# Patient Record
Sex: FEMALE | Race: WHITE | NOT HISPANIC OR LATINO | Employment: OTHER | URBAN - NONMETROPOLITAN AREA
[De-identification: names, ages, dates, MRNs, and addresses within clinical notes are randomized per-mention and may not be internally consistent; named-entity substitution may affect disease eponyms.]

---

## 2017-07-02 ENCOUNTER — HOSPITAL ENCOUNTER (EMERGENCY)
Facility: HOSPITAL | Age: 72
Discharge: HOME | End: 2017-07-02
Attending: EMERGENCY MEDICINE | Admitting: EMERGENCY MEDICINE

## 2017-07-02 VITALS
DIASTOLIC BLOOD PRESSURE: 93 MMHG | HEIGHT: 60 IN | RESPIRATION RATE: 14 BRPM | WEIGHT: 157.4 LBS | HEART RATE: 79 BPM | OXYGEN SATURATION: 96 % | BODY MASS INDEX: 30.9 KG/M2 | TEMPERATURE: 98.7 F | SYSTOLIC BLOOD PRESSURE: 169 MMHG
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
## 2017-07-02 PROCEDURE — 99282 EMERGENCY DEPT VISIT SF MDM: CPT

## 2017-07-02 RX ORDER — DOXYCYCLINE HYCLATE 100 MG/1
100 TABLET, DELAYED RELEASE ORAL 2 TIMES DAILY
Qty: 28 TABLET | Refills: 0 | Status: ON HOLD | OUTPATIENT
Start: 2017-07-02 | End: 2020-05-02

## 2017-07-02 NOTE — ED PROVIDER NOTES
Subjective   HPI Comments: 71-year-old female presents for our facility via private vehicle with family members at bedside with a complaint of a tick bite that is partially removed from her back.    She has noticed this tick bite for the past several days and itching type sensation.  She did not know what it was up until yesterday when her son noticed it was a tick and he attempted to remove it partially removed and now still has part of tick in bedded in the mid back area    There is no fever no chills no nausea no vomiting.      History provided by:  Patient   used: No        Review of Systems   Constitutional: Negative.    HENT: Negative.    Eyes: Negative.    Respiratory: Negative.    Cardiovascular: Negative.    Gastrointestinal: Negative.    Endocrine: Negative.    Musculoskeletal: Negative.    Neurological: Negative.    Hematological: Negative.    Psychiatric/Behavioral: Negative.        Past Medical History:   Diagnosis Date   • Depression    • Hypercholesteremia    • Hypertension    • TIA (transient ischemic attack)        No Known Allergies    Past Surgical History:   Procedure Laterality Date   • HYSTERECTOMY     • SKIN CANCER EXCISION         History reviewed. No pertinent family history.    Social History     Social History   • Marital status:      Spouse name: N/A   • Number of children: N/A   • Years of education: N/A     Social History Main Topics   • Smoking status: Never Smoker   • Smokeless tobacco: None   • Alcohol use No   • Drug use: No   • Sexual activity: Defer     Other Topics Concern   • None     Social History Narrative   • None           Objective   Physical Exam   Constitutional: She is oriented to person, place, and time. She appears well-developed and well-nourished.   HENT:   Head: Normocephalic.   Eyes: EOM are normal. Pupils are equal, round, and reactive to light.   Neck: Normal range of motion. Neck supple.   Cardiovascular: Normal rate.     Pulmonary/Chest: Effort normal and breath sounds normal.   Abdominal: Soft. Bowel sounds are normal.   Musculoskeletal: Normal range of motion.   Neurological: She is alert and oriented to person, place, and time. She has normal reflexes.   Skin:   Mid back area in the central area of the spine there is a central lesion that measures several millimeters there appears to be consistent with a tick its not lives moving his embedded into the skin    Surrounding area there appears to be no discoloration and no fluctuation no increasing temperature.   Psychiatric: She has a normal mood and affect. Her behavior is normal. Judgment and thought content normal.   Vitals reviewed.      Procedures         ED Course  ED Course   Comment By Time   Seizure note.  The area was cleaned with Betadine using 11 blade was able to scrape the pharmacy which of the ears to be consistent with a tick minimum bleeding she tolerated this well Chintan PETE MD 07/02 1240                  MDM  Number of Diagnoses or Management Options  Tick bite, sequela: new and does not require workup  Risk of Complications, Morbidity, and/or Mortality  Presenting problems: high  Diagnostic procedures: high  Management options: high    Patient Progress  Patient progress: stable      Final diagnoses:   Tick bite, sequela            Chintan PETE MD  07/02/17 7309

## 2017-07-02 NOTE — DISCHARGE INSTRUCTIONS
Keep area clean with antibiotic ointment.  Take antibiotics twice a day  If symptoms increase or shortness change return back to the ER.

## 2020-05-01 ENCOUNTER — HOSPITAL ENCOUNTER (INPATIENT)
Facility: HOSPITAL | Age: 75
LOS: 5 days | Discharge: NURSING, REHAB OR LONG-TERM CARE | End: 2020-05-06
Attending: EMERGENCY MEDICINE | Admitting: FAMILY MEDICINE

## 2020-05-01 ENCOUNTER — APPOINTMENT (OUTPATIENT)
Dept: CT IMAGING | Facility: HOSPITAL | Age: 75
End: 2020-05-01

## 2020-05-01 ENCOUNTER — APPOINTMENT (OUTPATIENT)
Dept: GENERAL RADIOLOGY | Facility: HOSPITAL | Age: 75
End: 2020-05-01

## 2020-05-01 DIAGNOSIS — R00.1 BRADYCARDIA, UNSPECIFIED: ICD-10-CM

## 2020-05-01 DIAGNOSIS — Z78.9 DECREASED ACTIVITIES OF DAILY LIVING (ADL): ICD-10-CM

## 2020-05-01 DIAGNOSIS — Z74.09 IMPAIRED MOBILITY: ICD-10-CM

## 2020-05-01 DIAGNOSIS — R29.898 LEFT ARM WEAKNESS: Primary | ICD-10-CM

## 2020-05-01 DIAGNOSIS — R13.10 DYSPHAGIA, UNSPECIFIED TYPE: ICD-10-CM

## 2020-05-01 DIAGNOSIS — R47.1 DYSARTHRIA: ICD-10-CM

## 2020-05-01 DIAGNOSIS — I63.9 CEREBROVASCULAR ACCIDENT (CVA), UNSPECIFIED MECHANISM (HCC): ICD-10-CM

## 2020-05-01 LAB
ABO GROUP BLD: NORMAL
ALBUMIN SERPL-MCNC: 4.5 G/DL (ref 3.5–5.2)
ALBUMIN/GLOB SERPL: 1.6 G/DL
ALP SERPL-CCNC: 94 U/L (ref 39–117)
ALT SERPL W P-5'-P-CCNC: 17 U/L (ref 1–33)
ANION GAP SERPL CALCULATED.3IONS-SCNC: 16 MMOL/L (ref 5–15)
AST SERPL-CCNC: 16 U/L (ref 1–32)
BASOPHILS # BLD AUTO: 0.06 10*3/MM3 (ref 0–0.2)
BASOPHILS NFR BLD AUTO: 0.6 % (ref 0–1.5)
BILIRUB SERPL-MCNC: 0.4 MG/DL (ref 0.2–1.2)
BLD GP AB SCN SERPL QL: NEGATIVE
BUN BLD-MCNC: 18 MG/DL (ref 8–23)
BUN/CREAT SERPL: 19.4 (ref 7–25)
CALCIUM SPEC-SCNC: 10.1 MG/DL (ref 8.6–10.5)
CHLORIDE SERPL-SCNC: 100 MMOL/L (ref 98–107)
CO2 SERPL-SCNC: 26 MMOL/L (ref 22–29)
CREAT BLD-MCNC: 0.93 MG/DL (ref 0.57–1)
DEPRECATED RDW RBC AUTO: 42.5 FL (ref 37–54)
EOSINOPHIL # BLD AUTO: 0.12 10*3/MM3 (ref 0–0.4)
EOSINOPHIL NFR BLD AUTO: 1.2 % (ref 0.3–6.2)
ERYTHROCYTE [DISTWIDTH] IN BLOOD BY AUTOMATED COUNT: 13 % (ref 12.3–15.4)
GFR SERPL CREATININE-BSD FRML MDRD: 59 ML/MIN/1.73
GLOBULIN UR ELPH-MCNC: 2.9 GM/DL
GLUCOSE BLD-MCNC: 214 MG/DL (ref 65–99)
GLUCOSE BLDC GLUCOMTR-MCNC: 207 MG/DL (ref 70–130)
HCT VFR BLD AUTO: 41.3 % (ref 34–46.6)
HGB BLD-MCNC: 13.7 G/DL (ref 12–15.9)
HOLD SPECIMEN: NORMAL
HOLD SPECIMEN: NORMAL
IMM GRANULOCYTES # BLD AUTO: 0.04 10*3/MM3 (ref 0–0.05)
IMM GRANULOCYTES NFR BLD AUTO: 0.4 % (ref 0–0.5)
INR PPP: 1.03 (ref 0.91–1.09)
LYMPHOCYTES # BLD AUTO: 2.8 10*3/MM3 (ref 0.7–3.1)
LYMPHOCYTES NFR BLD AUTO: 29.1 % (ref 19.6–45.3)
MCH RBC QN AUTO: 29.5 PG (ref 26.6–33)
MCHC RBC AUTO-ENTMCNC: 33.2 G/DL (ref 31.5–35.7)
MCV RBC AUTO: 89 FL (ref 79–97)
MONOCYTES # BLD AUTO: 0.77 10*3/MM3 (ref 0.1–0.9)
MONOCYTES NFR BLD AUTO: 8 % (ref 5–12)
NEUTROPHILS # BLD AUTO: 5.84 10*3/MM3 (ref 1.7–7)
NEUTROPHILS NFR BLD AUTO: 60.7 % (ref 42.7–76)
NRBC BLD AUTO-RTO: 0 /100 WBC (ref 0–0.2)
NT-PROBNP SERPL-MCNC: 86.3 PG/ML (ref 5–1800)
PLATELET # BLD AUTO: 268 10*3/MM3 (ref 140–450)
PMV BLD AUTO: 10.7 FL (ref 6–12)
POTASSIUM BLD-SCNC: 4.6 MMOL/L (ref 3.5–5.2)
PROT SERPL-MCNC: 7.4 G/DL (ref 6–8.5)
PROTHROMBIN TIME: 13.1 SECONDS (ref 11.9–14.6)
RBC # BLD AUTO: 4.64 10*6/MM3 (ref 3.77–5.28)
RH BLD: POSITIVE
SODIUM BLD-SCNC: 142 MMOL/L (ref 136–145)
T&S EXPIRATION DATE: NORMAL
TROPONIN T SERPL-MCNC: <0.01 NG/ML (ref 0–0.03)
WBC NRBC COR # BLD: 9.63 10*3/MM3 (ref 3.4–10.8)
WHOLE BLOOD HOLD SPECIMEN: NORMAL
WHOLE BLOOD HOLD SPECIMEN: NORMAL

## 2020-05-01 PROCEDURE — 99284 EMERGENCY DEPT VISIT MOD MDM: CPT

## 2020-05-01 PROCEDURE — 70496 CT ANGIOGRAPHY HEAD: CPT

## 2020-05-01 PROCEDURE — 85610 PROTHROMBIN TIME: CPT | Performed by: EMERGENCY MEDICINE

## 2020-05-01 PROCEDURE — 86850 RBC ANTIBODY SCREEN: CPT | Performed by: EMERGENCY MEDICINE

## 2020-05-01 PROCEDURE — 83880 ASSAY OF NATRIURETIC PEPTIDE: CPT | Performed by: EMERGENCY MEDICINE

## 2020-05-01 PROCEDURE — 80053 COMPREHEN METABOLIC PANEL: CPT | Performed by: EMERGENCY MEDICINE

## 2020-05-01 PROCEDURE — 85025 COMPLETE CBC W/AUTO DIFF WBC: CPT | Performed by: EMERGENCY MEDICINE

## 2020-05-01 PROCEDURE — 71045 X-RAY EXAM CHEST 1 VIEW: CPT

## 2020-05-01 PROCEDURE — 70450 CT HEAD/BRAIN W/O DYE: CPT

## 2020-05-01 PROCEDURE — 93010 ELECTROCARDIOGRAM REPORT: CPT | Performed by: INTERNAL MEDICINE

## 2020-05-01 PROCEDURE — 84484 ASSAY OF TROPONIN QUANT: CPT | Performed by: EMERGENCY MEDICINE

## 2020-05-01 PROCEDURE — 86901 BLOOD TYPING SEROLOGIC RH(D): CPT | Performed by: EMERGENCY MEDICINE

## 2020-05-01 PROCEDURE — 36415 COLL VENOUS BLD VENIPUNCTURE: CPT

## 2020-05-01 PROCEDURE — 70498 CT ANGIOGRAPHY NECK: CPT

## 2020-05-01 PROCEDURE — 86900 BLOOD TYPING SEROLOGIC ABO: CPT | Performed by: EMERGENCY MEDICINE

## 2020-05-01 PROCEDURE — 0 IOPAMIDOL PER 1 ML: Performed by: EMERGENCY MEDICINE

## 2020-05-01 PROCEDURE — 82962 GLUCOSE BLOOD TEST: CPT

## 2020-05-01 PROCEDURE — 93005 ELECTROCARDIOGRAM TRACING: CPT | Performed by: EMERGENCY MEDICINE

## 2020-05-01 RX ORDER — CITALOPRAM 40 MG/1
40 TABLET ORAL DAILY
COMMUNITY

## 2020-05-01 RX ORDER — SODIUM CHLORIDE 0.9 % (FLUSH) 0.9 %
10 SYRINGE (ML) INJECTION AS NEEDED
Status: DISCONTINUED | OUTPATIENT
Start: 2020-05-01 | End: 2020-05-06 | Stop reason: HOSPADM

## 2020-05-01 RX ORDER — ATORVASTATIN CALCIUM 10 MG/1
10 TABLET, FILM COATED ORAL NIGHTLY
Status: DISCONTINUED | OUTPATIENT
Start: 2020-05-01 | End: 2020-05-02

## 2020-05-01 RX ORDER — SODIUM CHLORIDE 0.9 % (FLUSH) 0.9 %
10 SYRINGE (ML) INJECTION EVERY 12 HOURS SCHEDULED
Status: DISCONTINUED | OUTPATIENT
Start: 2020-05-01 | End: 2020-05-06 | Stop reason: HOSPADM

## 2020-05-01 RX ORDER — AMLODIPINE BESYLATE AND BENAZEPRIL HYDROCHLORIDE 5; 20 MG/1; MG/1
1 CAPSULE ORAL DAILY
COMMUNITY

## 2020-05-01 RX ORDER — CLOPIDOGREL BISULFATE 75 MG/1
75 TABLET ORAL DAILY
Status: DISCONTINUED | OUTPATIENT
Start: 2020-05-02 | End: 2020-05-05

## 2020-05-01 RX ORDER — SODIUM CHLORIDE 9 MG/ML
100 INJECTION, SOLUTION INTRAVENOUS CONTINUOUS
Status: DISCONTINUED | OUTPATIENT
Start: 2020-05-01 | End: 2020-05-05

## 2020-05-01 RX ORDER — CITALOPRAM 20 MG/1
40 TABLET ORAL DAILY
Status: DISCONTINUED | OUTPATIENT
Start: 2020-05-02 | End: 2020-05-06 | Stop reason: HOSPADM

## 2020-05-01 RX ORDER — SIMVASTATIN 40 MG
40 TABLET ORAL NIGHTLY
COMMUNITY
End: 2020-05-06 | Stop reason: HOSPADM

## 2020-05-01 RX ORDER — CLOPIDOGREL BISULFATE 75 MG/1
75 TABLET ORAL DAILY
COMMUNITY

## 2020-05-01 RX ADMIN — ATORVASTATIN CALCIUM 10 MG: 10 TABLET, FILM COATED ORAL at 23:46

## 2020-05-01 RX ADMIN — IOPAMIDOL 82 ML: 755 INJECTION, SOLUTION INTRAVENOUS at 18:23

## 2020-05-01 RX ADMIN — SODIUM CHLORIDE 100 ML/HR: 9 INJECTION, SOLUTION INTRAVENOUS at 23:47

## 2020-05-01 RX ADMIN — SODIUM CHLORIDE, PRESERVATIVE FREE 10 ML: 5 INJECTION INTRAVENOUS at 23:47

## 2020-05-02 ENCOUNTER — APPOINTMENT (OUTPATIENT)
Dept: CARDIOLOGY | Facility: HOSPITAL | Age: 75
End: 2020-05-02

## 2020-05-02 ENCOUNTER — APPOINTMENT (OUTPATIENT)
Dept: MRI IMAGING | Facility: HOSPITAL | Age: 75
End: 2020-05-02

## 2020-05-02 PROBLEM — I16.0 HYPERTENSIVE URGENCY: Status: ACTIVE | Noted: 2020-05-02

## 2020-05-02 PROBLEM — E78.00 HYPERCHOLESTEROLEMIA: Status: ACTIVE | Noted: 2020-05-02

## 2020-05-02 PROBLEM — I63.9 STROKE (HCC): Status: ACTIVE | Noted: 2020-05-02

## 2020-05-02 PROBLEM — I10 ESSENTIAL HYPERTENSION: Status: ACTIVE | Noted: 2020-05-02

## 2020-05-02 LAB
ANION GAP SERPL CALCULATED.3IONS-SCNC: 15 MMOL/L (ref 5–15)
BASOPHILS # BLD AUTO: 0.07 10*3/MM3 (ref 0–0.2)
BASOPHILS NFR BLD AUTO: 0.7 % (ref 0–1.5)
BH CV ECHO MEAS - AO MAX PG (FULL): 4.1 MMHG
BH CV ECHO MEAS - AO MAX PG: 7 MMHG
BH CV ECHO MEAS - AO MEAN PG (FULL): 2 MMHG
BH CV ECHO MEAS - AO MEAN PG: 3 MMHG
BH CV ECHO MEAS - AO ROOT AREA (BSA CORRECTED): 1.8
BH CV ECHO MEAS - AO ROOT AREA: 7.3 CM^2
BH CV ECHO MEAS - AO ROOT DIAM: 3.1 CM
BH CV ECHO MEAS - AO V2 MAX: 132 CM/SEC
BH CV ECHO MEAS - AO V2 MEAN: 71.4 CM/SEC
BH CV ECHO MEAS - AO V2 VTI: 23.9 CM
BH CV ECHO MEAS - AVA(I,A): 2.2 CM^2
BH CV ECHO MEAS - AVA(I,D): 2.2 CM^2
BH CV ECHO MEAS - AVA(V,A): 2 CM^2
BH CV ECHO MEAS - AVA(V,D): 2 CM^2
BH CV ECHO MEAS - BSA(HAYCOCK): 1.7 M^2
BH CV ECHO MEAS - BSA: 1.7 M^2
BH CV ECHO MEAS - BZI_BMI: 29.9 KILOGRAMS/M^2
BH CV ECHO MEAS - BZI_METRIC_HEIGHT: 152.4 CM
BH CV ECHO MEAS - BZI_METRIC_WEIGHT: 69.4 KG
BH CV ECHO MEAS - EDV(CUBED): 71.5 ML
BH CV ECHO MEAS - EDV(MOD-SP4): 52.3 ML
BH CV ECHO MEAS - EDV(TEICH): 76.4 ML
BH CV ECHO MEAS - EF(CUBED): 75.4 %
BH CV ECHO MEAS - EF(MOD-SP4): 66.5 %
BH CV ECHO MEAS - EF(TEICH): 67.8 %
BH CV ECHO MEAS - ESV(CUBED): 17.6 ML
BH CV ECHO MEAS - ESV(MOD-SP4): 17.5 ML
BH CV ECHO MEAS - ESV(TEICH): 24.6 ML
BH CV ECHO MEAS - FS: 37.3 %
BH CV ECHO MEAS - IVS/LVPW: 1.4
BH CV ECHO MEAS - IVSD: 1.9 CM
BH CV ECHO MEAS - LA DIMENSION: 3.4 CM
BH CV ECHO MEAS - LA/AO: 1.1
BH CV ECHO MEAS - LAT PEAK E' VEL: 5.6 CM/SEC
BH CV ECHO MEAS - LV DIASTOLIC VOL/BSA (35-75): 31.4 ML/M^2
BH CV ECHO MEAS - LV MASS(C)D: 278.3 GRAMS
BH CV ECHO MEAS - LV MASS(C)DI: 167.1 GRAMS/M^2
BH CV ECHO MEAS - LV MAX PG: 2.8 MMHG
BH CV ECHO MEAS - LV MEAN PG: 1 MMHG
BH CV ECHO MEAS - LV SYSTOLIC VOL/BSA (12-30): 10.5 ML/M^2
BH CV ECHO MEAS - LV V1 MAX: 84.1 CM/SEC
BH CV ECHO MEAS - LV V1 MEAN: 41.7 CM/SEC
BH CV ECHO MEAS - LV V1 VTI: 16.8 CM
BH CV ECHO MEAS - LVIDD: 4.2 CM
BH CV ECHO MEAS - LVIDS: 2.6 CM
BH CV ECHO MEAS - LVLD AP4: 7.6 CM
BH CV ECHO MEAS - LVLS AP4: 5.6 CM
BH CV ECHO MEAS - LVOT AREA (M): 3.1 CM^2
BH CV ECHO MEAS - LVOT AREA: 3.1 CM^2
BH CV ECHO MEAS - LVOT DIAM: 2 CM
BH CV ECHO MEAS - LVPWD: 1.3 CM
BH CV ECHO MEAS - MED PEAK E' VEL: 4.35 CM/SEC
BH CV ECHO MEAS - MV A MAX VEL: 140 CM/SEC
BH CV ECHO MEAS - MV DEC TIME: 0.21 SEC
BH CV ECHO MEAS - MV E MAX VEL: 73.3 CM/SEC
BH CV ECHO MEAS - MV E/A: 0.52
BH CV ECHO MEAS - RVDD: 3.4 CM
BH CV ECHO MEAS - SI(AO): 104.8 ML/M^2
BH CV ECHO MEAS - SI(CUBED): 32.4 ML/M^2
BH CV ECHO MEAS - SI(LVOT): 31.7 ML/M^2
BH CV ECHO MEAS - SI(MOD-SP4): 20.9 ML/M^2
BH CV ECHO MEAS - SI(TEICH): 31.1 ML/M^2
BH CV ECHO MEAS - SV(AO): 174.6 ML
BH CV ECHO MEAS - SV(CUBED): 53.9 ML
BH CV ECHO MEAS - SV(LVOT): 52.8 ML
BH CV ECHO MEAS - SV(MOD-SP4): 34.8 ML
BH CV ECHO MEAS - SV(TEICH): 51.8 ML
BH CV ECHO MEASUREMENTS AVERAGE E/E' RATIO: 14.73
BUN BLD-MCNC: 20 MG/DL (ref 8–23)
BUN/CREAT SERPL: 19.2 (ref 7–25)
CALCIUM SPEC-SCNC: 10 MG/DL (ref 8.6–10.5)
CHLORIDE SERPL-SCNC: 102 MMOL/L (ref 98–107)
CHOLEST SERPL-MCNC: 184 MG/DL (ref 0–200)
CO2 SERPL-SCNC: 23 MMOL/L (ref 22–29)
CREAT BLD-MCNC: 1.04 MG/DL (ref 0.57–1)
DEPRECATED RDW RBC AUTO: 42.5 FL (ref 37–54)
EOSINOPHIL # BLD AUTO: 0.16 10*3/MM3 (ref 0–0.4)
EOSINOPHIL NFR BLD AUTO: 1.5 % (ref 0.3–6.2)
ERYTHROCYTE [DISTWIDTH] IN BLOOD BY AUTOMATED COUNT: 13.2 % (ref 12.3–15.4)
GFR SERPL CREATININE-BSD FRML MDRD: 52 ML/MIN/1.73
GLUCOSE BLD-MCNC: 265 MG/DL (ref 65–99)
GLUCOSE BLDC GLUCOMTR-MCNC: 212 MG/DL (ref 70–130)
GLUCOSE BLDC GLUCOMTR-MCNC: 262 MG/DL (ref 70–130)
HBA1C MFR BLD: 8.9 % (ref 4.8–5.6)
HCT VFR BLD AUTO: 39.1 % (ref 34–46.6)
HDLC SERPL-MCNC: 33 MG/DL (ref 40–60)
HGB BLD-MCNC: 13 G/DL (ref 12–15.9)
IMM GRANULOCYTES # BLD AUTO: 0.01 10*3/MM3 (ref 0–0.05)
IMM GRANULOCYTES NFR BLD AUTO: 0.1 % (ref 0–0.5)
LDLC SERPL CALC-MCNC: 85 MG/DL (ref 0–100)
LDLC/HDLC SERPL: 2.57 {RATIO}
LEFT ATRIUM VOLUME INDEX: 11.9 ML/M2
LEFT ATRIUM VOLUME: 19.8 CM3
LYMPHOCYTES # BLD AUTO: 4.07 10*3/MM3 (ref 0.7–3.1)
LYMPHOCYTES NFR BLD AUTO: 38.4 % (ref 19.6–45.3)
MAXIMAL PREDICTED HEART RATE: 145 BPM
MCH RBC QN AUTO: 29.4 PG (ref 26.6–33)
MCHC RBC AUTO-ENTMCNC: 33.2 G/DL (ref 31.5–35.7)
MCV RBC AUTO: 88.5 FL (ref 79–97)
MONOCYTES # BLD AUTO: 0.91 10*3/MM3 (ref 0.1–0.9)
MONOCYTES NFR BLD AUTO: 8.6 % (ref 5–12)
NEUTROPHILS # BLD AUTO: 5.37 10*3/MM3 (ref 1.7–7)
NEUTROPHILS NFR BLD AUTO: 50.7 % (ref 42.7–76)
NRBC BLD AUTO-RTO: 0 /100 WBC (ref 0–0.2)
PLATELET # BLD AUTO: 278 10*3/MM3 (ref 140–450)
PMV BLD AUTO: 10.7 FL (ref 6–12)
POTASSIUM BLD-SCNC: 3.8 MMOL/L (ref 3.5–5.2)
RBC # BLD AUTO: 4.42 10*6/MM3 (ref 3.77–5.28)
SODIUM BLD-SCNC: 140 MMOL/L (ref 136–145)
STRESS TARGET HR: 123 BPM
TRIGL SERPL-MCNC: 331 MG/DL (ref 0–150)
VIT B12 BLD-MCNC: 337 PG/ML (ref 211–946)
VLDLC SERPL-MCNC: 66.2 MG/DL
WBC NRBC COR # BLD: 10.59 10*3/MM3 (ref 3.4–10.8)

## 2020-05-02 PROCEDURE — 82962 GLUCOSE BLOOD TEST: CPT

## 2020-05-02 PROCEDURE — 97166 OT EVAL MOD COMPLEX 45 MIN: CPT

## 2020-05-02 PROCEDURE — 80061 LIPID PANEL: CPT | Performed by: NURSE PRACTITIONER

## 2020-05-02 PROCEDURE — 97110 THERAPEUTIC EXERCISES: CPT

## 2020-05-02 PROCEDURE — 63710000001 INSULIN LISPRO (HUMAN) PER 5 UNITS: Performed by: FAMILY MEDICINE

## 2020-05-02 PROCEDURE — 83036 HEMOGLOBIN GLYCOSYLATED A1C: CPT | Performed by: NURSE PRACTITIONER

## 2020-05-02 PROCEDURE — 85025 COMPLETE CBC W/AUTO DIFF WBC: CPT | Performed by: FAMILY MEDICINE

## 2020-05-02 PROCEDURE — 93306 TTE W/DOPPLER COMPLETE: CPT | Performed by: INTERNAL MEDICINE

## 2020-05-02 PROCEDURE — 70551 MRI BRAIN STEM W/O DYE: CPT

## 2020-05-02 PROCEDURE — 82607 VITAMIN B-12: CPT | Performed by: NURSE PRACTITIONER

## 2020-05-02 PROCEDURE — 80048 BASIC METABOLIC PNL TOTAL CA: CPT | Performed by: FAMILY MEDICINE

## 2020-05-02 PROCEDURE — 99223 1ST HOSP IP/OBS HIGH 75: CPT | Performed by: PSYCHIATRY & NEUROLOGY

## 2020-05-02 PROCEDURE — 93306 TTE W/DOPPLER COMPLETE: CPT

## 2020-05-02 RX ORDER — LEVOCETIRIZINE DIHYDROCHLORIDE 5 MG/1
5 TABLET, FILM COATED ORAL EVERY EVENING
COMMUNITY
End: 2020-05-06 | Stop reason: HOSPADM

## 2020-05-02 RX ORDER — LORAZEPAM 1 MG/1
1 TABLET ORAL EVERY 6 HOURS PRN
Status: DISCONTINUED | OUTPATIENT
Start: 2020-05-02 | End: 2020-05-06 | Stop reason: HOSPADM

## 2020-05-02 RX ORDER — ATORVASTATIN CALCIUM 40 MG/1
40 TABLET, FILM COATED ORAL NIGHTLY
Status: DISCONTINUED | OUTPATIENT
Start: 2020-05-02 | End: 2020-05-02

## 2020-05-02 RX ORDER — NICOTINE POLACRILEX 4 MG
15 LOZENGE BUCCAL
Status: DISCONTINUED | OUTPATIENT
Start: 2020-05-02 | End: 2020-05-06 | Stop reason: HOSPADM

## 2020-05-02 RX ORDER — ATORVASTATIN CALCIUM 40 MG/1
80 TABLET, FILM COATED ORAL NIGHTLY
Status: DISCONTINUED | OUTPATIENT
Start: 2020-05-02 | End: 2020-05-06 | Stop reason: HOSPADM

## 2020-05-02 RX ORDER — DEXTROSE MONOHYDRATE 25 G/50ML
25 INJECTION, SOLUTION INTRAVENOUS
Status: DISCONTINUED | OUTPATIENT
Start: 2020-05-02 | End: 2020-05-06 | Stop reason: HOSPADM

## 2020-05-02 RX ADMIN — CLOPIDOGREL 75 MG: 75 TABLET, FILM COATED ORAL at 08:08

## 2020-05-02 RX ADMIN — AMLODIPINE BESYLATE: 5 TABLET ORAL at 08:08

## 2020-05-02 RX ADMIN — SODIUM CHLORIDE, PRESERVATIVE FREE 10 ML: 5 INJECTION INTRAVENOUS at 08:08

## 2020-05-02 RX ADMIN — INSULIN LISPRO 4 UNITS: 100 INJECTION, SOLUTION INTRAVENOUS; SUBCUTANEOUS at 17:37

## 2020-05-02 RX ADMIN — ATORVASTATIN CALCIUM 80 MG: 40 TABLET, FILM COATED ORAL at 20:16

## 2020-05-02 RX ADMIN — SODIUM CHLORIDE, PRESERVATIVE FREE 10 ML: 5 INJECTION INTRAVENOUS at 20:16

## 2020-05-02 RX ADMIN — CITALOPRAM 40 MG: 20 TABLET, FILM COATED ORAL at 08:08

## 2020-05-02 RX ADMIN — INSULIN LISPRO 6 UNITS: 100 INJECTION, SOLUTION INTRAVENOUS; SUBCUTANEOUS at 12:14

## 2020-05-02 NOTE — THERAPY EVALUATION
Acute Care - Occupational Therapy Initial Evaluation  [facility]     Patient Name: Gaviota Erazo  : 1945  MRN: [id number]  Today's Date: 2020  Onset of Illness/Injury or Date of Surgery: 20  Date of Referral to OT: 20  Referring Physician: MARGARITA Sy    Admit Date: 2020       ICD-10-CM ICD-9-CM   1. Left arm weakness R29.898 729.89   2. Decreased activities of daily living (ADL) Z78.9 V49.89     Patient Active Problem List   Diagnosis   • Left arm weakness   • Essential hypertension   • Hypertensive urgency   • Hypercholesterolemia   • Stroke (CMS/HCC)     Past Medical History:   Diagnosis Date   • Depression    • Hypercholesteremia    • Hypertension    • TIA (transient ischemic attack)      Past Surgical History:   Procedure Laterality Date   • HYSTERECTOMY     • SKIN CANCER EXCISION            OT ASSESSMENT FLOWSHEET (last 12 hours)      Occupational Therapy Evaluation     Row Name 20 0830                   OT Evaluation Time/Intention    Subjective Information  complains of;weakness;numbness BLE weakness, LUE weakness, L hand numbness  -CS        Document Type  evaluation  -CS        Mode of Treatment  occupational therapy  -CS           General Information    Patient Profile Reviewed?  yes  -CS        Onset of Illness/Injury or Date of Surgery  20  -CS        Referring Physician  MARGARITA Sy  -CS        Patient Observations  alert;cooperative;agree to therapy  -CS        Patient/Family Observations  no family present  -CS        General Observations of Patient  Pt sitting in bedside chair  -CS        Prior Level of Function  independent:;all household mobility;community mobility;ADL's;cooking;cleaning;driving;dressing;bathing  -CS        Equipment Currently Used at Home  grab bar in shower  -CS        Pertinent History of Current Functional Problem  Pt presents with a 3 days history of LUE weakness and undetermined amount of time with progressive LE weakness.  DX:  R temporal lobe, basal ganglia, and centrum semiovale CVA (all age indeterminate)  -CS        Existing Precautions/Restrictions  fall  -CS        Risks Reviewed  patient:;LOB;nausea/vomiting;dizziness;increased discomfort  -CS        Benefits Reviewed  patient:;improve function;increase independence;increase strength;increase balance;decrease pain  -CS        Barriers to Rehab  medically complex  -CS           Relationship/Environment    Lives With  grandchild(brent)  -CS        Name(s) of Who Lives With Patient  Pt's grandson works full time; 7:00-3:30  -CS           Resource/Environmental Concerns    Current Living Arrangements  home/apartment/condo  -CS           Home Main Entrance    Number of Stairs, Main Entrance  two  -CS        Stair Railings, Main Entrance  railing on left side (ascending)  -CS           Cognitive Assessment/Interventions    Additional Documentation  Cognitive Assessment/Intervention (Group)  -CS           Cognitive Assessment/Intervention- PT/OT    Affect/Mental Status (Cognitive)  WNL  -CS        Orientation Status (Cognition)  oriented x 4  -CS        Follows Commands (Cognition)  WNL  -CS        Cognitive Function (Cognitive)  WNL  -CS        Personal Safety Interventions  fall prevention program maintained;gait belt;nonskid shoes/slippers when out of bed;supervised activity  -CS           Safety Issues, Functional Mobility    Impairments Affecting Function (Mobility)  balance;strength  -CS           Bed Mobility Assessment/Treatment    Comment (Bed Mobility)  deferred due to pt sitting in chair  -CS           Functional Mobility    Functional Mobility- Ind. Level  contact guard assist;supervision required;verbal cues required  -CS        Functional Mobility- Comment  Pt walked from bedside chair to room door to transport bed  -CS           Transfer Assessment/Treatment    Transfer Assessment/Treatment  sit-stand transfer;stand-sit transfer  -CS           Sit-Stand Transfer    Sit-Stand  Price (Transfers)  supervision;verbal cues  -CS           Stand-Sit Transfer    Stand-Sit Price (Transfers)  supervision;verbal cues  -CS           ADL Assessment/Intervention    BADL Assessment/Intervention  lower body dressing  -CS           Lower Body Dressing Assessment/Training    Lower Body Dressing Price Level  don;doff;socks;maximum assist (25% patient effort)  -CS        Lower Body Dressing Position  edge of bed sitting  -CS        Comment (Lower Body Dressing)  difficulty using 1 hand technique due to flexibility  -CS           BADL Safety/Performance    Impairments, BADL Safety/Performance  motor control;coordination;grasp/prehension;strength  -CS           General ROM    GENERAL ROM COMMENTS  RUE AROM WFL, LUE AROM WFL with increased time and attention, minimal impairment with L hand gross digit extension  -CS           MMT (Manual Muscle Testing)    General MMT Comments  RUE: 4+/5, LUE shoulder: 3+/5, L elbow: 4-/5, L hand grasp: 4-/5, L hand gross extension: 3-/5  -CS           Motor Assessment/Interventions    Muscle Tone Assessment  LUE  -CS        LUE Muscle Tone Assessment  mildly increased tone  -CS        Additional Documentation  Balance (Group);Fine Motor Testing & Training (Group);Gross Motor Coordination (Group);Muscle Tone Assessment (Row)  -CS           Gross Motor Coordination    Gross Motor Impairments  finger to nose;rapid alternating LUE severely impaired  -CS           Balance    Balance  static sitting balance;dynamic sitting balance;static standing balance;dynamic standing balance  -CS           Static Sitting Balance    Level of [place] (Unsupported Sitting, Static Balance)  independent  -CS        Sitting Position (Unsupported Sitting, Static Balance)  sitting in chair  -CS           Dynamic Sitting Balance    Level of [place], Reaches Outside Midline (Sitting, Dynamic Balance)  supervision  -CS        Sitting Position, Reaches Outside Midline  (Sitting, Dynamic Balance)  sitting in chair  -CS           Static Standing Balance    Level of [place] (Supported Standing, Static Balance)  supervision  -CS           Dynamic Standing Balance    Level of [place], Reaches Outside Midline (Standing, Dynamic Balance)  contact guard assist  -CS           Fine Motor Testing & Training    Comment, Fine Motor Coordination  L hand FMC severly impaired  -CS           Sensory Assessment/Intervention    Sensory General Assessment  no sensation deficits identified despite reports initially of diminished sensation in L hand  -CS           Positioning and Restraints    Pre-Treatment Position  sitting in chair/recliner  -CS           Pain Assessment    Additional Documentation  Pain Scale: Numbers Pre/Post-Treatment (Group)  -CS           Pain Scale: Numbers Pre/Post-Treatment    Pain Scale: Numbers, Pretreatment  0/10 - no pain  -CS        Pain Scale: Numbers, Post-Treatment  0/10 - no pain  -CS           Plan of Care Review    Plan of Care Reviewed With  patient  -CS        Progress  no change  -CS        Outcome Summary  OT evaluation completed.  Pt demo need for continued OT services.  Pt required S/CGA for functional mobility in the room, and max A for LB dressing due to impaired LUE strength and coordination deficits.  Pt is significantly limited with her LUE strength and coordination impairment, however also demonstrates decreased safety awareness and decreased balance.  OT will cont to follow.  It is recommended for pt to discharge to acute rehab vs home with assist and OP OT.  -CS           Clinical Impression (OT)    Date of Referral to OT  05/01/20  -CS        OT Diagnosis  Impaired ADLs and functional mobility  -CS        Patient/Family Goals Statement (OT Eval)  to go home  -CS        Criteria for Skilled Therapeutic Interventions Met (OT Eval)  yes;treatment indicated  -CS        Rehab Potential (OT Eval)  good, to achieve stated therapy goals  -CS         Therapy Frequency (OT Eval)  5 times/wk  -CS        Predicted Duration of Therapy Intervention (Therapy Eval)  until hospital discharge  -CS        Care Plan Review (OT)  evaluation/treatment results reviewed;care plan/treatment goals reviewed;risks/benefits reviewed;current/potential barriers reviewed  -CS        Anticipated Discharge Disposition (OT)  home with assist;home with OP services;inpatient rehabilitation facility  -CS           Planned OT Interventions    Planned Therapy Interventions (OT Eval)  activity tolerance training;BADL retraining;functional balance retraining;IADL retraining;transfer/mobility retraining;strengthening exercise;ROM/therapeutic exercise;patient/caregiver education/training;occupation/activity based interventions;neuromuscular control/coordination retraining  -CS           OT Goals    Bathing Goal Selection (OT)  bathing, OT goal 1  -CS        Dressing Goal Selection (OT)  dressing, OT goal 1  -CS        Coordination Goal Selection (OT)  coordination, OT goal 1  -CS        Additional Documentation  Coordination Goal Selection (OT) (Row)  -CS           Bathing Goal 1 (OT)    Activity/Assistive Device (Bathing Goal 1, OT)  bathing skills, all  -CS        [place] Level/Cues Needed (Bathing Goal 1, OT)  conditional independence  -CS        Time Frame (Bathing Goal 1, OT)  10 days  -CS        Progress/Outcomes (Bathing Goal 1, OT)  goal ongoing  -CS           Dressing Goal 1 (OT)    Activity/Assistive Device (Dressing Goal 1, OT)  dressing skills, all  -CS        [place]/Cues Needed (Dressing Goal 1, OT)  conditional independence  -CS        Time Frame (Dressing Goal 1, OT)  10 days  -CS        Progress/Outcome (Dressing Goal 1, OT)  goal ongoing  -CS           Coordination Goal 1 (OT)    Activity/Assistive Device (Coordination Goal 1, OT)  FM task;GM task  -CS        [place] Level/Cues Needed (Coordination Goal 1, OT)  independent  -CS        Time Frame (Coordination Goal  1, OT)  10 days  -CS        Progress/Outcomes (Coordination Goal 1, OT)  goal ongoing  -CS           Living Environment    Home Accessibility  stairs to enter home;tub/shower is not walk in  -CS          User Key  (r) = Recorded By, (t) = Taken By, (c) = Cosigned By    Initials Name Effective Dates    CS Enma Bennett, OTR/L, CNT 04/02/19 -                OT Recommendation and Plan  Outcome Summary/Treatment Plan (OT)  Anticipated Discharge Disposition (OT): home with assist, home with OP services, inpatient rehabilitation facility  Planned Therapy Interventions (OT Eval): activity tolerance training, BADL retraining, functional balance retraining, IADL retraining, transfer/mobility retraining, strengthening exercise, ROM/therapeutic exercise, patient/caregiver education/training, occupation/activity based interventions, neuromuscular control/coordination retraining  Therapy Frequency (OT Eval): 5 times/wk  Plan of Care Review  Plan of Care Reviewed With: patient  Plan of Care Reviewed With: patient  Outcome Summary: OT evaluation completed.  Pt demo need for continued OT services.  Pt required S/CGA for functional mobility in the room, and max A for LB dressing due to impaired LUE strength and coordination deficits.  Pt is significantly limited with her LUE strength and coordination impairment, however also demonstrates decreased safety awareness and decreased balance.  OT will cont to follow.  It is recommended for pt to discharge to acute rehab vs home with assist and OP OT.    Outcome Measures     Row Name 05/02/20 0830             How much help from another is currently needed...    Putting on and taking off regular lower body clothing?  2  -CS      Bathing (including washing, rinsing, and drying)  3  -CS      Toileting (which includes using toilet bed pan or urinal)  3  -CS      Putting on and taking off regular upper body clothing  2  -CS      Taking care of personal grooming (such as brushing teeth)  3   -CS      Eating meals  3  -CS      AM-PAC 6 Clicks Score (OT)  16  -CS         Modified [place] Scale    Pre-Stroke Modified Miller Scale  0 - No Symptoms at all.  -CS      Modified Miller Scale  4 - Moderately severe disability.  Unable to walk without assistance, and unable to attend to own bodily needs without assistance.  -CS         Functional Assessment    Outcome Measure Options  AM-PAC 6 Clicks Daily Activity (OT);Modified Miller  -CS        User Key  (r) = Recorded By, (t) = Taken By, (c) = Cosigned By    Initials Name Provider Type    Enma Sarkar OTR/L, ELVA Occupational Therapist          Time Calculation:   Time Calculation- OT     Row Name 05/02/20 0916             Time Calculation- OT    OT Start Time  0830  -CS      OT Stop Time  0911  -CS      OT Time Calculation (min)  41 min  -CS      OT Received On  05/02/20  -      OT Goal Re-Cert Due Date  05/12/20  -        User Key  (r) = Recorded By, (t) = Taken By, (c) = Cosigned By    Initials Name Provider Type    Enma Sarkar OTR/L, ELVA Occupational Therapist        Therapy Charges for Today     Code Description Service Date Service Provider Modifiers Qty    46287403277 HC OT EVAL MOD COMPLEXITY 3 5/2/2020 Enma Bennett OTR/L, CNT GO 1               GLADYS Xavier/L, CNT  5/2/2020

## 2020-05-02 NOTE — PLAN OF CARE
Problem: Patient Care Overview  Goal: Plan of Care Review  Outcome: Ongoing (interventions implemented as appropriate)  Flowsheets (Taken 5/2/2020 0830)  Progress: no change  Plan of Care Reviewed With: patient  Outcome Summary: OT evaluation completed.  Pt demo need for continued OT services.  Pt required S/CGA for functional mobility in the room, and max A for LB dressing due to impaired LUE strength and coordination deficits.  Pt is significantly limited with her LUE strength and coordination impairment, however also demonstrates decreased safety awareness and decreased balance.  OT will cont to follow.  It is recommended for pt to discharge to acute rehab vs home with assist and OP OT.

## 2020-05-02 NOTE — DISCHARGE PLACEMENT REQUEST
"Gaviota Hale (74 y.o. Female)     Date of Birth Social Security Number Address Home Phone MRN    1945  [address] [place] [id number] [phone] [id number]    [employer] Marital Status          [employer]        Admission Date Admission Type Admitting Provider Attending Provider Department, Room/Bed    5/1/20 Emergency Nhan Muir MD [place], James Noah, MD [facility] 3A, 353/1    Discharge Date Discharge Disposition Discharge Destination                       Attending Provider:  Nhan Muir MD    Allergies:  No Known Allergies    Isolation:  None   Infection:  None   Code Status:  CPR    Ht:  152.4 cm (60\")   Wt:  69.6 kg (153 lb 6 oz)    Admission Cmt:  None   Principal Problem:  Stroke (CMS/HCC) [I63.9]                 Active Insurance as of 5/1/2020     Primary Coverage     Payor Plan Insurance Group Employer/Plan Group    ANTHEM MEDICARE REPLACEMENT ANTHEM MEDICARE ADVANTAGE [id number]     Payor Plan Address Payor Plan Phone Number Payor Plan Fax Number Effective Dates    [address] [phone]  8/1/2019 - None Entered    [facility] [id number]       Subscriber Name Subscriber Birth Date Member ID       GAVIOTA HALE 1945 [id number]                 Emergency Contacts      (Rel.) Home Phone Work Phone Mobile Phone    Mandeep Hale (Grandchild) [phone] -- [phone]    Willam Hale (Grandchild) [phone] -- [phone]              "

## 2020-05-02 NOTE — PLAN OF CARE
Problem: Patient Care Overview  Goal: Plan of Care Review  Outcome: Ongoing (interventions implemented as appropriate)  Flowsheets  Taken 5/2/2020 1831 by Sophia Guzman RN  Progress: improving  Taken 5/2/2020 1451 by Jose Miguel Castaneda COTA/L  Plan of Care Reviewed With: patient  Note:   A&O X4. NIH=2,3. Q4 NIH complete, now Q shift. No c/o pain. Up X1. Voiding. Up in chair most of the shift. C/o nausea, crackers and Sprite given with good relief. MRI and echo today. Glucose monitoring. Safety maintained. Tele. NPO midnight tomorrow for ZAYDA on 5/4.

## 2020-05-02 NOTE — THERAPY TREATMENT NOTE
Acute Care - Occupational Therapy Treatment Note  [facility]     Patient Name: Gaviota Erazo  : 1945  MRN: [id number]  Today's Date: 2020  Onset of Illness/Injury or Date of Surgery: 20  Date of Referral to OT: 20  Referring Physician: MARGARITA Sy    Admit Date: 2020       ICD-10-CM ICD-9-CM   1. Left arm weakness R29.898 729.89   2. Decreased activities of daily living (ADL) Z78.9 V49.89     Patient Active Problem List   Diagnosis   • Left arm weakness   • Essential hypertension   • Hypertensive urgency   • Hypercholesterolemia   • Stroke (CMS/HCC)     Past Medical History:   Diagnosis Date   • Depression    • Hypercholesteremia    • Hypertension    • TIA (transient ischemic attack)      Past Surgical History:   Procedure Laterality Date   • HYSTERECTOMY     • SKIN CANCER EXCISION         Therapy Treatment    Rehabilitation Treatment Summary     Row Name 20 1305             Treatment Time/Intention    Discipline  occupational therapy assistant  -CJ      Document Type  therapy note (daily note)  -CJ      Subjective Information  complains of;weakness;fatigue  -CJ      Mode of Treatment  occupational therapy  -CJ      Patient Effort  good  -CJ      Existing Precautions/Restrictions  fall L. sided weakness!  -CJ      Recorded by [CJ] Jose Miguel Castaneda COTA/L 20 1450      Row Name 20 1305             Bed Mobility Assessment/Treatment    Comment (Bed Mobility)  up in chair!  -CJ      Recorded by [CJ] Jose Miguel Castaneda COTA/L 20 1450      Row Name 20 1305             General ROM    LT Upper Ext  Lt Shoulder ABduction;Lt Shoulder Extension;Lt Shoulder Flexion;Lt Shoulder Horizontal Abduction;Lt Shoulder External Rotation;Lt Shoulder Internal Rotation;Lt Elbow Extension/Flexion;Lt Elbow Supination;Lt Elbow Pronation;Lt Wrist Flexion;Lt Wrist Extension Prom/AAROM!  -CJ      Right Hand  Fingers MCP Flexion;Fingers MCP Extension;Fingers MCP ABDuction;Fingers MCP  ADDuction;Fingers PIP Flexion;Fingers DIP Flexion;Thumb CM Flexion;Thumb CM Extension L. Phalanges!  -CJ      Recorded by [CJ] Jose Miguel Castaneda COTA/L 05/02/20 1450      Row Name 05/02/20 1305             Motor Skills Assessment/Interventions    Muscle Tone Assessment  RUE  -CJ      Recorded by [CJ] Jose Miguel Castaneda COTA/L 05/02/20 1450      Row Name 05/02/20 1305             Positioning and Restraints    Pre-Treatment Position  sitting in chair/recliner  -CJ      Post Treatment Position  chair  -CJ      In Chair  sitting;call light within reach;encouraged to call for assist  -CJ      Recorded by [CJ] Jose Miguel Castaneda COTA/L 05/02/20 1450      Row Name 05/02/20 1305             Pain Assessment    Additional Documentation  Pain Scale 2: Word Pre/Post-Treatment (Group)  -CJ      Recorded by [CJ] Jose Miguel Castaneda COTA/L 05/02/20 1450      Row Name 05/02/20 1305             Pain Scale: Numbers Pre/Post-Treatment    Pain Scale: Numbers, Pretreatment  0/10 - no pain  -CJ      Pain Scale: Numbers, Post-Treatment  0/10 - no pain  -CJ      Recorded by [CJ] Jose Miguel Castaneda COTA/L 05/02/20 1450      Row Name 05/02/20 1305             Outcome Summary/Treatment Plan (OT)    Daily Summary of Progress (OT)  progress toward functional goals is good  -CJ      Barriers to Overall Progress (OT)  Fall/L. sided weakness!  -CJ      Plan for Continued Treatment (OT)  continue ot poc!  -CJ      Recorded by [CJ] Jose Miguel Castaneda COTA/LARISA 05/02/20 2283        User Key  (r) = Recorded By, (t) = Taken By, (c) = Cosigned By    Initials Name Effective Dates Discipline    CJ Jose Miguel Castaneda COTA/L 08/02/16 -  OT           Rehab Goal Summary     Row Name 05/02/20 0830             Occupational Therapy Goals    Bathing Goal Selection (OT)  bathing, OT goal 1  -CS      Dressing Goal Selection (OT)  dressing, OT goal 1  -CS      Coordination Goal Selection (OT)  coordination, OT goal 1  -CS         Bathing Goal 1 (OT)     Activity/Assistive Device (Bathing Goal 1, OT)  bathing skills, all  -CS      [place] Level/Cues Needed (Bathing Goal 1, OT)  conditional independence  -CS      Time Frame (Bathing Goal 1, OT)  10 days  -CS      Progress/Outcomes (Bathing Goal 1, OT)  goal ongoing  -CS         Dressing Goal 1 (OT)    Activity/Assistive Device (Dressing Goal 1, OT)  dressing skills, all  -CS      [place]/Cues Needed (Dressing Goal 1, OT)  conditional independence  -CS      Time Frame (Dressing Goal 1, OT)  10 days  -CS      Progress/Outcome (Dressing Goal 1, OT)  goal ongoing  -CS         Coordination Goal 1 (OT)    Activity/Assistive Device (Coordination Goal 1, OT)  FM task;GM task  -CS      [place] Level/Cues Needed (Coordination Goal 1, OT)  independent  -CS      Time Frame (Coordination Goal 1, OT)  10 days  -CS      Progress/Outcomes (Coordination Goal 1, OT)  goal ongoing  -CS        User Key  (r) = Recorded By, (t) = Taken By, (c) = Cosigned By    Initials Name Provider Type Discipline    CS Enma Bennett, OTR/L, CNT Occupational Therapist OT        Occupational Therapy Education                 Title: PT OT SLP Therapies (In Progress)     Topic: Occupational Therapy (In Progress)     Point: Home exercise program (Done)     Description:   Instruct learner(s) on appropriate technique for monitoring, assisting and/or progressing therapeutic exercises/activities.              Learning Progress Summary           Patient Acceptance, E,TB, VU,DU,NR by  at 5/2/2020 1450    Comment:  Pt. had prom/aarom with Lue, ue exs with Rue!                   Point: Precautions (Done)     Description:   Instruct learner(s) on prescribed precautions during self-care and functional transfers.              Learning Progress Summary           Patient Acceptance, E,TB, VU,DU,NR by  at 5/2/2020 1450    Comment:  Pt. had prom/aarom with Lue, ue exs with Rue!                   Point: Body mechanics (Done)     Description:   Instruct  learner(s) on proper positioning and spine alignment during self-care, functional mobility activities and/or exercises.              Learning Progress Summary           Patient Acceptance, E,TB, VU,DU,NR by  at 5/2/2020 1450    Comment:  Pt. had prom/aarom with Ariadnee, ue exs with Alia!                               User Key     Initials Effective Dates Name Provider Type Discipline     08/02/16 -  Jose Miguel Castaneda, JENARO/L Occupational Therapy Assistant OT                OT Recommendation and Plan  Outcome Summary/Treatment Plan (OT)  Daily Summary of Progress (OT): progress toward functional goals is good  Barriers to Overall Progress (OT): Fall/L. sided weakness!  Plan for Continued Treatment (OT): continue ot poc!  Daily Summary of Progress (OT): progress toward functional goals is good  Plan of Care Review  Plan of Care Reviewed With: patient  Plan of Care Reviewed With: patient  Outcome Measures     Row Name 05/02/20 1305 05/02/20 0830          How much help from another is currently needed...    Putting on and taking off regular lower body clothing?  2  -  2  -CS     Bathing (including washing, rinsing, and drying)  3  -  3  -CS     Toileting (which includes using toilet bed pan or urinal)  3  -  3  -CS     Putting on and taking off regular upper body clothing  2  -  2  -CS     Taking care of personal grooming (such as brushing teeth)  3  -  3  -CS     Eating meals  3  -  3  -CS     AM-PAC 6 Clicks Score (OT)  16  -  16  -CS        Modified [place] Scale    Pre-Stroke Modified [place] Scale  --  0 - No Symptoms at all.  -CS     Modified [place] Scale  --  4 - Moderately severe disability.  Unable to walk without assistance, and unable to attend to own bodily needs without assistance.  -CS        Functional Assessment    Outcome Measure Options  AM-PAC 6 Clicks Daily Activity (OT)  -  AM-PAC 6 Clicks Daily Activity (OT);Modified [place]  -CS       User Key  (r) = Recorded By, (t) = Taken By, (c) =  Cosigned By    Initials Name Provider Type     Jose Miguel Castaneda EASON/L Occupational Therapy Assistant    Enma Sarkar, OTR/L, ELVA Occupational Therapist           Time Calculation:   Time Calculation- OT     Row Name 05/02/20 1305 05/02/20 0916          Time Calculation- OT    OT Start Time  1305  -  0830  -     OT Stop Time  1336  -  0911  -     OT Time Calculation (min)  31 min  -  41 min  -     Total Timed Code Minutes- OT  31 minute(s)  -  --     TCU Minutes- OT  31 min  -  --     OT Received On  05/02/20  -  05/02/20  -     OT Goal Re-Cert Due Date  05/12/20  -  05/12/20  -       User Key  (r) = Recorded By, (t) = Taken By, (c) = Cosigned By    Initials Name Provider Type     Jose Miguel Castaneda EASON/L Occupational Therapy Assistant    Enma Sarkar, OTR/L, ELVA Occupational Therapist        Therapy Charges for Today     Code Description Service Date Service Provider Modifiers Qty    [id number] HC OT THER PROC EA 15 MIN 5/2/2020 Jose Miguel Castaneda COTA/L GO 2               JENARO Krishnamurthy/LARISA  5/2/2020

## 2020-05-02 NOTE — PLAN OF CARE
Problem: Patient Care Overview  Goal: Plan of Care Review  Outcome: Ongoing (interventions implemented as appropriate)  Flowsheets (Taken 5/2/2020 8429)  Progress: improving  Plan of Care Reviewed With: patient  Note:   Pt. Sitting in chair, Prom/AAROM exs performed with pt's Shubham, Pt. Has active mov't, but trace to weak L. Phalanges/wrist  release/flex/ext! Rue 2lb. Dumb bell for ue exs to vc's! Pt. Had vc's from this meza/l for instruction! Pt. Instructed with using leg or Rue to open fingers when exercising Lhand/phalanges!

## 2020-05-02 NOTE — PROGRESS NOTES
Discharge Planning Assessment  [facility]     Patient Name: Gaviota Erazo  MRN: [id number]  Today's Date: 5/2/2020    Admit Date: 5/1/2020    Discharge Needs Assessment     Row Name 05/02/20 1303       Living Environment    Lives With  grandchild(brent)    Current Living Arrangements  home/apartment/condo    Primary Care Provided by  self    Provides Primary Care For  no one    Family Caregiver if Needed  grandchild(brent), adult    Quality of Family Relationships  helpful;involved;supportive    Able to Return to Prior Arrangements  yes       Resource/Environmental Concerns    Resource/Environmental Concerns  none    Transportation Concerns  car, none       Transition Planning    Patient/Family Anticipates Transition to  home with family    Patient/Family Anticipated Services at Transition  none    Transportation Anticipated  family or friend will provide       Discharge Needs Assessment    Readmission Within the Last 30 Days  no previous admission in last 30 days    Concerns to be Addressed  no discharge needs identified;denies needs/concerns at this time    Equipment Currently Used at Home  none    Anticipated Changes Related to Illness  none    Equipment Needed After Discharge  none    Outpatient/Agency/Support Group Needs  inpatient rehabilitation facility    Patient's Choice of Community Agency(s)  [facility]    Discharge Coordination/Progress  Pt has RX coverage and a PCP. Pt lived with her grand son prior to admission. Pt is requesting a referral to [facility]. SW has faxed referral and will await on Monday for possible bed offer,         Discharge Plan    No documentation.       Destination      Service Provider Request Status Selected Services Address Phone Number Fax Number    [facility] Pending - No Request Sent N/A [address], [facility] [address] [phone]      Durable Medical Equipment      Coordination has not been started for this encounter.      Dialysis/Infusion       Coordination has not been started for this encounter.      Home Medical Care      Coordination has not been started for this encounter.      Therapy      Coordination has not been started for this encounter.      Community Resources      Coordination has not been started for this encounter.          Demographic Summary    No documentation.       Functional Status    No documentation.       Psychosocial    No documentation.       Abuse/Neglect    No documentation.       Legal    No documentation.       Substance Abuse    No documentation.       Patient Forms    No documentation.           Donna Foster

## 2020-05-02 NOTE — CONSULTS
Neurology Consult Note    Patient:  Gaviota Erazo   YOB: 1945  MRN:  [id number]  Date of Admission:  5/1/2020  3:44 PM    Date: 5/2/2020    Referring Provider: Thomas Rosales MD  Reason for Consultation: L hand ataxia/weakness, to see in AM      History of present illness:     This is a 74 y.o. left handed female.with H/O hypertension, hyperlipidemia, DM, previous TIA evaluated for possible stroke due to new onset left hand ataxia/weakness for 3  days prior to admission.    Patient noted left arm weakness/ataxia of about 4 days ago when she noted she had poor coordination and decreased ability to feed herself or use bathroom  She states there may have been a couple of times of jerking of the left arm but only lasted a second or so.   It is not occurring now. She denies a seizure and states it was positional and when she would move her arm another way the jerking would immediately stop. She  was not willing initially to come to the hospital due to Covid 19 risk. She also noticed some heaviness with walking and some decrease in balance but states the balance issues were of 3 to 4 months duration but the heaviness and greater difficulty walking just started.  She also reports of episodes of dysarthria that will come and go and episodes of choking on liquids at times. .     She is not able to dress herself or wipe herself    She lives with her grandson who is 27.    Past Medical History:   Diagnosis Date   • Depression    • Hypercholesteremia    • Hypertension    • TIA (transient ischemic attack)        Past Surgical History:   Procedure Laterality Date   • HYSTERECTOMY     • SKIN CANCER EXCISION         Prior to Admission medications    Medication Sig Start Date End Date Taking? Authorizing Provider   amLODIPine-benazepril (LOTREL 5-20) 5-20 MG per capsule Take 1 capsule by mouth Daily.   Yes Provider, MD Estrada   citalopram (CeleXA) 40 MG tablet Take 40 mg by mouth Daily.   Yes  Provider, MD Estrada   clopidogrel (PLAVIX) 75 MG tablet Take 75 mg by mouth Daily.   Yes Provider, MD Estrada   metFORMIN (GLUCOPHAGE) 500 MG tablet Take 500 mg by mouth 2 (Two) Times a Day With Meals.   Yes Provider, MD Estrada   simvastatin (ZOCOR) 40 MG tablet Take 40 mg by mouth Every Night.   Yes Provider, MD Estrada   doxycycline (DORYX) 100 MG enteric coated tablet Take 1 tablet by mouth 2 (Two) Times a Day. 7/2/17   Chintan Balbuena MD       [facility] scheduled medications:     amLODIPine-lisinopril 5-20 mg combo dose  Oral Q24H   atorvastatin 10 mg Oral Nightly   citalopram 40 mg Oral Daily   clopidogrel 75 mg Oral Daily   insulin lispro 0-9 Units Subcutaneous TID AC   [START ON 5/3/2020] metFORMIN 500 mg Oral BID With Meals   sodium chloride 10 mL Intravenous Q12H     Hospital PRN medications:  dextrose  •  dextrose  •  glucagon (human recombinant)  •  LORazepam  •  sodium chloride  •  sodium chloride    No Known Allergies    Social History     Socioeconomic History   • Marital status:      Spouse name: Not on file   • Number of children: Not on file   • Years of education: Not on file   • Highest education level: Not on file   Tobacco Use   • Smoking status: Never Smoker   Substance and Sexual Activity   • Alcohol use: No   • Drug use: No   • Sexual activity: Defer     History reviewed. No pertinent family history.    Review of Systems  A 14 point review of systems was reviewed and was negative except for weakness of left arm and episodic dysarthria    Vital Signs   Temp:  [97.6 °F (36.4 °C)-98.4 °F (36.9 °C)] 97.6 °F (36.4 °C)  Heart Rate:  [] 87  Resp:  [16-18] 16  BP: (134-173)/() 145/93    General Exam:  Head:  Normal cephalic, atraumatic  HEENT:  Neck supple  Fundoscopic Exam:  No signs of disc edema  CVS:  Regular rate and rhythm.  No murmurs  Carotid Examination:  No bruits  Lungs:  Clear to auscultation  Abdomen:  Non-tender, Non-distended  Extremities:  No signs  of peripheral edema  Skin:  No rashes    Neurologic Exam:    Mental Status:    -Awake, Alert, Oriented X 3  -No word finding difficulties  -No aphasia  -No dysarthria  -Follows simple and complex commands    CN II:  Visual fields full.  Pupils equally reactive to light  CN III, IV, VI:  Extraocular Muscles full with no signs of nystagmus  CN V:  Facial sensory is symmetric   CN VII:  Facial motor symmetric  CN VIII:  Gross hearing intact bilaterally  CN IX/X:  Palate elevates symmetrically  CN XI:  Shoulder shrug symmetric  CN XII:  Tongue is midline on protrusion    Motor: (strength out of 5:  1= minimal movement, 2 = movement in plane of gravity, 3 = movement against gravity, 4 = movement against some resistance, 5 = full strength)    -Right Upper Ext: Proximal: 5 Distal: 5  -Left Upper Ext: Left arm drift Proximal: 4 Distal: 3    -Right Lower Ext: Proximal: 5 Distal: 5  -Left Lower Ext: Proximal: 5 Distal: 5    DTR:  -Right   Bicep: 2+ Triceps: 2+ Brachioradialis: 2+   Patella: 2+ Ankle: 2+ Neg Babinski  -Left   Bicep: 2+ Triceps: 2+ Brachioradialis: 2+   Patella: 2+ Ankle: 2+ Neg Babinski    Sensory:  -Intact to light touch, pinprick    Coordination:  -Finger to nose intact on right and c/w weakness on left  -Heel to shin intact  -No ataxia    Gait  -Not tested for safety reasons as patient states she is off balanced and needs gait belt      Results Review:  Lab Results (last 7 days)     Procedure Component Value Units Date/Time    POC Glucose Once [[id number]]  (Abnormal) Collected:  05/02/20 1106    Specimen:  Blood Updated:  05/02/20 1121     Glucose 262 mg/dL      Comment: : [id number] Deny Vasquez ID: [id number]       Vitamin B12 [[id number]] Collected:  05/02/20 0754    Specimen:  Blood Updated:  05/02/20 0905    Lipid Panel [[id number]]  (Abnormal) Collected:  05/02/20 0438    Specimen:  Blood Updated:  05/02/20 0751     Total Cholesterol 184 mg/dL      Triglycerides 331 mg/dL      HDL Cholesterol 33  mg/dL      LDL Cholesterol  85 mg/dL      VLDL Cholesterol 66.2 mg/dL      LDL/HDL Ratio 2.57    Narrative:       Cholesterol Reference Ranges  (U.S. Department of Health and Human Services ATP III Classifications)    Desirable          <200 mg/dL  Borderline High    200-239 mg/dL  High Risk          >240 mg/dL      Triglyceride Reference Ranges  (U.S. Department of Health and Human Services ATP III Classifications)    Normal           <150 mg/dL  Borderline High  150-199 mg/dL  High             200-499 mg/dL  Very High        >500 mg/dL    HDL Reference Ranges  (U.S. Department of Health and Human Services ATP III Classifications)    Low     <40 mg/dl (major risk factor for CHD)  High    >60 mg/dl ('negative' risk factor for CHD)        LDL Reference Ranges  (U.S. Department of Health and Human Services ATP III Classifications)    Optimal          <100 mg/dL  Near Optimal     100-129 mg/dL  Borderline High  130-159 mg/dL  High             160-189 mg/dL  Very High        >189 mg/dL    Hemoglobin A1c [[id number]]  (Abnormal) Collected:  05/02/20 0438    Specimen:  Blood Updated:  05/02/20 0751     Hemoglobin A1C 8.90 %     Narrative:       Hemoglobin A1C Ranges:    Increased Risk for Diabetes  5.7% to 6.4%  Diabetes                     >= 6.5%  Diabetic Goal                < 7.0%    Basic Metabolic Panel [[id number]]  (Abnormal) Collected:  05/02/20 0438    Specimen:  Blood Updated:  05/02/20 0539     Glucose 265 mg/dL      BUN 20 mg/dL      Creatinine 1.04 mg/dL      Sodium 140 mmol/L      Potassium 3.8 mmol/L      Chloride 102 mmol/L      CO2 23.0 mmol/L      Calcium 10.0 mg/dL      eGFR Non African Amer 52 mL/min/1.73      BUN/Creatinine Ratio 19.2     Anion Gap 15.0 mmol/L     Narrative:       GFR Normal >60  Chronic Kidney Disease <60  Kidney Failure <15      CBC Auto Differential [[id number]]  (Abnormal) Collected:  05/02/20 0438    Specimen:  Blood Updated:  05/02/20 0521     WBC 10.59 10*3/mm3      RBC 4.42  10*6/mm3      Hemoglobin 13.0 g/dL      Hematocrit 39.1 %      MCV 88.5 fL      MCH 29.4 pg      MCHC 33.2 g/dL      RDW 13.2 %      RDW-SD 42.5 fl      MPV 10.7 fL      Platelets 278 10*3/mm3      Neutrophil % 50.7 %      Lymphocyte % 38.4 %      Monocyte % 8.6 %      Eosinophil % 1.5 %      Basophil % 0.7 %      Immature Grans % 0.1 %      Neutrophils, Absolute 5.37 10*3/mm3      Lymphocytes, Absolute 4.07 10*3/mm3      Monocytes, Absolute 0.91 10*3/mm3      Eosinophils, Absolute 0.16 10*3/mm3      Basophils, Absolute 0.07 10*3/mm3      Immature Grans, Absolute 0.01 10*3/mm3      nRBC 0.0 /100 WBC     [place] Draw [[id number]] Collected:  05/01/20 1622    Specimen:  Blood Updated:  05/01/20 1745    Narrative:       The following orders were created for panel order [place] Draw.  Procedure                               Abnormality         Status                     ---------                               -----------         ------                     Light Blue Top[[id number]]                                   Final result               Green Top (Gel)[[id number]]                                  Final result               Lavender Top[[id number]]                                     Final result               Red Top[[id number]]                                          Final result                 Please view results for these tests on the individual orders.    Light Blue Top [[id number]] Collected:  05/01/20 1632    Specimen:  Blood Updated:  05/01/20 1745     Extra Tube hold for add-on     Comment: Auto resulted       Green Top (Gel) [[id number]] Collected:  05/01/20 1622    Specimen:  Blood Updated:  05/01/20 1730     Extra Tube Hold for add-ons.     Comment: Auto resulted.       Lavender Top [[id number]] Collected:  05/01/20 1622    Specimen:  Blood Updated:  05/01/20 1730     Extra Tube hold for add-on     Comment: Auto resulted       Red Top [[id number]] Collected:  05/01/20 1622    Specimen:  Blood Updated:  05/01/20  1730     Extra Tube Hold for add-ons.     Comment: Auto resulted.       Comprehensive Metabolic Panel [[id number]]  (Abnormal) Collected:  05/01/20 1622    Specimen:  Blood Updated:  05/01/20 1704     Glucose 214 mg/dL      BUN 18 mg/dL      Creatinine 0.93 mg/dL      Sodium 142 mmol/L      Potassium 4.6 mmol/L      Chloride 100 mmol/L      CO2 26.0 mmol/L      Calcium 10.1 mg/dL      Total Protein 7.4 g/dL      Albumin 4.50 g/dL      ALT (SGPT) 17 U/L      AST (SGOT) 16 U/L      Alkaline Phosphatase 94 U/L      Total Bilirubin 0.4 mg/dL      eGFR Non African Amer 59 mL/min/1.73      Globulin 2.9 gm/dL      A/G Ratio 1.6 g/dL      BUN/Creatinine Ratio 19.4     Anion Gap 16.0 mmol/L     Narrative:       GFR Normal >60  Chronic Kidney Disease <60  Kidney Failure <15      Troponin [[id number]]  (Normal) Collected:  05/01/20 1622    Specimen:  Blood Updated:  05/01/20 1702     Troponin T <0.010 ng/mL     Narrative:       Troponin T Reference Range:  <= 0.03 ng/mL-   Negative for AMI  >0.03 ng/mL-     Abnormal for myocardial necrosis.  Clinicians would have to utilize clinical acumen, EKG, Troponin and serial changes to determine if it is an Acute Myocardial Infarction or myocardial injury due to an underlying chronic condition.       Results may be falsely decreased if patient taking Biotin.      BNP [[id number]]  (Normal) Collected:  05/01/20 1622    Specimen:  Blood Updated:  05/01/20 1701     proBNP 86.3 pg/mL     Narrative:       Among patients with dyspnea, NT-proBNP is highly sensitive for the detection of acute congestive heart failure. In addition NT-proBNP of <300 pg/ml effectively rules out acute congestive heart failure with 99% negative predictive value.    Results may be falsely decreased if patient taking Biotin.      Protime-INR [[id number]]  (Normal) Collected:  05/01/20 1632    Specimen:  Blood Updated:  05/01/20 1650     Protime 13.1 Seconds      INR 1.03    CBC & Differential [[id number]] Collected:   05/01/20 1622    Specimen:  Blood Updated:  05/01/20 1642    Narrative:       The following orders were created for panel order CBC & Differential.  Procedure                               Abnormality         Status                     ---------                               -----------         ------                     CBC Auto Differential[[id number]]        Normal              Final result                 Please view results for these tests on the individual orders.    CBC Auto Differential [[id number]]  (Normal) Collected:  05/01/20 1622    Specimen:  Blood Updated:  05/01/20 1642     WBC 9.63 10*3/mm3      RBC 4.64 10*6/mm3      Hemoglobin 13.7 g/dL      Hematocrit 41.3 %      MCV 89.0 fL      MCH 29.5 pg      MCHC 33.2 g/dL      RDW 13.0 %      RDW-SD 42.5 fl      MPV 10.7 fL      Platelets 268 10*3/mm3      Neutrophil % 60.7 %      Lymphocyte % 29.1 %      Monocyte % 8.0 %      Eosinophil % 1.2 %      Basophil % 0.6 %      Immature Grans % 0.4 %      Neutrophils, Absolute 5.84 10*3/mm3      Lymphocytes, Absolute 2.80 10*3/mm3      Monocytes, Absolute 0.77 10*3/mm3      Eosinophils, Absolute 0.12 10*3/mm3      Basophils, Absolute 0.06 10*3/mm3      Immature Grans, Absolute 0.04 10*3/mm3      nRBC 0.0 /100 WBC     POC Glucose Once [[id number]]  (Abnormal) Collected:  05/01/20 1616    Specimen:  Blood Updated:  05/01/20 1627     Glucose 207 mg/dL      Comment: : [id number] Champion Martins FerryMeter ID: [id number]             .  Imaging Results (Last 24 Hours)     Procedure Component Value Units Date/Time    MRI Brain Without Contrast [[id number]] Collected:  05/02/20 1149     Updated:  05/02/20 1159    Narrative:       HISTORY: Left arm weakness     MRI BRAIN: Multiplanar imaging the brain performed without contrast.     COMPARISON: CT head 05/01/2020     FINDINGS: Scattered foci of diffusion restriction seen throughout the  right cerebral hemisphere within the right MCA distribution involving  the frontal parietal  and temporal lobes. No diffusion restriction of the  cerebellum or the left cerebral hemisphere. No intracranial hemorrhage.  Mild cerebral and cerebellar volume loss. Corpus callosum intact. No  sellar or intracranial mass. Old lacunar infarct suspected of the right  basal ganglia. Prominent perivascular spaces along the basal ganglia  bilaterally. Hyperintense FLAIR signal changes of the periventricular  white matter regions favoring underlying chronic small vessel ischemia.     Hyperostosis frontalis internus. Chronic mucosal thickening of the  paranasal sinuses. Normal flow-voids in the distal internal carotid and  basilar arteries. Normal flow void in the sagittal sinus.       Impression:       1. Scattered foci of diffusion restriction within the right MCA  distribution suggestive for acute nonhemorrhagic multifocal ischemia of  the right MCA distribution.  2. Chronic small vessel ischemic changes suspected with mild cerebral  volume loss.  This report was finalized on 05/02/2020 11:56 by Dr. Lina Perrin MD.    CT Angiogram Head [[id number]] Collected:  05/01/20 1852     Updated:  05/01/20 1903    Narrative:       EXAM:   CT NECK ANGIOGRAM  CT HEAD ANGIOGRAM 05/01/2020     COMPARISON:   None.      HISTORY:  74 years-old Female. Stroke      TECHNIQUE:      Multiple CT images were obtained of the of the head and neck after the  administration of IV contrast. 3D MIP reformats were generated in the  axial, sagittal and coronal planes were sent to PACS for interpretation.        Grading of carotid artery stenosis is performed by NASCET criteria.     FINDINGS:      CT Neck Angiogram:     Common origin of the innominate and right common carotid artery. The  visualized bilateral common carotid arteries demonstrate mild  atherosclerotic disease, more atherosclerotic disease at the  bifurcation. The bilateral subclavian arteries are patent.     Mild calcification along the lateral aspect of the right carotid  bulb  with less than 30% stenosis. The remaining cervical right ICA is without  flow-limiting stenosis or occlusion.     Calcification of the left carotid bifurcation with less than 50%  stenosis.     The bilateral proximal vertebral arteries are well opacified. The right  vertebral artery is diminutive and terminates into a PICA the left C3/C4  demonstrate more atherosclerotic disease with mild stenosis.     CT Head Angiogram:     The intracranial right ICA demonstrates atherosclerotic disease in the  carotid siphon, with mild to moderate stenosis. The right carotid  terminus is visualized. The right A1 is diminutive. The right A2 is also  diminutive. The MCA bifurcation is visualized with irregular appearance  of the A1 and M1, reflecting atherosclerotic disease. There is prompt  opacification of the proximal M2 segments. There is no aneurysm or  vascular malformation.     The left intracranial ICA demonstrates atherosclerotic disease with mild  to moderate stenosis in the left carotid siphon. The left carotid  terminus is visualized. The left A1, A2, M1, visualized proximal M2  segments demonstrate irregularity, reflecting atherosclerotic disease,  but without flow-limiting stenosis or occlusion. No aneurysm or other  vascular malformation identified.        The visualized posterior fossa circulation demonstrates no focal  flow-limiting stenosis or occlusion in the basilar artery. The left PCA  is fetal in origin. The bilateral ACAs are well opacified.       Impression:       CT Angiography Of The Neck With Intravenous Contrast   1. No evidence of high-grade arterial stenosis or underlying dissection.     CT Angiography Of The Head With Intravenous Contrast  1.   Atherosclerotic disease, without focal high-grade stenosis or  occlusion.  This report was finalized on 05/01/2020 19:00 by Dr. Zenaida Gonzalez MD.    CT Angiogram Neck [[id number]] Collected:  05/01/20 1852     Updated:  05/01/20 1903    Narrative:        EXAM:   CT NECK ANGIOGRAM  CT HEAD ANGIOGRAM 05/01/2020     COMPARISON:   None.      HISTORY:  74 years-old Female. Stroke      TECHNIQUE:      Multiple CT images were obtained of the of the head and neck after the  administration of IV contrast. 3D MIP reformats were generated in the  axial, sagittal and coronal planes were sent to PACS for interpretation.        Grading of carotid artery stenosis is performed by NASCET criteria.     FINDINGS:      CT Neck Angiogram:     Common origin of the innominate and right common carotid artery. The  visualized bilateral common carotid arteries demonstrate mild  atherosclerotic disease, more atherosclerotic disease at the  bifurcation. The bilateral subclavian arteries are patent.     Mild calcification along the lateral aspect of the right carotid bulb  with less than 30% stenosis. The remaining cervical right ICA is without  flow-limiting stenosis or occlusion.     Calcification of the left carotid bifurcation with less than 50%  stenosis.     The bilateral proximal vertebral arteries are well opacified. The right  vertebral artery is diminutive and terminates into a PICA the left C3/C4  demonstrate more atherosclerotic disease with mild stenosis.     CT Head Angiogram:     The intracranial right ICA demonstrates atherosclerotic disease in the  carotid siphon, with mild to moderate stenosis. The right carotid  terminus is visualized. The right A1 is diminutive. The right A2 is also  diminutive. The MCA bifurcation is visualized with irregular appearance  of the A1 and M1, reflecting atherosclerotic disease. There is prompt  opacification of the proximal M2 segments. There is no aneurysm or  vascular malformation.     The left intracranial ICA demonstrates atherosclerotic disease with mild  to moderate stenosis in the left carotid siphon. The left carotid  terminus is visualized. The left A1, A2, M1, visualized proximal M2  segments demonstrate irregularity, reflecting  atherosclerotic disease,  but without flow-limiting stenosis or occlusion. No aneurysm or other  vascular malformation identified.        The visualized posterior fossa circulation demonstrates no focal  flow-limiting stenosis or occlusion in the basilar artery. The left PCA  is fetal in origin. The bilateral ACAs are well opacified.       Impression:       CT Angiography Of The Neck With Intravenous Contrast   1. No evidence of high-grade arterial stenosis or underlying dissection.     CT Angiography Of The Head With Intravenous Contrast  1.   Atherosclerotic disease, without focal high-grade stenosis or  occlusion.  This report was finalized on 05/01/2020 19:00 by Dr. Zenaida Gonzalez MD.    CT Head Without Contrast Stroke Protocol [[id number]] Collected:  05/01/20 1847     Updated:  05/01/20 1854    Narrative:       EXAM: CT OF THE HEAD WITHOUT IV CONTRAST 05/01/2020     COMPARISON: None      INDICATION: Female, 74 years-old. Left arm weakness      PROCEDURE: Non contrast enhanced head CT was performed.      Radiation dose equals .2 mGy-cm.  Automated exposure control dose  reduction technique was implemented.     FINDINGS:     7 mm hypodensity within the right frontal centrum semiovale, consistent  with age-indeterminate infarct (image 19, series 4). A right temporal  hypodensity measuring 7 mm (image 16, series 4) is age-indeterminate.  Age-indeterminate right basal ganglia infarct. A more chronic appearing  lacunar infarct in the right frontal lobe anteriorly (image 13, series  4).     There is no acute intracranial hemorrhage.     Confluent periventricular hypodensity, favored to reflect mild to  moderate chronic microvascular changes.     The basal cisterns are preserved. There is no midline shift. No acute  hydrocephalus.     Mastoid air cells are clear.          Impression:       1.  Age-indeterminate infarct in the right basal ganglia, right centrum  semiovale and the posterior right temporal  lobe.  2.  More chronic appearing right anterior frontal lobe lacunar infarct.  This report was finalized on 05/01/2020 18:51 by Dr. Zenaida Gonzalez MD.    XR Chest 1 View [[id number]] Collected:  05/01/20 1714     Updated:  05/01/20 1718    Narrative:       Exam:   XR CHEST 1 VW-       Date:  05/01/2020      History:  Female, age  74 years;Stroke Protocol (Onset > 12 hrs);  R29.898-Other symptoms and signs involving the musculoskeletal system     COMPARISON:  None.     Findings :  Low lung volumes. Chronic interstitial lung changes.  The heart and mediastinum are normal in size. Lungs are without focal  infiltrate, mass or effusions.  The bones show no acute pathology.         Impression:       Impression:     1.  Low lung volumes.  2.  Otherwise, no acute cardiopulmonary process.     This report was finalized on 05/01/2020 17:15 by Dr. Zenaida Gonzalez MD.          Results for orders placed during the hospital encounter of 05/01/20   Adult Transthoracic Echo Complete W/ Cont if Necessary Per Protocol    Narrative · Left ventricular systolic function is normal. Estimated EF appears to be   in the range of 61 - 65%.  · Left ventricular diastolic dysfunction (grade I) consistent with   impaired relaxation.  · Left ventricular wall thickness is consistent with mild concentric   hypertrophy.  · No evidence of a patent foramen ovale.  · No hemodynamically significant valvular abnormalities identified on the   study.          Personal review of MRI:with multiple acute lesions suggestive of emboli but in the same vascular distribution of the right MCA which  may mean artery to artery emboli. These are of differing ages as some show on FLAIR but all are dark on ADC map   DWI      Telemetry: Sinus/sinus tach 94 to 104    Impression:  1. Acute and subacute right MCA stroke--multiple acute/subacute foci of ischemia in the right MCA territory s/o artery to artery emboli  2. DM with elevated hemoglobin A1c at 8.9 and goal of <  7.0 to reduce future risk of stroke  3. Mixed  Hyperlipidemia with LDL of 85 and goal of < 70 to reduce future risk of stroke. Her triglycerides are elevated at 331  4. No TPA as she was well outside the TPA window    Plan  · Carb consistent and cardiac diet  · PT/OT/Speech  · Telemetry  · Would benefit from ZAYDA  · SCDs  · Dietician to educate patient on diet.  · Increase Lipitor to 80 mg for acute stroke  This can be reduced later  · Plavix--already on   · Would likely benefit from inpatient rehab but will need to observe gait when PT is working with her  I discussed the patients findings and my recommendations with patient    Suri Gaspar MD  05/02/20  12:37

## 2020-05-02 NOTE — PLAN OF CARE
Problem: Patient Care Overview  Goal: Plan of Care Review  Outcome: Ongoing (interventions implemented as appropriate)  Flowsheets (Taken 5/2/2020 3440)  Progress: no change  Plan of Care Reviewed With: patient  Note:   Pt admitted from the ER tonight with L hand weakness. Alert and oriented x4. No c/o pain. NIH 1. Will be going down for an MRI this morning. Upx1 stand by assist. VSS. Safety maintained. Will continue to monitor.

## 2020-05-02 NOTE — H&P
History and Physical      CHIEF COMPLAINT: Left arm weakness    Reason for Admission: TIA versus CVA    History Obtained From: Patient    HISTORY OF PRESENT ILLNESS:      The patient is a 74 y.o. female with significant past medical history of hyperlipidemia, hypertension, previous TIA and depression who presents with left arm weakness for 3 days.  The patient states that she has been having some difficulties with her legs going weak for an undetermined period of time.  She states approximately 3 days ago she started having left arm weakness and coordination issues with it.  She states she is left hand dominant and had difficulties feeding herself.  She states that she did not want to come to the hospital right away due to the coronavirus and possible exposure.  The patient states that she currently has been taking all of her appropriate medications.  She denies any headache, loss of vision, speech difficulties or decreased sensation in her lower extremities.  She was evaluated in the emergency department and was found to have a severely elevated blood pressure at 173/101.  She was taken for CT scan of the head that shows an age-indeterminate infarct in the right basal ganglia, right centrum semiovale and the posterior right temporal lobe.  CTA head and neck did not reveal any high-grade stenosis or occlusion.  She was admitted for further work-up and evaluation.    Past Medical History:    Past Medical History:   Diagnosis Date   • Depression    • Hypercholesteremia    • Hypertension    • TIA (transient ischemic attack)      Past Surgical History:    Past Surgical History:   Procedure Laterality Date   • HYSTERECTOMY     • SKIN CANCER EXCISION       Medications Prior to Admission:    Medications Prior to Admission   Medication Sig Dispense Refill Last Dose   • amLODIPine-benazepril (LOTREL 5-20) 5-20 MG per capsule Take 1 capsule by mouth Daily.      • citalopram (CeleXA) 40 MG tablet Take 40 mg by mouth Daily.       • clopidogrel (PLAVIX) 75 MG tablet Take 75 mg by mouth Daily.      • metFORMIN (GLUCOPHAGE) 500 MG tablet Take 500 mg by mouth 2 (Two) Times a Day With Meals.      • simvastatin (ZOCOR) 40 MG tablet Take 40 mg by mouth Every Night.      • doxycycline (DORYX) 100 MG enteric coated tablet Take 1 tablet by mouth 2 (Two) Times a Day. 28 tablet 0        Allergies:  Patient has no known allergies.    Social History:   Social History     Socioeconomic History   • Marital status:      Spouse name: Not on file   • Number of children: Not on file   • Years of education: Not on file   • Highest education level: Not on file   Tobacco Use   • Smoking status: Never Smoker   Substance and Sexual Activity   • Alcohol use: No   • Drug use: No   • Sexual activity: Defer       Family History:   History reviewed. No pertinent family history.    REVIEW OF SYSTEMS:    CONSTITUTIONAL:  Negative for anorexia, chills, fevers, night sweats and weight loss  EYES:  negative for eye dryness, icterus and redness  HEENT:   negative for dental problems, epistaxis, facial trauma and thrush  RESPIRATORY:  negative for chest tightness, cough, dyspnea on exertion, pneumonia and sputum  CARDIOVASCULAR: negative for chest pain, dyspnea, exertional chest pressure/discomfort, irregular heart beat, palpitations, paroxysmal nocturnal dyspnea and syncope  GASTROINTESTINAL:  negative for abdominal pain, hematemesis, jaundice, melena and rectal bleeding  MUSCULOSKELETAL:  negative for muscle weakness, myalgias and neck pain  NEUROLOGICAL:   negative for dizziness, headaches, seizures, speech problems, tremors and vertigo  INTEGUMENT: negative for pruritus, rash, skin color change and skin lesion(s)       Vital Signs   Temp:  [98.1 °F (36.7 °C)-98.4 °F (36.9 °C)] 98.2 °F (36.8 °C)  Heart Rate:  [] 99  Resp:  [16-18] 16  BP: (134-173)/() 147/89          Physical Exam:  Constitutional: The patient is oriented to person, place, and time.  They appear well-developed.   HEENT:   Head: Normocephalic and atraumatic.   Eyes: Pupils are equal, round, and reactive to light.   Neck: Neck supple without masses or carotid bruit.  Cardiovascular: Regular rhythm and normal heart sounds.    Pulmonary/Chest: Effort normal and breath sounds normal. CTAB  Abdominal: Soft. Bowel sounds are normal. There is  no distension or  Tenderness appreciated. There is no rebound and no guarding.   Musculoskeletal: Normal range of motion. There is  no edema or tenderness.   Neurological: Pt is alert and oriented to person, place, and time. They have normal reflexes. Left arm weakness and discoordination.  Skin: Skin is warm and dry without significant rashes     Results Review:   I reviewed the patient's new imaging results and agree with the interpretation.    DATA:  Lab Results (last 24 hours)     Procedure Component Value Units Date/Time    Basic Metabolic Panel [[id number]]  (Abnormal) Collected:  05/02/20 0438    Specimen:  Blood Updated:  05/02/20 0539     Glucose 265 mg/dL      BUN 20 mg/dL      Creatinine 1.04 mg/dL      Sodium 140 mmol/L      Potassium 3.8 mmol/L      Chloride 102 mmol/L      CO2 23.0 mmol/L      Calcium 10.0 mg/dL      eGFR Non African Amer 52 mL/min/1.73      BUN/Creatinine Ratio 19.2     Anion Gap 15.0 mmol/L     Narrative:       GFR Normal >60  Chronic Kidney Disease <60  Kidney Failure <15      CBC Auto Differential [[id number]]  (Abnormal) Collected:  05/02/20 0438    Specimen:  Blood Updated:  05/02/20 0521     WBC 10.59 10*3/mm3      RBC 4.42 10*6/mm3      Hemoglobin 13.0 g/dL      Hematocrit 39.1 %      MCV 88.5 fL      MCH 29.4 pg      MCHC 33.2 g/dL      RDW 13.2 %      RDW-SD 42.5 fl      MPV 10.7 fL      Platelets 278 10*3/mm3      Neutrophil % 50.7 %      Lymphocyte % 38.4 %      Monocyte % 8.6 %      Eosinophil % 1.5 %      Basophil % 0.7 %      Immature Grans % 0.1 %      Neutrophils, Absolute 5.37 10*3/mm3      Lymphocytes, Absolute 4.07  10*3/mm3      Monocytes, Absolute 0.91 10*3/mm3      Eosinophils, Absolute 0.16 10*3/mm3      Basophils, Absolute 0.07 10*3/mm3      Immature Grans, Absolute 0.01 10*3/mm3      nRBC 0.0 /100 WBC     [facility] Draw [[id number]] Collected:  05/01/20 1622    Specimen:  Blood Updated:  05/01/20 1745    Narrative:       The following orders were created for panel order [facility].  Procedure                               Abnormality         Status                     ---------                               -----------         ------                     Light Blue Top[[id number]]                                   Final result               Green Top (Gel)[[id number]]                                  Final result               Lavender Top[[id number]]                                     Final result               Red Top[[id number]]                                          Final result                 Please view results for these tests on the individual orders.    Light Blue Top [[id number]] Collected:  05/01/20 1632    Specimen:  Blood Updated:  05/01/20 1745     Extra Tube hold for add-on     Comment: Auto resulted       Green Top (Gel) [[id number]] Collected:  05/01/20 1622    Specimen:  Blood Updated:  05/01/20 1730     Extra Tube Hold for add-ons.     Comment: Auto resulted.       Lavender Top [[id number]] Collected:  05/01/20 1622    Specimen:  Blood Updated:  05/01/20 1730     Extra Tube hold for add-on     Comment: Auto resulted       Red Top [[id number]] Collected:  05/01/20 1622    Specimen:  Blood Updated:  05/01/20 1730     Extra Tube Hold for add-ons.     Comment: Auto resulted.       Comprehensive Metabolic Panel [[id number]]  (Abnormal) Collected:  05/01/20 1622    Specimen:  Blood Updated:  05/01/20 1704     Glucose 214 mg/dL      BUN 18 mg/dL      Creatinine 0.93 mg/dL      Sodium 142 mmol/L      Potassium 4.6 mmol/L      Chloride 100 mmol/L      CO2 26.0 mmol/L      Calcium 10.1 mg/dL      Total Protein 7.4 g/dL       Albumin 4.50 g/dL      ALT (SGPT) 17 U/L      AST (SGOT) 16 U/L      Alkaline Phosphatase 94 U/L      Total Bilirubin 0.4 mg/dL      eGFR Non African Amer 59 mL/min/1.73      Globulin 2.9 gm/dL      A/G Ratio 1.6 g/dL      BUN/Creatinine Ratio 19.4     Anion Gap 16.0 mmol/L     Narrative:       GFR Normal >60  Chronic Kidney Disease <60  Kidney Failure <15      Troponin [[id number]]  (Normal) Collected:  05/01/20 1622    Specimen:  Blood Updated:  05/01/20 1702     Troponin T <0.010 ng/mL     Narrative:       Troponin T Reference Range:  <= 0.03 ng/mL-   Negative for AMI  >0.03 ng/mL-     Abnormal for myocardial necrosis.  Clinicians would have to utilize clinical acumen, EKG, Troponin and serial changes to determine if it is an Acute Myocardial Infarction or myocardial injury due to an underlying chronic condition.       Results may be falsely decreased if patient taking Biotin.      BNP [[id number]]  (Normal) Collected:  05/01/20 1622    Specimen:  Blood Updated:  05/01/20 1701     proBNP 86.3 pg/mL     Narrative:       Among patients with dyspnea, NT-proBNP is highly sensitive for the detection of acute congestive heart failure. In addition NT-proBNP of <300 pg/ml effectively rules out acute congestive heart failure with 99% negative predictive value.    Results may be falsely decreased if patient taking Biotin.      Protime-INR [[id number]]  (Normal) Collected:  05/01/20 1632    Specimen:  Blood Updated:  05/01/20 1650     Protime 13.1 Seconds      INR 1.03    CBC & Differential [[id number]] Collected:  05/01/20 1622    Specimen:  Blood Updated:  05/01/20 1642    Narrative:       The following orders were created for panel order CBC & Differential.  Procedure                               Abnormality         Status                     ---------                               -----------         ------                     CBC Auto Differential[[id number]]        Normal              Final result                  Please view results for these tests on the individual orders.    CBC Auto Differential [[id number]]  (Normal) Collected:  05/01/20 1622    Specimen:  Blood Updated:  05/01/20 1642     WBC 9.63 10*3/mm3      RBC 4.64 10*6/mm3      Hemoglobin 13.7 g/dL      Hematocrit 41.3 %      MCV 89.0 fL      MCH 29.5 pg      MCHC 33.2 g/dL      RDW 13.0 %      RDW-SD 42.5 fl      MPV 10.7 fL      Platelets 268 10*3/mm3      Neutrophil % 60.7 %      Lymphocyte % 29.1 %      Monocyte % 8.0 %      Eosinophil % 1.2 %      Basophil % 0.6 %      Immature Grans % 0.4 %      Neutrophils, Absolute 5.84 10*3/mm3      Lymphocytes, Absolute 2.80 10*3/mm3      Monocytes, Absolute 0.77 10*3/mm3      Eosinophils, Absolute 0.12 10*3/mm3      Basophils, Absolute 0.06 10*3/mm3      Immature Grans, Absolute 0.04 10*3/mm3      nRBC 0.0 /100 WBC     POC Glucose Once [[id number]]  (Abnormal) Collected:  05/01/20 1616    Specimen:  Blood Updated:  05/01/20 1627     Glucose 207 mg/dL      Comment: : [id number] Akira TaylorMeter ID: [id number]           Imaging Results (Last 24 Hours)     Procedure Component Value Units Date/Time    CT Angiogram Head [[id number]] Collected:  05/01/20 1852     Updated:  05/01/20 1903    Narrative:       EXAM:   CT NECK ANGIOGRAM  CT HEAD ANGIOGRAM 05/01/2020     COMPARISON:   None.      HISTORY:  74 years-old Female. Stroke      TECHNIQUE:      Multiple CT images were obtained of the of the head and neck after the  administration of IV contrast. 3D MIP reformats were generated in the  axial, sagittal and coronal planes were sent to PACS for interpretation.        Grading of carotid artery stenosis is performed by NASCET criteria.     FINDINGS:      CT Neck Angiogram:     Common origin of the innominate and right common carotid artery. The  visualized bilateral common carotid arteries demonstrate mild  atherosclerotic disease, more atherosclerotic disease at the  bifurcation. The bilateral subclavian arteries are  patent.     Mild calcification along the lateral aspect of the right carotid bulb  with less than 30% stenosis. The remaining cervical right ICA is without  flow-limiting stenosis or occlusion.     Calcification of the left carotid bifurcation with less than 50%  stenosis.     The bilateral proximal vertebral arteries are well opacified. The right  vertebral artery is diminutive and terminates into a PICA the left C3/C4  demonstrate more atherosclerotic disease with mild stenosis.     CT Head Angiogram:     The intracranial right ICA demonstrates atherosclerotic disease in the  carotid siphon, with mild to moderate stenosis. The right carotid  terminus is visualized. The right A1 is diminutive. The right A2 is also  diminutive. The MCA bifurcation is visualized with irregular appearance  of the A1 and M1, reflecting atherosclerotic disease. There is prompt  opacification of the proximal M2 segments. There is no aneurysm or  vascular malformation.     The left intracranial ICA demonstrates atherosclerotic disease with mild  to moderate stenosis in the left carotid siphon. The left carotid  terminus is visualized. The left A1, A2, M1, visualized proximal M2  segments demonstrate irregularity, reflecting atherosclerotic disease,  but without flow-limiting stenosis or occlusion. No aneurysm or other  vascular malformation identified.        The visualized posterior fossa circulation demonstrates no focal  flow-limiting stenosis or occlusion in the basilar artery. The left PCA  is fetal in origin. The bilateral ACAs are well opacified.       Impression:       CT Angiography Of The Neck With Intravenous Contrast   1. No evidence of high-grade arterial stenosis or underlying dissection.     CT Angiography Of The Head With Intravenous Contrast  1.   Atherosclerotic disease, without focal high-grade stenosis or  occlusion.  This report was finalized on 05/01/2020 19:00 by Dr. Zenaida Gonzalez MD.    CT Angiogram Neck  [[id number]] Collected:  05/01/20 1852     Updated:  05/01/20 1903    Narrative:       EXAM:   CT NECK ANGIOGRAM  CT HEAD ANGIOGRAM 05/01/2020     COMPARISON:   None.      HISTORY:  74 years-old Female. Stroke      TECHNIQUE:      Multiple CT images were obtained of the of the head and neck after the  administration of IV contrast. 3D MIP reformats were generated in the  axial, sagittal and coronal planes were sent to PACS for interpretation.        Grading of carotid artery stenosis is performed by NASCET criteria.     FINDINGS:      CT Neck Angiogram:     Common origin of the innominate and right common carotid artery. The  visualized bilateral common carotid arteries demonstrate mild  atherosclerotic disease, more atherosclerotic disease at the  bifurcation. The bilateral subclavian arteries are patent.     Mild calcification along the lateral aspect of the right carotid bulb  with less than 30% stenosis. The remaining cervical right ICA is without  flow-limiting stenosis or occlusion.     Calcification of the left carotid bifurcation with less than 50%  stenosis.     The bilateral proximal vertebral arteries are well opacified. The right  vertebral artery is diminutive and terminates into a PICA the left C3/C4  demonstrate more atherosclerotic disease with mild stenosis.     CT Head Angiogram:     The intracranial right ICA demonstrates atherosclerotic disease in the  carotid siphon, with mild to moderate stenosis. The right carotid  terminus is visualized. The right A1 is diminutive. The right A2 is also  diminutive. The MCA bifurcation is visualized with irregular appearance  of the A1 and M1, reflecting atherosclerotic disease. There is prompt  opacification of the proximal M2 segments. There is no aneurysm or  vascular malformation.     The left intracranial ICA demonstrates atherosclerotic disease with mild  to moderate stenosis in the left carotid siphon. The left carotid  terminus is visualized. The left  A1, A2, M1, visualized proximal M2  segments demonstrate irregularity, reflecting atherosclerotic disease,  but without flow-limiting stenosis or occlusion. No aneurysm or other  vascular malformation identified.        The visualized posterior fossa circulation demonstrates no focal  flow-limiting stenosis or occlusion in the basilar artery. The left PCA  is fetal in origin. The bilateral ACAs are well opacified.       Impression:       CT Angiography Of The Neck With Intravenous Contrast   1. No evidence of high-grade arterial stenosis or underlying dissection.     CT Angiography Of The Head With Intravenous Contrast  1.   Atherosclerotic disease, without focal high-grade stenosis or  occlusion.  This report was finalized on 05/01/2020 19:00 by Dr. Zenaida Gonzalez MD.    CT Head Without Contrast Stroke Protocol [[id number]] Collected:  05/01/20 1847     Updated:  05/01/20 1854    Narrative:       EXAM: CT OF THE HEAD WITHOUT IV CONTRAST 05/01/2020     COMPARISON: None      INDICATION: Female, 74 years-old. Left arm weakness      PROCEDURE: Non contrast enhanced head CT was performed.      Radiation dose equals .2 mGy-cm.  Automated exposure control dose  reduction technique was implemented.     FINDINGS:     7 mm hypodensity within the right frontal centrum semiovale, consistent  with age-indeterminate infarct (image 19, series 4). A right temporal  hypodensity measuring 7 mm (image 16, series 4) is age-indeterminate.  Age-indeterminate right basal ganglia infarct. A more chronic appearing  lacunar infarct in the right frontal lobe anteriorly (image 13, series  4).     There is no acute intracranial hemorrhage.     Confluent periventricular hypodensity, favored to reflect mild to  moderate chronic microvascular changes.     The basal cisterns are preserved. There is no midline shift. No acute  hydrocephalus.     Mastoid air cells are clear.          Impression:       1.  Age-indeterminate infarct in the  right basal ganglia, right centrum  semiovale and the posterior right temporal lobe.  2.  More chronic appearing right anterior frontal lobe lacunar infarct.  This report was finalized on 05/01/2020 18:51 by Dr. Zenaida Gonzalez MD.    XR Chest 1 View [[id number]] Collected:  05/01/20 1714     Updated:  05/01/20 1718    Narrative:       Exam:   XR CHEST 1 VW-       Date:  05/01/2020      History:  Female, age  74 years;Stroke Protocol (Onset > 12 hrs);  R29.898-Other symptoms and signs involving the musculoskeletal system     COMPARISON:  None.     Findings :  Low lung volumes. Chronic interstitial lung changes.  The heart and mediastinum are normal in size. Lungs are without focal  infiltrate, mass or effusions.  The bones show no acute pathology.         Impression:       Impression:     1.  Low lung volumes.  2.  Otherwise, no acute cardiopulmonary process.     This report was finalized on 05/01/2020 17:15 by Dr. Zenaida Gonzalez MD.            ASSESSMENT AND PLAN:         Stroke (CMS/[facility])    Left arm weakness    Essential hypertension    Hypertensive urgency    Hypercholesterolemia    1.  Age interdeterminate stroke on CT head    - MRI today    - ECHO today   - Neurology consultation    - PT/OT   - Lipid profile   - HgbA1c   - Will need better blood pressure control, will continue to allow permissive hypertension until MRI is done and have more information.  Likely need to increase Lisinopril to 40 mg.   - Continue Plavix     2.   Hyperlipidemia   - lipid profile    - Continue Lipitor     3.   Hypertensive urgency   - Continue Amlodipine and Lisinopril    - Likely need to increase Lisinopril to 40 mg but await MRI results     4.   Left arm weakness    - MRI   - PT/OT     Estimate 24-48 hour stay, likely able to discharge home.  Await work up.       MARGARITA Sullivan  07:27 5/2/2020     Will add carotids  Ativan prior to MRI  Await Neuro eval  We will hold Glucophage after IV dye-add Accu-Cheks sliding scale  insulin.    Type 2 DM- review labs and home medication. Discuss with patient importance of blood sugar control in the healing process. Review diet, medical,and lifestyle modifications for optimal medical treatment. Will restart their regular diabetic regimen and make consult for diabetic education / dietician available upon request.     I have discussed the care of Gaviota Erazo, including pertinent history and exam findings with the ARNP/PA.  I have seen and examined the patient and the key elements of all parts of the encounter have been performed by me. I agree with the assessment and plan as outlined by the ARNP/PA. Please refer to my comments for complete documentation.     Electronically signed by Nhan Muir MD on 5/2/2020 at 08:15

## 2020-05-03 ENCOUNTER — APPOINTMENT (OUTPATIENT)
Dept: CT IMAGING | Facility: HOSPITAL | Age: 75
End: 2020-05-03

## 2020-05-03 LAB
ANION GAP SERPL CALCULATED.3IONS-SCNC: 14 MMOL/L (ref 5–15)
BUN BLD-MCNC: 20 MG/DL (ref 8–23)
BUN/CREAT SERPL: 24.4 (ref 7–25)
CALCIUM SPEC-SCNC: 9.5 MG/DL (ref 8.6–10.5)
CHLORIDE SERPL-SCNC: 102 MMOL/L (ref 98–107)
CO2 SERPL-SCNC: 23 MMOL/L (ref 22–29)
CREAT BLD-MCNC: 0.82 MG/DL (ref 0.57–1)
GFR SERPL CREATININE-BSD FRML MDRD: 68 ML/MIN/1.73
GLUCOSE BLD-MCNC: 192 MG/DL (ref 65–99)
GLUCOSE BLDC GLUCOMTR-MCNC: 212 MG/DL (ref 70–130)
GLUCOSE BLDC GLUCOMTR-MCNC: 267 MG/DL (ref 70–130)
POTASSIUM BLD-SCNC: 4.3 MMOL/L (ref 3.5–5.2)
SODIUM BLD-SCNC: 139 MMOL/L (ref 136–145)
WHOLE BLOOD HOLD SPECIMEN: NORMAL

## 2020-05-03 PROCEDURE — 80048 BASIC METABOLIC PNL TOTAL CA: CPT | Performed by: NURSE PRACTITIONER

## 2020-05-03 PROCEDURE — 82962 GLUCOSE BLOOD TEST: CPT

## 2020-05-03 PROCEDURE — 97535 SELF CARE MNGMENT TRAINING: CPT

## 2020-05-03 PROCEDURE — 70450 CT HEAD/BRAIN W/O DYE: CPT

## 2020-05-03 PROCEDURE — 97161 PT EVAL LOW COMPLEX 20 MIN: CPT

## 2020-05-03 PROCEDURE — 99233 SBSQ HOSP IP/OBS HIGH 50: CPT | Performed by: PSYCHIATRY & NEUROLOGY

## 2020-05-03 PROCEDURE — 97110 THERAPEUTIC EXERCISES: CPT

## 2020-05-03 PROCEDURE — 92610 EVALUATE SWALLOWING FUNCTION: CPT

## 2020-05-03 PROCEDURE — 63710000001 INSULIN LISPRO (HUMAN) PER 5 UNITS: Performed by: FAMILY MEDICINE

## 2020-05-03 RX ADMIN — AMLODIPINE BESYLATE: 5 TABLET ORAL at 09:53

## 2020-05-03 RX ADMIN — CLOPIDOGREL 75 MG: 75 TABLET, FILM COATED ORAL at 09:53

## 2020-05-03 RX ADMIN — CITALOPRAM 40 MG: 20 TABLET, FILM COATED ORAL at 09:53

## 2020-05-03 RX ADMIN — BUSPIRONE HYDROCHLORIDE 15 MG: 10 TABLET ORAL at 21:15

## 2020-05-03 RX ADMIN — INSULIN LISPRO 4 UNITS: 100 INJECTION, SOLUTION INTRAVENOUS; SUBCUTANEOUS at 17:40

## 2020-05-03 RX ADMIN — ATORVASTATIN CALCIUM 80 MG: 40 TABLET, FILM COATED ORAL at 21:15

## 2020-05-03 NOTE — PROGRESS NOTES
Gaviota Erazo is a 74 y.o. female patient.  Anxious about the diagnosis of acute CVA.  Work-up in progress.  Plan transesophageal echo tomorrow.  Reviewed neuro note.        Current Facility-Administered Medications   Medication Dose Route Frequency Provider Last Rate Last Dose   • amLODIPine (NORVASC) 5 mg, lisinopril (PRINIVIL,ZESTRIL) 20 mg   Oral Q24H Nhan Muir MD       • atorvastatin (LIPITOR) tablet 80 mg  80 mg Oral Nightly Clancy Suri Gaspar MD   80 mg at 05/02/20 2016   • citalopram (CeleXA) tablet 40 mg  40 mg Oral Daily Nhan Muir MD   40 mg at 05/03/20 0953   • clopidogrel (PLAVIX) tablet 75 mg  75 mg Oral Daily Nhan Muir MD   75 mg at 05/03/20 0953   • dextrose (D50W) 25 g/ 50mL Intravenous Solution 25 g  25 g Intravenous Q15 Min PRN Nhan Muir MD       • dextrose (GLUTOSE) oral gel 15 g  15 g Oral Q15 Min PRN Nhan Muir MD       • glucagon (human recombinant) (GLUCAGEN DIAGNOSTIC) injection 1 mg  1 mg Subcutaneous Q15 Min PRN Nhan Muir MD       • insulin lispro (humaLOG) injection 0-9 Units  0-9 Units Subcutaneous TID AC Nhan Muir MD   4 Units at 05/02/20 1737   • LORazepam (ATIVAN) tablet 1 mg  1 mg Oral Q6H PRN Nhan Muir MD       • metFORMIN (GLUCOPHAGE) tablet 500 mg  500 mg Oral BID With Meals Nhan Muir MD       • sodium chloride 0.9 % flush 10 mL  10 mL Intravenous PRN Thomas Rosales MD       • sodium chloride 0.9 % flush 10 mL  10 mL Intravenous Q12H Nhan Muir MD   10 mL at 05/02/20 2016   • sodium chloride 0.9 % flush 10 mL  10 mL Intravenous PRN Nhan Muir MD       • sodium chloride 0.9 % infusion  100 mL/hr Intravenous Continuous Nhan Muir MD   Stopped at 05/02/20 1554     ALLERGIES:  No Known Allergies    Stroke (CMS/HCC)    Left arm weakness    Essential hypertension    Hypertensive urgency    Hypercholesterolemia    Blood pressure  "157/81, pulse 89, temperature 98.6 °F (37 °C), temperature source Oral, resp. rate 18, height 152.4 cm (60\"), weight 69.6 kg (153 lb 6 oz), SpO2 95 %.      Subjective:  Symptoms:  Stable.  She reports weakness and anxiety.  No shortness of breath or chest pain.    Diet:  Adequate intake.    Activity level: Impaired due to weakness.    Pain:  She reports no pain.      Review of Systems   Respiratory: Negative for shortness of breath.    Cardiovascular: Negative for chest pain.   Neurological: Positive for weakness.     Objective:  General Appearance:  Comfortable and well-appearing.    Vital signs: (most recent): Blood pressure 157/81, pulse 89, temperature 98.6 °F (37 °C), temperature source Oral, resp. rate 18, height 152.4 cm (60\"), weight 69.6 kg (153 lb 6 oz), SpO2 95 %.  Vital signs are normal.    Output: Producing urine and minimal stool output.    HEENT: Normal HEENT exam.  (Left-sided facial droop noted)    Lungs:  Normal effort and normal respiratory rate.    Heart: Normal rate.  Regular rhythm.    Abdomen: Abdomen is soft.  Bowel sounds are normal.   There is no abdominal tenderness.     Extremities: Decreased range of motion.    Skin:  Warm and dry.              Labs:  Lab Results (last 72 hours)     Procedure Component Value Units Date/Time    POC Glucose Once [[id number]]  (Abnormal) Collected:  05/03/20 0945    Specimen:  Blood Updated:  05/03/20 0956     Glucose 267 mg/dL      Comment: : [id number] Pickens MeganMeter ID: [id number]       Extra Tubes [[id number]] Collected:  05/03/20 0341    Specimen:  Blood, Venous Line Updated:  05/03/20 0500    Narrative:       The following orders were created for panel order Extra Tubes.  Procedure                               Abnormality         Status                     ---------                               -----------         ------                     Lavender Top[[id number]]                                     Final result                 Please view " results for these tests on the individual orders.    Lavender Top [[id number]] Collected:  05/03/20 0341    Specimen:  Blood Updated:  05/03/20 0500     Extra Tube hold for add-on     Comment: Auto resulted       Basic Metabolic Panel [[id number]]  (Abnormal) Collected:  05/03/20 0341    Specimen:  Blood Updated:  05/03/20 0447     Glucose 192 mg/dL      BUN 20 mg/dL      Creatinine 0.82 mg/dL      Sodium 139 mmol/L      Potassium 4.3 mmol/L      Chloride 102 mmol/L      CO2 23.0 mmol/L      Calcium 9.5 mg/dL      eGFR Non African Amer 68 mL/min/1.73      BUN/Creatinine Ratio 24.4     Anion Gap 14.0 mmol/L     Narrative:       GFR Normal >60  Chronic Kidney Disease <60  Kidney Failure <15      Vitamin B12 [[id number]]  (Normal) Collected:  05/02/20 0754    Specimen:  Blood Updated:  05/02/20 2202     Vitamin B-12 337 pg/mL     Narrative:       Results may be falsely increased if patient taking Biotin.      POC Glucose Once [[id number]]  (Abnormal) Collected:  05/02/20 1733    Specimen:  Blood Updated:  05/02/20 1744     Glucose 212 mg/dL      Comment: : [id number] Megan Reese DestinyMeter ID: [id number]       POC Glucose Once [[id number]]  (Abnormal) Collected:  05/02/20 1106    Specimen:  Blood Updated:  05/02/20 1121     Glucose 262 mg/dL      Comment: : [id number] Deny RenndyMeter ID: [id number]       Lipid Panel [[id number]]  (Abnormal) Collected:  05/02/20 0438    Specimen:  Blood Updated:  05/02/20 0751     Total Cholesterol 184 mg/dL      Triglycerides 331 mg/dL      HDL Cholesterol 33 mg/dL      LDL Cholesterol  85 mg/dL      VLDL Cholesterol 66.2 mg/dL      LDL/HDL Ratio 2.57    Narrative:       Cholesterol Reference Ranges  (U.S. Department of Health and Human Services ATP III Classifications)    Desirable          <200 mg/dL  Borderline High    200-239 mg/dL  High Risk          >240 mg/dL      Triglyceride Reference Ranges  (U.S. Department of Health and Human Services ATP III Classifications)    Normal            <150 mg/dL  Borderline High  150-199 mg/dL  High             200-499 mg/dL  Very High        >500 mg/dL    HDL Reference Ranges  (U.S. Department of Health and Human Services ATP III Classifcations)    Low     <40 mg/dl (major risk factor for CHD)  High    >60 mg/dl ('negative' risk factor for CHD)        LDL Reference Ranges  (U.S. Department of Health and Human Services ATP III Classifcations)    Optimal          <100 mg/dL  Near Optimal     100-129 mg/dL  Borderline High  130-159 mg/dL  High             160-189 mg/dL  Very High        >189 mg/dL    Hemoglobin A1c [[id number]]  (Abnormal) Collected:  05/02/20 0438    Specimen:  Blood Updated:  05/02/20 0751     Hemoglobin A1C 8.90 %     Narrative:       Hemoglobin A1C Ranges:    Increased Risk for Diabetes  5.7% to 6.4%  Diabetes                     >= 6.5%  Diabetic Goal                < 7.0%    Basic Metabolic Panel [[id number]]  (Abnormal) Collected:  05/02/20 0438    Specimen:  Blood Updated:  05/02/20 0539     Glucose 265 mg/dL      BUN 20 mg/dL      Creatinine 1.04 mg/dL      Sodium 140 mmol/L      Potassium 3.8 mmol/L      Chloride 102 mmol/L      CO2 23.0 mmol/L      Calcium 10.0 mg/dL      eGFR Non African Amer 52 mL/min/1.73      BUN/Creatinine Ratio 19.2     Anion Gap 15.0 mmol/L     Narrative:       GFR Normal >60  Chronic Kidney Disease <60  Kidney Failure <15      CBC Auto Differential [[id number]]  (Abnormal) Collected:  05/02/20 0438    Specimen:  Blood Updated:  05/02/20 0521     WBC 10.59 10*3/mm3      RBC 4.42 10*6/mm3      Hemoglobin 13.0 g/dL      Hematocrit 39.1 %      MCV 88.5 fL      MCH 29.4 pg      MCHC 33.2 g/dL      RDW 13.2 %      RDW-SD 42.5 fl      MPV 10.7 fL      Platelets 278 10*3/mm3      Neutrophil % 50.7 %      Lymphocyte % 38.4 %      Monocyte % 8.6 %      Eosinophil % 1.5 %      Basophil % 0.7 %      Immature Grans % 0.1 %      Neutrophils, Absolute 5.37 10*3/mm3      Lymphocytes, Absolute 4.07 10*3/mm3      Monocytes,  Absolute 0.91 10*3/mm3      Eosinophils, Absolute 0.16 10*3/mm3      Basophils, Absolute 0.07 10*3/mm3      Immature Grans, Absolute 0.01 10*3/mm3      nRBC 0.0 /100 WBC     [facility] Draw [[id number]] Collected:  05/01/20 1622    Specimen:  Blood Updated:  05/01/20 1745    Narrative:       The following orders were created for panel order [facility] Draw.  Procedure                               Abnormality         Status                     ---------                               -----------         ------                     Light Blue Top[[id number]]                                   Final result               Green Top (Gel)[[id number]]                                  Final result               Lavender Top[[id number]]                                     Final result               Red Top[[id number]]                                          Final result                 Please view results for these tests on the individual orders.    Light Blue Top [[id number]] Collected:  05/01/20 1632    Specimen:  Blood Updated:  05/01/20 1745     Extra Tube hold for add-on     Comment: Auto resulted       Green Top (Gel) [[id number]] Collected:  05/01/20 1622    Specimen:  Blood Updated:  05/01/20 1730     Extra Tube Hold for add-ons.     Comment: Auto resulted.       Lavender Top [[id number]] Collected:  05/01/20 1622    Specimen:  Blood Updated:  05/01/20 1730     Extra Tube hold for add-on     Comment: Auto resulted       Red Top [[id number]] Collected:  05/01/20 1622    Specimen:  Blood Updated:  05/01/20 1730     Extra Tube Hold for add-ons.     Comment: Auto resulted.       Comprehensive Metabolic Panel [[id number]]  (Abnormal) Collected:  05/01/20 1622    Specimen:  Blood Updated:  05/01/20 1704     Glucose 214 mg/dL      BUN 18 mg/dL      Creatinine 0.93 mg/dL      Sodium 142 mmol/L      Potassium 4.6 mmol/L      Chloride 100 mmol/L      CO2 26.0 mmol/L      Calcium 10.1 mg/dL      Total Protein 7.4 g/dL      Albumin 4.50 g/dL       ALT (SGPT) 17 U/L      AST (SGOT) 16 U/L      Alkaline Phosphatase 94 U/L      Total Bilirubin 0.4 mg/dL      eGFR Non African Amer 59 mL/min/1.73      Globulin 2.9 gm/dL      A/G Ratio 1.6 g/dL      BUN/Creatinine Ratio 19.4     Anion Gap 16.0 mmol/L     Narrative:       GFR Normal >60  Chronic Kidney Disease <60  Kidney Failure <15      Troponin [[id number]]  (Normal) Collected:  05/01/20 1622    Specimen:  Blood Updated:  05/01/20 1702     Troponin T <0.010 ng/mL     Narrative:       Troponin T Reference Range:  <= 0.03 ng/mL-   Negative for AMI  >0.03 ng/mL-     Abnormal for myocardial necrosis.  Clinicians would have to utilize clinical acumen, EKG, Troponin and serial changes to determine if it is an Acute Myocardial Infarction or myocardial injury due to an underlying chronic condition.       Results may be falsely decreased if patient taking Biotin.      BNP [[id number]]  (Normal) Collected:  05/01/20 1622    Specimen:  Blood Updated:  05/01/20 1701     proBNP 86.3 pg/mL     Narrative:       Among patients with dyspnea, NT-proBNP is highly sensitive for the detection of acute congestive heart failure. In addition NT-proBNP of <300 pg/ml effectively rules out acute congestive heart failure with 99% negative predictive value.    Results may be falsely decreased if patient taking Biotin.      Protime-INR [[id number]]  (Normal) Collected:  05/01/20 1632    Specimen:  Blood Updated:  05/01/20 1650     Protime 13.1 Seconds      INR 1.03    CBC & Differential [[id number]] Collected:  05/01/20 1622    Specimen:  Blood Updated:  05/01/20 1642    Narrative:       The following orders were created for panel order CBC & Differential.  Procedure                               Abnormality         Status                     ---------                               -----------         ------                     CBC Auto Differential[[id number]]        Normal              Final result                 Please view results for these  tests on the individual orders.    CBC Auto Differential [[id number]]  (Normal) Collected:  05/01/20 1622    Specimen:  Blood Updated:  05/01/20 1642     WBC 9.63 10*3/mm3      RBC 4.64 10*6/mm3      Hemoglobin 13.7 g/dL      Hematocrit 41.3 %      MCV 89.0 fL      MCH 29.5 pg      MCHC 33.2 g/dL      RDW 13.0 %      RDW-SD 42.5 fl      MPV 10.7 fL      Platelets 268 10*3/mm3      Neutrophil % 60.7 %      Lymphocyte % 29.1 %      Monocyte % 8.0 %      Eosinophil % 1.2 %      Basophil % 0.6 %      Immature Grans % 0.4 %      Neutrophils, Absolute 5.84 10*3/mm3      Lymphocytes, Absolute 2.80 10*3/mm3      Monocytes, Absolute 0.77 10*3/mm3      Eosinophils, Absolute 0.12 10*3/mm3      Basophils, Absolute 0.06 10*3/mm3      Immature Grans, Absolute 0.04 10*3/mm3      nRBC 0.0 /100 WBC     POC Glucose Once [[id number]]  (Abnormal) Collected:  05/01/20 1616    Specimen:  Blood Updated:  05/01/20 1627     Glucose 207 mg/dL      Comment: : [id number] Champion TaylorMeter ID: [id number]             Imaging Results (Last 72 Hours)     Procedure Component Value Units Date/Time    MRI Brain Without Contrast [[id number]] Collected:  05/02/20 1149     Updated:  05/02/20 1159    Narrative:       HISTORY: Left arm weakness     MRI BRAIN: Multiplanar imaging the brain performed without contrast.     COMPARISON: CT head 05/01/2020     FINDINGS: Scattered foci of diffusion restriction seen throughout the  right cerebral hemisphere within the right MCA distribution involving  the frontal parietal and temporal lobes. No diffusion restriction of the  cerebellum or the left cerebral hemisphere. No intracranial hemorrhage.  Mild cerebral and cerebellar volume loss. Corpus callosum intact. No  sellar or intracranial mass. Old lacunar infarct suspected of the right  basal ganglia. Prominent perivascular spaces along the basal ganglia  bilaterally. Hyperintense FLAIR signal changes of the periventricular  white matter regions favoring  underlying chronic small vessel ischemia.     Hyperostosis frontalis internus. Chronic mucosal thickening of the  paranasal sinuses. Normal flow-voids in the distal internal carotid and  basilar arteries. Normal flow void in the sagittal sinus.       Impression:       1. Scattered foci of diffusion restriction within the right MCA  distribution suggestive for acute nonhemorrhagic multifocal ischemia of  the right MCA distribution.  2. Chronic small vessel ischemic changes suspected with mild cerebral  volume loss.  This report was finalized on 05/02/2020 11:56 by Dr. Lina Perrin MD.    CT Angiogram Head [[id number]] Collected:  05/01/20 1852     Updated:  05/01/20 1903    Narrative:       EXAM:   CT NECK ANGIOGRAM  CT HEAD ANGIOGRAM 05/01/2020     COMPARISON:   None.      HISTORY:  74 years-old Female. Stroke      TECHNIQUE:      Multiple CT images were obtained of the of the head and neck after the  administration of IV contrast. 3D MIP reformats were generated in the  axial, sagittal and coronal planes were sent to PACS for interpretation.        Grading of carotid artery stenosis is performed by NASCET criteria.     FINDINGS:      CT Neck Angiogram:     Common origin of the innominate and right common carotid artery. The  visualized bilateral common carotid arteries demonstrate mild  atherosclerotic disease, more atherosclerotic disease at the  bifurcation. The bilateral subclavian arteries are patent.     Mild calcification along the lateral aspect of the right carotid bulb  with less than 30% stenosis. The remaining cervical right ICA is without  flow-limiting stenosis or occlusion.     Calcification of the left carotid bifurcation with less than 50%  stenosis.     The bilateral proximal vertebral arteries are well opacified. The right  vertebral artery is diminutive and terminates into a PICA the left C3/C4  demonstrate more atherosclerotic disease with mild stenosis.     CT Head Angiogram:     The  intracranial right ICA demonstrates atherosclerotic disease in the  carotid siphon, with mild to moderate stenosis. The right carotid  terminus is visualized. The right A1 is diminutive. The right A2 is also  diminutive. The MCA bifurcation is visualized with irregular appearance  of the A1 and M1, reflecting atherosclerotic disease. There is prompt  opacification of the proximal M2 segments. There is no aneurysm or  vascular malformation.     The left intracranial ICA demonstrates atherosclerotic disease with mild  to moderate stenosis in the left carotid siphon. The left carotid  terminus is visualized. The left A1, A2, M1, visualized proximal M2  segments demonstrate irregularity, reflecting atherosclerotic disease,  but without flow-limiting stenosis or occlusion. No aneurysm or other  vascular malformation identified.        The visualized posterior fossa circulation demonstrates no focal  flow-limiting stenosis or occlusion in the basilar artery. The left PCA  is fetal in origin. The bilateral ACAs are well opacified.       Impression:       CT Angiography Of The Neck With Intravenous Contrast   1. No evidence of high-grade arterial stenosis or underlying dissection.     CT Angiography Of The Head With Intravenous Contrast  1.   Atherosclerotic disease, without focal high-grade stenosis or  occlusion.  This report was finalized on 05/01/2020 19:00 by Dr. Zenaida Gonzalez MD.    CT Angiogram Neck [[id number]] Collected:  05/01/20 1852     Updated:  05/01/20 1903    Narrative:       EXAM:   CT NECK ANGIOGRAM  CT HEAD ANGIOGRAM 05/01/2020     COMPARISON:   None.      HISTORY:  74 years-old Female. Stroke      TECHNIQUE:      Multiple CT images were obtained of the of the head and neck after the  administration of IV contrast. 3D MIP reformats were generated in the  axial, sagittal and coronal planes were sent to PACS for interpretation.        Grading of carotid artery stenosis is performed by NASCET criteria.      FINDINGS:      CT Neck Angiogram:     Common origin of the innominate and right common carotid artery. The  visualized bilateral common carotid arteries demonstrate mild  atherosclerotic disease, more atherosclerotic disease at the  bifurcation. The bilateral subclavian arteries are patent.     Mild calcification along the lateral aspect of the right carotid bulb  with less than 30% stenosis. The remaining cervical right ICA is without  flow-limiting stenosis or occlusion.     Calcification of the left carotid bifurcation with less than 50%  stenosis.     The bilateral proximal vertebral arteries are well opacified. The right  vertebral artery is diminutive and terminates into a PICA the left C3/C4  demonstrate more atherosclerotic disease with mild stenosis.     CT Head Angiogram:     The intracranial right ICA demonstrates atherosclerotic disease in the  carotid siphon, with mild to moderate stenosis. The right carotid  terminus is visualized. The right A1 is diminutive. The right A2 is also  diminutive. The MCA bifurcation is visualized with irregular appearance  of the A1 and M1, reflecting atherosclerotic disease. There is prompt  opacification of the proximal M2 segments. There is no aneurysm or  vascular malformation.     The left intracranial ICA demonstrates atherosclerotic disease with mild  to moderate stenosis in the left carotid siphon. The left carotid  terminus is visualized. The left A1, A2, M1, visualized proximal M2  segments demonstrate irregularity, reflecting atherosclerotic disease,  but without flow-limiting stenosis or occlusion. No aneurysm or other  vascular malformation identified.        The visualized posterior fossa circulation demonstrates no focal  flow-limiting stenosis or occlusion in the basilar artery. The left PCA  is fetal in origin. The bilateral ACAs are well opacified.       Impression:       CT Angiography Of The Neck With Intravenous Contrast   1. No evidence of  high-grade arterial stenosis or underlying dissection.     CT Angiography Of The Head With Intravenous Contrast  1.   Atherosclerotic disease, without focal high-grade stenosis or  occlusion.  This report was finalized on 05/01/2020 19:00 by Dr. Zenaida Gonzalez MD.    CT Head Without Contrast Stroke Protocol [[id number]] Collected:  05/01/20 1847     Updated:  05/01/20 1854    Narrative:       EXAM: CT OF THE HEAD WITHOUT IV CONTRAST 05/01/2020     COMPARISON: None      INDICATION: Female, 74 years-old. Left arm weakness      PROCEDURE: Non contrast enhanced head CT was performed.      Radiation dose equals .2 mGy-cm.  Automated exposure control dose  reduction technique was implemented.     FINDINGS:     7 mm hypodensity within the right frontal centrum semiovale, consistent  with age-indeterminate infarct (image 19, series 4). A right temporal  hypodensity measuring 7 mm (image 16, series 4) is age-indeterminate.  Age-indeterminate right basal ganglia infarct. A more chronic appearing  lacunar infarct in the right frontal lobe anteriorly (image 13, series  4).     There is no acute intracranial hemorrhage.     Confluent periventricular hypodensity, favored to reflect mild to  moderate chronic microvascular changes.     The basal cisterns are preserved. There is no midline shift. No acute  hydrocephalus.     Mastoid air cells are clear.          Impression:       1.  Age-indeterminate infarct in the right basal ganglia, right centrum  semiovale and the posterior right temporal lobe.  2.  More chronic appearing right anterior frontal lobe lacunar infarct.  This report was finalized on 05/01/2020 18:51 by Dr. Zenaida Gonzalez MD.    XR Chest 1 View [[id number]] Collected:  05/01/20 1714     Updated:  05/01/20 1718    Narrative:       Exam:   XR CHEST 1 VW-       Date:  05/01/2020      History:  Female, age  74 years;Stroke Protocol (Onset > 12 hrs);  R29.898-Other symptoms and signs involving the musculoskeletal  system     COMPARISON:  None.     Findings :  Low lung volumes. Chronic interstitial lung changes.  The heart and mediastinum are normal in size. Lungs are without focal  infiltrate, mass or effusions.  The bones show no acute pathology.         Impression:       Impression:     1.  Low lung volumes.  2.  Otherwise, no acute cardiopulmonary process.     This report was finalized on 05/01/2020 17:15 by Dr. Zenaida Gonzalez MD.                Assessment:    Condition: In stable condition.  Improving.   (Type 2 DM- review labs and home medication. Discuss with patient importance of blood sugar control in the healing process. Review diet, medical,and lifestyle modifications for optimal medical treatment. Will restart their regular diabetic regimen and make consult for diabetic education / dietician available upon request.  Hemoglobin A1c 8.9- will adjust medication today.    Anxiety/depression- currently on Celexa 40 mg.  Reluctant to add Wellbutrin with questionable seizure activity.      Check transesophageal echo in morning.    Agree will need inpatient rehab but she does not have much social support at home.).     Plan:   Encourage ambulation and per physical therapy.      Patient Active Problem List   Diagnosis   • Left arm weakness   • Essential hypertension   • Hypertensive urgency   • Hypercholesterolemia   • Stroke (CMS/HCC)     Nhan Muir MD  5/3/2020

## 2020-05-03 NOTE — THERAPY TREATMENT NOTE
Acute Care - Occupational Therapy Treatment Note  [facility]     Patient Name: Gaviota Erazo  : 1945  MRN: [id number]  Today's Date: 5/3/2020  Onset of Illness/Injury or Date of Surgery: 20  Date of Referral to OT: 20  Referring Physician: MARGARITA Sy    Admit Date: 2020       ICD-10-CM ICD-9-CM   1. Left arm weakness R29.898 729.89   2. Decreased activities of daily living (ADL) Z78.9 V49.89   3. Impaired mobility Z74.09 799.89     Patient Active Problem List   Diagnosis   • Left arm weakness   • Essential hypertension   • Hypertensive urgency   • Hypercholesterolemia   • Stroke (CMS/HCC)     Past Medical History:   Diagnosis Date   • Depression    • Hypercholesteremia    • Hypertension    • TIA (transient ischemic attack)      Past Surgical History:   Procedure Laterality Date   • HYSTERECTOMY     • SKIN CANCER EXCISION         Therapy Treatment    Rehabilitation Treatment Summary     Row Name 20 0807             Treatment Time/Intention    Discipline  occupational therapy assistant  -CJ      Document Type  therapy note (daily note)  -CJ      Subjective Information  complains of;weakness;fatigue  -CJ      Mode of Treatment  occupational therapy  -CJ      Patient Effort  adequate  -CJ      Existing Precautions/Restrictions  fall L. sided weakness!  -CJ      Recorded by [CJ] Jose Miguel Castaneda COTA/L 20 1130      Row Name 20 0807             Bed Mobility Assessment/Treatment    Comment (Bed Mobility)  up in chair!  -CJ      Recorded by [CJ] Jose Miguel Castaneda COTA/L 20 1130      Row Name 20 0807             ADL Assessment/Intervention    14564 - OT Self Care/Mgmt Minutes  20  -CJ      BADL Assessment/Intervention  bathing;upper body dressing;lower body dressing  -CJ2      Recorded by [CJ] Jose Miguel Castaneda COTA/L 20 1131  [CJ2] Jose Miguel Castaneda COTA/L 20 1130      Row Name 20 0807             Bathing Assessment/Intervention    Bathing  [place] Level  bathing skills;set up;verbal cues;minimum assist (75% patient effort);moderate assist (50% patient effort)  -      Assistive Devices (Bathing)  bath mitt;grab bars/tub rail;hand-held shower spray hose;shower chair Kelsi PCA assisted pt. with bathing!  -      Bathing Position  supported sitting;supported standing  -CJ      Recorded by [CJ] Jose Miguel Castaneda COTA/L 05/03/20 1130      Row Name 05/03/20 0807             Upper Body Dressing Assessment/Training    Upper Body Dressing [place] Level  doff;don;front opening garment;set up;verbal cues;minimum assist (75% patient effort)  -CJ      Upper Body Dressing Position  unsupported sitting  -CJ      Recorded by [CJ] Jose Miguel Castaneda COTA/L 05/03/20 1130      Row Name 05/03/20 0807             Lower Body Dressing Assessment/Training    Lower Body Dressing [place] Level  doff;don;socks;undergarment;set up;verbal cues;minimum assist (75% patient effort);moderate assist (50% patient effort)  -      Lower Body Dressing Position  unsupported sitting;supported standing  -CJ      Recorded by [CJ] Jose Miguel Castaneda COTA/L 05/03/20 1130      Row Name 05/03/20 0807             General ROM    LT Upper Ext  Lt Shoulder ABduction;Lt Shoulder Extension;Lt Shoulder Flexion;Lt Shoulder Horizontal Abduction;Lt Shoulder External Rotation;Lt Elbow Extension/Flexion;Lt Elbow Supination;Lt Elbow Pronation;Lt Wrist Flexion;Lt Wrist Extension  -CJ      Recorded by [CJ] Jose Miguel Castaneda COTA/L 05/03/20 1130      Row Name 05/03/20 0807             Positioning and Restraints    Pre-Treatment Position  sitting in chair/recliner  -CJ      Post Treatment Position  chair  -CJ      In Chair  sitting;call light within reach;encouraged to call for assist  -CJ      Recorded by [CJ] Jose Miguel Castaneda COTA/L 05/03/20 1130      Row Name 05/03/20 0807             Pain Assessment    Additional Documentation  Pain Scale 2: Word Pre/Post-Treatment (Group)  -CJ       Recorded by [CJ] Jose Miguel Castaneda EASON/L 05/03/20 1130      Row Name 05/03/20 0807             Pain Scale: Numbers Pre/Post-Treatment    Pain Scale: Numbers, Pretreatment  0/10 - no pain  -      Pain Scale: Numbers, Post-Treatment  0/10 - no pain  -      Pain Intervention(s)  Repositioned;Rest;Shower  -CJ      Recorded by [CJ] Jose Miguel Castaneda EASON/L 05/03/20 1130      Row Name 05/03/20 0807             Outcome Summary/Treatment Plan (OT)    Daily Summary of Progress (OT)  progress towards functional goals is fair  -CJ      Barriers to Overall Progress (OT)  Fall/L. sided weakness!  -      Plan for Continued Treatment (OT)  continue with ot poc!  -CJ      Recorded by [CJ] Jose Miguel Castaneda EASON/L 05/03/20 1130        User Key  (r) = Recorded By, (t) = Taken By, (c) = Cosigned By    Initials Name Effective Dates Discipline     Jose Miguel Castaneda EASON/L 08/02/16 -  OT           Rehab Goal Summary     Row Name 05/03/20 1000             Bed Mobility Goal 1 (PT)    Activity/Assistive Device (Bed Mobility Goal 1, PT)  bed mobility activities, all  -      [place] Level/Cues Needed (Bed Mobility Goal 1, PT)  independent  -      Time Frame (Bed Mobility Goal 1, PT)  by discharge  -      Progress/Outcomes (Bed Mobility Goal 1, PT)  goal ongoing  -         Transfer Goal 1 (PT)    Activity/Assistive Device (Transfer Goal 1, PT)  sit-to-stand/stand-to-sit  -LH      [place] Level/Cues Needed (Transfer Goal 1, PT)  independent  -         Gait Training Goal 1 (PT)    [place] Level (Gait Training Goal 1, PT)  standby assist  -LH      Distance (Gait Goal 1, PT)  120ft  -LH      Time Frame (Gait Training Goal 1, PT)  by discharge  -      Progress/Outcome (Gait Training Goal 1, PT)  goal ongoing  -        User Key  (r) = Recorded By, (t) = Taken By, (c) = Cosigned By    Initials Name Provider Type Discipline     Deny Dobson, PT Physical Therapist PT            OT Recommendation and  Plan  Outcome Summary/Treatment Plan (OT)  Daily Summary of Progress (OT): progress towards functional goals is fair  Barriers to Overall Progress (OT): Fall/L. sided weakness!  Plan for Continued Treatment (OT): continue with ot poc!  Daily Summary of Progress (OT): progress towards functional goals is fair  Plan of Care Review  Plan of Care Reviewed With: patient  Plan of Care Reviewed With: patient  Outcome Measures     Row Name 05/03/20 0807 05/02/20 1305 05/02/20 0830       How much help from another is currently needed...    Putting on and taking off regular lower body clothing?  2  -CJ  2  -CJ  2  -CS    Bathing (including washing, rinsing, and drying)  3  -CJ  3  -CJ  3  -CS    Toileting (which includes using toilet bed pan or urinal)  3  -CJ  3  -CJ  3  -CS    Putting on and taking off regular upper body clothing  2  -CJ  2  -CJ  2  -CS    Taking care of personal grooming (such as brushing teeth)  3  -CJ  3  -CJ  3  -CS    Eating meals  3  -CJ  3  -CJ  3  -CS    AM-PAC 6 Clicks Score (OT)  16  -CJ  16  -CJ  16  -CS       Modified Corn Scale    Pre-Stroke Modified Miller Scale  --  --  0 - No Symptoms at all.  -CS    Modified [place] Scale  --  --  4 - Moderately severe disability.  Unable to walk without assistance, and unable to attend to own bodily needs without assistance.  -CS       Functional Assessment    Outcome Measure Options  --  AM-PAC 6 Clicks Daily Activity (OT)  -  AM-PAC 6 Clicks Daily Activity (OT);Modified Corn  -CS      User Key  (r) = Recorded By, (t) = Taken By, (c) = Cosigned By    Initials Name Provider Type    CJ Jose Miguel Castaneda, EASON/L Occupational Therapy Assistant    CS Enma Bennett, OTR/L, CNT Occupational Therapist           Time Calculation:   Time Calculation- OT     Row Name 05/03/20 0807             Time Calculation- OT    OT Start Time  0807  -      OT Stop Time  0848  -      OT Time Calculation (min)  41 min  -      Total Timed Code Minutes- OT  41  minute(s)  -      TCU Minutes- OT  41 min  -      OT Received On  05/03/20  -      OT Goal Re-Cert Due Date  05/12/20  -         Timed Charges    14432 - OT Self Care/Mgmt Minutes  20  -        User Key  (r) = Recorded By, (t) = Taken By, (c) = Cosigned By    Initials Name Provider Type     Jose Miguel Castaneda, EASON/L Occupational Therapy Assistant        Therapy Charges for Today     Code Description Service Date Service Provider Modifiers Qty    40176353825 HC OT THER PROC EA 15 MIN 5/2/2020 Jose Miguel Castaneda EASON/L GO 2    68143128890 HC OT SELF CARE/MGMT/TRAIN EA 15 MIN 5/3/2020 Jose Miguel Castaneda COTA/L GO 1    24642289928 HC OT THER PROC EA 15 MIN 5/3/2020 Jose Miguel Castaneda EASON/L GO 2               JENARO Krishnamurthy/LARISA  5/3/2020

## 2020-05-03 NOTE — PROGRESS NOTES
Neurology Progress Note      Date of admission: 5/1/2020  3:44 PM  Date of visit: 5/3/2020    Chief Complaint:  F/u stroke    Subjective     Subjective:  Patient reported to nursing that she had an episode where she could not talk and she tells me it occurred when she first woke up.  Nursing thought slight dysarthria but it rapidly resolved.  During episode her VSS and blood glucose was 266. No change in NIHSS per nursing. However, she was seen by speech therapy and while she  passed her swallow evaluation,  speech therapist noted anomia and dysarthria at times. With me she is dysarthric and there is worsening left facial droop and left arm weakness.   ZAYDA scheduled for tomorrow       Medications:  Current Facility-Administered Medications   Medication Dose Route Frequency Provider Last Rate Last Dose   • amLODIPine (NORVASC) 5 mg, lisinopril (PRINIVIL,ZESTRIL) 20 mg   Oral Q24H Nhan Muir MD       • atorvastatin (LIPITOR) tablet 80 mg  80 mg Oral Nightly Suri Tyler MD   80 mg at 05/02/20 2016   • citalopram (CeleXA) tablet 40 mg  40 mg Oral Daily Nhan Muir MD   40 mg at 05/03/20 0953   • clopidogrel (PLAVIX) tablet 75 mg  75 mg Oral Daily Nhan Muir MD   75 mg at 05/03/20 0953   • dextrose (D50W) 25 g/ 50mL Intravenous Solution 25 g  25 g Intravenous Q15 Min PRN Nhan Muir MD       • dextrose (GLUTOSE) oral gel 15 g  15 g Oral Q15 Min PRN Nhan Muir MD       • glucagon (human recombinant) (GLUCAGEN DIAGNOSTIC) injection 1 mg  1 mg Subcutaneous Q15 Min PRN Nhan Muir MD       • insulin lispro (humaLOG) injection 0-9 Units  0-9 Units Subcutaneous TID AC Nhan Muir MD   4 Units at 05/02/20 1737   • LORazepam (ATIVAN) tablet 1 mg  1 mg Oral Q6H PRN Nhan Muir MD       • metFORMIN (GLUCOPHAGE) tablet 500 mg  500 mg Oral BID With Meals Nhan Muir MD       • sodium chloride 0.9 % flush 10 mL  10 mL  Intravenous PRN Thomas Rosales MD       • sodium chloride 0.9 % flush 10 mL  10 mL Intravenous Q12H Nhan Muir MD   10 mL at 05/02/20 2016   • sodium chloride 0.9 % flush 10 mL  10 mL Intravenous PRN Nhan Muir MD       • sodium chloride 0.9 % infusion  100 mL/hr Intravenous Continuous Nhan Muir MD   Stopped at 05/02/20 1554       Review of Systems:   -A 14 point review of systems is completed and is negative except for increased dysarthria and left arm weakness    Objective     Objective      Vital Signs  Temp:  [98 °F (36.7 °C)-99.5 °F (37.5 °C)] 98.6 °F (37 °C)  Heart Rate:  [82-98] 89  Resp:  [16-18] 18  BP: (135-161)/(79-94) 157/81    Physical Exam:    HEENT:  Neck supple  CVS:  Regular rate and rhythm.  No murmurs  Carotid Examination:  No bruits  Lungs:  Clear to auscultation  Abdomen:  Non-tender, Non-distended  Extremities:  No signs of peripheral edema    Neurologic Exam:    -Awake, Alert, Oriented X 3  -No word finding difficulties--able to repeat  words, read sentences and speak and comprehend   -No aphasia  -Has dysarthria  -Follows simple and complex commands    Cranial nerves II through XII   · EOMI  · No facial sensory loss  · Increased left facial droop --UMN VII-spares forehead  · Hearing intact to finger rub and voice  · Turns head side to side.  · Tongue is midline (reportedly showed deviation to speech therapist    Motor: (strength out of 5:  1= minimal movement, 2 = movement in plane of gravity, 3 = movement against gravity, 4 = movement against some resistance, 5 = full strength)    -Right Upper Ext: Proximal: 5 Distal: 5  -Left Upper Ext: Proximal: 5 Distal: 5    -Right Lower Ext: Proximal: 3 Distal: 1+ to 2  -Left Lower Ext: Proximal: 5 Distal: 5    DTR:  2+ throughout in all four extremities  No babinski's    Sensory:  -Intact to light touch, pinprick, temperature, pain, and proprioception    Coordination/Gait:  -No ataxia/dysmetria on finger  to nos of right upper extremity and heel to shin of bilateral extremities.  Left finger to nose c/w weakness     Results Review:    I reviewed the patient's new clinical results.    Lab Results (last 24 hours)     Procedure Component Value Units Date/Time    POC Glucose Once [[id number]]  (Abnormal) Collected:  05/03/20 0945    Specimen:  Blood Updated:  05/03/20 0956     Glucose 267 mg/dL      Comment: : [id number] Nelia MeganMeter ID: [id number]       Extra Tubes [[id number]] Collected:  05/03/20 0341    Specimen:  Blood, Venous Line Updated:  05/03/20 0500    Narrative:       The following orders were created for panel order Extra Tubes.  Procedure                               Abnormality         Status                     ---------                               -----------         ------                     Lavender Top[[id number]]                                     Final result                 Please view results for these tests on the individual orders.    Lavender Top [[id number]] Collected:  05/03/20 0341    Specimen:  Blood Updated:  05/03/20 0500     Extra Tube hold for add-on     Comment: Auto resulted       Basic Metabolic Panel [[id number]]  (Abnormal) Collected:  05/03/20 0341    Specimen:  Blood Updated:  05/03/20 0447     Glucose 192 mg/dL      BUN 20 mg/dL      Creatinine 0.82 mg/dL      Sodium 139 mmol/L      Potassium 4.3 mmol/L      Chloride 102 mmol/L      CO2 23.0 mmol/L      Calcium 9.5 mg/dL      eGFR Non African Amer 68 mL/min/1.73      BUN/Creatinine Ratio 24.4     Anion Gap 14.0 mmol/L     Narrative:       GFR Normal >60  Chronic Kidney Disease <60  Kidney Failure <15      Vitamin B12 [[id number]]  (Normal) Collected:  05/02/20 0754    Specimen:  Blood Updated:  05/02/20 2202     Vitamin B-12 337 pg/mL     Narrative:       Results may be falsely increased if patient taking Biotin.      POC Glucose Once [[id number]]  (Abnormal) Collected:  05/02/20 1733    Specimen:  Blood Updated:   05/02/20 1744     Glucose 212 mg/dL      Comment: : [id number] Megan Reese DestinyMeter ID: [id number]           Imaging Results (Last 24 Hours)     ** No results found for the last 24 hours. **        Results for orders placed during the hospital encounter of 05/01/20   Adult Transthoracic Echo Complete W/ Cont if Necessary Per Protocol    Narrative · Left ventricular systolic function is normal. Estimated EF appears to be   in the range of 61 - 65%.  · Left ventricular diastolic dysfunction (grade I) consistent with   impaired relaxation.  · Left ventricular wall thickness is consistent with mild concentric   hypertrophy.  · No evidence of a patent foramen ovale.  · No hemodynamically significant valvular abnormalities identified on the   study.        Telemetry: Sinus/sinus tach 76 to 103    Assessment/Plan     Hospital Problem List      Stroke (CMS/HCC)    Left arm weakness    Essential hypertension    Hypertensive urgency    Hypercholesterolemia    Impression:  1. Left MCA territory strokes suspicious for artery to artery emboli but cannot exclude cardiac CTA did not show evidence for emboli or thrombus but there was atherosclerotic disease without high grade stenosis.  There is irregularity at right M1 (and A2)  c/w atherosclerotic disease which may be the basis for stroke  There may be slight progression of symptoms in the same territory or increased edema from stroke --however she state this began several days ago so there should not be the increased edema  2. Hypertension   3. Mixed hyperlipidemia with LDL of 85 and goal of < 70 to reduce future risk of stroke. Her triglycerides are elevated at 331  4. DM  with elevated hemoglobin A1c at 8.9 and goal of < 7.0 to reduce future risk of stroke    Plan:  · Head CT to look for progression  · Due to balance issues, incoordination of left arm and speech finding it is recommended that she go through inpatient rehab.  · ZAYDA tomorrow  · NPO at midnight for ZAYDA  tomorrow  · She may benefit from antidepressant as she seems to think she will never get better and is not sure why she should go through therapy.   · Discussed the benefits of therapy with her      Suri Gaspar MD  05/03/20  14:14

## 2020-05-03 NOTE — SIGNIFICANT NOTE
Went to give patient meds and patient presented with more slurred speech. Called Charge RN and NIHSS repeated. Slurred speech resolved in assessment. Slight tongue deviation present, Rusty Gaspar notified. Speech therapy present and evaluated after NIH completed

## 2020-05-03 NOTE — PLAN OF CARE
Problem: Patient Care Overview  Goal: Plan of Care Review  Outcome: Ongoing (interventions implemented as appropriate)  Flowsheets (Taken 5/3/2020 0310)  Progress: no change  Plan of Care Reviewed With: patient  Outcome Summary: Pt A&O, answers all appropriately and follows commands, however she requires additional time to think about the answers and may need additional prompting and cues at times. Upset that he husnamd isn't allowed in the hospital, but requests for the Dr's to call him in the AM. L side continues to be weak, with limited use of LUE. Spent the night up in the recliner, as she staes the bed is uncomfortable.

## 2020-05-03 NOTE — THERAPY EVALUATION
Patient Name: Gaviota Erazo  : 1945    MRN: [id number]                              Today's Date: 5/3/2020       Admit Date: 2020    Visit Dx:     ICD-10-CM ICD-9-CM   1. Left arm weakness R29.898 729.89   2. Decreased activities of daily living (ADL) Z78.9 V49.89   3. Impaired mobility Z74.09 799.89     Patient Active Problem List   Diagnosis   • Left arm weakness   • Essential hypertension   • Hypertensive urgency   • Hypercholesterolemia   • Stroke (CMS/HCC)     Past Medical History:   Diagnosis Date   • Depression    • Hypercholesteremia    • Hypertension    • TIA (transient ischemic attack)      Past Surgical History:   Procedure Laterality Date   • HYSTERECTOMY     • SKIN CANCER EXCISION       General Information     Row Name 20 1000          PT Evaluation Time/Intention    Document Type  evaluation Pt presents with a 3 days history of LUE weakness and undetermined amount of time with progressive LE weakness.  DX: R temporal lobe, basal ganglia, and centrum semiovale CVA (all age indeterminate)  -     Mode of Treatment  physical therapy mri on 20:  Scattered foci of diffusion restriction within the right MCA distribution suggestive for acute nonhemorrhagic multifocal ischemia   -     Row Name 20 1000          General Information    Patient Profile Reviewed?  yes  -     Prior Level of Function  independent:;community mobility;ADL's;home management;cooking;cleaning;driving;shopping  -     Existing Precautions/Restrictions  fall  -     Barriers to Rehab  medically complex  -     Row Name 20 1000          Relationship/Environment    Lives With  grandchild(brent)  -     Row Name 20 1000          Resource/Environmental Concerns    Current Living Arrangements  home/apartment/condo  -     Row Name 20 1000          Home Main Entrance    Number of Stairs, Main Entrance  two  -     Stair Railings, Main Entrance  railing on left side (ascending)  -     Row Name  05/03/20 1000          Cognitive Assessment/Intervention- PT/OT    Orientation Status (Cognition)  oriented x 4  -     Personal Safety Interventions  fall prevention program maintained;gait belt;nonskid shoes/slippers when out of bed  -[facility] Name 05/03/20 1000          Safety Issues, Functional Mobility    Safety Issues Affecting Function (Mobility)  ability to follow commands  -     Impairments Affecting Function (Mobility)  balance;coordination;endurance/activity tolerance;strength  -       User Key  (r) = Recorded By, (t) = Taken By, (c) = Cosigned By    Initials Name Provider Type     Deny Dobson, PT Physical Therapist        Mobility     [facility] Name 05/03/20 1000          Bed Mobility Assessment/Treatment    Comment (Bed Mobility)  up in chair  -LH     Row Name 05/03/20 1000          Sit-Stand Transfer    Sit-Stand [place] (Transfers)  verbal cues;stand by assist  -LH     Row Name 05/03/20 1000          Gait/Stairs Assessment/Training    [place] Level (Gait)  contact guard  -     Distance in Feet (Gait)  90  -     Deviations/Abnormal Patterns (Gait)  gait speed decreased;stride length decreased  [facility]       User Key  (r) = Recorded By, (t) = Taken By, (c) = Cosigned By    Initials Name Provider Type     Deny Dobson, PT Physical Therapist        Obj/Interventions     [facility] Name 05/03/20 1000          General ROM    GENERAL ROM COMMENTS  WFL  -[facility] Name 05/03/20 1000          MMT (Manual Muscle Testing)    General MMT Comments  4/5 BLE  -LH     Row Name 05/03/20 1000          Static Sitting Balance    Level of [place] (Unsupported Sitting, Static Balance)  independent  -     Sitting Position (Unsupported Sitting, Static Balance)  sitting in chair  -LH     Row Name 05/03/20 1000          Dynamic Sitting Balance    Level of [place], Reaches Outside Midline (Sitting, Dynamic Balance)  independent  -     Sitting Position, Reaches Outside Midline (Sitting, Dynamic Balance)   sitting in chair  -[facility] Name 05/03/20 1000          Static Standing Balance    Level of [place] (Supported Standing, Static Balance)  standby assist  -[facility] Name 05/03/20 1000          Dynamic Standing Balance    Level of [place], Reaches Outside Midline (Standing, Dynamic Balance)  minimal assist, 75% patient effort  -     Comment, Reaches Outside Midline (Standing, Dynamic Balance)  tandem stance and gait min A to balance, backwards gait is guarded but cg A, LLE decrease step length backwards  -[facility] Name 05/03/20 1000          Sensory Assessment/Intervention    Sensory General Assessment  no sensation deficits identified  -       User Key  (r) = Recorded By, (t) = Taken By, (c) = Cosigned By    Initials Name Provider Type     Deny Dobson, PT Physical Therapist        Goals/Plan     [facility] Name 05/03/20 1000          Bed Mobility Goal 1 (PT)    Activity/Assistive Device (Bed Mobility Goal 1, PT)  bed mobility activities, all  -     [place] Level/Cues Needed (Bed Mobility Goal 1, PT)  independent  -     Time Frame (Bed Mobility Goal 1, PT)  by discharge  -     Progress/Outcomes (Bed Mobility Goal 1, PT)  goal ongoing  -[facility] Name 05/03/20 1000          Transfer Goal 1 (PT)    Activity/Assistive Device (Transfer Goal 1, PT)  sit-to-stand/stand-to-sit  -     [place] Level/Cues Needed (Transfer Goal 1, PT)  independent  -[facility] Name 05/03/20 1000          Gait Training Goal 1 (PT)    [place] Level (Gait Training Goal 1, PT)  standby assist  -     Distance (Gait Goal 1, PT)  120ft  -     Time Frame (Gait Training Goal 1, PT)  by discharge  -     Progress/Outcome (Gait Training Goal 1, PT)  goal ongoing  -       User Key  (r) = Recorded By, (t) = Taken By, (c) = Cosigned By    Initials Name Provider Type     Deny Dobson, PT Physical Therapist        Clinical Impression     Row Name 05/03/20 1000          Pain Assessment    Additional  Documentation  Pain Scale: Numbers Pre/Post-Treatment (Group)  -[facility] Name 05/03/20 1000          Pain Scale: Numbers Pre/Post-Treatment    Pain Scale: Numbers, Pretreatment  0/10 - no pain  -     Pain Scale: Numbers, Post-Treatment  0/10 - no pain  -     Pain Intervention(s)  Medication (See MAR)  -[facility] Name 05/03/20 1000          Plan of Care Review    Plan of Care Reviewed With  patient  -     Outcome Summary  PT IE complete.  Pt is cg A w/ forward gait no obstacles/challenges.  Min A to balance w/ sidestepping, tandem stance/gait.  Decrease coordination LLE.  PT to address functional mobility safety and balance.  Recommend acute rehab at [place].  Thank you for referral.  -[facility] Name 05/03/20 1000          Physical Therapy Clinical Impression    Patient/Family Goals Statement (PT Clinical Impression)  return home  -     Criteria for Skilled Interventions Met (PT Clinical Impression)  yes;treatment indicated  -     Rehab Potential (PT Clinical Summary)  good, to achieve stated therapy goals  -     Predicted Duration of Therapy (PT)  until dc  -[facility] Name 05/03/20 1000          Vital Signs    Posttreatment Heart Rate (beats/min)  101  -     Post SpO2 (%)  98  -     O2 Delivery Post Treatment  room air  -     Post Patient Position  Sitting  -[facility] Name 05/03/20 1000          Positioning and Restraints    Pre-Treatment Position  sitting in chair/recliner  -     Post Treatment Position  chair  -     In Chair  reclined;call light within reach;encouraged to call for assist;legs elevated  -       User Key  (r) = Recorded By, (t) = Taken By, (c) = Cosigned By    Initials Name Provider Type     Deny Dobson, PT Physical Therapist        Outcome Measures     [facility] Name 05/03/20 1043          How much help from another person do you currently need...    Turning from your back to your side while in flat bed without using bedrails?  4  -     Moving from lying on back to sitting  on the side of a flat bed without bedrails?  3  -LH     Moving to and from a bed to a chair (including a wheelchair)?  3  -LH     Standing up from a chair using your arms (e.g., wheelchair, bedside chair)?  4  -LH     Climbing 3-5 steps with a railing?  3  -LH     To walk in hospital room?  3  -     AM-PAC 6 Clicks Score (PT)  20  -     Row Name 05/03/20 1043          Modified [place] Scale    Pre-Stroke Modified [place] Scale  0 - No Symptoms at all.  -     Modified [place] Scale  4 - Moderately severe disability.  Unable to walk without assistance, and unable to attend to own bodily needs without assistance.  -     Row Name 05/03/20 1043          Functional Assessment    Outcome Measure Options  AM-PAC 6 Clicks Basic Mobility (PT);Modified Miller  -       User Key  (r) = Recorded By, (t) = Taken By, (c) = Cosigned By    Initials Name Provider Type     Deny Dobson, PT Physical Therapist          PT Recommendation and Plan  Planned Therapy Interventions (PT Eval): balance training, bed mobility training, gait training, home exercise program, motor coordination training, patient/family education, strengthening, transfer training  Outcome Summary/Treatment Plan (PT)  Anticipated Discharge Disposition (PT): inpatient rehabilitation facility  Plan of Care Reviewed With: patient  Outcome Summary: PT IE complete.  Pt is cg A w/ forward gait no obstacles/challenges.  Min A to balance w/ sidestepping, tandem stance/gait.  Decrease coordination LLE.  PT to address functional mobility safety and balance.  Recommend acute rehab at [place].  Thank you for referral.     Time Calculation:   PT Charges     Row Name 05/03/20 1053             Time Calculation    Start Time  1000  -      Stop Time  1053  -      Time Calculation (min)  53 min  -      PT Received On  05/03/20  -      PT Goal Re-Cert Due Date  05/13/20  -        User Key  (r) = Recorded By, (t) = Taken By, (c) = Cosigned By    Initials Name Provider Type      Deny Dobson, PT Physical Therapist        Therapy Charges for Today     Code Description Service Date Service Provider Modifiers Qty    14982080973 HC PT EVAL LOW COMPLEXITY 4 5/3/2020 Deny Dobson, PT GP 1          PT G-Codes  Outcome Measure Options: AM-PAC 6 Clicks Basic Mobility (PT), Modified [place]  AM-PAC 6 Clicks Score (PT): 20  AM-PAC 6 Clicks Score (OT): 16  Modified Miller Scale: 4 - Moderately severe disability.  Unable to walk without assistance, and unable to attend to own bodily needs without assistance.    Deny Dobson, PT  5/3/2020

## 2020-05-03 NOTE — PLAN OF CARE
Problem: Patient Care Overview  Goal: Plan of Care Review  Outcome: Ongoing (interventions implemented as appropriate)  Flowsheets (Taken 5/3/2020 3595)  Progress: no change (Initial eval)  Plan of Care Reviewed With: patient  Note:   Clinical bedside swallow evaluation complete. Pt alert and oriented x4. Mild left sided labial droop with decreased left sided tongue strength and slight deviation to the right with protrusion. Due to this she has increased oral preparation time of regular solids. No overt s/s of aspiration noted with all trials complete. Pt ok to continue a regular diet with thin liquids. Ok for meds to be administered whole with thin liquids as tolerated. SLP to complete a speech language evaluation as she does exhibit anomia as well as mild dysarthria at times.

## 2020-05-03 NOTE — THERAPY EVALUATION
Acute Care - Speech Language Pathology   Swallow Initial Evaluation [facility]     Patient Name: Gaviota Erazo  : 1945  MRN: [id number]  Today's Date: 5/3/2020  Onset of Illness/Injury or Date of Surgery: 20     Referring Physician: MARGARITA Sy      Admit Date: 2020  Clinical bedside swallow evaluation complete. Pt alert and oriented x4. Mild left sided labial droop with decreased left sided tongue strength and slight deviation to the right with protrusion. Due to this she has increased oral preparation time of regular solids. No overt s/s of aspiration noted with all trials complete. Pt ok to continue a regular diet with thin liquids. Ok for meds to be administered whole with thin liquids as tolerated. SLP to complete a speech language evaluation as she does exhibit anomia as well as mild dysarthria at times.  Mandeep Savage MS CCC-SLP 5/3/2020 13:39  Visit Dx:     ICD-10-CM ICD-9-CM   1. Left arm weakness R29.898 729.89   2. Decreased activities of daily living (ADL) Z78.9 V49.89   3. Impaired mobility Z74.09 799.89   4. Dysphagia, unspecified type R13.10 787.20     Patient Active Problem List   Diagnosis   • Left arm weakness   • Essential hypertension   • Hypertensive urgency   • Hypercholesterolemia   • Stroke (CMS/HCC)     Past Medical History:   Diagnosis Date   • Depression    • Hypercholesteremia    • Hypertension    • TIA (transient ischemic attack)      Past Surgical History:   Procedure Laterality Date   • HYSTERECTOMY     • SKIN CANCER EXCISION          SWALLOW EVALUATION (last 72 hours)      SLP Adult Swallow Evaluation     Row Name 20 0944                   Rehab Evaluation    Document Type  evaluation  -CS        Subjective Information  complains of;weakness  -CS        Patient Observations  alert;cooperative;agree to therapy  -CS        Patient/Family Observations  No family present  -CS        Patient Effort  good  -CS           General Information    Patient Profile  Reviewed  yes  -CS        Pertinent History Of Current Problem  Left sided weakness, HTN, DM, MRI of brain 5/02/2020- Acute nonhemorrhagic multifocal ischemia of the right MCA distribution.  -CS        Current Method of Nutrition  regular textures;thin liquids  -CS        Precautions/Limitations, Vision  WFL;for purposes of eval  -CS        Precautions/Limitations, Hearing  WFL  -CS        Prior Level of Function-Communication  WFL  -CS        Prior Level of Function-Swallowing  no diet consistency restrictions  -CS        Plans/Goals Discussed with  patient  -CS        Barriers to Rehab  none identified  -CS        Patient's Goals for Discharge  patient did not state  -CS           Pain Assessment    Additional Documentation  Pain Scale: Numbers Pre/Post-Treatment (Group)  -CS           Pain Scale: Numbers Pre/Post-Treatment    Pain Scale: Numbers, Pretreatment  0/10 - no pain  -CS        Pain Scale: Numbers, Post-Treatment  0/10 - no pain  -CS           Oral Motor and Function    Dentition Assessment  natural, present and adequate  -CS        Secretion Management  WNL/WFL  -CS        Mucosal Quality  moist, healthy  -CS           Oral Musculature and Cranial Nerve Assessment    Oral Motor General Assessment  oral labial or buccal impairment;lingual impairment  -CS        Oral Labial or Buccal Impairment, Detail, Cranial Nerve VII (Facial):  CN7: Motor Impairment;left labial droop  -CS        Lingual Impairment, Detail. Cranial Nerves IX, XII (Glossopharyngeal and Hypoglossal)  CN12: Motor Impairment;reduced strength left  -CS           General Eating/Swallowing Observations    Respiratory Support Currently in Use  room air  -CS        Eating/Swallowing Skills  fed by SLP;self-fed  -CS        Positioning During Eating  upright in chair  -CS        Utensils Used  spoon;straw  -CS        Consistencies Trialed  regular textures;honey-thick liquids;nectar/syrup-thick liquids;thin liquids;pureed  -CS           Clinical  Swallow Eval    Oral Prep Phase  impaired  -CS        Oral Transit  WFL  -CS        Oral Residue  WFL  -CS        Pharyngeal Phase  no overt signs/symptoms of pharyngeal impairment  -CS        Esophageal Phase  unremarkable  -CS        Clinical Swallow Evaluation Summary  See eval note  -CS           Oral Prep Concerns    Oral Prep Concerns  increased prep time  -CS        Increased Prep Time  regular consistencies  -CS           Clinical Impression    SLP Swallowing Diagnosis  mild;oral dysfunction  -CS        Functional Impact  risk of aspiration/pneumonia  -CS        Rehab Potential/Prognosis, Swallowing  good, to achieve stated therapy goals  -CS        Swallow Criteria for Skilled Therapeutic Interventions Met  demonstrates skilled criteria  -CS           Recommendations    Therapy Frequency (Swallow)  PRN  -CS        Predicted Duration Therapy Intervention (Days)  until discharge  -CS        SLP Diet Recommendation  regular textures;thin liquids  -CS        Recommended Precautions and Strategies  upright posture during/after eating;small bites of food and sips of liquid  -CS        SLP Rec. for Method of Medication Administration  meds whole;with thin liquids;as tolerated  -CS        Monitor for Signs of Aspiration  yes;cough;gurgly voice;throat clearing;notify SLP if any concerns  -CS        Anticipated Dischage Disposition  unknown  -CS           Swallow Goals (SLP)    Oral Nutrition/Hydration Goal Selection (SLP)  oral nutrition/hydration, SLP goal 1  -CS           Oral Nutrition/Hydration Goal 1 (SLP)    Oral Nutrition/Hydration Goal 1, SLP  Pt will tolerate LRD w/o any overt s/s of aspriation.  -CS        Time Frame (Oral Nutrition/Hydration Goal 1, SLP)  by discharge  -CS        Barriers (Oral Nutrition/Hydration Goal 1, SLP)  n/a  -CS        Progress/Outcomes (Oral Nutrition/Hydration Goal 1, SLP)  goal ongoing  -CS          User Key  (r) = Recorded By, (t) = Taken By, (c) = Cosigned By    Initials Name  Effective Dates    Mandeep Wing MS CCC-SLP 04/03/18 -           EDUCATION  The patient has been educated in the following areas:   Dysphagia (Swallowing Impairment).    SLP Recommendation and Plan  SLP Swallowing Diagnosis: mild, oral dysfunction  SLP Diet Recommendation: regular textures, thin liquids  Recommended Precautions and Strategies: upright posture during/after eating, small bites of food and sips of liquid  SLP Rec. for Method of Medication Administration: meds whole, with thin liquids, as tolerated     Monitor for Signs of Aspiration: yes, cough, gurgly voice, throat clearing, notify SLP if any concerns     Swallow Criteria for Skilled Therapeutic Interventions Met: demonstrates skilled criteria  Anticipated Dischage Disposition: unknown  Rehab Potential/Prognosis, Swallowing: good, to achieve stated therapy goals  Therapy Frequency (Swallow): PRN  Predicted Duration Therapy Intervention (Days): until discharge       Plan of Care Reviewed With: patient  Progress: no change(Initial eval)    SLP GOALS     Row Name 05/03/20 0944             Oral Nutrition/Hydration Goal 1 (SLP)    Oral Nutrition/Hydration Goal 1, SLP  Pt will tolerate LRD w/o any overt s/s of aspriation.  -CS      Time Frame (Oral Nutrition/Hydration Goal 1, SLP)  by discharge  -CS      Barriers (Oral Nutrition/Hydration Goal 1, SLP)  n/a  -CS      Progress/Outcomes (Oral Nutrition/Hydration Goal 1, SLP)  goal ongoing  -CS        User Key  (r) = Recorded By, (t) = Taken By, (c) = Cosigned By    Initials Name Provider Type    AMPARO SavageMandeep MS CCC-SLP Speech and Language Pathologist             Time Calculation:   Time Calculation- SLP     Row Name 05/03/20 1338             Time Calculation- SLP    SLP Start Time  0944  -      SLP Stop Time  1055  -      SLP Time Calculation (min)  71 min  -CS      SLP Received On  05/03/20  -      SLP Goal Re-Cert Due Date  05/13/20  -CS        User Key  (r) = Recorded By, (t) = Taken By, (c)  = Cosigned By    Initials Name Provider Type    CS Mandeep Savage, MS CCC-SLP Speech and Language Pathologist          Therapy Charges for Today     Code Description Service Date Service Provider Modifiers Qty    [id number]  ST EVAL ORAL PHARYNG SWALLOW 5 5/3/2020 Mandeep Savage, MS CCC-SLP GN 1               Mandeep Savage MS CCC-SLP  5/3/2020

## 2020-05-03 NOTE — PLAN OF CARE
Problem: Patient Care Overview  Goal: Plan of Care Review  Outcome: Ongoing (interventions implemented as appropriate)  Flowsheets (Taken 5/3/2020 1000)  Plan of Care Reviewed With: patient  Outcome Summary: PT IE complete.  Pt is cg A w/ forward gait no obstacles/challenges.  Min A to balance w/ sidestepping, tandem stance/gait.  Decrease coordination LLE.  PT to address functional mobility safety and balance.  Recommend acute rehab at [place].  Thank you for referral.

## 2020-05-03 NOTE — PLAN OF CARE
Problem: Patient Care Overview  Goal: Plan of Care Review  Outcome: Ongoing (interventions implemented as appropriate)  Flowsheets (Taken 5/3/2020 1132)  Progress: improving  Plan of Care Reviewed With: patient  Note:   Pt. Able to perform ue rom with Lue with this meza/l, AAROM with vc's, trace phalanges ext noted with this tx, adl with PCA KALEE for female areas!

## 2020-05-03 NOTE — PLAN OF CARE
Problem: Patient Care Overview  Goal: Plan of Care Review  Outcome: Ongoing (interventions implemented as appropriate)  Flowsheets  Taken 5/3/2020 1817 by Alma Barclay, RN  Progress: improving  Outcome Summary: Pt A&Ox4, VSS safety maintained. Acute neuro change this shift, see significant note and Bienvenido Gaspar evaluated, repeat CT ordered. Will continue to monitor  Taken 5/3/2020 1334 by Mandeep Savage MS CCC-SLP  Plan of Care Reviewed With: patient

## 2020-05-04 ENCOUNTER — APPOINTMENT (OUTPATIENT)
Dept: CARDIOLOGY | Facility: HOSPITAL | Age: 75
End: 2020-05-04

## 2020-05-04 ENCOUNTER — ANESTHESIA (OUTPATIENT)
Dept: CARDIOLOGY | Facility: HOSPITAL | Age: 75
End: 2020-05-04

## 2020-05-04 ENCOUNTER — ANESTHESIA EVENT (OUTPATIENT)
Dept: CARDIOLOGY | Facility: HOSPITAL | Age: 75
End: 2020-05-04

## 2020-05-04 LAB
BH CV ECHO MEAS - BSA(HAYCOCK): 1.7 M^2
BH CV ECHO MEAS - BSA: 1.7 M^2
BH CV ECHO MEAS - BZI_BMI: 29.9 KILOGRAMS/M^2
BH CV ECHO MEAS - BZI_METRIC_HEIGHT: 152.4 CM
BH CV ECHO MEAS - BZI_METRIC_WEIGHT: 69.4 KG
GLUCOSE BLDC GLUCOMTR-MCNC: 152 MG/DL (ref 70–130)
GLUCOSE BLDC GLUCOMTR-MCNC: 193 MG/DL (ref 70–130)
GLUCOSE BLDC GLUCOMTR-MCNC: 200 MG/DL (ref 70–130)

## 2020-05-04 PROCEDURE — 25010000002 PROPOFOL 10 MG/ML EMULSION: Performed by: NURSE ANESTHETIST, CERTIFIED REGISTERED

## 2020-05-04 PROCEDURE — 97110 THERAPEUTIC EXERCISES: CPT

## 2020-05-04 PROCEDURE — 93325 DOPPLER ECHO COLOR FLOW MAPG: CPT

## 2020-05-04 PROCEDURE — 93320 DOPPLER ECHO COMPLETE: CPT | Performed by: INTERNAL MEDICINE

## 2020-05-04 PROCEDURE — 99232 SBSQ HOSP IP/OBS MODERATE 35: CPT | Performed by: PSYCHIATRY & NEUROLOGY

## 2020-05-04 PROCEDURE — 25010000002 CYANOCOBALAMIN PER 1000 MCG: Performed by: CLINICAL NURSE SPECIALIST

## 2020-05-04 PROCEDURE — 93320 DOPPLER ECHO COMPLETE: CPT

## 2020-05-04 PROCEDURE — 97116 GAIT TRAINING THERAPY: CPT

## 2020-05-04 PROCEDURE — 93312 ECHO TRANSESOPHAGEAL: CPT

## 2020-05-04 PROCEDURE — 82962 GLUCOSE BLOOD TEST: CPT

## 2020-05-04 PROCEDURE — 63710000001 INSULIN LISPRO (HUMAN) PER 5 UNITS: Performed by: FAMILY MEDICINE

## 2020-05-04 PROCEDURE — 93312 ECHO TRANSESOPHAGEAL: CPT | Performed by: INTERNAL MEDICINE

## 2020-05-04 PROCEDURE — 92523 SPEECH SOUND LANG COMPREHEN: CPT | Performed by: SPEECH-LANGUAGE PATHOLOGIST

## 2020-05-04 PROCEDURE — 93325 DOPPLER ECHO COLOR FLOW MAPG: CPT | Performed by: INTERNAL MEDICINE

## 2020-05-04 RX ORDER — CYANOCOBALAMIN 1000 UG/ML
1000 INJECTION, SOLUTION INTRAMUSCULAR; SUBCUTANEOUS ONCE
Status: COMPLETED | OUTPATIENT
Start: 2020-05-04 | End: 2020-05-04

## 2020-05-04 RX ORDER — ASPIRIN 81 MG/1
81 TABLET, CHEWABLE ORAL DAILY
Status: DISCONTINUED | OUTPATIENT
Start: 2020-05-04 | End: 2020-05-05

## 2020-05-04 RX ORDER — PROPOFOL 10 MG/ML
VIAL (ML) INTRAVENOUS AS NEEDED
Status: DISCONTINUED | OUTPATIENT
Start: 2020-05-04 | End: 2020-05-04 | Stop reason: SURG

## 2020-05-04 RX ADMIN — PROPOFOL 60 MG: 10 INJECTION, EMULSION INTRAVENOUS at 10:15

## 2020-05-04 RX ADMIN — LIDOCAINE HYDROCHLORIDE 100 MG: 20 INJECTION, SOLUTION INTRAVENOUS at 10:10

## 2020-05-04 RX ADMIN — SODIUM CHLORIDE: 9 INJECTION, SOLUTION INTRAVENOUS at 10:08

## 2020-05-04 RX ADMIN — SODIUM CHLORIDE, PRESERVATIVE FREE 10 ML: 5 INJECTION INTRAVENOUS at 20:42

## 2020-05-04 RX ADMIN — CYANOCOBALAMIN 1000 MCG: 1000 INJECTION, SOLUTION INTRAMUSCULAR; SUBCUTANEOUS at 17:55

## 2020-05-04 RX ADMIN — METFORMIN HYDROCHLORIDE 500 MG: 500 TABLET ORAL at 18:46

## 2020-05-04 RX ADMIN — LIDOCAINE HYDROCHLORIDE 100 MG: 20 INJECTION, SOLUTION INTRAVENOUS at 10:17

## 2020-05-04 RX ADMIN — ASPIRIN 81 MG: 81 TABLET, CHEWABLE ORAL at 17:56

## 2020-05-04 RX ADMIN — LINAGLIPTIN 5 MG: 5 TABLET, FILM COATED ORAL at 12:50

## 2020-05-04 RX ADMIN — METFORMIN HYDROCHLORIDE 500 MG: 500 TABLET ORAL at 12:53

## 2020-05-04 RX ADMIN — CLOPIDOGREL 75 MG: 75 TABLET, FILM COATED ORAL at 12:50

## 2020-05-04 RX ADMIN — INSULIN LISPRO 2 UNITS: 100 INJECTION, SOLUTION INTRAVENOUS; SUBCUTANEOUS at 17:57

## 2020-05-04 RX ADMIN — BUSPIRONE HYDROCHLORIDE 15 MG: 10 TABLET ORAL at 12:54

## 2020-05-04 RX ADMIN — AMLODIPINE BESYLATE: 5 TABLET ORAL at 12:54

## 2020-05-04 RX ADMIN — PROPOFOL 60 MG: 10 INJECTION, EMULSION INTRAVENOUS at 10:10

## 2020-05-04 RX ADMIN — SODIUM CHLORIDE 100 ML/HR: 9 INJECTION, SOLUTION INTRAVENOUS at 18:50

## 2020-05-04 RX ADMIN — PROPOFOL 40 MG: 10 INJECTION, EMULSION INTRAVENOUS at 10:20

## 2020-05-04 RX ADMIN — INSULIN LISPRO 4 UNITS: 100 INJECTION, SOLUTION INTRAVENOUS; SUBCUTANEOUS at 12:45

## 2020-05-04 RX ADMIN — SODIUM CHLORIDE, PRESERVATIVE FREE 10 ML: 5 INJECTION INTRAVENOUS at 12:55

## 2020-05-04 RX ADMIN — ATORVASTATIN CALCIUM 80 MG: 40 TABLET, FILM COATED ORAL at 20:42

## 2020-05-04 RX ADMIN — BUSPIRONE HYDROCHLORIDE 15 MG: 10 TABLET ORAL at 20:42

## 2020-05-04 RX ADMIN — CITALOPRAM 40 MG: 20 TABLET, FILM COATED ORAL at 12:51

## 2020-05-04 NOTE — THERAPY TREATMENT NOTE
Acute Care - Physical Therapy Treatment Note  [facility]     Patient Name: Gaviota Erazo  : 1945  MRN: [id number]  Today's Date: 2020  Onset of Illness/Injury or Date of Surgery: 20     Referring Physician: MARGARITA Sy    Admit Date: 2020    Visit Dx:    ICD-10-CM ICD-9-CM   1. Left arm weakness R29.898 729.89   2. Decreased activities of daily living (ADL) Z78.9 V49.89   3. Impaired mobility Z74.09 799.89   4. Dysphagia, unspecified type R13.10 787.20   5. Cerebrovascular accident (CVA), unspecified mechanism (CMS/HCC) I63.9 434.91   6. Bradycardia, unspecified  R00.1 427.89     Patient Active Problem List   Diagnosis   • Left arm weakness   • Essential hypertension   • Hypertensive urgency   • Hypercholesterolemia   • Stroke (CMS/[facility])       Therapy Treatment    Rehabilitation Treatment Summary     Row Name 20 1435 20 1319 20 1018       Treatment Time/Intention    Discipline  physical therapy assistant  -LC  physical therapy assistant  -LC  physical therapy assistant  -LC    Document Type  therapy note (daily note)  -2  therapy note (daily note)  -  --    Subjective Information  complains of;weakness;fatigue  -2  --  --    Mode of Treatment  physical therapy  -2  physical therapy  -  --    Comment  --  eating lunch  -2  --    Reason Treatment Not Performed  --  --  unavailable for treatment  -    Existing Precautions/Restrictions  fall L. sided weakness  -2  --  --    Recorded by [LC] Donna Shaffer, PTA 20 1435  [LC2] Donna Shaffer, PTA 20 1446 [LC] Donna Shaffer, PTA 20 1319  [LC2] Donna Shaffer, PTA 20 1320 [LC] Donna Shaffer, PTA 20 1018    Row Name 20 0755             Treatment Time/Intention    Discipline  occupational therapy assistant  -CJ      Document Type  therapy note (daily note)  -      Subjective Information  complains of;weakness;fatigue  -CJ      Mode of Treatment  occupational therapy  -      Patient  Effort  good  -CJ      Existing Precautions/Restrictions  fall L. sided weakness!  -CJ      Recorded by [CJ] Jose Miguel Castaneda EASON/L 05/04/20 1123      Row Name 05/04/20 1435             Bed Mobility Assessment/Treatment    Comment (Bed Mobility)  up in chair  -LC      Recorded by [LC] Donna Shaffer, PTA 05/04/20 1522      Row Name 05/04/20 0755             Functional Mobility    Functional Mobility- Comment  up in chair!  -CJ      Recorded by [CJ] Jose Miguel Castaneda EASON/L 05/04/20 1123      Row Name 05/04/20 1435             Sit-Stand Transfer    Sit-Stand [place] (Transfers)  verbal cues;contact guard  -LC      Recorded by [LC] Donna Shaffer, PTA 05/04/20 1522      Row Name 05/04/20 1435             Stand-Sit Transfer    Stand-Sit [place] (Transfers)  verbal cues;contact guard  -LC      Recorded by [LC] Donna Shaffer, PTA 05/04/20 1522      Row Name 05/04/20 1435             Gait/Stairs Assessment/Training    [place] Level (Gait)  contact guard  -LC      Assistive Device (Gait)  -- HHA  -LC      Distance in Feet (Gait)  50' x2 seated rest break  -LC      Pattern (Gait)  step-to;step-through  -LC      Deviations/Abnormal Patterns (Gait)  base of support, narrow;gait speed decreased;stride length decreased  -LC      Bilateral Gait Deviations  forward flexed posture  -LC      Comment (Gait/Stairs)  seated rest break, requires cues for LLE advancement, corrects with cues  -LC      Recorded by [LC] Donna Shaffer, PTA 05/04/20 1522      Row Name 05/04/20 0755             General ROM    LT Upper Ext  Lt Shoulder ABduction;Lt Shoulder Extension;Lt Shoulder Flexion;Lt Shoulder Horizontal Abduction;Lt Shoulder External Rotation;Lt Shoulder Internal Rotation;Lt Elbow Extension/Flexion;Lt Elbow Supination;Lt Elbow Pronation;Lt Wrist Flexion;Lt Wrist Extension;Lt Ulnar Deviation  -CJ      Left Hand  Fingers MCP Flexion;Fingers MCP Extension;Fingers PIP Flexion;Fingers DIP Flexion;Thumb CM Flexion;Thumb CM  Extension;Thumb MCP Flexion;Thumb IP Flexion  -CJ      Recorded by [CJ] Jose Miguel Castaneda COTA/L 05/04/20 1123      Row Name 05/04/20 1435             Motor Skills Assessment/Interventions    Additional Documentation  Therapeutic Exercise (Group);Therapeutic Exercise Interventions (Group)  -      Recorded by [LC] Donna Shaffer, [facility] 05/04/20 1522      Row Name 05/04/20 1435             Therapeutic Exercise    Lower Extremity (Therapeutic Exercise)  LAQ (long arc quad), bilateral;marching while seated  -      Lower Extremity Range of Motion (Therapeutic Exercise)  ankle dorsiflexion/plantar flexion, bilateral  -      Exercise Type (Therapeutic Exercise)  AROM (active range of motion)  -      Position (Therapeutic Exercise)  seated  -      Sets/Reps (Therapeutic Exercise)  10 x2   -LC      Recorded by [LC] Donna Shaffer, [facility] 05/04/20 1522      Row Name 05/04/20 1435 05/04/20 0755          Positioning and Restraints    Pre-Treatment Position  sitting in chair/recliner  -  sitting in chair/recliner  -     Post Treatment Position  chair  -  chair  -CJ     In Chair  reclined;call light within reach;encouraged to call for assist  -  sitting;call light within reach;encouraged to call for assist  -CJ     Recorded by [LC] Donna Shaffer, PTA 05/04/20 1522 [CJ] Jose Miguel Castaneda COTA/L 05/04/20 1123     Row Name 05/04/20 0755             Pain Assessment    Additional Documentation  Pain Scale 2: Word Pre/Post-Treatment (Group)  -CJ      Recorded by [CJ] Jose Miguel Castaneda COTA/L 05/04/20 1123      Row Name 05/04/20 1435 05/04/20 0755          Pain Scale: Numbers Pre/Post-Treatment    Pain Scale: Numbers, Pretreatment  0/10 - no pain  -  0/10 - no pain  -CJ     Pain Scale: Numbers, Post-Treatment  0/10 - no pain  -  0/10 - no pain  -     Pain Intervention(s)  --  Rest  -CJ     Recorded by [LC] Donna Shaffer, [facility] 05/04/20 1522 [CJ] Jose Miguel Castaneda COTA/L 05/04/20 1123     Row Name 05/04/20 1435              Plan of Care Review    Plan of Care Reviewed With  patient  -LC      Progress  improving  -      Outcome Summary  PT tx completed. CGA for sit to stands. Amb 50' x2 trials with HHA and CGA and seated rest break. Required cues for improved LLE advancement, able to correct. Cues provided for safety, mostly with turns. Will cont to progress with PT for impoved functional mobility. Recommend acute inpatient rehab upon d/c.  -LC      Recorded by [LC] Donna Shaffer, PTA 05/04/20 1522      Row Name 05/04/20 0755             Outcome Summary/Treatment Plan (OT)    Daily Summary of Progress (OT)  progress toward functional goals is good  -      Barriers to Overall Progress (OT)  Fall/L. sided weakness!  -      Plan for Continued Treatment (OT)  continue with ot poc!  -CJ      Recorded by [CJ] Jose Miguel Castaneda COTA/LARISA 05/04/20 1123        User Key  (r) = Recorded By, (t) = Taken By, (c) = Cosigned By    Initials Name Effective Dates Discipline     Jose Miguel Castaneda EASON/L 08/02/16 -  OT     Donna Shaffer, PTA 10/18/19 -  PT                       PT Recommendation and Plan     Plan of Care Reviewed With: patient  Progress: improving  Outcome Summary: PT tx completed. CGA for sit to stands. Amb 50' x2 trials with HHA and CGA and seated rest break. Required cues for improved LLE advancement, able to correct. Cues provided for safety, mostly with turns. Will cont to progress with PT for impoved functional mobility. Recommend acute inpatient rehab upon d/c.  Outcome Measures     Row Name 05/04/20 0755 05/03/20 0807 05/02/20 1305       How much help from another is currently needed...    Putting on and taking off regular lower body clothing?  2  -CJ  2  -CJ  2  -CJ    Bathing (including washing, rinsing, and drying)  3  -CJ  3  -CJ  3  -CJ    Toileting (which includes using toilet bed pan or urinal)  3  -CJ  3  -CJ  3  -CJ    Putting on and taking off regular upper body clothing  2  -CJ  2  -CJ  2  -CJ     Taking care of personal grooming (such as brushing teeth)  3  -CJ  3  -CJ  3  -CJ    Eating meals  3  -CJ  3  -CJ  3  -CJ    AM-PAC 6 Clicks Score (OT)  16  -CJ  16  -CJ  16  -CJ       Functional Assessment    Outcome Measure Options  AM-PAC 6 Clicks Daily Activity (OT)  -  --  AM-PAC 6 Clicks Daily Activity (OT)  -    Row Name 05/02/20 0830             How much help from another is currently needed...    Putting on and taking off regular lower body clothing?  2  -CS      Bathing (including washing, rinsing, and drying)  3  -CS      Toileting (which includes using toilet bed pan or urinal)  3  -CS      Putting on and taking off regular upper body clothing  2  -CS      Taking care of personal grooming (such as brushing teeth)  3  -CS      Eating meals  3  -CS      AM-PAC 6 Clicks Score (OT)  16  -CS         Modified Miller Scale    Pre-Stroke Modified [place] Scale  0 - No Symptoms at all.  -CS      Modified [place] Scale  4 - Moderately severe disability.  Unable to walk without assistance, and unable to attend to own bodily needs without assistance.  -CS         Functional Assessment    Outcome Measure Options  AM-PAC 6 Clicks Daily Activity (OT);Modified Miller  -CS        User Key  (r) = Recorded By, (t) = Taken By, (c) = Cosigned By    Initials Name Provider Type     Jose Miguel Castaneda COTA/L Occupational Therapy Assistant    Enma Sarkar, OTR/L, CNT Occupational Therapist         Time Calculation:   PT Charges     Row Name 05/04/20 1522             Time Calculation    Start Time  1435  -      Stop Time  1515  -      Time Calculation (min)  40 min  -      PT Received On  05/04/20  -         Time Calculation- PT    Total Timed Code Minutes- PT  40 minute(s)  -        User Key  (r) = Recorded By, (t) = Taken By, (c) = Cosigned By    Initials Name Provider Type    Donna Lopez, PTA Physical Therapy Assistant        Therapy Charges for Today     Code Description Service Date Service  Provider Modifiers Qty    46990544947 HC GAIT TRAINING EA 15 MIN 5/4/2020 Donna Shaffer, HUMZA GP 2    78129424124 HC PT THER PROC EA 15 MIN 5/4/2020 Donna Shaffer PTA GP 1          PT G-Codes  Outcome Measure Options: AM-PAC 6 Clicks Daily Activity (OT)  AM-PAC 6 Clicks Score (PT): 20  AM-PAC 6 Clicks Score (OT): 16  Modified Dubois Scale: 4 - Moderately severe disability.  Unable to walk without assistance, and unable to attend to own bodily needs without assistance.    Donna Shafefr PTA  5/4/2020

## 2020-05-04 NOTE — THERAPY TREATMENT NOTE
Acute Care - Occupational Therapy Treatment Note  [facility]     Patient Name: Gaviota Erazo  : 1945  MRN: [id number]  Today's Date: 2020  Onset of Illness/Injury or Date of Surgery: 20  Date of Referral to OT: 20  Referring Physician: MARGARITA Sy    Admit Date: 2020       ICD-10-CM ICD-9-CM   1. Left arm weakness R29.898 729.89   2. Decreased activities of daily living (ADL) Z78.9 V49.89   3. Impaired mobility Z74.09 799.89   4. Dysphagia, unspecified type R13.10 787.20   5. Cerebrovascular accident (CVA), unspecified mechanism (CMS/HCC) I63.9 434.91   6. Bradycardia, unspecified  R00.1 427.89     Patient Active Problem List   Diagnosis   • Left arm weakness   • Essential hypertension   • Hypertensive urgency   • Hypercholesterolemia   • Stroke (CMS/HCC)     Past Medical History:   Diagnosis Date   • Depression    • Hypercholesteremia    • Hypertension    • TIA (transient ischemic attack)      Past Surgical History:   Procedure Laterality Date   • HYSTERECTOMY     • SKIN CANCER EXCISION         Therapy Treatment    Rehabilitation Treatment Summary     Row Name 20 1018 20 0755          Treatment Time/Intention    Discipline  physical therapy assistant  -LC  occupational therapy assistant  -     Document Type  --  therapy note (daily note)  -CJ     Subjective Information  --  complains of;weakness;fatigue  -CJ     Mode of Treatment  --  occupational therapy  -CJ     Patient Effort  --  good  -CJ     Reason Treatment Not Performed  unavailable for treatment  -  --     Existing Precautions/Restrictions  --  fall L. sided weakness!  -CJ     Recorded by [LC] Donna Shaffer, HUMZA 20 1018 [CJ] Jose Miguel Castaneda COTA/L 20 1123     Row Name 20 0755             Functional Mobility    Functional Mobility- Comment  up in chair!  -CJ      Recorded by [CJ] Jose Miguel Castaneda COTA/L 20 1123      Row Name 20 0755             General ROM    LT Upper Ext   Lt Shoulder ABduction;Lt Shoulder Extension;Lt Shoulder Flexion;Lt Shoulder Horizontal Abduction;Lt Shoulder External Rotation;Lt Shoulder Internal Rotation;Lt Elbow Extension/Flexion;Lt Elbow Supination;Lt Elbow Pronation;Lt Wrist Flexion;Lt Wrist Extension;Lt Ulnar Deviation  -CJ      Left Hand  Fingers MCP Flexion;Fingers MCP Extension;Fingers PIP Flexion;Fingers DIP Flexion;Thumb CM Flexion;Thumb CM Extension;Thumb MCP Flexion;Thumb IP Flexion  -CJ      Recorded by [CJ] Jose Miguel Castaneda COTA/L 05/04/20 1123      Row Name 05/04/20 0755             Positioning and Restraints    Pre-Treatment Position  sitting in chair/recliner  -CJ      Post Treatment Position  chair  -CJ      In Chair  sitting;call light within reach;encouraged to call for assist  -CJ      Recorded by [CJ] Jose Miguel Castaneda COTA/L 05/04/20 1123      Row Name 05/04/20 0755             Pain Assessment    Additional Documentation  Pain Scale 2: Word Pre/Post-Treatment (Group)  -CJ      Recorded by [CJ] Jose Miguel Castaneda COTA/L 05/04/20 1123      Row Name 05/04/20 0755             Pain Scale: Numbers Pre/Post-Treatment    Pain Scale: Numbers, Pretreatment  0/10 - no pain  -CJ      Pain Scale: Numbers, Post-Treatment  0/10 - no pain  -CJ      Pain Intervention(s)  Rest  -CJ      Recorded by [CJ] Jose Miguel Castaneda COTA/L 05/04/20 1123      Row Name 05/04/20 0755             Outcome Summary/Treatment Plan (OT)    Daily Summary of Progress (OT)  progress toward functional goals is good  -CJ      Barriers to Overall Progress (OT)  Fall/L. sided weakness!  -      Plan for Continued Treatment (OT)  continue with ot poc!  -CJ      Recorded by [CJ] Jose Miguel Castaneda COTA/LARISA 05/04/20 1123        User Key  (r) = Recorded By, (t) = Taken By, (c) = Cosigned By    Initials Name Effective Dates Discipline    CJ Jose Miguel Castaneda COTA/L 08/02/16 -  OT    Donna Lopez, PTA 10/18/19 -  PT                 OT Recommendation and Plan  Outcome  Summary/Treatment Plan (OT)  Daily Summary of Progress (OT): progress toward functional goals is good  Barriers to Overall Progress (OT): Fall/L. sided weakness!  Plan for Continued Treatment (OT): continue with ot poc!  Daily Summary of Progress (OT): progress toward functional goals is good  Plan of Care Review  Plan of Care Reviewed With: patient  Plan of Care Reviewed With: patient  Outcome Measures     Row Name 05/04/20 0755 05/03/20 0807 05/02/20 1305       How much help from another is currently needed...    Putting on and taking off regular lower body clothing?  2  -CJ  2  -CJ  2  -CJ    Bathing (including washing, rinsing, and drying)  3  -CJ  3  -CJ  3  -CJ    Toileting (which includes using toilet bed pan or urinal)  3  -CJ  3  -CJ  3  -CJ    Putting on and taking off regular upper body clothing  2  -CJ  2  -CJ  2  -CJ    Taking care of personal grooming (such as brushing teeth)  3  -CJ  3  -CJ  3  -CJ    Eating meals  3  -CJ  3  -CJ  3  -CJ    AM-PAC 6 Clicks Score (OT)  16  -CJ  16  -CJ  16  -CJ       Functional Assessment    Outcome Measure Options  AM-PAC 6 Clicks Daily Activity (OT)  -CJ  --  AM-PAC 6 Clicks Daily Activity (OT)  -CJ    Row Name 05/02/20 0830             How much help from another is currently needed...    Putting on and taking off regular lower body clothing?  2  -CS      Bathing (including washing, rinsing, and drying)  3  -CS      Toileting (which includes using toilet bed pan or urinal)  3  -CS      Putting on and taking off regular upper body clothing  2  -CS      Taking care of personal grooming (such as brushing teeth)  3  -CS      Eating meals  3  -CS      AM-PAC 6 Clicks Score (OT)  16  -CS         Modified [place] Scale    Pre-Stroke Modified [place] Scale  0 - No Symptoms at all.  -CS      Modified [place] Scale  4 - Moderately severe disability.  Unable to walk without assistance, and unable to attend to own bodily needs without assistance.  -CS         Functional Assessment     Outcome Measure Options  AM-PAC 6 Clicks Daily Activity (OT);Modified [place]  -AMPARO        User Key  (r) = Recorded By, (t) = Taken By, (c) = Cosigned By    Initials Name Provider Type    Jose Miguel Talamantes EASON/L Occupational Therapy Assistant    Enma Sarkar, OTR/L, CNT Occupational Therapist           Time Calculation:   Time Calculation- OT     Row Name 05/04/20 0755             Time Calculation- OT    OT Start Time  0755  -      OT Stop Time  0824  -      OT Time Calculation (min)  29 min  -      Total Timed Code Minutes- OT  29 minute(s)  -      TCU Minutes- OT  29 min  -      OT Received On  05/04/20  -      OT Goal Re-Cert Due Date  05/12/20  -        User Key  (r) = Recorded By, (t) = Taken By, (c) = Cosigned By    Initials Name Provider Type     Jose Miguel Castaneda COTA/L Occupational Therapy Assistant        Therapy Charges for Today     Code Description Service Date Service Provider Modifiers Qty    74722536910 HC OT SELF CARE/MGMT/TRAIN EA 15 MIN 5/3/2020 Jose Miguel Castaneda EASON/L GO 1    69974507590 HC OT THER PROC EA 15 MIN 5/3/2020 Jose Miguel Castaneda EASON/L GO 2    98830579331 HC OT THER PROC EA 15 MIN 5/4/2020 Jose Miguel Castaneda COTA/L GO 2               JENARO Krishnamurthy/LARISA  5/4/2020

## 2020-05-04 NOTE — ANESTHESIA PREPROCEDURE EVALUATION
Anesthesia Evaluation     Patient summary reviewed and Nursing notes reviewed   no history of anesthetic complications:  NPO Solid Status: > 8 hours  NPO Liquid Status: > 8 hours           Airway   Mallampati: II  TM distance: >3 FB  Neck ROM: full  No difficulty expected  Dental      Pulmonary    (-) not a smoker  Cardiovascular   Exercise tolerance: poor (<4 METS)    (+) hypertension, hyperlipidemia,     ROS comment: TTE· Left ventricular systolic function is normal. Estimated EF appears to be in the range of 61 - 65%.  · Left ventricular diastolic dysfunction (grade I) consistent with impaired relaxation.  · Left ventricular wall thickness is consistent with mild concentric hypertrophy.  · No evidence of a patent foramen ovale.  · No hemodynamically significant valvular abnormalities identified on the study.    Neuro/Psych  (+) TIA, CVA, psychiatric history Depression,       ROS Comment: Admitted 5/2/20 with left arm weakness, found to have acute stroke  GI/Hepatic/Renal/Endo    (+)   diabetes mellitus type 2,   (-) liver disease, no renal disease    Musculoskeletal     Abdominal    Substance History      OB/GYN          Other                        Anesthesia Plan    ASA 3     MAC     intravenous induction     Anesthetic plan, all risks, benefits, and alternatives have been provided, discussed and informed consent has been obtained with: patient.

## 2020-05-04 NOTE — PROGRESS NOTES
Continued Stay Note  [facility]     Patient Name: Gaviota Erazo  MRN: [id number]  Today's Date: 5/4/2020    Admit Date: 5/1/2020    Discharge Plan     Row Name 05/04/20 1001       Plan    Plan  [facility] Referral    Patient/Family in Agreement with Plan  yes    Plan Comments  Left a message for Hayley from [facility] [phone] to check on the status of the referral. Awaiting return call.         Discharge Codes    No documentation.             MAXI Crystal

## 2020-05-04 NOTE — PROGRESS NOTES
Gaviota Erazo is a 74 y.o. female patient.  Anxious about the diagnosis of acute CVA.  Work-up in progress.  Plan transesophageal echo today  Reviewed neuro note.        Current Facility-Administered Medications   Medication Dose Route Frequency Provider Last Rate Last Dose   • amLODIPine (NORVASC) 5 mg, lisinopril (PRINIVIL,ZESTRIL) 20 mg   Oral Q24H Nhan Muir MD       • atorvastatin (LIPITOR) tablet 80 mg  80 mg Oral Nightly Suri Tyler MD   80 mg at 05/03/20 2115   • busPIRone (BUSPAR) tablet 15 mg  15 mg Oral Q12H Nhan Muir MD   15 mg at 05/03/20 2115   • citalopram (CeleXA) tablet 40 mg  40 mg Oral Daily Nhan Muir MD   40 mg at 05/03/20 0953   • clopidogrel (PLAVIX) tablet 75 mg  75 mg Oral Daily Nhan Muir MD   75 mg at 05/03/20 0953   • dextrose (D50W) 25 g/ 50mL Intravenous Solution 25 g  25 g Intravenous Q15 Min PRN Nhan Muir MD       • dextrose (GLUTOSE) oral gel 15 g  15 g Oral Q15 Min PRN Nhan Muir MD       • glucagon (human recombinant) (GLUCAGEN DIAGNOSTIC) injection 1 mg  1 mg Subcutaneous Q15 Min PRN Nhan Muir MD       • insulin lispro (humaLOG) injection 0-9 Units  0-9 Units Subcutaneous TID AC Nhan Muir MD   4 Units at 05/03/20 1740   • LORazepam (ATIVAN) tablet 1 mg  1 mg Oral Q6H PRN Nhan Muir MD       • metFORMIN (GLUCOPHAGE) tablet 500 mg  500 mg Oral BID With Meals Nhan Muir MD       • sodium chloride 0.9 % flush 10 mL  10 mL Intravenous PRN Thomas Rosales MD       • sodium chloride 0.9 % flush 10 mL  10 mL Intravenous Q12H Nhan Muir MD   10 mL at 05/02/20 2016   • sodium chloride 0.9 % flush 10 mL  10 mL Intravenous PRN Nhan Muir MD       • sodium chloride 0.9 % infusion  100 mL/hr Intravenous Continuous Nhan Muir MD   Stopped at 05/02/20 1051     ALLERGIES:  No Known Allergies    Stroke (CMS/[facility])    Left arm  "weakness    Essential hypertension    Hypertensive urgency    Hypercholesterolemia    Blood pressure 144/74, pulse 86, temperature 98.3 °F (36.8 °C), temperature source Oral, resp. rate 14, height 152.4 cm (60\"), weight 69.6 kg (153 lb 6 oz), SpO2 95 %.      Subjective:  Symptoms:  Stable.  She reports weakness and anxiety.  No shortness of breath or chest pain.    Diet:  Adequate intake.    Activity level: Impaired due to weakness.    Pain:  She reports no pain.      Review of Systems   Respiratory: Negative for shortness of breath.    Cardiovascular: Negative for chest pain.   Neurological: Positive for weakness.     Objective:  General Appearance:  Comfortable and well-appearing.    Vital signs: (most recent): Blood pressure 144/74, pulse 86, temperature 98.3 °F (36.8 °C), temperature source Oral, resp. rate 14, height 152.4 cm (60\"), weight 69.6 kg (153 lb 6 oz), SpO2 95 %.  Vital signs are normal.    Output: Producing urine and minimal stool output.    HEENT: Normal HEENT exam.  (Left-sided facial droop noted)    Lungs:  Normal effort and normal respiratory rate.    Heart: Normal rate.  Regular rhythm.    Abdomen: Abdomen is soft.  Bowel sounds are normal.   There is no abdominal tenderness.     Extremities: Decreased range of motion.    Skin:  Warm and dry.              Labs:  Lab Results (last 72 hours)     Procedure Component Value Units Date/Time    POC Glucose Once [[id number]]  (Abnormal) Collected:  05/03/20 0945    Specimen:  Blood Updated:  05/03/20 0956     Glucose 267 mg/dL      Comment: : [id number] Nelia MeganMeter ID: [id number]       Extra Tubes [[id number]] Collected:  05/03/20 0341    Specimen:  Blood, Venous Line Updated:  05/03/20 0500    Narrative:       The following orders were created for panel order Extra Tubes.  Procedure                               Abnormality         Status                     ---------                               -----------         ------                   "   Lavender Top[[id number]]                                     Final result                 Please view results for these tests on the individual orders.    Lavender Top [[id number]] Collected:  05/03/20 0341    Specimen:  Blood Updated:  05/03/20 0500     Extra Tube hold for add-on     Comment: Auto resulted       Basic Metabolic Panel [[id number]]  (Abnormal) Collected:  05/03/20 0341    Specimen:  Blood Updated:  05/03/20 0447     Glucose 192 mg/dL      BUN 20 mg/dL      Creatinine 0.82 mg/dL      Sodium 139 mmol/L      Potassium 4.3 mmol/L      Chloride 102 mmol/L      CO2 23.0 mmol/L      Calcium 9.5 mg/dL      eGFR Non African Amer 68 mL/min/1.73      BUN/Creatinine Ratio 24.4     Anion Gap 14.0 mmol/L     Narrative:       GFR Normal >60  Chronic Kidney Disease <60  Kidney Failure <15      Vitamin B12 [[id number]]  (Normal) Collected:  05/02/20 0754    Specimen:  Blood Updated:  05/02/20 2202     Vitamin B-12 337 pg/mL     Narrative:       Results may be falsely increased if patient taking Biotin.      POC Glucose Once [[id number]]  (Abnormal) Collected:  05/02/20 1733    Specimen:  Blood Updated:  05/02/20 1744     Glucose 212 mg/dL      Comment: : [id number] Megan LópezyMeter ID: [id number]       POC Glucose Once [[id number]]  (Abnormal) Collected:  05/02/20 1106    Specimen:  Blood Updated:  05/02/20 1121     Glucose 262 mg/dL      Comment: : [id number] Deny McgowanyMeter ID: [id number]       Lipid Panel [[id number]]  (Abnormal) Collected:  05/02/20 0438    Specimen:  Blood Updated:  05/02/20 0751     Total Cholesterol 184 mg/dL      Triglycerides 331 mg/dL      HDL Cholesterol 33 mg/dL      LDL Cholesterol  85 mg/dL      VLDL Cholesterol 66.2 mg/dL      LDL/HDL Ratio 2.57    Narrative:       Cholesterol Reference Ranges  (U.S. Department of Health and Human Services ATP III Classifications)    Desirable          <200 mg/dL  Borderline High    200-239 mg/dL  High Risk          >240  mg/dL      Triglyceride Reference Ranges  (U.S. Department of Health and Human Services ATP III Classifications)    Normal           <150 mg/dL  Borderline High  150-199 mg/dL  High             200-499 mg/dL  Very High        >500 mg/dL    HDL Reference Ranges  (U.S. Department of Health and Human Services ATP III Classifcations)    Low     <40 mg/dl (major risk factor for CHD)  High    >60 mg/dl ('negative' risk factor for CHD)        LDL Reference Ranges  (U.S. Department of Health and Human Services ATP III Classifcations)    Optimal          <100 mg/dL  Near Optimal     100-129 mg/dL  Borderline High  130-159 mg/dL  High             160-189 mg/dL  Very High        >189 mg/dL    Hemoglobin A1c [[id number]]  (Abnormal) Collected:  05/02/20 0438    Specimen:  Blood Updated:  05/02/20 0751     Hemoglobin A1C 8.90 %     Narrative:       Hemoglobin A1C Ranges:    Increased Risk for Diabetes  5.7% to 6.4%  Diabetes                     >= 6.5%  Diabetic Goal                < 7.0%    Basic Metabolic Panel [[id number]]  (Abnormal) Collected:  05/02/20 0438    Specimen:  Blood Updated:  05/02/20 0539     Glucose 265 mg/dL      BUN 20 mg/dL      Creatinine 1.04 mg/dL      Sodium 140 mmol/L      Potassium 3.8 mmol/L      Chloride 102 mmol/L      CO2 23.0 mmol/L      Calcium 10.0 mg/dL      eGFR Non African Amer 52 mL/min/1.73      BUN/Creatinine Ratio 19.2     Anion Gap 15.0 mmol/L     Narrative:       GFR Normal >60  Chronic Kidney Disease <60  Kidney Failure <15      CBC Auto Differential [[id number]]  (Abnormal) Collected:  05/02/20 0438    Specimen:  Blood Updated:  05/02/20 0521     WBC 10.59 10*3/mm3      RBC 4.42 10*6/mm3      Hemoglobin 13.0 g/dL      Hematocrit 39.1 %      MCV 88.5 fL      MCH 29.4 pg      MCHC 33.2 g/dL      RDW 13.2 %      RDW-SD 42.5 fl      MPV 10.7 fL      Platelets 278 10*3/mm3      Neutrophil % 50.7 %      Lymphocyte % 38.4 %      Monocyte % 8.6 %      Eosinophil % 1.5 %      Basophil % 0.7 %       Immature Grans % 0.1 %      Neutrophils, Absolute 5.37 10*3/mm3      Lymphocytes, Absolute 4.07 10*3/mm3      Monocytes, Absolute 0.91 10*3/mm3      Eosinophils, Absolute 0.16 10*3/mm3      Basophils, Absolute 0.07 10*3/mm3      Immature Grans, Absolute 0.01 10*3/mm3      nRBC 0.0 /100 WBC     [place] Draw [[id number]] Collected:  05/01/20 1622    Specimen:  Blood Updated:  05/01/20 1745    Narrative:       The following orders were created for panel order [place] Draw.  Procedure                               Abnormality         Status                     ---------                               -----------         ------                     Light Blue Top[[id number]]                                   Final result               Green Top (Gel)[[id number]]                                  Final result               Lavender Top[[id number]]                                     Final result               Red Top[[id number]]                                          Final result                 Please view results for these tests on the individual orders.    Light Blue Top [[id number]] Collected:  05/01/20 1632    Specimen:  Blood Updated:  05/01/20 1745     Extra Tube hold for add-on     Comment: Auto resulted       Green Top (Gel) [[id number]] Collected:  05/01/20 1622    Specimen:  Blood Updated:  05/01/20 1730     Extra Tube Hold for add-ons.     Comment: Auto resulted.       Lavender Top [[id number]] Collected:  05/01/20 1622    Specimen:  Blood Updated:  05/01/20 1730     Extra Tube hold for add-on     Comment: Auto resulted       Red Top [[id number]] Collected:  05/01/20 1622    Specimen:  Blood Updated:  05/01/20 1730     Extra Tube Hold for add-ons.     Comment: Auto resulted.       Comprehensive Metabolic Panel [[id number]]  (Abnormal) Collected:  05/01/20 1622    Specimen:  Blood Updated:  05/01/20 1704     Glucose 214 mg/dL      BUN 18 mg/dL      Creatinine 0.93 mg/dL      Sodium 142 mmol/L      Potassium 4.6 mmol/L       Chloride 100 mmol/L      CO2 26.0 mmol/L      Calcium 10.1 mg/dL      Total Protein 7.4 g/dL      Albumin 4.50 g/dL      ALT (SGPT) 17 U/L      AST (SGOT) 16 U/L      Alkaline Phosphatase 94 U/L      Total Bilirubin 0.4 mg/dL      eGFR Non African Amer 59 mL/min/1.73      Globulin 2.9 gm/dL      A/G Ratio 1.6 g/dL      BUN/Creatinine Ratio 19.4     Anion Gap 16.0 mmol/L     Narrative:       GFR Normal >60  Chronic Kidney Disease <60  Kidney Failure <15      Troponin [[id number]]  (Normal) Collected:  05/01/20 1622    Specimen:  Blood Updated:  05/01/20 1702     Troponin T <0.010 ng/mL     Narrative:       Troponin T Reference Range:  <= 0.03 ng/mL-   Negative for AMI  >0.03 ng/mL-     Abnormal for myocardial necrosis.  Clinicians would have to utilize clinical acumen, EKG, Troponin and serial changes to determine if it is an Acute Myocardial Infarction or myocardial injury due to an underlying chronic condition.       Results may be falsely decreased if patient taking Biotin.      BNP [[id number]]  (Normal) Collected:  05/01/20 1622    Specimen:  Blood Updated:  05/01/20 1701     proBNP 86.3 pg/mL     Narrative:       Among patients with dyspnea, NT-proBNP is highly sensitive for the detection of acute congestive heart failure. In addition NT-proBNP of <300 pg/ml effectively rules out acute congestive heart failure with 99% negative predictive value.    Results may be falsely decreased if patient taking Biotin.      Protime-INR [[id number]]  (Normal) Collected:  05/01/20 1632    Specimen:  Blood Updated:  05/01/20 1650     Protime 13.1 Seconds      INR 1.03    CBC & Differential [[id number]] Collected:  05/01/20 1622    Specimen:  Blood Updated:  05/01/20 1642    Narrative:       The following orders were created for panel order CBC & Differential.  Procedure                               Abnormality         Status                     ---------                               -----------         ------                      CBC Auto Differential[[id number]]        Normal              Final result                 Please view results for these tests on the individual orders.    CBC Auto Differential [[id number]]  (Normal) Collected:  05/01/20 1622    Specimen:  Blood Updated:  05/01/20 1642     WBC 9.63 10*3/mm3      RBC 4.64 10*6/mm3      Hemoglobin 13.7 g/dL      Hematocrit 41.3 %      MCV 89.0 fL      MCH 29.5 pg      MCHC 33.2 g/dL      RDW 13.0 %      RDW-SD 42.5 fl      MPV 10.7 fL      Platelets 268 10*3/mm3      Neutrophil % 60.7 %      Lymphocyte % 29.1 %      Monocyte % 8.0 %      Eosinophil % 1.2 %      Basophil % 0.6 %      Immature Grans % 0.4 %      Neutrophils, Absolute 5.84 10*3/mm3      Lymphocytes, Absolute 2.80 10*3/mm3      Monocytes, Absolute 0.77 10*3/mm3      Eosinophils, Absolute 0.12 10*3/mm3      Basophils, Absolute 0.06 10*3/mm3      Immature Grans, Absolute 0.04 10*3/mm3      nRBC 0.0 /100 WBC     POC Glucose Once [[id number]]  (Abnormal) Collected:  05/01/20 1616    Specimen:  Blood Updated:  05/01/20 1627     Glucose 207 mg/dL      Comment: : [id number] Champion TaylorMeter ID: [id number]             Imaging Results (Last 72 Hours)     Procedure Component Value Units Date/Time    CT Head Without Contrast [[id number]] Collected:  05/03/20 1557     Updated:  05/03/20 1603    Narrative:       CT HEAD WO CONTRAST- 5/3/2020 3:38 PM CDT     HISTORY: Speech changes (or aphasia), new or progressive; right MCA  ischemia     COMPARISON: 05/01/2020     DOSE LENGTH PRODUCT: 639 mGy cm. Automated exposure control was also  utilized to decrease patient radiation dose.     TECHNIQUE: Axial images of brain obtained without contrast     FINDINGS:  Similar hypodensities of the right basal ganglia and right  centrum semiovale. Old ischemia suspected of the right occipital lobe.  No intracranial hemorrhage. Chronic vessel vessel ischemic changes.  Ventricles are similar in size and configuration. No midline shifting.  No  extra-axial fluid collection.     Visible paranasal sinuses and mastoid air cells are well-aerated.  Hyperostosis frontalis internus.       Impression:       1. Stable CT head exam compared to 05/01/2020. Acute/subacute ischemic  foci of the right middle cerebral artery territory with no intracranial  hemorrhage or prominent mass effect.   This report was finalized on 05/03/2020 16:00 by Dr. Lina Perrin MD.    MRI Brain Without Contrast [[id number]] Collected:  05/02/20 1149     Updated:  05/02/20 1159    Narrative:       HISTORY: Left arm weakness     MRI BRAIN: Multiplanar imaging the brain performed without contrast.     COMPARISON: CT head 05/01/2020     FINDINGS: Scattered foci of diffusion restriction seen throughout the  right cerebral hemisphere within the right MCA distribution involving  the frontal parietal and temporal lobes. No diffusion restriction of the  cerebellum or the left cerebral hemisphere. No intracranial hemorrhage.  Mild cerebral and cerebellar volume loss. Corpus callosum intact. No  sellar or intracranial mass. Old lacunar infarct suspected of the right  basal ganglia. Prominent perivascular spaces along the basal ganglia  bilaterally. Hyperintense FLAIR signal changes of the periventricular  white matter regions favoring underlying chronic small vessel ischemia.     Hyperostosis frontalis internus. Chronic mucosal thickening of the  paranasal sinuses. Normal flow-voids in the distal internal carotid and  basilar arteries. Normal flow void in the sagittal sinus.       Impression:       1. Scattered foci of diffusion restriction within the right MCA  distribution suggestive for acute nonhemorrhagic multifocal ischemia of  the right MCA distribution.  2. Chronic small vessel ischemic changes suspected with mild cerebral  volume loss.  This report was finalized on 05/02/2020 11:56 by Dr. Lina Perrin MD.    CT Angiogram Head [[id number]] Collected:  05/01/20 1852     Updated:  05/01/20  1903    Narrative:       EXAM:   CT NECK ANGIOGRAM  CT HEAD ANGIOGRAM 05/01/2020     COMPARISON:   None.      HISTORY:  74 years-old Female. Stroke      TECHNIQUE:      Multiple CT images were obtained of the of the head and neck after the  administration of IV contrast. 3D MIP reformats were generated in the  axial, sagittal and coronal planes were sent to PACS for interpretation.        Grading of carotid artery stenosis is performed by NASCET criteria.     FINDINGS:      CT Neck Angiogram:     Common origin of the innominate and right common carotid artery. The  visualized bilateral common carotid arteries demonstrate mild  atherosclerotic disease, more atherosclerotic disease at the  bifurcation. The bilateral subclavian arteries are patent.     Mild calcification along the lateral aspect of the right carotid bulb  with less than 30% stenosis. The remaining cervical right ICA is without  flow-limiting stenosis or occlusion.     Calcification of the left carotid bifurcation with less than 50%  stenosis.     The bilateral proximal vertebral arteries are well opacified. The right  vertebral artery is diminutive and terminates into a PICA the left C3/C4  demonstrate more atherosclerotic disease with mild stenosis.     CT Head Angiogram:     The intracranial right ICA demonstrates atherosclerotic disease in the  carotid siphon, with mild to moderate stenosis. The right carotid  terminus is visualized. The right A1 is diminutive. The right A2 is also  diminutive. The MCA bifurcation is visualized with irregular appearance  of the A1 and M1, reflecting atherosclerotic disease. There is prompt  opacification of the proximal M2 segments. There is no aneurysm or  vascular malformation.     The left intracranial ICA demonstrates atherosclerotic disease with mild  to moderate stenosis in the left carotid siphon. The left carotid  terminus is visualized. The left A1, A2, M1, visualized proximal M2  segments demonstrate  irregularity, reflecting atherosclerotic disease,  but without flow-limiting stenosis or occlusion. No aneurysm or other  vascular malformation identified.        The visualized posterior fossa circulation demonstrates no focal  flow-limiting stenosis or occlusion in the basilar artery. The left PCA  is fetal in origin. The bilateral ACAs are well opacified.       Impression:       CT Angiography Of The Neck With Intravenous Contrast   1. No evidence of high-grade arterial stenosis or underlying dissection.     CT Angiography Of The Head With Intravenous Contrast  1.   Atherosclerotic disease, without focal high-grade stenosis or  occlusion.  This report was finalized on 05/01/2020 19:00 by Dr. Zenaida Gonzalez MD.    CT Angiogram Neck [[id number]] Collected:  05/01/20 1852     Updated:  05/01/20 1903    Narrative:       EXAM:   CT NECK ANGIOGRAM  CT HEAD ANGIOGRAM 05/01/2020     COMPARISON:   None.      HISTORY:  74 years-old Female. Stroke      TECHNIQUE:      Multiple CT images were obtained of the of the head and neck after the  administration of IV contrast. 3D MIP reformats were generated in the  axial, sagittal and coronal planes were sent to PACS for interpretation.        Grading of carotid artery stenosis is performed by NASCET criteria.     FINDINGS:      CT Neck Angiogram:     Common origin of the innominate and right common carotid artery. The  visualized bilateral common carotid arteries demonstrate mild  atherosclerotic disease, more atherosclerotic disease at the  bifurcation. The bilateral subclavian arteries are patent.     Mild calcification along the lateral aspect of the right carotid bulb  with less than 30% stenosis. The remaining cervical right ICA is without  flow-limiting stenosis or occlusion.     Calcification of the left carotid bifurcation with less than 50%  stenosis.     The bilateral proximal vertebral arteries are well opacified. The right  vertebral artery is diminutive and  terminates into a PICA the left C3/C4  demonstrate more atherosclerotic disease with mild stenosis.     CT Head Angiogram:     The intracranial right ICA demonstrates atherosclerotic disease in the  carotid siphon, with mild to moderate stenosis. The right carotid  terminus is visualized. The right A1 is diminutive. The right A2 is also  diminutive. The MCA bifurcation is visualized with irregular appearance  of the A1 and M1, reflecting atherosclerotic disease. There is prompt  opacification of the proximal M2 segments. There is no aneurysm or  vascular malformation.     The left intracranial ICA demonstrates atherosclerotic disease with mild  to moderate stenosis in the left carotid siphon. The left carotid  terminus is visualized. The left A1, A2, M1, visualized proximal M2  segments demonstrate irregularity, reflecting atherosclerotic disease,  but without flow-limiting stenosis or occlusion. No aneurysm or other  vascular malformation identified.        The visualized posterior fossa circulation demonstrates no focal  flow-limiting stenosis or occlusion in the basilar artery. The left PCA  is fetal in origin. The bilateral ACAs are well opacified.       Impression:       CT Angiography Of The Neck With Intravenous Contrast   1. No evidence of high-grade arterial stenosis or underlying dissection.     CT Angiography Of The Head With Intravenous Contrast  1.   Atherosclerotic disease, without focal high-grade stenosis or  occlusion.  This report was finalized on 05/01/2020 19:00 by Dr. Zenaida Gonzalez MD.    CT Head Without Contrast Stroke Protocol [[id number]] Collected:  05/01/20 1847     Updated:  05/01/20 1854    Narrative:       EXAM: CT OF THE HEAD WITHOUT IV CONTRAST 05/01/2020     COMPARISON: None      INDICATION: Female, 74 years-old. Left arm weakness      PROCEDURE: Non contrast enhanced head CT was performed.      Radiation dose equals .2 mGy-cm.  Automated exposure control dose  reduction  technique was implemented.     FINDINGS:     7 mm hypodensity within the right frontal centrum semiovale, consistent  with age-indeterminate infarct (image 19, series 4). A right temporal  hypodensity measuring 7 mm (image 16, series 4) is age-indeterminate.  Age-indeterminate right basal ganglia infarct. A more chronic appearing  lacunar infarct in the right frontal lobe anteriorly (image 13, series  4).     There is no acute intracranial hemorrhage.     Confluent periventricular hypodensity, favored to reflect mild to  moderate chronic microvascular changes.     The basal cisterns are preserved. There is no midline shift. No acute  hydrocephalus.     Mastoid air cells are clear.          Impression:       1.  Age-indeterminate infarct in the right basal ganglia, right centrum  semiovale and the posterior right temporal lobe.  2.  More chronic appearing right anterior frontal lobe lacunar infarct.  This report was finalized on 05/01/2020 18:51 by Dr. Zenaida Gonzalez MD.    XR Chest 1 View [[id number]] Collected:  05/01/20 1714     Updated:  05/01/20 1718    Narrative:       Exam:   XR CHEST 1 VW-       Date:  05/01/2020      History:  Female, age  74 years;Stroke Protocol (Onset > 12 hrs);  R29.898-Other symptoms and signs involving the musculoskeletal system     COMPARISON:  None.     Findings :  Low lung volumes. Chronic interstitial lung changes.  The heart and mediastinum are normal in size. Lungs are without focal  infiltrate, mass or effusions.  The bones show no acute pathology.         Impression:       Impression:     1.  Low lung volumes.  2.  Otherwise, no acute cardiopulmonary process.     This report was finalized on 05/01/2020 17:15 by Dr. Zenaida Gonzalez MD.                Assessment:    Condition: In stable condition.  Improving.   (Type 2 DM- review labs and home medication. Discuss with patient importance of blood sugar control in the healing process. Review diet, medical,and lifestyle  modifications for optimal medical treatment. Will restart their regular diabetic regimen and make consult for diabetic education / dietician available upon request.  Hemoglobin A1c 8.9- will adjust medication today.  Tradjenta added.    Anxiety/depression- currently on Celexa 40 mg.  Reluctant to add Wellbutrin with questionable seizure activity.  Add BuSpar for anxiety today      Check transesophageal echo.    Agree will need inpatient rehab but she does not have much social support at home.  Consult  for inpatient [facility] at [facility]).     Plan:   Encourage ambulation and per physical therapy.      Patient Active Problem List   Diagnosis   • Left arm weakness   • Essential hypertension   • Hypertensive urgency   • Hypercholesterolemia   • Stroke (CMS/[facility] [facility])     Nhan Muir MD  5/4/2020

## 2020-05-04 NOTE — PLAN OF CARE
Problem: Patient Care Overview  Goal: Plan of Care Review  Outcome: Ongoing (interventions implemented as appropriate)  Flowsheets (Taken 5/4/2020 4685)  Progress: no change  Plan of Care Reviewed With: patient  Outcome Summary: Pt A&O, shruthi aphsica nad dyphagia at times. Pt did had difficulty swallowing one of her pills at night, was able to get it down wiht applesauce. Encourgae patient to use her L hand, becomes frustrated and is scared. No neuro chnages noted this shift. Rested in chair. Ambulated in hallway. NPO for ZAYDA today.

## 2020-05-04 NOTE — PLAN OF CARE
Problem: Patient Care Overview  Goal: Plan of Care Review  Flowsheets (Taken 5/4/2020 9389)  Progress: improving  Plan of Care Reviewed With: patient  Outcome Summary: PT tx completed. CGA for sit to stands. Amb 50' x2 trials with HHA and CGA and seated rest break. Required cues for improved LLE advancement, able to correct. Cues provided for safety, mostly with turns. Will cont to progress with PT for impoved functional mobility. Recommend acute inpatient rehab upon d/c.

## 2020-05-04 NOTE — PROGRESS NOTES
Neurology Progress Note      Chief Complaint:  F/u stroke    Subjective     Subjective:  Sitting up in chair. PT at bedside. ZAYDA this AM and no thrombus seen. No complaints. Referral to acute rehab pending.       Medications:  Current Facility-Administered Medications   Medication Dose Route Frequency Provider Last Rate Last Dose   • amLODIPine (NORVASC) 5 mg, lisinopril (PRINIVIL,ZESTRIL) 20 mg   Oral Q24H Nhan Muir MD       • atorvastatin (LIPITOR) tablet 80 mg  80 mg Oral Nightly Suri Tyler MD   80 mg at 05/03/20 2115   • busPIRone (BUSPAR) tablet 15 mg  15 mg Oral Q12H Nhan Muir MD   15 mg at 05/04/20 1254   • citalopram (CeleXA) tablet 40 mg  40 mg Oral Daily Nhan Muir MD   40 mg at 05/04/20 1251   • clopidogrel (PLAVIX) tablet 75 mg  75 mg Oral Daily Nhan Muir MD   75 mg at 05/04/20 1250   • dextrose (D50W) 25 g/ 50mL Intravenous Solution 25 g  25 g Intravenous Q15 Min PRN Nhan Muir MD       • dextrose (GLUTOSE) oral gel 15 g  15 g Oral Q15 Min PRN Nhan Muir MD       • glucagon (human recombinant) (GLUCAGEN DIAGNOSTIC) injection 1 mg  1 mg Subcutaneous Q15 Min PRN Nhan Muir MD       • insulin lispro (humaLOG) injection 0-9 Units  0-9 Units Subcutaneous TID AC Nhan Muir MD   4 Units at 05/04/20 1245   • linagliptin (TRADJENTA) tablet 5 mg  5 mg Oral Daily Nhan Muir MD   5 mg at 05/04/20 1250   • LORazepam (ATIVAN) tablet 1 mg  1 mg Oral Q6H PRN Nhan Muir MD       • metFORMIN (GLUCOPHAGE) tablet 500 mg  500 mg Oral BID With Meals Nhan Muir MD   500 mg at 05/04/20 1253   • sodium chloride 0.9 % flush 10 mL  10 mL Intravenous PRN Thomas Rosales MD       • sodium chloride 0.9 % flush 10 mL  10 mL Intravenous Q12H Nhan Muir MD   10 mL at 05/04/20 1255   • sodium chloride 0.9 % flush 10 mL  10 mL Intravenous PRN Nhan Muir MD       •  sodium chloride 0.9 % infusion  100 mL/hr Intravenous Continuous Nhan Muir MD 0 mL/hr at 05/02/20 1554         Review of Systems:   -A 14 point review of systems is completed and is negative except for left arm more than left leg weakness, dysarthria.      Objective      Vital Signs  Temp:  [97.4 °F (36.3 °C)-98.7 °F (37.1 °C)] 98.7 °F (37.1 °C)  Heart Rate:  [80-97] 80  Resp:  [14-23] 14  BP: (144-175)/(74-99) 155/78    Telemetry: S-St     Physical Exam:  HEENT:  Neck supple  CVS:  Regular rate and rhythm.  No murmurs  Carotid Examination:  No bruits  Lungs:  Clear to auscultation  Abdomen:  Non-tender, Non-distended  Extremities:  No signs of peripheral edema     Neurologic Exam:     -Awake, Alert, Oriented X 3  -No word finding difficulties-  -No aphasia  -mild to moderate dysarthria  -Follows simple and complex commands     Cranial nerves II through XII   · EOMI  · No facial sensory loss  · Increased left facial droop --UMN VII-spares forehead  · Hearing intact to finger rub and voice  · Turns head side to side.  · Tongue is midline     Motor: (strength out of 5:  1= minimal movement, 2 = movement in plane of gravity, 3 = movement against gravity, 4 = movement against some resistance, 5 = full strength)     -Right Upper Ext: Proximal: 5 Distal: 5  -Left Upper Ext: Proximal: 4   Distal: 54     -Right Lower Ext: Proximal: 5 Distal: 5  -Left Lower Ext: Proximal: 5-  Distal: 5_     DTR:  2+ throughout in all four extremities  No babinski's     Sensory:  -Intact to light touch, pinprick, temperature, pain, and proprioception     Coordination/Gait:  -No ataxia/dysmetria on finger to nose of right upper extremity and heel to shin of bilateral extremities but more difficult for patient to perform on left.   No  of left hand.                     Results Review:    I reviewed the patient's new clinical results.    Results from last 7 days   Lab Units 05/02/20  0438 05/01/20  1622   WBC 10*3/mm3 10.59  9.63   HEMOGLOBIN g/dL 13.0 13.7   HEMATOCRIT % 39.1 41.3   PLATELETS 10*3/mm3 278 268        Results from last 7 days   Lab Units 05/03/20  0341 05/02/20  0438 05/01/20  1622   SODIUM mmol/L 139 140 142   POTASSIUM mmol/L 4.3 3.8 4.6   CHLORIDE mmol/L 102 102 100   CO2 mmol/L 23.0 23.0 26.0   BUN mg/dL 20 20 18   CREATININE mg/dL 0.82 1.04* 0.93   CALCIUM mg/dL 9.5 10.0 10.1   BILIRUBIN mg/dL  --   --  0.4   ALK PHOS U/L  --   --  94   ALT (SGPT) U/L  --   --  17   AST (SGOT) U/L  --   --  16   GLUCOSE mg/dL 192* 265* 214*        Lab Results   Component Value Date    PROTIME 13.1 05/01/2020    INR 1.03 05/01/2020     No components found for: POCGLUC  No components found for: A1C  Lab Results   Component Value Date    HDL 33 (L) 05/02/2020    LDL 85 05/02/2020     No components found for: B12  No results found for: TSH    Imaging Results (Last 24 Hours)     Procedure Component Value Units Date/Time    CT Head Without Contrast [[id number]] Collected:  05/03/20 1557     Updated:  05/03/20 1603    Narrative:       CT HEAD WO CONTRAST- 5/3/2020 3:38 PM CDT     HISTORY: Speech changes (or aphasia), new or progressive; right MCA  ischemia     COMPARISON: 05/01/2020     DOSE LENGTH PRODUCT: 639 mGy cm. Automated exposure control was also  utilized to decrease patient radiation dose.     TECHNIQUE: Axial images of brain obtained without contrast     FINDINGS:  Similar hypodensities of the right basal ganglia and right  centrum semiovale. Old ischemia suspected of the right occipital lobe.  No intracranial hemorrhage. Chronic vessel vessel ischemic changes.  Ventricles are similar in size and configuration. No midline shifting.  No extra-axial fluid collection.     Visible paranasal sinuses and mastoid air cells are well-aerated.  Hyperostosis frontalis internus.       Impression:       1. Stable CT head exam compared to 05/01/2020. Acute/subacute ischemic  foci of the right middle cerebral artery territory with no  intracranial  hemorrhage or prominent mass effect.   This report was finalized on 05/03/2020 16:00 by Dr. Lina Perrin MD.        Results for orders placed during the hospital encounter of 05/01/20   Adult Transesophageal Echo (ZAYDA) W/ Cont if Necessary Per Protocol    Narrative · Left ventricular systolic function is normal.  · No evidence of a left atrial appendage thrombus.  · No obvious clots, shunts, masses or vegetations.  · No hemodynamically significant valvular abnormalities.          ZAYDA:  Interpretation Summary     · Left ventricular systolic function is normal.  · No evidence of a left atrial appendage thrombus.  · No obvious clots, shunts, masses or vegetations.  · No hemodynamically significant valvular abnormalities.          Assessment/Plan     Hospital Problem List      Stroke (CMS/HCC)    Left arm weakness    Essential hypertension    Hypertensive urgency    Hypercholesterolemia    Impression:  1. Left MCA territory strokes suspicious for artery to artery emboli but cannot exclude cardiac CTA did not show evidence for emboli or thrombus but there was atherosclerotic disease without high grade stenosis.  There is irregularity at right M1 (and A2)  c/w atherosclerotic disease which may be the basis for stroke  There may be slight progression of symptoms in the same territory or increased edema from stroke --however she state this began several days ago so there should not be the increased edema  2. Hypertension   3. Mixed hyperlipidemia with LDL of 85 and goal of < 70 to reduce future risk of stroke. Her triglycerides are elevated at 331  4. DM  with elevated hemoglobin A1c at 8.9 and goal of < 7.0 to reduce future risk of stroke   5. NIHSS = 6  6. B12 deficiency, low normal at 337.     Plan:  1. ASA 81 mg added to Plavix 75 mg for dual antiplatelet therapy.  2. Lipitor 80 mg for LDL goal less than 70.   3. DM management for A1C goal less than 7.  4. Will need ZIO patch at discharge.  5. Replace  B12 1000 mcg IM today and then 500 mcg daily.   6. Refer to acute rehab pending.  7. Will need follow up with neurology in 3 weeks.  8. SCD for DVT prophylaxis.   9. Continue OT/ST/PT  10. Repeat CT head in AM for surveillance.       Flory Chaney, APRN  05/04/20  15:05

## 2020-05-04 NOTE — PAYOR COMM NOTE
"Gaviota Hale (74 y.o. Female) [id number]   ADMIT 5/1   [facility] phone   Fax        Date of Birth Social Security Number Address Home Phone MRN    1945  [address] [place] [phone] [id number]    [employer] Marital Status          Cheondoism        Admission Date Admission Type Admitting Provider Attending Provider Department, Room/Bed    5/1/20 Emergency Nhan Muir MD Eickholz, James Noah, MD [facility] 3A, 353/1    Discharge Date Discharge Disposition Discharge Destination                       Attending Provider:  Nhan Muir MD    Allergies:  No Known Allergies    Isolation:  None   Infection:  None   Code Status:  CPR    Ht:  152.4 cm (60\")   Wt:  69.6 kg (153 lb 6 oz)    Admission Cmt:  None   Principal Problem:  Stroke (CMS/[facility]) [I63.9]                 Active Insurance as of 5/1/2020     Primary Coverage     Payor Plan Insurance Group Employer/Plan Group    ANTHEM MEDICARE REPLACEMENT ANTHEM MEDICARE ADVANTAGE [id number]     Payor Plan Address Payor Plan Phone Number Payor Plan Fax Number Effective Dates    [address] [phone]  8/1/2019 - None Entered    [facility] [id number]       Subscriber Name Subscriber Birth Date Member ID       GAVIOTA HALE 1945 [id number]                 Emergency Contacts      (Rel.) Home Phone Work Phone Mobile Phone    Mandeep Hale (Grandchild) [phone] -- [phone]    Willam Hale (Grandchild) [phone] -- [phone]               History & Physical      Nhna Muir MD at 05/02/20 0719          History and Physical      CHIEF COMPLAINT: Left arm weakness    Reason for Admission: TIA versus CVA    History Obtained From: Patient    HISTORY OF PRESENT ILLNESS:      The patient is a 74 y.o. female with significant past medical history of hyperlipidemia, hypertension, previous TIA and depression who presents with left " arm weakness for 3 days.  The patient states that she has been having some difficulties with her legs going weak for an undetermined period of time.  She states approximately 3 days ago she started having left arm weakness and coordination issues with it.  She states she is left hand dominant and had difficulties feeding herself.  She states that she did not want to come to the hospital right away due to the coronavirus and possible exposure.  The patient states that she currently has been taking all of her appropriate medications.  She denies any headache, loss of vision, speech difficulties or decreased sensation in her lower extremities.  She was evaluated in the emergency department and was found to have a severely elevated blood pressure at 173/101.  She was taken for CT scan of the head that shows an age-indeterminate infarct in the right basal ganglia, right centrum semiovale and the posterior right temporal lobe.  CTA head and neck did not reveal any high-grade stenosis or occlusion.  She was admitted for further work-up and evaluation.    Past Medical History:    Past Medical History:   Diagnosis Date   • Depression    • Hypercholesteremia    • Hypertension    • TIA (transient ischemic attack)      Past Surgical History:    Past Surgical History:   Procedure Laterality Date   • HYSTERECTOMY     • SKIN CANCER EXCISION       Medications Prior to Admission:    Medications Prior to Admission   Medication Sig Dispense Refill Last Dose   • amLODIPine-benazepril (LOTREL 5-20) 5-20 MG per capsule Take 1 capsule by mouth Daily.      • citalopram (CeleXA) 40 MG tablet Take 40 mg by mouth Daily.      • clopidogrel (PLAVIX) 75 MG tablet Take 75 mg by mouth Daily.      • metFORMIN (GLUCOPHAGE) 500 MG tablet Take 500 mg by mouth 2 (Two) Times a Day With Meals.      • simvastatin (ZOCOR) 40 MG tablet Take 40 mg by mouth Every Night.      • doxycycline (DORYX) 100 MG enteric coated tablet Take 1 tablet by mouth 2 (Two)  Times a Day. 28 tablet 0        Allergies:  Patient has no known allergies.    Social History:   Social History     Socioeconomic History   • Marital status:      Spouse name: Not on file   • Number of children: Not on file   • Years of education: Not on file   • Highest education level: Not on file   Tobacco Use   • Smoking status: Never Smoker   Substance and Sexual Activity   • Alcohol use: No   • Drug use: No   • Sexual activity: Defer       Family History:   History reviewed. No pertinent family history.    REVIEW OF SYSTEMS:    CONSTITUTIONAL:  Negative for anorexia, chills, fevers, night sweats and weight loss  EYES:  negative for eye dryness, icterus and redness  HEENT:   negative for dental problems, epistaxis, facial trauma and thrush  RESPIRATORY:  negative for chest tightness, cough, dyspnea on exertion, pneumonia and sputum  CARDIOVASCULAR: negative for chest pain, dyspnea, exertional chest pressure/discomfort, irregular heart beat, palpitations, paroxysmal nocturnal dyspnea and syncope  GASTROINTESTINAL:  negative for abdominal pain, hematemesis, jaundice, melena and rectal bleeding  MUSCULOSKELETAL:  negative for muscle weakness, myalgias and neck pain  NEUROLOGICAL:   negative for dizziness, headaches, seizures, speech problems, tremors and vertigo  INTEGUMENT: negative for pruritus, rash, skin color change and skin lesion(s)       Vital Signs   Temp:  [98.1 °F (36.7 °C)-98.4 °F (36.9 °C)] 98.2 °F (36.8 °C)  Heart Rate:  [] 99  Resp:  [16-18] 16  BP: (134-173)/() 147/89          Physical Exam:  Constitutional: The patient is oriented to person, place, and time. They appear well-developed.   HEENT:   Head: Normocephalic and atraumatic.   Eyes: Pupils are equal, round, and reactive to light.   Neck: Neck supple without masses or carotid bruit.  Cardiovascular: Regular rhythm and normal heart sounds.    Pulmonary/Chest: Effort normal and breath sounds normal. CTAB  Abdominal: Soft.  Bowel sounds are normal. There is  no distension or  Tenderness appreciated. There is no rebound and no guarding.   Musculoskeletal: Normal range of motion. There is  no edema or tenderness.   Neurological: Pt is alert and oriented to person, place, and time. They have normal reflexes. Left arm weakness and discoordination.  Skin: Skin is warm and dry without significant rashes     Results Review:   I reviewed the patient's new imaging results and agree with the interpretation.    DATA:  Lab Results (last 24 hours)     Procedure Component Value Units Date/Time    Basic Metabolic Panel [[id number]]  (Abnormal) Collected:  05/02/20 0438    Specimen:  Blood Updated:  05/02/20 0539     Glucose 265 mg/dL      BUN 20 mg/dL      Creatinine 1.04 mg/dL      Sodium 140 mmol/L      Potassium 3.8 mmol/L      Chloride 102 mmol/L      CO2 23.0 mmol/L      Calcium 10.0 mg/dL      eGFR Non African Amer 52 mL/min/1.73      BUN/Creatinine Ratio 19.2     Anion Gap 15.0 mmol/L     Narrative:       GFR Normal >60  Chronic Kidney Disease <60  Kidney Failure <15      CBC Auto Differential [[id number]]  (Abnormal) Collected:  05/02/20 0438    Specimen:  Blood Updated:  05/02/20 0521     WBC 10.59 10*3/mm3      RBC 4.42 10*6/mm3      Hemoglobin 13.0 g/dL      Hematocrit 39.1 %      MCV 88.5 fL      MCH 29.4 pg      MCHC 33.2 g/dL      RDW 13.2 %      RDW-SD 42.5 fl      MPV 10.7 fL      Platelets 278 10*3/mm3      Neutrophil % 50.7 %      Lymphocyte % 38.4 %      Monocyte % 8.6 %      Eosinophil % 1.5 %      Basophil % 0.7 %      Immature Grans % 0.1 %      Neutrophils, Absolute 5.37 10*3/mm3      Lymphocytes, Absolute 4.07 10*3/mm3      Monocytes, Absolute 0.91 10*3/mm3      Eosinophils, Absolute 0.16 10*3/mm3      Basophils, Absolute 0.07 10*3/mm3      Immature Grans, Absolute 0.01 10*3/mm3      nRBC 0.0 /100 WBC     Gates Draw [[id number]] Collected:  05/01/20 1622    Specimen:  Blood Updated:  05/01/20 0782    Narrative:       The  following orders were created for panel order Rosine Draw.  Procedure                               Abnormality         Status                     ---------                               -----------         ------                     Light Blue Top[[id number]]                                   Final result               Green Top (Gel)[[id number]]                                  Final result               Lavender Top[[id number]]                                     Final result               Red Top[[id number]]                                          Final result                 Please view results for these tests on the individual orders.    Light Blue Top [[id number]] Collected:  05/01/20 1632    Specimen:  Blood Updated:  05/01/20 1745     Extra Tube hold for add-on     Comment: Auto resulted       Green Top (Gel) [[id number]] Collected:  05/01/20 1622    Specimen:  Blood Updated:  05/01/20 1730     Extra Tube Hold for add-ons.     Comment: Auto resulted.       Lavender Top [[id number]] Collected:  05/01/20 1622    Specimen:  Blood Updated:  05/01/20 1730     Extra Tube hold for add-on     Comment: Auto resulted       Red Top [[id number]] Collected:  05/01/20 1622    Specimen:  Blood Updated:  05/01/20 1730     Extra Tube Hold for add-ons.     Comment: Auto resulted.       Comprehensive Metabolic Panel [[id number]]  (Abnormal) Collected:  05/01/20 1622    Specimen:  Blood Updated:  05/01/20 1704     Glucose 214 mg/dL      BUN 18 mg/dL      Creatinine 0.93 mg/dL      Sodium 142 mmol/L      Potassium 4.6 mmol/L      Chloride 100 mmol/L      CO2 26.0 mmol/L      Calcium 10.1 mg/dL      Total Protein 7.4 g/dL      Albumin 4.50 g/dL      ALT (SGPT) 17 U/L      AST (SGOT) 16 U/L      Alkaline Phosphatase 94 U/L      Total Bilirubin 0.4 mg/dL      eGFR Non African Amer 59 mL/min/1.73      Globulin 2.9 gm/dL      A/G Ratio 1.6 g/dL      BUN/Creatinine Ratio 19.4     Anion Gap 16.0 mmol/L     Narrative:       GFR Normal  >60  Chronic Kidney Disease <60  Kidney Failure <15      Troponin [[id number]]  (Normal) Collected:  05/01/20 1622    Specimen:  Blood Updated:  05/01/20 1702     Troponin T <0.010 ng/mL     Narrative:       Troponin T Reference Range:  <= 0.03 ng/mL-   Negative for AMI  >0.03 ng/mL-     Abnormal for myocardial necrosis.  Clinicians would have to utilize clinical acumen, EKG, Troponin and serial changes to determine if it is an Acute Myocardial Infarction or myocardial injury due to an underlying chronic condition.       Results may be falsely decreased if patient taking Biotin.      BNP [[id number]]  (Normal) Collected:  05/01/20 1622    Specimen:  Blood Updated:  05/01/20 1701     proBNP 86.3 pg/mL     Narrative:       Among patients with dyspnea, NT-proBNP is highly sensitive for the detection of acute congestive heart failure. In addition NT-proBNP of <300 pg/ml effectively rules out acute congestive heart failure with 99% negative predictive value.    Results may be falsely decreased if patient taking Biotin.      Protime-INR [[id number]]  (Normal) Collected:  05/01/20 1632    Specimen:  Blood Updated:  05/01/20 1650     Protime 13.1 Seconds      INR 1.03    CBC & Differential [[id number]] Collected:  05/01/20 1622    Specimen:  Blood Updated:  05/01/20 1642    Narrative:       The following orders were created for panel order CBC & Differential.  Procedure                               Abnormality         Status                     ---------                               -----------         ------                     CBC Auto Differential[[id number]]        Normal              Final result                 Please view results for these tests on the individual orders.    CBC Auto Differential [[id number]]  (Normal) Collected:  05/01/20 1622    Specimen:  Blood Updated:  05/01/20 1642     WBC 9.63 10*3/mm3      RBC 4.64 10*6/mm3      Hemoglobin 13.7 g/dL      Hematocrit 41.3 %      MCV 89.0 fL      MCH 29.5 pg       MCHC 33.2 g/dL      RDW 13.0 %      RDW-SD 42.5 fl      MPV 10.7 fL      Platelets 268 10*3/mm3      Neutrophil % 60.7 %      Lymphocyte % 29.1 %      Monocyte % 8.0 %      Eosinophil % 1.2 %      Basophil % 0.6 %      Immature Grans % 0.4 %      Neutrophils, Absolute 5.84 10*3/mm3      Lymphocytes, Absolute 2.80 10*3/mm3      Monocytes, Absolute 0.77 10*3/mm3      Eosinophils, Absolute 0.12 10*3/mm3      Basophils, Absolute 0.06 10*3/mm3      Immature Grans, Absolute 0.04 10*3/mm3      nRBC 0.0 /100 WBC     POC Glucose Once [[id number]]  (Abnormal) Collected:  05/01/20 1616    Specimen:  Blood Updated:  05/01/20 1627     Glucose 207 mg/dL      Comment: : [id number] Akira TaylorMeter ID: [id number]           Imaging Results (Last 24 Hours)     Procedure Component Value Units Date/Time    CT Angiogram Head [[id number]] Collected:  05/01/20 1852     Updated:  05/01/20 1903    Narrative:       EXAM:   CT NECK ANGIOGRAM  CT HEAD ANGIOGRAM 05/01/2020     COMPARISON:   None.      HISTORY:  74 years-old Female. Stroke      TECHNIQUE:      Multiple CT images were obtained of the of the head and neck after the  administration of IV contrast. 3D MIP reformats were generated in the  axial, sagittal and coronal planes were sent to PACS for interpretation.        Grading of carotid artery stenosis is performed by NASCET criteria.     FINDINGS:      CT Neck Angiogram:     Common origin of the innominate and right common carotid artery. The  visualized bilateral common carotid arteries demonstrate mild  atherosclerotic disease, more atherosclerotic disease at the  bifurcation. The bilateral subclavian arteries are patent.     Mild calcification along the lateral aspect of the right carotid bulb  with less than 30% stenosis. The remaining cervical right ICA is without  flow-limiting stenosis or occlusion.     Calcification of the left carotid bifurcation with less than 50%  stenosis.     The bilateral proximal vertebral arteries  are well opacified. The right  vertebral artery is diminutive and terminates into a PICA the left C3/C4  demonstrate more atherosclerotic disease with mild stenosis.     CT Head Angiogram:     The intracranial right ICA demonstrates atherosclerotic disease in the  carotid siphon, with mild to moderate stenosis. The right carotid  terminus is visualized. The right A1 is diminutive. The right A2 is also  diminutive. The MCA bifurcation is visualized with irregular appearance  of the A1 and M1, reflecting atherosclerotic disease. There is prompt  opacification of the proximal M2 segments. There is no aneurysm or  vascular malformation.     The left intracranial ICA demonstrates atherosclerotic disease with mild  to moderate stenosis in the left carotid siphon. The left carotid  terminus is visualized. The left A1, A2, M1, visualized proximal M2  segments demonstrate irregularity, reflecting atherosclerotic disease,  but without flow-limiting stenosis or occlusion. No aneurysm or other  vascular malformation identified.        The visualized posterior fossa circulation demonstrates no focal  flow-limiting stenosis or occlusion in the basilar artery. The left PCA  is fetal in origin. The bilateral ACAs are well opacified.       Impression:       CT Angiography Of The Neck With Intravenous Contrast   1. No evidence of high-grade arterial stenosis or underlying dissection.     CT Angiography Of The Head With Intravenous Contrast  1.   Atherosclerotic disease, without focal high-grade stenosis or  occlusion.  This report was finalized on 05/01/2020 19:00 by Dr. Zenaida Gonzalez MD.    CT Angiogram Neck [[id number]] Collected:  05/01/20 1852     Updated:  05/01/20 1903    Narrative:       EXAM:   CT NECK ANGIOGRAM  CT HEAD ANGIOGRAM 05/01/2020     COMPARISON:   None.      HISTORY:  74 years-old Female. Stroke      TECHNIQUE:      Multiple CT images were obtained of the of the head and neck after the  administration of IV  contrast. 3D MIP reformats were generated in the  axial, sagittal and coronal planes were sent to PACS for interpretation.        Grading of carotid artery stenosis is performed by NASCET criteria.     FINDINGS:      CT Neck Angiogram:     Common origin of the innominate and right common carotid artery. The  visualized bilateral common carotid arteries demonstrate mild  atherosclerotic disease, more atherosclerotic disease at the  bifurcation. The bilateral subclavian arteries are patent.     Mild calcification along the lateral aspect of the right carotid bulb  with less than 30% stenosis. The remaining cervical right ICA is without  flow-limiting stenosis or occlusion.     Calcification of the left carotid bifurcation with less than 50%  stenosis.     The bilateral proximal vertebral arteries are well opacified. The right  vertebral artery is diminutive and terminates into a PICA the left C3/C4  demonstrate more atherosclerotic disease with mild stenosis.     CT Head Angiogram:     The intracranial right ICA demonstrates atherosclerotic disease in the  carotid siphon, with mild to moderate stenosis. The right carotid  terminus is visualized. The right A1 is diminutive. The right A2 is also  diminutive. The MCA bifurcation is visualized with irregular appearance  of the A1 and M1, reflecting atherosclerotic disease. There is prompt  opacification of the proximal M2 segments. There is no aneurysm or  vascular malformation.     The left intracranial ICA demonstrates atherosclerotic disease with mild  to moderate stenosis in the left carotid siphon. The left carotid  terminus is visualized. The left A1, A2, M1, visualized proximal M2  segments demonstrate irregularity, reflecting atherosclerotic disease,  but without flow-limiting stenosis or occlusion. No aneurysm or other  vascular malformation identified.        The visualized posterior fossa circulation demonstrates no focal  flow-limiting stenosis or occlusion in  the basilar artery. The left PCA  is fetal in origin. The bilateral ACAs are well opacified.       Impression:       CT Angiography Of The Neck With Intravenous Contrast   1. No evidence of high-grade arterial stenosis or underlying dissection.     CT Angiography Of The Head With Intravenous Contrast  1.   Atherosclerotic disease, without focal high-grade stenosis or  occlusion.  This report was finalized on 05/01/2020 19:00 by Dr. Zenaida Gonzalez MD.    CT Head Without Contrast Stroke Protocol [[id number]] Collected:  05/01/20 1847     Updated:  05/01/20 1854    Narrative:       EXAM: CT OF THE HEAD WITHOUT IV CONTRAST 05/01/2020     COMPARISON: None      INDICATION: Female, 74 years-old. Left arm weakness      PROCEDURE: Non contrast enhanced head CT was performed.      Radiation dose equals .2 mGy-cm.  Automated exposure control dose  reduction technique was implemented.     FINDINGS:     7 mm hypodensity within the right frontal centrum semiovale, consistent  with age-indeterminate infarct (image 19, series 4). A right temporal  hypodensity measuring 7 mm (image 16, series 4) is age-indeterminate.  Age-indeterminate right basal ganglia infarct. A more chronic appearing  lacunar infarct in the right frontal lobe anteriorly (image 13, series  4).     There is no acute intracranial hemorrhage.     Confluent periventricular hypodensity, favored to reflect mild to  moderate chronic microvascular changes.     The basal cisterns are preserved. There is no midline shift. No acute  hydrocephalus.     Mastoid air cells are clear.          Impression:       1.  Age-indeterminate infarct in the right basal ganglia, right centrum  semiovale and the posterior right temporal lobe.  2.  More chronic appearing right anterior frontal lobe lacunar infarct.  This report was finalized on 05/01/2020 18:51 by Dr. Zenaida Gonzalez MD.    XR Chest 1 View [[id number]] Collected:  05/01/20 1714     Updated:  05/01/20 1718     Narrative:       Exam:   XR CHEST 1 VW-       Date:  05/01/2020      History:  Female, age  74 years;Stroke Protocol (Onset > 12 hrs);  R29.898-Other symptoms and signs involving the musculoskeletal system     COMPARISON:  None.     Findings :  Low lung volumes. Chronic interstitial lung changes.  The heart and mediastinum are normal in size. Lungs are without focal  infiltrate, mass or effusions.  The bones show no acute pathology.         Impression:       Impression:     1.  Low lung volumes.  2.  Otherwise, no acute cardiopulmonary process.     This report was finalized on 05/01/2020 17:15 by Dr. Zenaida Gonzalez MD.            ASSESSMENT AND PLAN:         Stroke (CMS/[facility])    Left arm weakness    Essential hypertension    Hypertensive urgency    Hypercholesterolemia    1.  Age interdeterminate stroke on CT head    - MRI today    - ECHO today   - Neurology consultation    - PT/OT   - Lipid profile   - HgbA1c   - Will need better blood pressure control, will continue to allow permissive hypertension until MRI is done and have more information.  Likely need to increase Lisinopril to 40 mg.   - Continue Plavix     2.   Hyperlipidemia   - lipid profile    - Continue Lipitor     3.   Hypertensive urgency   - Continue Amlodipine and Lisinopril    - Likely need to increase Lisinopril to 40 mg but await MRI results     4.   Left arm weakness    - MRI   - PT/OT     Estimate 24-48 hour stay, likely able to discharge home.  Await work up.       Himanshu Diaz, APRN  07:27 5/2/2020     Will add carotids  Ativan prior to MRI  Await Neuro eval  We will hold Glucophage after IV dye-add Accu-Cheks sliding scale insulin.    Type 2 DM- review labs and home medication. Discuss with patient importance of blood sugar control in the healing process. Review diet, medical,and lifestyle modifications for optimal medical treatment. Will restart their regular diabetic regimen and make consult for diabetic education / dietician available  upon request.     I have discussed the care of Gaviota Erazo, including pertinent history and exam findings with the ARNP/PA.  I have seen and examined the patient and the key elements of all parts of the encounter have been performed by me. I agree with the assessment and plan as outlined by the ARNP/PA. Please refer to my comments for complete documentation.     Electronically signed by Nhan Muir MD on 5/2/2020 at 08:15        Electronically signed by Nhan Muir MD at 05/02/20 0818          Emergency Department Notes      Thomas Rosales MD at 05/01/20 1603          Subjective   74-year-old lady with a past medical history of hypertension, hyperlipidemia, TIA who presents the emergency department with left arm ataxia.  She states this was sudden onset somewhere between 3 and 5 days ago.  It is intermittent.  No exacerbating or relieving factors.  Associated with a feeling of heaviness in her bilateral lower extremities.  She denies any headache, vision changes, neck pain, shoulder pain, arm pain, chest pain, shortness of breath, abdominal pain, nausea, vomiting, fevers, chills, cough, dysuria, or hematuria.  She states she called her primary care provider who was concerned she had a stroke so she was sent here for further evaluation.  She denies any numbness or weakness.    Past medical history: Hypertension, hyperlipidemia, TIA  Medications: Plavix      History provided by:  Patient      Review of Systems   All other systems reviewed and are negative.      Past Medical History:   Diagnosis Date   • Depression    • Hypercholesteremia    • Hypertension    • TIA (transient ischemic attack)        No Known Allergies    Past Surgical History:   Procedure Laterality Date   • HYSTERECTOMY     • SKIN CANCER EXCISION         History reviewed. No pertinent family history.    Social History     Socioeconomic History   • Marital status:      Spouse name: Not on file   • Number of  children: Not on file   • Years of education: Not on file   • Highest education level: Not on file   Tobacco Use   • Smoking status: Never Smoker   Substance and Sexual Activity   • Alcohol use: No   • Drug use: No   • Sexual activity: Defer           Objective   Physical Exam   Constitutional: She is oriented to person, place, and time. She appears well-developed and well-nourished. No distress.   HENT:   Head: Normocephalic and atraumatic.   Eyes: Conjunctivae and EOM are normal. Right eye exhibits no discharge. Left eye exhibits no discharge.   Neck: Normal range of motion. Neck supple. No JVD present. No tracheal deviation present.   Cardiovascular: Normal rate, regular rhythm, normal heart sounds and intact distal pulses. Exam reveals no gallop and no friction rub.   No murmur heard.  Pulmonary/Chest: Effort normal and breath sounds normal. No stridor. No respiratory distress. She has no wheezes. She has no rales. She exhibits no tenderness.   Abdominal: Soft. Bowel sounds are normal. She exhibits no distension and no mass. There is no tenderness. There is no rebound and no guarding. No hernia.   Musculoskeletal: Normal range of motion. She exhibits no edema or deformity.   Neurological: She is alert and oriented to person, place, and time. No cranial nerve deficit or sensory deficit. She exhibits normal muscle tone. Coordination normal.   NIH stroke scale 0.  Alert and oriented.  5 out of 5 strength in bilateral upper and bilateral lower extremities.  Normal sensation to light touch in bilateral lower extremities.  Normal finger-to-nose in the right arm.  Patient does not want to try it with her left arm.   Skin: Skin is warm and dry. Capillary refill takes less than 2 seconds. No rash noted. She is not diaphoretic. No pallor.   Psychiatric: She has a normal mood and affect. Her behavior is normal.   Nursing note and vitals reviewed.      Procedures          ED Course                                            MDM  Number of Diagnoses or Management Options  Left arm weakness: new and requires workup  Diagnosis management comments: Patient presents with left arm ataxia.  Upon arrival she is in no acute distress and vital signs are reassuring.  She is far outside the stroke alert window so stroke alert is not called.  Overall, physical exam is remarkable for no objective finding.  She has no neck pain or shoulder pain to suggest some sort of injury to her arm.  She has no back pain to suggest cauda equina syndrome.  She has no urinary retention, urinary incontinence, stool incontinence, saddle anesthesia.  She is evaluated with chest x-ray, lab work, and CT angiogram of the head and neck. Overall lab work is reassuring as is her CT scan. I considered spinal cord pathology however she has no spinal pain or tenderness. I considered brachial plexus pathology however she has no trauma and no arm pain. Labwork overall is unrevealing. Due to her complex presentation I have discussed the case with neurology who has recommended admission and MRI. Due to this I have discussed the case with the patient's PCP who has graciously agreed to admit her to his service. I have ordered a MRI brain for the morning as well as a diet, vitals, and telemtery. The patient is in agreement with this plan. She has been admitted in stable condition for further evaluation and management.        Amount and/or Complexity of Data Reviewed  Clinical lab tests: ordered and reviewed  Tests in the radiology section of CPT®:  ordered and reviewed  Discuss the patient with other providers: yes (Dr. Gaspar and Dr. Luna)    Risk of Complications, Morbidity, and/or Mortality  Presenting problems: moderate  Diagnostic procedures: low  Management options: moderate    Patient Progress  Patient progress: stable      Final diagnoses:   Left arm weakness            Thomas Rosales MD  05/02/20 0032      Electronically signed by Thomas Rosales  MD Serjio at 05/02/20 0032         Current Facility-Administered Medications   Medication Dose Route Frequency Provider Last Rate Last Dose   • amLODIPine (NORVASC) 5 mg, lisinopril (PRINIVIL,ZESTRIL) 20 mg   Oral Q24H Nhan Muir MD       • atorvastatin (LIPITOR) tablet 80 mg  80 mg Oral Nightly Suri Tyler MD   80 mg at 05/03/20 2115   • busPIRone (BUSPAR) tablet 15 mg  15 mg Oral Q12H Nhan Muir MD   15 mg at 05/04/20 1254   • citalopram (CeleXA) tablet 40 mg  40 mg Oral Daily Nhan Muir MD   40 mg at 05/04/20 1251   • clopidogrel (PLAVIX) tablet 75 mg  75 mg Oral Daily Nhan Muir MD   75 mg at 05/04/20 1250   • dextrose (D50W) 25 g/ 50mL Intravenous Solution 25 g  25 g Intravenous Q15 Min PRN Nhan Muir MD       • dextrose (GLUTOSE) oral gel 15 g  15 g Oral Q15 Min PRN Nhan Muir MD       • glucagon (human recombinant) (GLUCAGEN DIAGNOSTIC) injection 1 mg  1 mg Subcutaneous Q15 Min PRN Nhan Muir MD       • insulin lispro (humaLOG) injection 0-9 Units  0-9 Units Subcutaneous TID AC Nhan Muir MD   4 Units at 05/04/20 1245   • linagliptin (TRADJENTA) tablet 5 mg  5 mg Oral Daily Nhan Muir MD   5 mg at 05/04/20 1250   • LORazepam (ATIVAN) tablet 1 mg  1 mg Oral Q6H PRN Nhan Miur MD       • metFORMIN (GLUCOPHAGE) tablet 500 mg  500 mg Oral BID With Meals Nhan Muir MD   500 mg at 05/04/20 1253   • sodium chloride 0.9 % flush 10 mL  10 mL Intravenous PRN Thomas Rosales MD       • sodium chloride 0.9 % flush 10 mL  10 mL Intravenous Q12H Nhan Muir MD   10 mL at 05/04/20 1255   • sodium chloride 0.9 % flush 10 mL  10 mL Intravenous PRN Nhan Muir MD       • sodium chloride 0.9 % infusion  100 mL/hr Intravenous Continuous Nhan Muir MD 0 mL/hr at 05/02/20 1554          Physician Progress Notes (last 72 hours) (Notes from 05/01/20 1344  through 05/04/20 1344)      Nhan Muir MD at 05/04/20 0805          Gaviota Erazo is a 74 y.o. female patient.  Anxious about the diagnosis of acute CVA.  Work-up in progress.  Plan transesophageal echo today  Reviewed neuro note.        Current Facility-Administered Medications   Medication Dose Route Frequency Provider Last Rate Last Dose   • amLODIPine (NORVASC) 5 mg, lisinopril (PRINIVIL,ZESTRIL) 20 mg   Oral Q24H Nhan Muir MD       • atorvastatin (LIPITOR) tablet 80 mg  80 mg Oral Nightly Suri Tyler MD   80 mg at 05/03/20 2115   • busPIRone (BUSPAR) tablet 15 mg  15 mg Oral Q12H Nhan Muir MD   15 mg at 05/03/20 2115   • citalopram (CeleXA) tablet 40 mg  40 mg Oral Daily Nhan Muir MD   40 mg at 05/03/20 0953   • clopidogrel (PLAVIX) tablet 75 mg  75 mg Oral Daily Nhan Muir MD   75 mg at 05/03/20 0953   • dextrose (D50W) 25 g/ 50mL Intravenous Solution 25 g  25 g Intravenous Q15 Min PRN Nhan Muir MD       • dextrose (GLUTOSE) oral gel 15 g  15 g Oral Q15 Min PRN Nhan Muir MD       • glucagon (human recombinant) (GLUCAGEN DIAGNOSTIC) injection 1 mg  1 mg Subcutaneous Q15 Min PRN Nhan Muir MD       • insulin lispro (humaLOG) injection 0-9 Units  0-9 Units Subcutaneous TID AC hNan Muir MD   4 Units at 05/03/20 1740   • LORazepam (ATIVAN) tablet 1 mg  1 mg Oral Q6H PRN Nhan Muir MD       • metFORMIN (GLUCOPHAGE) tablet 500 mg  500 mg Oral BID With Meals Nhan Muir MD       • sodium chloride 0.9 % flush 10 mL  10 mL Intravenous PRN Thomas Rosales MD       • sodium chloride 0.9 % flush 10 mL  10 mL Intravenous Q12H Nhan Muir MD   10 mL at 05/02/20 2016   • sodium chloride 0.9 % flush 10 mL  10 mL Intravenous PRN Nhan Muir MD       • sodium chloride 0.9 % infusion  100 mL/hr Intravenous Continuous Nhan Muir MD   Stopped at 05/02/20  "1554     ALLERGIES:  No Known Allergies    Stroke (CMS/[facility])    Left arm weakness    Essential hypertension    Hypertensive urgency    Hypercholesterolemia    Blood pressure 144/74, pulse 86, temperature 98.3 °F (36.8 °C), temperature source Oral, resp. rate 14, height 152.4 cm (60\"), weight 69.6 kg (153 lb 6 oz), SpO2 95 %.      Subjective:  Symptoms:  Stable.  She reports weakness and anxiety.  No shortness of breath or chest pain.    Diet:  Adequate intake.    Activity level: Impaired due to weakness.    Pain:  She reports no pain.      Review of Systems   Respiratory: Negative for shortness of breath.    Cardiovascular: Negative for chest pain.   Neurological: Positive for weakness.     Objective:  General Appearance:  Comfortable and well-appearing.    Vital signs: (most recent): Blood pressure 144/74, pulse 86, temperature 98.3 °F (36.8 °C), temperature source Oral, resp. rate 14, height 152.4 cm (60\"), weight 69.6 kg (153 lb 6 oz), SpO2 95 %.  Vital signs are normal.    Output: Producing urine and minimal stool output.    HEENT: Normal HEENT exam.  (Left-sided facial droop noted)    Lungs:  Normal effort and normal respiratory rate.    Heart: Normal rate.  Regular rhythm.    Abdomen: Abdomen is soft.  Bowel sounds are normal.   There is no abdominal tenderness.     Extremities: Decreased range of motion.    Skin:  Warm and dry.              Labs:  Lab Results (last 72 hours)     Procedure Component Value Units Date/Time    POC Glucose Once [[id number]]  (Abnormal) Collected:  05/03/20 0945    Specimen:  Blood Updated:  05/03/20 0956     Glucose 267 mg/dL      Comment: : [id number] Nelia MeganMeter ID: [id number]       Extra Tubes [[id number]] Collected:  05/03/20 0341    Specimen:  Blood, Venous Line Updated:  05/03/20 0500    Narrative:       The following orders were created for panel order Extra Tubes.  Procedure                               Abnormality         Status                     ---------      "                          -----------         ------                     Lavender Top[[id number]]                                     Final result                 Please view results for these tests on the individual orders.    Lavender Top [[id number]] Collected:  05/03/20 0341    Specimen:  Blood Updated:  05/03/20 0500     Extra Tube hold for add-on     Comment: Auto resulted       Basic Metabolic Panel [[id number]]  (Abnormal) Collected:  05/03/20 0341    Specimen:  Blood Updated:  05/03/20 0447     Glucose 192 mg/dL      BUN 20 mg/dL      Creatinine 0.82 mg/dL      Sodium 139 mmol/L      Potassium 4.3 mmol/L      Chloride 102 mmol/L      CO2 23.0 mmol/L      Calcium 9.5 mg/dL      eGFR Non African Amer 68 mL/min/1.73      BUN/Creatinine Ratio 24.4     Anion Gap 14.0 mmol/L     Narrative:       GFR Normal >60  Chronic Kidney Disease <60  Kidney Failure <15      Vitamin B12 [[id number]]  (Normal) Collected:  05/02/20 0754    Specimen:  Blood Updated:  05/02/20 2202     Vitamin B-12 337 pg/mL     Narrative:       Results may be falsely increased if patient taking Biotin.      POC Glucose Once [[id number]]  (Abnormal) Collected:  05/02/20 1733    Specimen:  Blood Updated:  05/02/20 1744     Glucose 212 mg/dL      Comment: : [id number] Megan LópezyMeter ID: [id number]       POC Glucose Once [[id number]]  (Abnormal) Collected:  05/02/20 1106    Specimen:  Blood Updated:  05/02/20 1121     Glucose 262 mg/dL      Comment: : [id number] Deny McgowanyMeter ID: [id number]       Lipid Panel [[id number]]  (Abnormal) Collected:  05/02/20 0438    Specimen:  Blood Updated:  05/02/20 0751     Total Cholesterol 184 mg/dL      Triglycerides 331 mg/dL      HDL Cholesterol 33 mg/dL      LDL Cholesterol  85 mg/dL      VLDL Cholesterol 66.2 mg/dL      LDL/HDL Ratio 2.57    Narrative:       Cholesterol Reference Ranges  (U.S. Department of Health and Human Services ATP III Classifications)    Desirable          <200  mg/dL  Borderline High    200-239 mg/dL  High Risk          >240 mg/dL      Triglyceride Reference Ranges  (U.S. Department of Health and Human Services ATP III Classifications)    Normal           <150 mg/dL  Borderline High  150-199 mg/dL  High             200-499 mg/dL  Very High        >500 mg/dL    HDL Reference Ranges  (U.S. Department of Health and Human Services ATP III Classifcations)    Low     <40 mg/dl (major risk factor for CHD)  High    >60 mg/dl ('negative' risk factor for CHD)        LDL Reference Ranges  (U.S. Department of Health and Human Services ATP III Classifcations)    Optimal          <100 mg/dL  Near Optimal     100-129 mg/dL  Borderline High  130-159 mg/dL  High             160-189 mg/dL  Very High        >189 mg/dL    Hemoglobin A1c [[id number]]  (Abnormal) Collected:  05/02/20 0438    Specimen:  Blood Updated:  05/02/20 0751     Hemoglobin A1C 8.90 %     Narrative:       Hemoglobin A1C Ranges:    Increased Risk for Diabetes  5.7% to 6.4%  Diabetes                     >= 6.5%  Diabetic Goal                < 7.0%    Basic Metabolic Panel [[id number]]  (Abnormal) Collected:  05/02/20 0438    Specimen:  Blood Updated:  05/02/20 0539     Glucose 265 mg/dL      BUN 20 mg/dL      Creatinine 1.04 mg/dL      Sodium 140 mmol/L      Potassium 3.8 mmol/L      Chloride 102 mmol/L      CO2 23.0 mmol/L      Calcium 10.0 mg/dL      eGFR Non African Amer 52 mL/min/1.73      BUN/Creatinine Ratio 19.2     Anion Gap 15.0 mmol/L     Narrative:       GFR Normal >60  Chronic Kidney Disease <60  Kidney Failure <15      CBC Auto Differential [[id number]]  (Abnormal) Collected:  05/02/20 0438    Specimen:  Blood Updated:  05/02/20 0521     WBC 10.59 10*3/mm3      RBC 4.42 10*6/mm3      Hemoglobin 13.0 g/dL      Hematocrit 39.1 %      MCV 88.5 fL      MCH 29.4 pg      MCHC 33.2 g/dL      RDW 13.2 %      RDW-SD 42.5 fl      MPV 10.7 fL      Platelets 278 10*3/mm3      Neutrophil % 50.7 %      Lymphocyte % 38.4 %       Monocyte % 8.6 %      Eosinophil % 1.5 %      Basophil % 0.7 %      Immature Grans % 0.1 %      Neutrophils, Absolute 5.37 10*3/mm3      Lymphocytes, Absolute 4.07 10*3/mm3      Monocytes, Absolute 0.91 10*3/mm3      Eosinophils, Absolute 0.16 10*3/mm3      Basophils, Absolute 0.07 10*3/mm3      Immature Grans, Absolute 0.01 10*3/mm3      nRBC 0.0 /100 WBC     [place] Draw [[id number]] Collected:  05/01/20 1622    Specimen:  Blood Updated:  05/01/20 1745    Narrative:       The following orders were created for panel order [place] Draw.  Procedure                               Abnormality         Status                     ---------                               -----------         ------                     Light Blue Top[[id number]]                                   Final result               Green Top (Gel)[[id number]]                                  Final result               Lavender Top[[id number]]                                     Final result               Red Top[[id number]]                                          Final result                 Please view results for these tests on the individual orders.    Light Blue Top [[id number]] Collected:  05/01/20 1632    Specimen:  Blood Updated:  05/01/20 1745     Extra Tube hold for add-on     Comment: Auto resulted       Green Top (Gel) [[id number]] Collected:  05/01/20 1622    Specimen:  Blood Updated:  05/01/20 1730     Extra Tube Hold for add-ons.     Comment: Auto resulted.       Lavender Top [[id number]] Collected:  05/01/20 1622    Specimen:  Blood Updated:  05/01/20 1730     Extra Tube hold for add-on     Comment: Auto resulted       Red Top [[id number]] Collected:  05/01/20 1622    Specimen:  Blood Updated:  05/01/20 1730     Extra Tube Hold for add-ons.     Comment: Auto resulted.       Comprehensive Metabolic Panel [[id number]]  (Abnormal) Collected:  05/01/20 1622    Specimen:  Blood Updated:  05/01/20 1704     Glucose 214 mg/dL      BUN 18 mg/dL       Creatinine 0.93 mg/dL      Sodium 142 mmol/L      Potassium 4.6 mmol/L      Chloride 100 mmol/L      CO2 26.0 mmol/L      Calcium 10.1 mg/dL      Total Protein 7.4 g/dL      Albumin 4.50 g/dL      ALT (SGPT) 17 U/L      AST (SGOT) 16 U/L      Alkaline Phosphatase 94 U/L      Total Bilirubin 0.4 mg/dL      eGFR Non African Amer 59 mL/min/1.73      Globulin 2.9 gm/dL      A/G Ratio 1.6 g/dL      BUN/Creatinine Ratio 19.4     Anion Gap 16.0 mmol/L     Narrative:       GFR Normal >60  Chronic Kidney Disease <60  Kidney Failure <15      Troponin [[id number]]  (Normal) Collected:  05/01/20 1622    Specimen:  Blood Updated:  05/01/20 1702     Troponin T <0.010 ng/mL     Narrative:       Troponin T Reference Range:  <= 0.03 ng/mL-   Negative for AMI  >0.03 ng/mL-     Abnormal for myocardial necrosis.  Clinicians would have to utilize clinical acumen, EKG, Troponin and serial changes to determine if it is an Acute Myocardial Infarction or myocardial injury due to an underlying chronic condition.       Results may be falsely decreased if patient taking Biotin.      BNP [[id number]]  (Normal) Collected:  05/01/20 1622    Specimen:  Blood Updated:  05/01/20 1701     proBNP 86.3 pg/mL     Narrative:       Among patients with dyspnea, NT-proBNP is highly sensitive for the detection of acute congestive heart failure. In addition NT-proBNP of <300 pg/ml effectively rules out acute congestive heart failure with 99% negative predictive value.    Results may be falsely decreased if patient taking Biotin.      Protime-INR [[id number]]  (Normal) Collected:  05/01/20 1632    Specimen:  Blood Updated:  05/01/20 1650     Protime 13.1 Seconds      INR 1.03    CBC & Differential [[id number]] Collected:  05/01/20 1622    Specimen:  Blood Updated:  05/01/20 1642    Narrative:       The following orders were created for panel order CBC & Differential.  Procedure                               Abnormality         Status                     ---------                                -----------         ------                     CBC Auto Differential[[id number]]        Normal              Final result                 Please view results for these tests on the individual orders.    CBC Auto Differential [[id number]]  (Normal) Collected:  05/01/20 1622    Specimen:  Blood Updated:  05/01/20 1642     WBC 9.63 10*3/mm3      RBC 4.64 10*6/mm3      Hemoglobin 13.7 g/dL      Hematocrit 41.3 %      MCV 89.0 fL      MCH 29.5 pg      MCHC 33.2 g/dL      RDW 13.0 %      RDW-SD 42.5 fl      MPV 10.7 fL      Platelets 268 10*3/mm3      Neutrophil % 60.7 %      Lymphocyte % 29.1 %      Monocyte % 8.0 %      Eosinophil % 1.2 %      Basophil % 0.6 %      Immature Grans % 0.4 %      Neutrophils, Absolute 5.84 10*3/mm3      Lymphocytes, Absolute 2.80 10*3/mm3      Monocytes, Absolute 0.77 10*3/mm3      Eosinophils, Absolute 0.12 10*3/mm3      Basophils, Absolute 0.06 10*3/mm3      Immature Grans, Absolute 0.04 10*3/mm3      nRBC 0.0 /100 WBC     POC Glucose Once [[id number]]  (Abnormal) Collected:  05/01/20 1616    Specimen:  Blood Updated:  05/01/20 1627     Glucose 207 mg/dL      Comment: : [id number] Akira TaylorMeter ID: [id number]             Imaging Results (Last 72 Hours)     Procedure Component Value Units Date/Time    CT Head Without Contrast [[id number]] Collected:  05/03/20 1557     Updated:  05/03/20 1603    Narrative:       CT HEAD WO CONTRAST- 5/3/2020 3:38 PM CDT     HISTORY: Speech changes (or aphasia), new or progressive; right MCA  ischemia     COMPARISON: 05/01/2020     DOSE LENGTH PRODUCT: 639 mGy cm. Automated exposure control was also  utilized to decrease patient radiation dose.     TECHNIQUE: Axial images of brain obtained without contrast     FINDINGS:  Similar hypodensities of the right basal ganglia and right  centrum semiovale. Old ischemia suspected of the right occipital lobe.  No intracranial hemorrhage. Chronic vessel vessel ischemic  changes.  Ventricles are similar in size and configuration. No midline shifting.  No extra-axial fluid collection.     Visible paranasal sinuses and mastoid air cells are well-aerated.  Hyperostosis frontalis internus.       Impression:       1. Stable CT head exam compared to 05/01/2020. Acute/subacute ischemic  foci of the right middle cerebral artery territory with no intracranial  hemorrhage or prominent mass effect.   This report was finalized on 05/03/2020 16:00 by Dr. Lina Perrin MD.    MRI Brain Without Contrast [[id number]] Collected:  05/02/20 1149     Updated:  05/02/20 1159    Narrative:       HISTORY: Left arm weakness     MRI BRAIN: Multiplanar imaging the brain performed without contrast.     COMPARISON: CT head 05/01/2020     FINDINGS: Scattered foci of diffusion restriction seen throughout the  right cerebral hemisphere within the right MCA distribution involving  the frontal parietal and temporal lobes. No diffusion restriction of the  cerebellum or the left cerebral hemisphere. No intracranial hemorrhage.  Mild cerebral and cerebellar volume loss. Corpus callosum intact. No  sellar or intracranial mass. Old lacunar infarct suspected of the right  basal ganglia. Prominent perivascular spaces along the basal ganglia  bilaterally. Hyperintense FLAIR signal changes of the periventricular  white matter regions favoring underlying chronic small vessel ischemia.     Hyperostosis frontalis internus. Chronic mucosal thickening of the  paranasal sinuses. Normal flow-voids in the distal internal carotid and  basilar arteries. Normal flow void in the sagittal sinus.       Impression:       1. Scattered foci of diffusion restriction within the right MCA  distribution suggestive for acute nonhemorrhagic multifocal ischemia of  the right MCA distribution.  2. Chronic small vessel ischemic changes suspected with mild cerebral  volume loss.  This report was finalized on 05/02/2020 11:56 by Dr. Lina Perrin  MD.    CT Angiogram Head [[id number]] Collected:  05/01/20 1852     Updated:  05/01/20 1903    Narrative:       EXAM:   CT NECK ANGIOGRAM  CT HEAD ANGIOGRAM 05/01/2020     COMPARISON:   None.      HISTORY:  74 years-old Female. Stroke      TECHNIQUE:      Multiple CT images were obtained of the of the head and neck after the  administration of IV contrast. 3D MIP reformats were generated in the  axial, sagittal and coronal planes were sent to PACS for interpretation.        Grading of carotid artery stenosis is performed by NASCET criteria.     FINDINGS:      CT Neck Angiogram:     Common origin of the innominate and right common carotid artery. The  visualized bilateral common carotid arteries demonstrate mild  atherosclerotic disease, more atherosclerotic disease at the  bifurcation. The bilateral subclavian arteries are patent.     Mild calcification along the lateral aspect of the right carotid bulb  with less than 30% stenosis. The remaining cervical right ICA is without  flow-limiting stenosis or occlusion.     Calcification of the left carotid bifurcation with less than 50%  stenosis.     The bilateral proximal vertebral arteries are well opacified. The right  vertebral artery is diminutive and terminates into a PICA the left C3/C4  demonstrate more atherosclerotic disease with mild stenosis.     CT Head Angiogram:     The intracranial right ICA demonstrates atherosclerotic disease in the  carotid siphon, with mild to moderate stenosis. The right carotid  terminus is visualized. The right A1 is diminutive. The right A2 is also  diminutive. The MCA bifurcation is visualized with irregular appearance  of the A1 and M1, reflecting atherosclerotic disease. There is prompt  opacification of the proximal M2 segments. There is no aneurysm or  vascular malformation.     The left intracranial ICA demonstrates atherosclerotic disease with mild  to moderate stenosis in the left carotid siphon. The left carotid  terminus  is visualized. The left A1, A2, M1, visualized proximal M2  segments demonstrate irregularity, reflecting atherosclerotic disease,  but without flow-limiting stenosis or occlusion. No aneurysm or other  vascular malformation identified.        The visualized posterior fossa circulation demonstrates no focal  flow-limiting stenosis or occlusion in the basilar artery. The left PCA  is fetal in origin. The bilateral ACAs are well opacified.       Impression:       CT Angiography Of The Neck With Intravenous Contrast   1. No evidence of high-grade arterial stenosis or underlying dissection.     CT Angiography Of The Head With Intravenous Contrast  1.   Atherosclerotic disease, without focal high-grade stenosis or  occlusion.  This report was finalized on 05/01/2020 19:00 by Dr. Zenaida Gonzalez MD.    CT Angiogram Neck [[id number]] Collected:  05/01/20 1852     Updated:  05/01/20 1903    Narrative:       EXAM:   CT NECK ANGIOGRAM  CT HEAD ANGIOGRAM 05/01/2020     COMPARISON:   None.      HISTORY:  74 years-old Female. Stroke      TECHNIQUE:      Multiple CT images were obtained of the of the head and neck after the  administration of IV contrast. 3D MIP reformats were generated in the  axial, sagittal and coronal planes were sent to PACS for interpretation.        Grading of carotid artery stenosis is performed by NASCET criteria.     FINDINGS:      CT Neck Angiogram:     Common origin of the innominate and right common carotid artery. The  visualized bilateral common carotid arteries demonstrate mild  atherosclerotic disease, more atherosclerotic disease at the  bifurcation. The bilateral subclavian arteries are patent.     Mild calcification along the lateral aspect of the right carotid bulb  with less than 30% stenosis. The remaining cervical right ICA is without  flow-limiting stenosis or occlusion.     Calcification of the left carotid bifurcation with less than 50%  stenosis.     The bilateral proximal vertebral  arteries are well opacified. The right  vertebral artery is diminutive and terminates into a PICA the left C3/C4  demonstrate more atherosclerotic disease with mild stenosis.     CT Head Angiogram:     The intracranial right ICA demonstrates atherosclerotic disease in the  carotid siphon, with mild to moderate stenosis. The right carotid  terminus is visualized. The right A1 is diminutive. The right A2 is also  diminutive. The MCA bifurcation is visualized with irregular appearance  of the A1 and M1, reflecting atherosclerotic disease. There is prompt  opacification of the proximal M2 segments. There is no aneurysm or  vascular malformation.     The left intracranial ICA demonstrates atherosclerotic disease with mild  to moderate stenosis in the left carotid siphon. The left carotid  terminus is visualized. The left A1, A2, M1, visualized proximal M2  segments demonstrate irregularity, reflecting atherosclerotic disease,  but without flow-limiting stenosis or occlusion. No aneurysm or other  vascular malformation identified.        The visualized posterior fossa circulation demonstrates no focal  flow-limiting stenosis or occlusion in the basilar artery. The left PCA  is fetal in origin. The bilateral ACAs are well opacified.       Impression:       CT Angiography Of The Neck With Intravenous Contrast   1. No evidence of high-grade arterial stenosis or underlying dissection.     CT Angiography Of The Head With Intravenous Contrast  1.   Atherosclerotic disease, without focal high-grade stenosis or  occlusion.  This report was finalized on 05/01/2020 19:00 by Dr. Zenaida Gonzalez MD.    CT Head Without Contrast Stroke Protocol [[id number]] Collected:  05/01/20 1847     Updated:  05/01/20 1854    Narrative:       EXAM: CT OF THE HEAD WITHOUT IV CONTRAST 05/01/2020     COMPARISON: None      INDICATION: Female, 74 years-old. Left arm weakness      PROCEDURE: Non contrast enhanced head CT was performed.      Radiation dose  equals .2 mGy-cm.  Automated exposure control dose  reduction technique was implemented.     FINDINGS:     7 mm hypodensity within the right frontal centrum semiovale, consistent  with age-indeterminate infarct (image 19, series 4). A right temporal  hypodensity measuring 7 mm (image 16, series 4) is age-indeterminate.  Age-indeterminate right basal ganglia infarct. A more chronic appearing  lacunar infarct in the right frontal lobe anteriorly (image 13, series  4).     There is no acute intracranial hemorrhage.     Confluent periventricular hypodensity, favored to reflect mild to  moderate chronic microvascular changes.     The basal cisterns are preserved. There is no midline shift. No acute  hydrocephalus.     Mastoid air cells are clear.          Impression:       1.  Age-indeterminate infarct in the right basal ganglia, right centrum  semiovale and the posterior right temporal lobe.  2.  More chronic appearing right anterior frontal lobe lacunar infarct.  This report was finalized on 05/01/2020 18:51 by Dr. Zenaida Gonzalez MD.    XR Chest 1 View [[id number]] Collected:  05/01/20 1714     Updated:  05/01/20 1718    Narrative:       Exam:   XR CHEST 1 VW-       Date:  05/01/2020      History:  Female, age  74 years;Stroke Protocol (Onset > 12 hrs);  R29.898-Other symptoms and signs involving the musculoskeletal system     COMPARISON:  None.     Findings :  Low lung volumes. Chronic interstitial lung changes.  The heart and mediastinum are normal in size. Lungs are without focal  infiltrate, mass or effusions.  The bones show no acute pathology.         Impression:       Impression:     1.  Low lung volumes.  2.  Otherwise, no acute cardiopulmonary process.     This report was finalized on 05/01/2020 17:15 by Dr. Zenaida Gonzalez MD.                Assessment:    Condition: In stable condition.  Improving.   (Type 2 DM- review labs and home medication. Discuss with patient importance of blood sugar control in  the healing process. Review diet, medical,and lifestyle modifications for optimal medical treatment. Will restart their regular diabetic regimen and make consult for diabetic education / dietician available upon request.  Hemoglobin A1c 8.9- will adjust medication today.  Tradjenta added.    Anxiety/depression- currently on Celexa 40 mg.  Reluctant to add Wellbutrin with questionable seizure activity.  Add BuSpar for anxiety today      Check transesophageal echo.    Agree will need inpatient rehab but she does not have much social support at home.  Consult  for inpatient rehab at [facility]).     Plan:   Encourage ambulation and per physical therapy.      Patient Active Problem List   Diagnosis   • Left arm weakness   • Essential hypertension   • Hypertensive urgency   • Hypercholesterolemia   • Stroke (CMS/[facility])     Nhan Muir MD  5/4/2020                                                  Electronically signed by Nhan Muir MD at 05/04/20 0808     Nhan Muir MD at 05/03/20 1528          Gaviota Erazo is a 74 y.o. female patient.  Anxious about the diagnosis of acute CVA.  Work-up in progress.  Plan transesophageal echo tomorrow.  Reviewed neuro note.        Current Facility-Administered Medications   Medication Dose Route Frequency Provider Last Rate Last Dose   • amLODIPine (NORVASC) 5 mg, lisinopril (PRINIVIL,ZESTRIL) 20 mg   Oral Q24H Nhan Muir MD       • atorvastatin (LIPITOR) tablet 80 mg  80 mg Oral Nightly Clancy Suri Gaspar MD   80 mg at 05/02/20 2016   • citalopram (CeleXA) tablet 40 mg  40 mg Oral Daily Nhan Muir MD   40 mg at 05/03/20 0953   • clopidogrel (PLAVIX) tablet 75 mg  75 mg Oral Daily Nhan Muir MD   75 mg at 05/03/20 0953   • dextrose (D50W) 25 g/ 50mL Intravenous Solution 25 g  25 g Intravenous Q15 Min PRN Nhan Muir MD       • dextrose (GLUTOSE) oral gel 15 g  15 g Oral Q15 Min PRN Isadora  "Nhan Spicer MD       • glucagon (human recombinant) (GLUCAGEN DIAGNOSTIC) injection 1 mg  1 mg Subcutaneous Q15 Min PRN Nhan Muir MD       • insulin lispro (humaLOG) injection 0-9 Units  0-9 Units Subcutaneous TID AC Nhan Muir MD   4 Units at 05/02/20 1737   • LORazepam (ATIVAN) tablet 1 mg  1 mg Oral Q6H PRN Nhan Muir MD       • metFORMIN (GLUCOPHAGE) tablet 500 mg  500 mg Oral BID With Meals Nhan Muir MD       • sodium chloride 0.9 % flush 10 mL  10 mL Intravenous PRN Thomas Rosales MD       • sodium chloride 0.9 % flush 10 mL  10 mL Intravenous Q12H Nhan Muir MD   10 mL at 05/02/20 2016   • sodium chloride 0.9 % flush 10 mL  10 mL Intravenous PRN Nhan Muir MD       • sodium chloride 0.9 % infusion  100 mL/hr Intravenous Continuous Nhan Muir MD   Stopped at 05/02/20 1554     ALLERGIES:  No Known Allergies    Stroke (CMS/[facility])    Left arm weakness    Essential hypertension    Hypertensive urgency    Hypercholesterolemia    Blood pressure 157/81, pulse 89, temperature 98.6 °F (37 °C), temperature source Oral, resp. rate 18, height 152.4 cm (60\"), weight 69.6 kg (153 lb 6 oz), SpO2 95 %.      Subjective:  Symptoms:  Stable.  She reports weakness and anxiety.  No shortness of breath or chest pain.    Diet:  Adequate intake.    Activity level: Impaired due to weakness.    Pain:  She reports no pain.      Review of Systems   Respiratory: Negative for shortness of breath.    Cardiovascular: Negative for chest pain.   Neurological: Positive for weakness.     Objective:  General Appearance:  Comfortable and well-appearing.    Vital signs: (most recent): Blood pressure 157/81, pulse 89, temperature 98.6 °F (37 °C), temperature source Oral, resp. rate 18, height 152.4 cm (60\"), weight 69.6 kg (153 lb 6 oz), SpO2 95 %.  Vital signs are normal.    Output: Producing urine and minimal stool output.    HEENT: Normal HEENT exam.  " (Left-sided facial droop noted)    Lungs:  Normal effort and normal respiratory rate.    Heart: Normal rate.  Regular rhythm.    Abdomen: Abdomen is soft.  Bowel sounds are normal.   There is no abdominal tenderness.     Extremities: Decreased range of motion.    Skin:  Warm and dry.              Labs:  Lab Results (last 72 hours)     Procedure Component Value Units Date/Time    POC Glucose Once [[id number]]  (Abnormal) Collected:  05/03/20 0945    Specimen:  Blood Updated:  05/03/20 0956     Glucose 267 mg/dL      Comment: : [id number] Pickens MeganMeter ID: [id number]       Extra Tubes [[id number]] Collected:  05/03/20 0341    Specimen:  Blood, Venous Line Updated:  05/03/20 0500    Narrative:       The following orders were created for panel order Extra Tubes.  Procedure                               Abnormality         Status                     ---------                               -----------         ------                     Lavender Top[[id number]]                                     Final result                 Please view results for these tests on the individual orders.    Lavender Top [[id number]] Collected:  05/03/20 0341    Specimen:  Blood Updated:  05/03/20 0500     Extra Tube hold for add-on     Comment: Auto resulted       Basic Metabolic Panel [[id number]]  (Abnormal) Collected:  05/03/20 0341    Specimen:  Blood Updated:  05/03/20 0447     Glucose 192 mg/dL      BUN 20 mg/dL      Creatinine 0.82 mg/dL      Sodium 139 mmol/L      Potassium 4.3 mmol/L      Chloride 102 mmol/L      CO2 23.0 mmol/L      Calcium 9.5 mg/dL      eGFR Non African Amer 68 mL/min/1.73      BUN/Creatinine Ratio 24.4     Anion Gap 14.0 mmol/L     Narrative:       GFR Normal >60  Chronic Kidney Disease <60  Kidney Failure <15      Vitamin B12 [[id number]]  (Normal) Collected:  05/02/20 0754    Specimen:  Blood Updated:  05/02/20 2202     Vitamin B-12 337 pg/mL     Narrative:       Results may be falsely increased if patient  taking Biotin.      POC Glucose Once [[id number]]  (Abnormal) Collected:  05/02/20 1733    Specimen:  Blood Updated:  05/02/20 1744     Glucose 212 mg/dL      Comment: : [id number] Megan LópezyMeter ID: [id number]       POC Glucose Once [[id number]]  (Abnormal) Collected:  05/02/20 1106    Specimen:  Blood Updated:  05/02/20 1121     Glucose 262 mg/dL      Comment: : [id number] Deny McgowanyMeter ID: [id number]       Lipid Panel [[id number]]  (Abnormal) Collected:  05/02/20 0438    Specimen:  Blood Updated:  05/02/20 0751     Total Cholesterol 184 mg/dL      Triglycerides 331 mg/dL      HDL Cholesterol 33 mg/dL      LDL Cholesterol  85 mg/dL      VLDL Cholesterol 66.2 mg/dL      LDL/HDL Ratio 2.57    Narrative:       Cholesterol Reference Ranges  (U.S. Department of Health and Human Services ATP III Classifications)    Desirable          <200 mg/dL  Borderline High    200-239 mg/dL  High Risk          >240 mg/dL      Triglyceride Reference Ranges  (U.S. Department of Health and Human Services ATP III Classifications)    Normal           <150 mg/dL  Borderline High  150-199 mg/dL  High             200-499 mg/dL  Very High        >500 mg/dL    HDL Reference Ranges  (U.S. Department of Health and Human Services ATP III Classifcations)    Low     <40 mg/dl (major risk factor for CHD)  High    >60 mg/dl ('negative' risk factor for CHD)        LDL Reference Ranges  (U.S. Department of Health and Human Services ATP III Classifcations)    Optimal          <100 mg/dL  Near Optimal     100-129 mg/dL  Borderline High  130-159 mg/dL  High             160-189 mg/dL  Very High        >189 mg/dL    Hemoglobin A1c [[id number]]  (Abnormal) Collected:  05/02/20 0438    Specimen:  Blood Updated:  05/02/20 0751     Hemoglobin A1C 8.90 %     Narrative:       Hemoglobin A1C Ranges:    Increased Risk for Diabetes  5.7% to 6.4%  Diabetes                     >= 6.5%  Diabetic Goal                < 7.0%    Basic Metabolic Panel  [[id number]]  (Abnormal) Collected:  05/02/20 0438    Specimen:  Blood Updated:  05/02/20 0539     Glucose 265 mg/dL      BUN 20 mg/dL      Creatinine 1.04 mg/dL      Sodium 140 mmol/L      Potassium 3.8 mmol/L      Chloride 102 mmol/L      CO2 23.0 mmol/L      Calcium 10.0 mg/dL      eGFR Non African Amer 52 mL/min/1.73      BUN/Creatinine Ratio 19.2     Anion Gap 15.0 mmol/L     Narrative:       GFR Normal >60  Chronic Kidney Disease <60  Kidney Failure <15      CBC Auto Differential [[id number]]  (Abnormal) Collected:  05/02/20 0438    Specimen:  Blood Updated:  05/02/20 0521     WBC 10.59 10*3/mm3      RBC 4.42 10*6/mm3      Hemoglobin 13.0 g/dL      Hematocrit 39.1 %      MCV 88.5 fL      MCH 29.4 pg      MCHC 33.2 g/dL      RDW 13.2 %      RDW-SD 42.5 fl      MPV 10.7 fL      Platelets 278 10*3/mm3      Neutrophil % 50.7 %      Lymphocyte % 38.4 %      Monocyte % 8.6 %      Eosinophil % 1.5 %      Basophil % 0.7 %      Immature Grans % 0.1 %      Neutrophils, Absolute 5.37 10*3/mm3      Lymphocytes, Absolute 4.07 10*3/mm3      Monocytes, Absolute 0.91 10*3/mm3      Eosinophils, Absolute 0.16 10*3/mm3      Basophils, Absolute 0.07 10*3/mm3      Immature Grans, Absolute 0.01 10*3/mm3      nRBC 0.0 /100 WBC     [place] Plane Draw [[id number]] Collected:  05/01/20 1622    Specimen:  Blood Updated:  05/01/20 1745    Narrative:       The following orders were created for panel order [place] [facility] Draw.  Procedure                               Abnormality         Status                     ---------                               -----------         ------                     Light Blue Top[[id number]]                                   Final result               Green Top (Gel)[[id number]]                                  Final result               Lavender Top[[id number]]                                     Final result               Red Top[[id number]]                                          Final result                 Please view  results for these tests on the individual orders.    Light Blue Top [[id number]] Collected:  05/01/20 1632    Specimen:  Blood Updated:  05/01/20 1745     Extra Tube hold for add-on     Comment: Auto resulted       Green Top (Gel) [[id number]] Collected:  05/01/20 1622    Specimen:  Blood Updated:  05/01/20 1730     Extra Tube Hold for add-ons.     Comment: Auto resulted.       Lavender Top [[id number]] Collected:  05/01/20 1622    Specimen:  Blood Updated:  05/01/20 1730     Extra Tube hold for add-on     Comment: Auto resulted       Red Top [[id number]] Collected:  05/01/20 1622    Specimen:  Blood Updated:  05/01/20 1730     Extra Tube Hold for add-ons.     Comment: Auto resulted.       Comprehensive Metabolic Panel [[id number]]  (Abnormal) Collected:  05/01/20 1622    Specimen:  Blood Updated:  05/01/20 1704     Glucose 214 mg/dL      BUN 18 mg/dL      Creatinine 0.93 mg/dL      Sodium 142 mmol/L      Potassium 4.6 mmol/L      Chloride 100 mmol/L      CO2 26.0 mmol/L      Calcium 10.1 mg/dL      Total Protein 7.4 g/dL      Albumin 4.50 g/dL      ALT (SGPT) 17 U/L      AST (SGOT) 16 U/L      Alkaline Phosphatase 94 U/L      Total Bilirubin 0.4 mg/dL      eGFR Non African Amer 59 mL/min/1.73      Globulin 2.9 gm/dL      A/G Ratio 1.6 g/dL      BUN/Creatinine Ratio 19.4     Anion Gap 16.0 mmol/L     Narrative:       GFR Normal >60  Chronic Kidney Disease <60  Kidney Failure <15      Troponin [[id number]]  (Normal) Collected:  05/01/20 1622    Specimen:  Blood Updated:  05/01/20 1702     Troponin T <0.010 ng/mL     Narrative:       Troponin T Reference Range:  <= 0.03 ng/mL-   Negative for AMI  >0.03 ng/mL-     Abnormal for myocardial necrosis.  Clinicians would have to utilize clinical acumen, EKG, Troponin and serial changes to determine if it is an Acute Myocardial Infarction or myocardial injury due to an underlying chronic condition.       Results may be falsely decreased if patient taking Biotin.      BNP  [[id number]]  (Normal) Collected:  05/01/20 1622    Specimen:  Blood Updated:  05/01/20 1701     proBNP 86.3 pg/mL     Narrative:       Among patients with dyspnea, NT-proBNP is highly sensitive for the detection of acute congestive heart failure. In addition NT-proBNP of <300 pg/ml effectively rules out acute congestive heart failure with 99% negative predictive value.    Results may be falsely decreased if patient taking Biotin.      Protime-INR [[id number]]  (Normal) Collected:  05/01/20 1632    Specimen:  Blood Updated:  05/01/20 1650     Protime 13.1 Seconds      INR 1.03    CBC & Differential [[id number]] Collected:  05/01/20 1622    Specimen:  Blood Updated:  05/01/20 1642    Narrative:       The following orders were created for panel order CBC & Differential.  Procedure                               Abnormality         Status                     ---------                               -----------         ------                     CBC Auto Differential[[id number]]        Normal              Final result                 Please view results for these tests on the individual orders.    CBC Auto Differential [[id number]]  (Normal) Collected:  05/01/20 1622    Specimen:  Blood Updated:  05/01/20 1642     WBC 9.63 10*3/mm3      RBC 4.64 10*6/mm3      Hemoglobin 13.7 g/dL      Hematocrit 41.3 %      MCV 89.0 fL      MCH 29.5 pg      MCHC 33.2 g/dL      RDW 13.0 %      RDW-SD 42.5 fl      MPV 10.7 fL      Platelets 268 10*3/mm3      Neutrophil % 60.7 %      Lymphocyte % 29.1 %      Monocyte % 8.0 %      Eosinophil % 1.2 %      Basophil % 0.6 %      Immature Grans % 0.4 %      Neutrophils, Absolute 5.84 10*3/mm3      Lymphocytes, Absolute 2.80 10*3/mm3      Monocytes, Absolute 0.77 10*3/mm3      Eosinophils, Absolute 0.12 10*3/mm3      Basophils, Absolute 0.06 10*3/mm3      Immature Grans, Absolute 0.04 10*3/mm3      nRBC 0.0 /100 WBC     POC Glucose Once [[id number]]  (Abnormal) Collected:  05/01/20 1616    Specimen:   Blood Updated:  05/01/20 1627     Glucose 207 mg/dL      Comment: : [id number] Champion TaylorMeter ID: [id number]             Imaging Results (Last 72 Hours)     Procedure Component Value Units Date/Time    MRI Brain Without Contrast [[id number]] Collected:  05/02/20 1149     Updated:  05/02/20 1159    Narrative:       HISTORY: Left arm weakness     MRI BRAIN: Multiplanar imaging the brain performed without contrast.     COMPARISON: CT head 05/01/2020     FINDINGS: Scattered foci of diffusion restriction seen throughout the  right cerebral hemisphere within the right MCA distribution involving  the frontal parietal and temporal lobes. No diffusion restriction of the  cerebellum or the left cerebral hemisphere. No intracranial hemorrhage.  Mild cerebral and cerebellar volume loss. Corpus callosum intact. No  sellar or intracranial mass. Old lacunar infarct suspected of the right  basal ganglia. Prominent perivascular spaces along the basal ganglia  bilaterally. Hyperintense FLAIR signal changes of the periventricular  white matter regions favoring underlying chronic small vessel ischemia.     Hyperostosis frontalis internus. Chronic mucosal thickening of the  paranasal sinuses. Normal flow-voids in the distal internal carotid and  basilar arteries. Normal flow void in the sagittal sinus.       Impression:       1. Scattered foci of diffusion restriction within the right MCA  distribution suggestive for acute nonhemorrhagic multifocal ischemia of  the right MCA distribution.  2. Chronic small vessel ischemic changes suspected with mild cerebral  volume loss.  This report was finalized on 05/02/2020 11:56 by Dr. Lina Perrin MD.    CT Angiogram Head [[id number]] Collected:  05/01/20 1852     Updated:  05/01/20 1903    Narrative:       EXAM:   CT NECK ANGIOGRAM  CT HEAD ANGIOGRAM 05/01/2020     COMPARISON:   None.      HISTORY:  74 years-old Female. Stroke      TECHNIQUE:      Multiple CT images were obtained of the  of the head and neck after the  administration of IV contrast. 3D MIP reformats were generated in the  axial, sagittal and coronal planes were sent to PACS for interpretation.        Grading of carotid artery stenosis is performed by NASCET criteria.     FINDINGS:      CT Neck Angiogram:     Common origin of the innominate and right common carotid artery. The  visualized bilateral common carotid arteries demonstrate mild  atherosclerotic disease, more atherosclerotic disease at the  bifurcation. The bilateral subclavian arteries are patent.     Mild calcification along the lateral aspect of the right carotid bulb  with less than 30% stenosis. The remaining cervical right ICA is without  flow-limiting stenosis or occlusion.     Calcification of the left carotid bifurcation with less than 50%  stenosis.     The bilateral proximal vertebral arteries are well opacified. The right  vertebral artery is diminutive and terminates into a PICA the left C3/C4  demonstrate more atherosclerotic disease with mild stenosis.     CT Head Angiogram:     The intracranial right ICA demonstrates atherosclerotic disease in the  carotid siphon, with mild to moderate stenosis. The right carotid  terminus is visualized. The right A1 is diminutive. The right A2 is also  diminutive. The MCA bifurcation is visualized with irregular appearance  of the A1 and M1, reflecting atherosclerotic disease. There is prompt  opacification of the proximal M2 segments. There is no aneurysm or  vascular malformation.     The left intracranial ICA demonstrates atherosclerotic disease with mild  to moderate stenosis in the left carotid siphon. The left carotid  terminus is visualized. The left A1, A2, M1, visualized proximal M2  segments demonstrate irregularity, reflecting atherosclerotic disease,  but without flow-limiting stenosis or occlusion. No aneurysm or other  vascular malformation identified.        The visualized posterior fossa circulation  demonstrates no focal  flow-limiting stenosis or occlusion in the basilar artery. The left PCA  is fetal in origin. The bilateral ACAs are well opacified.       Impression:       CT Angiography Of The Neck With Intravenous Contrast   1. No evidence of high-grade arterial stenosis or underlying dissection.     CT Angiography Of The Head With Intravenous Contrast  1.   Atherosclerotic disease, without focal high-grade stenosis or  occlusion.  This report was finalized on 05/01/2020 19:00 by Dr. Zenaida Gonzalez MD.    CT Angiogram Neck [[id number]] Collected:  05/01/20 1852     Updated:  05/01/20 1903    Narrative:       EXAM:   CT NECK ANGIOGRAM  CT HEAD ANGIOGRAM 05/01/2020     COMPARISON:   None.      HISTORY:  74 years-old Female. Stroke      TECHNIQUE:      Multiple CT images were obtained of the of the head and neck after the  administration of IV contrast. 3D MIP reformats were generated in the  axial, sagittal and coronal planes were sent to PACS for interpretation.        Grading of carotid artery stenosis is performed by NASCET criteria.     FINDINGS:      CT Neck Angiogram:     Common origin of the innominate and right common carotid artery. The  visualized bilateral common carotid arteries demonstrate mild  atherosclerotic disease, more atherosclerotic disease at the  bifurcation. The bilateral subclavian arteries are patent.     Mild calcification along the lateral aspect of the right carotid bulb  with less than 30% stenosis. The remaining cervical right ICA is without  flow-limiting stenosis or occlusion.     Calcification of the left carotid bifurcation with less than 50%  stenosis.     The bilateral proximal vertebral arteries are well opacified. The right  vertebral artery is diminutive and terminates into a PICA the left C3/C4  demonstrate more atherosclerotic disease with mild stenosis.     CT Head Angiogram:     The intracranial right ICA demonstrates atherosclerotic disease in the  carotid siphon,  with mild to moderate stenosis. The right carotid  terminus is visualized. The right A1 is diminutive. The right A2 is also  diminutive. The MCA bifurcation is visualized with irregular appearance  of the A1 and M1, reflecting atherosclerotic disease. There is prompt  opacification of the proximal M2 segments. There is no aneurysm or  vascular malformation.     The left intracranial ICA demonstrates atherosclerotic disease with mild  to moderate stenosis in the left carotid siphon. The left carotid  terminus is visualized. The left A1, A2, M1, visualized proximal M2  segments demonstrate irregularity, reflecting atherosclerotic disease,  but without flow-limiting stenosis or occlusion. No aneurysm or other  vascular malformation identified.        The visualized posterior fossa circulation demonstrates no focal  flow-limiting stenosis or occlusion in the basilar artery. The left PCA  is fetal in origin. The bilateral ACAs are well opacified.       Impression:       CT Angiography Of The Neck With Intravenous Contrast   1. No evidence of high-grade arterial stenosis or underlying dissection.     CT Angiography Of The Head With Intravenous Contrast  1.   Atherosclerotic disease, without focal high-grade stenosis or  occlusion.  This report was finalized on 05/01/2020 19:00 by Dr. Zenaida Gonzalez MD.    CT Head Without Contrast Stroke Protocol [[id number]] Collected:  05/01/20 1847     Updated:  05/01/20 1854    Narrative:       EXAM: CT OF THE HEAD WITHOUT IV CONTRAST 05/01/2020     COMPARISON: None      INDICATION: Female, 74 years-old. Left arm weakness      PROCEDURE: Non contrast enhanced head CT was performed.      Radiation dose equals .2 mGy-cm.  Automated exposure control dose  reduction technique was implemented.     FINDINGS:     7 mm hypodensity within the right frontal centrum semiovale, consistent  with age-indeterminate infarct (image 19, series 4). A right temporal  hypodensity measuring 7 mm  (image 16, series 4) is age-indeterminate.  Age-indeterminate right basal ganglia infarct. A more chronic appearing  lacunar infarct in the right frontal lobe anteriorly (image 13, series  4).     There is no acute intracranial hemorrhage.     Confluent periventricular hypodensity, favored to reflect mild to  moderate chronic microvascular changes.     The basal cisterns are preserved. There is no midline shift. No acute  hydrocephalus.     Mastoid air cells are clear.          Impression:       1.  Age-indeterminate infarct in the right basal ganglia, right centrum  semiovale and the posterior right temporal lobe.  2.  More chronic appearing right anterior frontal lobe lacunar infarct.  This report was finalized on 05/01/2020 18:51 by Dr. Zenaida Gonzalez MD.    XR Chest 1 View [[id number]] Collected:  05/01/20 1714     Updated:  05/01/20 1718    Narrative:       Exam:   XR CHEST 1 VW-       Date:  05/01/2020      History:  Female, age  74 years;Stroke Protocol (Onset > 12 hrs);  R29.898-Other symptoms and signs involving the musculoskeletal system     COMPARISON:  None.     Findings :  Low lung volumes. Chronic interstitial lung changes.  The heart and mediastinum are normal in size. Lungs are without focal  infiltrate, mass or effusions.  The bones show no acute pathology.         Impression:       Impression:     1.  Low lung volumes.  2.  Otherwise, no acute cardiopulmonary process.     This report was finalized on 05/01/2020 17:15 by Dr. Zenaida Gonzalez MD.                Assessment:    Condition: In stable condition.  Improving.   (Type 2 DM- review labs and home medication. Discuss with patient importance of blood sugar control in the healing process. Review diet, medical,and lifestyle modifications for optimal medical treatment. Will restart their regular diabetic regimen and make consult for diabetic education / dietician available upon request.  Hemoglobin A1c 8.9- will adjust medication  today.    Anxiety/depression- currently on Celexa 40 mg.  Reluctant to add Wellbutrin with questionable seizure activity.      Check transesophageal echo in morning.    Agree will need inpatient rehab but she does not have much social support at home.).     Plan:   Encourage ambulation and per physical therapy.      Patient Active Problem List   Diagnosis   • Left arm weakness   • Essential hypertension   • Hypertensive urgency   • Hypercholesterolemia   • Stroke (CMS/[facility])     Nhan Muir MD  5/3/2020                                                  Electronically signed by Nhan Muir MD at 05/03/20 1531     Suri Tyler MD at 05/03/20 1413            Neurology Progress Note      Date of admission: 5/1/2020  3:44 PM  Date of visit: 5/3/2020    Chief Complaint:  F/u stroke    Subjective     Subjective:  Patient reported to nursing that she had an episode where she could not talk and she tells me it occurred when she first woke up.  Nursing thought slight dysarthria but it rapidly resolved.  During episode her VSS and blood glucose was 266. No change in NIHSS per nursing. However, she was seen by speech therapy and while she  passed her swallow evaluation,  speech therapist noted anomia and dysarthria at times. With me she is dysarthric and there is worsening left facial droop and left arm weakness.   ZAYDA scheduled for tomorrow       Medications:  Current Facility-Administered Medications   Medication Dose Route Frequency Provider Last Rate Last Dose   • amLODIPine (NORVASC) 5 mg, lisinopril (PRINIVIL,ZESTRIL) 20 mg   Oral Q24H Nhan Muir MD       • atorvastatin (LIPITOR) tablet 80 mg  80 mg Oral Nightly Suri Tyler MD   80 mg at 05/02/20 2016   • citalopram (CeleXA) tablet 40 mg  40 mg Oral Daily Nhan Muir MD   40 mg at 05/03/20 0953   • clopidogrel (PLAVIX) tablet 75 mg  75 mg Oral Daily Nhan Muir MD   75 mg at 05/03/20 0953   • dextrose  (D50W) 25 g/ 50mL Intravenous Solution 25 g  25 g Intravenous Q15 Min PRN Nhan Muir MD       • dextrose (GLUTOSE) oral gel 15 g  15 g Oral Q15 Min PRN Nhan Muir MD       • glucagon (human recombinant) (GLUCAGEN DIAGNOSTIC) injection 1 mg  1 mg Subcutaneous Q15 Min PRN Nhan Muir MD       • insulin lispro (humaLOG) injection 0-9 Units  0-9 Units Subcutaneous TID AC Nhan Muir MD   4 Units at 05/02/20 1737   • LORazepam (ATIVAN) tablet 1 mg  1 mg Oral Q6H PRN Nhan Muir MD       • metFORMIN (GLUCOPHAGE) tablet 500 mg  500 mg Oral BID With Meals Nhan Muir MD       • sodium chloride 0.9 % flush 10 mL  10 mL Intravenous PRN Thomas Rosales MD       • sodium chloride 0.9 % flush 10 mL  10 mL Intravenous Q12H Nhan Muir MD   10 mL at 05/02/20 2016   • sodium chloride 0.9 % flush 10 mL  10 mL Intravenous PRN Nhan Muir MD       • sodium chloride 0.9 % infusion  100 mL/hr Intravenous Continuous Nhan Muir MD   Stopped at 05/02/20 1554       Review of Systems:   -A 14 point review of systems is completed and is negative except for increased dysarthria and left arm weakness    Objective     Objective      Vital Signs  Temp:  [98 °F (36.7 °C)-99.5 °F (37.5 °C)] 98.6 °F (37 °C)  Heart Rate:  [82-98] 89  Resp:  [16-18] 18  BP: (135-161)/(79-94) 157/81    Physical Exam:    HEENT:  Neck supple  CVS:  Regular rate and rhythm.  No murmurs  Carotid Examination:  No bruits  Lungs:  Clear to auscultation  Abdomen:  Non-tender, Non-distended  Extremities:  No signs of peripheral edema    Neurologic Exam:    -Awake, Alert, Oriented X 3  -No word finding difficulties--able to repeat  words, read sentences and speak and comprehend   -No aphasia  -Has dysarthria  -Follows simple and complex commands    Cranial nerves II through XII   · EOMI  · No facial sensory loss  · Increased left facial droop --UMN VII-spares  forehead  · Hearing intact to finger rub and voice  · Turns head side to side.  · Tongue is midline (reportedly showed deviation to speech therapist    Motor: (strength out of 5:  1= minimal movement, 2 = movement in plane of gravity, 3 = movement against gravity, 4 = movement against some resistance, 5 = full strength)    -Right Upper Ext: Proximal: 5 Distal: 5  -Left Upper Ext: Proximal: 5 Distal: 5    -Right Lower Ext: Proximal: 3 Distal: 1+ to 2  -Left Lower Ext: Proximal: 5 Distal: 5    DTR:  2+ throughout in all four extremities  No babinski's    Sensory:  -Intact to light touch, pinprick, temperature, pain, and proprioception    Coordination/Gait:  -No ataxia/dysmetria on finger to nos of right upper extremity and heel to shin of bilateral extremities.  Left finger to nose c/w weakness     Results Review:    I reviewed the patient's new clinical results.    Lab Results (last 24 hours)     Procedure Component Value Units Date/Time    POC Glucose Once [[id number]]  (Abnormal) Collected:  05/03/20 0945    Specimen:  Blood Updated:  05/03/20 0956     Glucose 267 mg/dL      Comment: : [id number] Nelia MeganMeter ID: [id number]       Extra Tubes [[id number]] Collected:  05/03/20 0341    Specimen:  Blood, Venous Line Updated:  05/03/20 0500    Narrative:       The following orders were created for panel order Extra Tubes.  Procedure                               Abnormality         Status                     ---------                               -----------         ------                     Lavender Top[[id number]]                                     Final result                 Please view results for these tests on the individual orders.    Lavender Top [[id number]] Collected:  05/03/20 0341    Specimen:  Blood Updated:  05/03/20 0500     Extra Tube hold for add-on     Comment: Auto resulted       Basic Metabolic Panel [[id number]]  (Abnormal) Collected:  05/03/20 0341    Specimen:  Blood Updated:  05/03/20  0447     Glucose 192 mg/dL      BUN 20 mg/dL      Creatinine 0.82 mg/dL      Sodium 139 mmol/L      Potassium 4.3 mmol/L      Chloride 102 mmol/L      CO2 23.0 mmol/L      Calcium 9.5 mg/dL      eGFR Non African Amer 68 mL/min/1.73      BUN/Creatinine Ratio 24.4     Anion Gap 14.0 mmol/L     Narrative:       GFR Normal >60  Chronic Kidney Disease <60  Kidney Failure <15      Vitamin B12 [[id number]]  (Normal) Collected:  05/02/20 0754    Specimen:  Blood Updated:  05/02/20 2202     Vitamin B-12 337 pg/mL     Narrative:       Results may be falsely increased if patient taking Biotin.      POC Glucose Once [[id number]]  (Abnormal) Collected:  05/02/20 1733    Specimen:  Blood Updated:  05/02/20 1744     Glucose 212 mg/dL      Comment: : [id number] Megan Reese DestinyMeter ID: [id number]           Imaging Results (Last 24 Hours)     ** No results found for the last 24 hours. **        Results for orders placed during the hospital encounter of 05/01/20   Adult Transthoracic Echo Complete W/ Cont if Necessary Per Protocol    Narrative · Left ventricular systolic function is normal. Estimated EF appears to be   in the range of 61 - 65%.  · Left ventricular diastolic dysfunction (grade I) consistent with   impaired relaxation.  · Left ventricular wall thickness is consistent with mild concentric   hypertrophy.  · No evidence of a patent foramen ovale.  · No hemodynamically significant valvular abnormalities identified on the   study.        Telemetry: Sinus/sinus tach 76 to 103    Assessment/Plan     Hospital Problem List      Stroke (CMS/HCC)    Left arm weakness    Essential hypertension    Hypertensive urgency    Hypercholesterolemia    Impression:  1. Left MCA territory strokes suspicious for artery to artery emboli but cannot exclude cardiac CTA did not show evidence for emboli or thrombus but there was atherosclerotic disease without high grade stenosis.  There is irregularity at right M1 (and A2)  c/w  atherosclerotic disease which may be the basis for stroke  There may be slight progression of symptoms in the same territory or increased edema from stroke --however she state this began several days ago so there should not be the increased edema  2. Hypertension   3. Mixed hyperlipidemia with LDL of 85 and goal of < 70 to reduce future risk of stroke. Her triglycerides are elevated at 331  4. DM  with elevated hemoglobin A1c at 8.9 and goal of < 7.0 to reduce future risk of stroke    Plan:  · Head CT to look for progression  · Due to balance issues, incoordination of left arm and speech finding it is recommended that she go through inpatient rehab.  · ZAYDA tomorrow  · NPO at midnight for ZAYDA tomorrow  · She may benefit from antidepressant as she seems to think she will never get better and is not sure why she should go through therapy.   · Discussed the benefits of therapy with her      Suri SANTOS. Aston Gaspar MD  05/03/20  14:14        Electronically signed by Suri Tyler MD at 05/03/20 1511          Consult Notes (last 72 hours) (Notes from 05/01/20 1345 through 05/04/20 1345)      Suri Tyler MD at 05/02/20 1236      Consult Orders    1. Inpatient Neurology Consult Stroke [[id number]] ordered by Thomas Rosales MD at 05/01/20 2011                    Neurology Consult Note    Patient:  Gaviota Erazo   YOB: 1945  MRN:  [id number]  Date of Admission:  5/1/2020  3:44 PM    Date: 5/2/2020    Referring Provider: Thomas Rosales MD  Reason for Consultation: L hand ataxia/weakness, to see in AM      History of present illness:     This is a 74 y.o. left handed female.with H/O hypertension, hyperlipidemia, DM, previous TIA evaluated for possible stroke due to new onset left hand ataxia/weakness for 3  days prior to admission.    Patient noted left arm weakness/ataxia of about 4 days ago when she noted she had poor coordination and decreased ability to feed  herself or use bathroom  She states there may have been a couple of times of jerking of the left arm but only lasted a second or so.   It is not occurring now. She denies a seizure and states it was positional and when she would move her arm another way the jerking would immediately stop. She  was not willing initially to come to the hospital due to Covid 19 risk. She also noticed some heaviness with walking and some decrease in balance but states the balance issues were of 3 to 4 months duration but the heaviness and greater difficulty walking just started.  She also reports of episodes of dysarthria that will come and go and episodes of choking on liquids at times. .     She is not able to dress herself or wipe herself    She lives with her grandson who is 27.    Past Medical History:   Diagnosis Date   • Depression    • Hypercholesteremia    • Hypertension    • TIA (transient ischemic attack)        Past Surgical History:   Procedure Laterality Date   • HYSTERECTOMY     • SKIN CANCER EXCISION         Prior to Admission medications    Medication Sig Start Date End Date Taking? Authorizing Provider   amLODIPine-benazepril (LOTREL 5-20) 5-20 MG per capsule Take 1 capsule by mouth Daily.   Yes ProviderEstrada MD   citalopram (CeleXA) 40 MG tablet Take 40 mg by mouth Daily.   Yes ProviderEstrada MD   clopidogrel (PLAVIX) 75 MG tablet Take 75 mg by mouth Daily.   Yes ProviderEstrada MD   metFORMIN (GLUCOPHAGE) 500 MG tablet Take 500 mg by mouth 2 (Two) Times a Day With Meals.   Yes ProviderEstrada MD   simvastatin (ZOCOR) 40 MG tablet Take 40 mg by mouth Every Night.   Yes ProviderEstrada MD   doxycycline (DORYX) 100 MG enteric coated tablet Take 1 tablet by mouth 2 (Two) Times a Day. 7/2/17   Chintan Balbuena MD       [facility] scheduled medications:     amLODIPine-lisinopril 5-20 mg combo dose  Oral Q24H   atorvastatin 10 mg Oral Nightly   citalopram 40 mg Oral Daily   clopidogrel 75 mg  Oral Daily   insulin lispro 0-9 Units Subcutaneous TID AC   [START ON 5/3/2020] metFORMIN 500 mg Oral BID With Meals   sodium chloride 10 mL Intravenous Q12H     Hospital PRN medications:  dextrose  •  dextrose  •  glucagon (human recombinant)  •  LORazepam  •  sodium chloride  •  sodium chloride    No Known Allergies    Social History     Socioeconomic History   • Marital status:      Spouse name: Not on file   • Number of children: Not on file   • Years of education: Not on file   • Highest education level: Not on file   Tobacco Use   • Smoking status: Never Smoker   Substance and Sexual Activity   • Alcohol use: No   • Drug use: No   • Sexual activity: Defer     History reviewed. No pertinent family history.    Review of Systems  A 14 point review of systems was reviewed and was negative except for weakness of left arm and episodic dysarthria    Vital Signs   Temp:  [97.6 °F (36.4 °C)-98.4 °F (36.9 °C)] 97.6 °F (36.4 °C)  Heart Rate:  [] 87  Resp:  [16-18] 16  BP: (134-173)/() 145/93    General Exam:  Head:  Normal cephalic, atraumatic  HEENT:  Neck supple  Fundoscopic Exam:  No signs of disc edema  CVS:  Regular rate and rhythm.  No murmurs  Carotid Examination:  No bruits  Lungs:  Clear to auscultation  Abdomen:  Non-tender, Non-distended  Extremities:  No signs of peripheral edema  Skin:  No rashes    Neurologic Exam:    Mental Status:    -Awake, Alert, Oriented X 3  -No word finding difficulties  -No aphasia  -No dysarthria  -Follows simple and complex commands    CN II:  Visual fields full.  Pupils equally reactive to light  CN III, IV, VI:  Extraocular Muscles full with no signs of nystagmus  CN V:  Facial sensory is symmetric   CN VII:  Facial motor symmetric  CN VIII:  Gross hearing intact bilaterally  CN IX/X:  Palate elevates symmetrically  CN XI:  Shoulder shrug symmetric  CN XII:  Tongue is midline on protrusion    Motor: (strength out of 5:  1= minimal movement, 2 = movement in  plane of gravity, 3 = movement against gravity, 4 = movement against some resistance, 5 = full strength)    -Right Upper Ext: Proximal: 5 Distal: 5  -Left Upper Ext: Left arm drift Proximal: 4 Distal: 3    -Right Lower Ext: Proximal: 5 Distal: 5  -Left Lower Ext: Proximal: 5 Distal: 5    DTR:  -Right   Bicep: 2+ Triceps: 2+ Brachioradialis: 2+   Patella: 2+ Ankle: 2+ Neg Babinski  -Left   Bicep: 2+ Triceps: 2+ Brachioradialis: 2+   Patella: 2+ Ankle: 2+ Neg Babinski    Sensory:  -Intact to light touch, pinprick    Coordination:  -Finger to nose intact on right and c/w weakness on left  -Heel to shin intact  -No ataxia    Gait  -Not tested for safety reasons as patient states she is off balanced and needs gait belt      Results Review:  Lab Results (last 7 days)     Procedure Component Value Units Date/Time    POC Glucose Once [[id number]]  (Abnormal) Collected:  05/02/20 1106    Specimen:  Blood Updated:  05/02/20 1121     Glucose 262 mg/dL      Comment: : [id number] Deny Vasquez ID: [id number]       Vitamin B12 [[id number]] Collected:  05/02/20 0754    Specimen:  Blood Updated:  05/02/20 0905    Lipid Panel [[id number]]  (Abnormal) Collected:  05/02/20 0438    Specimen:  Blood Updated:  05/02/20 0751     Total Cholesterol 184 mg/dL      Triglycerides 331 mg/dL      HDL Cholesterol 33 mg/dL      LDL Cholesterol  85 mg/dL      VLDL Cholesterol 66.2 mg/dL      LDL/HDL Ratio 2.57    Narrative:       Cholesterol Reference Ranges  (U.S. Department of Health and Human Services ATP III Classifications)    Desirable          <200 mg/dL  Borderline High    200-239 mg/dL  High Risk          >240 mg/dL      Triglyceride Reference Ranges  (U.S. Department of Health and Human Services ATP III Classifications)    Normal           <150 mg/dL  Borderline High  150-199 mg/dL  High             200-499 mg/dL  Very High        >500 mg/dL    HDL Reference Ranges  (U.S. Department of Health and Human Services ATP III  Classifications)    Low     <40 mg/dl (major risk factor for CHD)  High    >60 mg/dl ('negative' risk factor for CHD)        LDL Reference Ranges  (U.S. Department of Health and Human Services ATP III Classifications)    Optimal          <100 mg/dL  Near Optimal     100-129 mg/dL  Borderline High  130-159 mg/dL  High             160-189 mg/dL  Very High        >189 mg/dL    Hemoglobin A1c [[id number]]  (Abnormal) Collected:  05/02/20 0438    Specimen:  Blood Updated:  05/02/20 0751     Hemoglobin A1C 8.90 %     Narrative:       Hemoglobin A1C Ranges:    Increased Risk for Diabetes  5.7% to 6.4%  Diabetes                     >= 6.5%  Diabetic Goal                < 7.0%    Basic Metabolic Panel [[id number]]  (Abnormal) Collected:  05/02/20 0438    Specimen:  Blood Updated:  05/02/20 0539     Glucose 265 mg/dL      BUN 20 mg/dL      Creatinine 1.04 mg/dL      Sodium 140 mmol/L      Potassium 3.8 mmol/L      Chloride 102 mmol/L      CO2 23.0 mmol/L      Calcium 10.0 mg/dL      eGFR Non African Amer 52 mL/min/1.73      BUN/Creatinine Ratio 19.2     Anion Gap 15.0 mmol/L     Narrative:       GFR Normal >60  Chronic Kidney Disease <60  Kidney Failure <15      CBC Auto Differential [[id number]]  (Abnormal) Collected:  05/02/20 0438    Specimen:  Blood Updated:  05/02/20 0521     WBC 10.59 10*3/mm3      RBC 4.42 10*6/mm3      Hemoglobin 13.0 g/dL      Hematocrit 39.1 %      MCV 88.5 fL      MCH 29.4 pg      MCHC 33.2 g/dL      RDW 13.2 %      RDW-SD 42.5 fl      MPV 10.7 fL      Platelets 278 10*3/mm3      Neutrophil % 50.7 %      Lymphocyte % 38.4 %      Monocyte % 8.6 %      Eosinophil % 1.5 %      Basophil % 0.7 %      Immature Grans % 0.1 %      Neutrophils, Absolute 5.37 10*3/mm3      Lymphocytes, Absolute 4.07 10*3/mm3      Monocytes, Absolute 0.91 10*3/mm3      Eosinophils, Absolute 0.16 10*3/mm3      Basophils, Absolute 0.07 10*3/mm3      Immature Grans, Absolute 0.01 10*3/mm3      nRBC 0.0 /100 WBC     [place] Draw  [[id number]] Collected:  05/01/20 1622    Specimen:  Blood Updated:  05/01/20 1745    Narrative:       The following orders were created for panel order [place] Draw.  Procedure                               Abnormality         Status                     ---------                               -----------         ------                     Light Blue Top[[id number]]                                   Final result               Green Top (Gel)[[id number]]                                  Final result               Lavender Top[[id number]]                                     Final result               Red Top[[id number]]                                          Final result                 Please view results for these tests on the individual orders.    Light Blue Top [[id number]] Collected:  05/01/20 1632    Specimen:  Blood Updated:  05/01/20 1745     Extra Tube hold for add-on     Comment: Auto resulted       Green Top (Gel) [[id number]] Collected:  05/01/20 1622    Specimen:  Blood Updated:  05/01/20 1730     Extra Tube Hold for add-ons.     Comment: Auto resulted.       Lavender Top [[id number]] Collected:  05/01/20 1622    Specimen:  Blood Updated:  05/01/20 1730     Extra Tube hold for add-on     Comment: Auto resulted       Red Top [[id number]] Collected:  05/01/20 1622    Specimen:  Blood Updated:  05/01/20 1730     Extra Tube Hold for add-ons.     Comment: Auto resulted.       Comprehensive Metabolic Panel [[id number]]  (Abnormal) Collected:  05/01/20 1622    Specimen:  Blood Updated:  05/01/20 1704     Glucose 214 mg/dL      BUN 18 mg/dL      Creatinine 0.93 mg/dL      Sodium 142 mmol/L      Potassium 4.6 mmol/L      Chloride 100 mmol/L      CO2 26.0 mmol/L      Calcium 10.1 mg/dL      Total Protein 7.4 g/dL      Albumin 4.50 g/dL      ALT (SGPT) 17 U/L      AST (SGOT) 16 U/L      Alkaline Phosphatase 94 U/L      Total Bilirubin 0.4 mg/dL      eGFR Non African Amer 59 mL/min/1.73      Globulin 2.9 gm/dL      A/G Ratio  1.6 g/dL      BUN/Creatinine Ratio 19.4     Anion Gap 16.0 mmol/L     Narrative:       GFR Normal >60  Chronic Kidney Disease <60  Kidney Failure <15      Troponin [[id number]]  (Normal) Collected:  05/01/20 1622    Specimen:  Blood Updated:  05/01/20 1702     Troponin T <0.010 ng/mL     Narrative:       Troponin T Reference Range:  <= 0.03 ng/mL-   Negative for AMI  >0.03 ng/mL-     Abnormal for myocardial necrosis.  Clinicians would have to utilize clinical acumen, EKG, Troponin and serial changes to determine if it is an Acute Myocardial Infarction or myocardial injury due to an underlying chronic condition.       Results may be falsely decreased if patient taking Biotin.      BNP [[id number]]  (Normal) Collected:  05/01/20 1622    Specimen:  Blood Updated:  05/01/20 1701     proBNP 86.3 pg/mL     Narrative:       Among patients with dyspnea, NT-proBNP is highly sensitive for the detection of acute congestive heart failure. In addition NT-proBNP of <300 pg/ml effectively rules out acute congestive heart failure with 99% negative predictive value.    Results may be falsely decreased if patient taking Biotin.      Protime-INR [[id number]]  (Normal) Collected:  05/01/20 1632    Specimen:  Blood Updated:  05/01/20 1650     Protime 13.1 Seconds      INR 1.03    CBC & Differential [[id number]] Collected:  05/01/20 1622    Specimen:  Blood Updated:  05/01/20 1642    Narrative:       The following orders were created for panel order CBC & Differential.  Procedure                               Abnormality         Status                     ---------                               -----------         ------                     CBC Auto Differential[[id number]]        Normal              Final result                 Please view results for these tests on the individual orders.    CBC Auto Differential [[id number]]  (Normal) Collected:  05/01/20 1622    Specimen:  Blood Updated:  05/01/20 1642     WBC 9.63 10*3/mm3      RBC 4.64  10*6/mm3      Hemoglobin 13.7 g/dL      Hematocrit 41.3 %      MCV 89.0 fL      MCH 29.5 pg      MCHC 33.2 g/dL      RDW 13.0 %      RDW-SD 42.5 fl      MPV 10.7 fL      Platelets 268 10*3/mm3      Neutrophil % 60.7 %      Lymphocyte % 29.1 %      Monocyte % 8.0 %      Eosinophil % 1.2 %      Basophil % 0.6 %      Immature Grans % 0.4 %      Neutrophils, Absolute 5.84 10*3/mm3      Lymphocytes, Absolute 2.80 10*3/mm3      Monocytes, Absolute 0.77 10*3/mm3      Eosinophils, Absolute 0.12 10*3/mm3      Basophils, Absolute 0.06 10*3/mm3      Immature Grans, Absolute 0.04 10*3/mm3      nRBC 0.0 /100 WBC     POC Glucose Once [[id number]]  (Abnormal) Collected:  05/01/20 1616    Specimen:  Blood Updated:  05/01/20 1627     Glucose 207 mg/dL      Comment: : [id number] Champion TaylorMeter ID: [id number]             .  Imaging Results (Last 24 Hours)     Procedure Component Value Units Date/Time    MRI Brain Without Contrast [[id number]] Collected:  05/02/20 1149     Updated:  05/02/20 1159    Narrative:       HISTORY: Left arm weakness     MRI BRAIN: Multiplanar imaging the brain performed without contrast.     COMPARISON: CT head 05/01/2020     FINDINGS: Scattered foci of diffusion restriction seen throughout the  right cerebral hemisphere within the right MCA distribution involving  the frontal parietal and temporal lobes. No diffusion restriction of the  cerebellum or the left cerebral hemisphere. No intracranial hemorrhage.  Mild cerebral and cerebellar volume loss. Corpus callosum intact. No  sellar or intracranial mass. Old lacunar infarct suspected of the right  basal ganglia. Prominent perivascular spaces along the basal ganglia  bilaterally. Hyperintense FLAIR signal changes of the periventricular  white matter regions favoring underlying chronic small vessel ischemia.     Hyperostosis frontalis internus. Chronic mucosal thickening of the  paranasal sinuses. Normal flow-voids in the distal internal carotid  and  basilar arteries. Normal flow void in the sagittal sinus.       Impression:       1. Scattered foci of diffusion restriction within the right MCA  distribution suggestive for acute nonhemorrhagic multifocal ischemia of  the right MCA distribution.  2. Chronic small vessel ischemic changes suspected with mild cerebral  volume loss.  This report was finalized on 05/02/2020 11:56 by Dr. Lina Perrin MD.    CT Angiogram Head [[id number]] Collected:  05/01/20 1852     Updated:  05/01/20 1903    Narrative:       EXAM:   CT NECK ANGIOGRAM  CT HEAD ANGIOGRAM 05/01/2020     COMPARISON:   None.      HISTORY:  74 years-old Female. Stroke      TECHNIQUE:      Multiple CT images were obtained of the of the head and neck after the  administration of IV contrast. 3D MIP reformats were generated in the  axial, sagittal and coronal planes were sent to PACS for interpretation.        Grading of carotid artery stenosis is performed by NASCET criteria.     FINDINGS:      CT Neck Angiogram:     Common origin of the innominate and right common carotid artery. The  visualized bilateral common carotid arteries demonstrate mild  atherosclerotic disease, more atherosclerotic disease at the  bifurcation. The bilateral subclavian arteries are patent.     Mild calcification along the lateral aspect of the right carotid bulb  with less than 30% stenosis. The remaining cervical right ICA is without  flow-limiting stenosis or occlusion.     Calcification of the left carotid bifurcation with less than 50%  stenosis.     The bilateral proximal vertebral arteries are well opacified. The right  vertebral artery is diminutive and terminates into a PICA the left C3/C4  demonstrate more atherosclerotic disease with mild stenosis.     CT Head Angiogram:     The intracranial right ICA demonstrates atherosclerotic disease in the  carotid siphon, with mild to moderate stenosis. The right carotid  terminus is visualized. The right A1 is diminutive. The  right A2 is also  diminutive. The MCA bifurcation is visualized with irregular appearance  of the A1 and M1, reflecting atherosclerotic disease. There is prompt  opacification of the proximal M2 segments. There is no aneurysm or  vascular malformation.     The left intracranial ICA demonstrates atherosclerotic disease with mild  to moderate stenosis in the left carotid siphon. The left carotid  terminus is visualized. The left A1, A2, M1, visualized proximal M2  segments demonstrate irregularity, reflecting atherosclerotic disease,  but without flow-limiting stenosis or occlusion. No aneurysm or other  vascular malformation identified.        The visualized posterior fossa circulation demonstrates no focal  flow-limiting stenosis or occlusion in the basilar artery. The left PCA  is fetal in origin. The bilateral ACAs are well opacified.       Impression:       CT Angiography Of The Neck With Intravenous Contrast   1. No evidence of high-grade arterial stenosis or underlying dissection.     CT Angiography Of The Head With Intravenous Contrast  1.   Atherosclerotic disease, without focal high-grade stenosis or  occlusion.  This report was finalized on 05/01/2020 19:00 by Dr. Zenaida Gonzalez MD.    CT Angiogram Neck [[id number]] Collected:  05/01/20 1852     Updated:  05/01/20 1903    Narrative:       EXAM:   CT NECK ANGIOGRAM  CT HEAD ANGIOGRAM 05/01/2020     COMPARISON:   None.      HISTORY:  74 years-old Female. Stroke      TECHNIQUE:      Multiple CT images were obtained of the of the head and neck after the  administration of IV contrast. 3D MIP reformats were generated in the  axial, sagittal and coronal planes were sent to PACS for interpretation.        Grading of carotid artery stenosis is performed by NASCET criteria.     FINDINGS:      CT Neck Angiogram:     Common origin of the innominate and right common carotid artery. The  visualized bilateral common carotid arteries demonstrate mild  atherosclerotic  disease, more atherosclerotic disease at the  bifurcation. The bilateral subclavian arteries are patent.     Mild calcification along the lateral aspect of the right carotid bulb  with less than 30% stenosis. The remaining cervical right ICA is without  flow-limiting stenosis or occlusion.     Calcification of the left carotid bifurcation with less than 50%  stenosis.     The bilateral proximal vertebral arteries are well opacified. The right  vertebral artery is diminutive and terminates into a PICA the left C3/C4  demonstrate more atherosclerotic disease with mild stenosis.     CT Head Angiogram:     The intracranial right ICA demonstrates atherosclerotic disease in the  carotid siphon, with mild to moderate stenosis. The right carotid  terminus is visualized. The right A1 is diminutive. The right A2 is also  diminutive. The MCA bifurcation is visualized with irregular appearance  of the A1 and M1, reflecting atherosclerotic disease. There is prompt  opacification of the proximal M2 segments. There is no aneurysm or  vascular malformation.     The left intracranial ICA demonstrates atherosclerotic disease with mild  to moderate stenosis in the left carotid siphon. The left carotid  terminus is visualized. The left A1, A2, M1, visualized proximal M2  segments demonstrate irregularity, reflecting atherosclerotic disease,  but without flow-limiting stenosis or occlusion. No aneurysm or other  vascular malformation identified.        The visualized posterior fossa circulation demonstrates no focal  flow-limiting stenosis or occlusion in the basilar artery. The left PCA  is fetal in origin. The bilateral ACAs are well opacified.       Impression:       CT Angiography Of The Neck With Intravenous Contrast   1. No evidence of high-grade arterial stenosis or underlying dissection.     CT Angiography Of The Head With Intravenous Contrast  1.   Atherosclerotic disease, without focal high-grade stenosis or  occlusion.  This  report was finalized on 05/01/2020 19:00 by Dr. Zenaida Gonzalez MD.    CT Head Without Contrast Stroke Protocol [[id number]] Collected:  05/01/20 1847     Updated:  05/01/20 1854    Narrative:       EXAM: CT OF THE HEAD WITHOUT IV CONTRAST 05/01/2020     COMPARISON: None      INDICATION: Female, 74 years-old. Left arm weakness      PROCEDURE: Non contrast enhanced head CT was performed.      Radiation dose equals .2 mGy-cm.  Automated exposure control dose  reduction technique was implemented.     FINDINGS:     7 mm hypodensity within the right frontal centrum semiovale, consistent  with age-indeterminate infarct (image 19, series 4). A right temporal  hypodensity measuring 7 mm (image 16, series 4) is age-indeterminate.  Age-indeterminate right basal ganglia infarct. A more chronic appearing  lacunar infarct in the right frontal lobe anteriorly (image 13, series  4).     There is no acute intracranial hemorrhage.     Confluent periventricular hypodensity, favored to reflect mild to  moderate chronic microvascular changes.     The basal cisterns are preserved. There is no midline shift. No acute  hydrocephalus.     Mastoid air cells are clear.          Impression:       1.  Age-indeterminate infarct in the right basal ganglia, right centrum  semiovale and the posterior right temporal lobe.  2.  More chronic appearing right anterior frontal lobe lacunar infarct.  This report was finalized on 05/01/2020 18:51 by Dr. Zenaida Gonzalez MD.    XR Chest 1 View [[id number]] Collected:  05/01/20 1714     Updated:  05/01/20 1718    Narrative:       Exam:   XR CHEST 1 VW-       Date:  05/01/2020      History:  Female, age  74 years;Stroke Protocol (Onset > 12 hrs);  R29.898-Other symptoms and signs involving the musculoskeletal system     COMPARISON:  None.     Findings :  Low lung volumes. Chronic interstitial lung changes.  The heart and mediastinum are normal in size. Lungs are without focal  infiltrate, mass or  effusions.  The bones show no acute pathology.         Impression:       Impression:     1.  Low lung volumes.  2.  Otherwise, no acute cardiopulmonary process.     This report was finalized on 05/01/2020 17:15 by Dr. Zenaida Gonzalez MD.          Results for orders placed during the hospital encounter of 05/01/20   Adult Transthoracic Echo Complete W/ Cont if Necessary Per Protocol    Narrative · Left ventricular systolic function is normal. Estimated EF appears to be   in the range of 61 - 65%.  · Left ventricular diastolic dysfunction (grade I) consistent with   impaired relaxation.  · Left ventricular wall thickness is consistent with mild concentric   hypertrophy.  · No evidence of a patent foramen ovale.  · No hemodynamically significant valvular abnormalities identified on the   study.          Personal review of MRI:with multiple acute lesions suggestive of emboli but in the same vascular distribution of the right MCA which  may mean artery to artery emboli. These are of differing ages as some show on FLAIR but all are dark on ADC map   DWI      Telemetry: Sinus/sinus tach 94 to 104    Impression:  1. Acute and subacute right MCA stroke--multiple acute/subacute foci of ischemia in the right MCA territory s/o artery to artery emboli  2. DM with elevated hemoglobin A1c at 8.9 and goal of < 7.0 to reduce future risk of stroke  3. Mixed  Hyperlipidemia with LDL of 85 and goal of < 70 to reduce future risk of stroke. Her triglycerides are elevated at 331  4. No TPA as she was well outside the TPA window    Plan  · Carb consistent and cardiac diet  · PT/OT/Speech  · Telemetry  · Would benefit from ZAYDA  · SCDs  · Dietician to educate patient on diet.  · Increase Lipitor to 80 mg for acute stroke  This can be reduced later  · Plavix--already on   · Would likely benefit from inpatient rehab but will need to observe gait when PT is working with her  I discussed the patients findings and my recommendations with  patient    Suri Gaspar MD  05/02/20  12:37      Electronically signed by Suri Tyler MD at 05/02/20 1702         5/2 nurse note :   A&O X4. NIH=2,3. Q4 NIH complete, now Q shift. No c/o pain. Up X1. Voiding.. C/o nausea, crackers and Sprite given with good relief. MRI and echo today. Glucose monitoring. Safety maintained. Tele. NPO midnight tomorrow for ZAYDA on 5/4.     5/3 nurse note :   answers all appropriately and follows commands, however she requires additional time to think about the answers and may need additional prompting and cues at times. Upset that he husnamd isn't allowed in the hospital, but requests for the Dr's to call him in the AM. L side continues to be weak, with limited use of LUE. Spent the night up in the recliner, as she staes the bed is uncomfortable.

## 2020-05-04 NOTE — ANESTHESIA POSTPROCEDURE EVALUATION
Patient: Gaviota Erazo    Procedure Summary     Date:  05/04/20 Room / Location:  [facility] CATH LAB    Anesthesia Start:  0958 Anesthesia Stop:  1033    Procedure:  ADULT TRANSESOPHAGEAL ECHO (ZAYDA) W/ CONT IF NECESSARY PER PROTOCOL Diagnosis:  (Cardiac Source of Emboli)    Scheduled Providers:  Lefty Hernandez MD; Juan F Mendoza MD Provider:  Manuel Bergman CRNA    Anesthesia Type:  MAC ASA Status:  3          Anesthesia Type: MAC    Vitals  No vitals data found for the desired time range.          Post Anesthesia Care and Evaluation    Patient location during evaluation: PHASE II  Patient participation: complete - patient participated  Level of consciousness: awake  Pain score: 0  Pain management: adequate  Airway patency: patent  Anesthetic complications: No anesthetic complications  PONV Status: none  Cardiovascular status: acceptable  Respiratory status: acceptable  Hydration status: acceptable  Post Neuraxial Block status: Motor and sensory function returned to baseline

## 2020-05-04 NOTE — THERAPY EVALUATION
Acute Care - Speech Language Pathology Initial Evaluation  [facility]     Patient Name: Gaviota Erazo  : 1945  MRN: [id number]  Today's Date: 2020  Onset of Illness/Injury or Date of Surgery: 20     Referring Physician: MARGARITA Sy      Admit Date: 2020    SLP completed speech language evaluation this session.  Patient also working with PT.  Patients speech appeared to worsen some with fatigue.  Language appeared appropriate.  She did display some mild difficulties with complex concrete yes/no questions. Oral motor showed left sided weakness.  Slurred speech with poor breath support and inability to sustain voicing beyond 1-2 words.  Intelligibility impacted by poor breath support, poor articulation, and poor intensity.  Screened swallow due to slurred speech, no overt s/s of aspiration with thin liquids.  Ok to continue diet recommended from evaluation.  SLP will continue to follow for dysarthria.  Yanira Ro MS CCC-SLP 2020 16:10    Visit Dx:    ICD-10-CM ICD-9-CM   1. Left arm weakness R29.898 729.89   2. Decreased activities of daily living (ADL) Z78.9 V49.89   3. Impaired mobility Z74.09 799.89   4. Dysphagia, unspecified type R13.10 787.20   5. Cerebrovascular accident (CVA), unspecified mechanism (CMS/HCC) I63.9 434.91   6. Bradycardia, unspecified  R00.1 427.89   7. Dysarthria R47.1 784.51     Patient Active Problem List   Diagnosis   • Left arm weakness   • Essential hypertension   • Hypertensive urgency   • Hypercholesterolemia   • Stroke (CMS/HCC)     Past Medical History:   Diagnosis Date   • Depression    • Hypercholesteremia    • Hypertension    • TIA (transient ischemic attack)      Past Surgical History:   Procedure Laterality Date   • HYSTERECTOMY     • SKIN CANCER EXCISION          SLP EVALUATION (last 72 hours)      SLP SLC Evaluation     Row Name 20 1500                   Communication Assessment/Intervention    Document Type  evaluation  -KW         Subjective Information  no complaints  -KW        Patient Observations  alert;cooperative  -KW        Patient/Family Observations  no family present  -KW        Patient Effort  good  -KW           General Information    Patient Profile Reviewed  yes  -KW        Pertinent History Of Current Problem  Left sided weakness, HTN, DM, MRI of brain 5/02/2020- Acute nonhemorrhagic multifocal ischemia of the right MCA distribution.  -KW        Precautions/Limitations, Vision  WFL;for purposes of eval  -KW        Precautions/Limitations, Hearing  WFL  -KW        Prior Level of Function-Communication  WFL  -KW        Plans/Goals Discussed with  patient  -KW        Barriers to Rehab  none identified  -KW        Patient's Goals for Discharge  return to all previous roles/activities  -KW           Pain Assessment    Additional Documentation  Pain Scale: FACES Pre/Post-Treatment (Group)  -KW           Pain Scale: FACES Pre/Post-Treatment    Pain: FACES Scale, Pretreatment  0-->no hurt  -KW        Pain: FACES Scale, Post-Treatment  0-->no hurt  -KW           Comprehension Assessment/Intervention    Comprehension Assessment/Intervention  Auditory Comprehension  -KW           Auditory Comprehension Assessment/Intervention    Auditory Comprehension (Communication)  mild impairment  -KW        Answers Questions (Communication)  yes/no;mulit-unit;abstract;mild impairment  -KW           Expression Assessment/Intervention    Expression Assessment/Intervention  verbal expression  -KW           Verbal Expression Assessment/Intervention    Verbal Expression  WFL  -KW           Oral Motor Structure and Function    Oral Motor Structure and Function  moderate impairment  -KW        Dentition Assessment  natural, present and adequate  -KW           Oral Musculature and Cranial Nerve Assessment    Oral Motor General Assessment  oral labial or buccal impairment;lingual impairment  -KW        Oral Labial or Buccal Impairment, Detail, Cranial Nerve  VII (Facial):  CN7: Motor Impairment;left labial droop  -KW        Lingual Impairment, Detail. Cranial Nerves IX, XII (Glossopharyngeal and Hypoglossal)  CN12: Motor Impairment;reduced strength left  -KW           Motor Speech Assessment/Intervention    Motor Speech Function  moderate impairment  -KW        Characteristics Consistent with Dysarthria  decreased intensity;decreased articulation;decreased breath support;slurred speech  -KW           SLP Clinical Impressions    SLP Diagnosis  Moderate dysarthria  -KW        Rehab Potential/Prognosis  good  -KW        SLC Criteria for Skilled Therapy Interventions Met  yes  -KW        Functional Impact  functional impact in social situations  -KW           Recommendations    Therapy Frequency (SLP SLC)  at least;3 days per week  -KW        Predicted Duration Therapy Intervention (Days)  until discharge  -KW        Anticipated Dischage Disposition  unknown  -KW           Communication Treatment Objective and Progress Goals (SLP)    Motor Speech/Dysarthria Treatment Objectives  Motor Speech/Dysarthria Treatment Objectives (Group)  -KW           Motor Speech/Dysarthria Treatment Objectives    Respiratory Support Selection  respiratory support, SLP goal 1  -KW        Phonation Selection  phonation, SLP goal 1  -KW        Articulation Selection  articulation, SLP goal 1  -KW           Respiratory Support Goal 1 (SLP)    Improve Respiratory Support Goal 1 (SLP)  increasing length of exhalation;sustaining a vowel sound on exhalation;independently (over 90% accuracy);repeating a sentence on exhalation  -KW        Time Frame (Respiratory Support Goal 1, SLP)  short term goal (STG);by discharge  -KW        Barriers (Respiratory Support Goal 1, SLP)  n/a  -KW        Progress/Outcomes (Respiratory Support Goal 1, SLP)  goal ongoing  -KW           Phonation Goal 1 (SLP)    Improve Phonation By Goal 1 (SLP)  using loud speech;independently (over 90% accuracy)  -KW        Time Frame  (Phonation Goal 1, SLP)  short term goal (STG);by discharge  -KW        Barriers (Phonation Goal 1, SLP)  n/a  -KW        Progress/Outcomes (Phonation Goal 1, SLP)  goal ongoing  -KW           Articulation Goal 1 (SLP)    Improve Articulation Goal 1 (SLP)  by over-articulating at word level;by over-articulating at phrase level;independently (over 90% accuracy)  -KW        Time Frame (Articulation Goal 1, SLP)  short term goal (STG);by discharge  -KW        Barriers (Articulation Goal 1, SLP)  n/a  -KW        Progress/Outcomes (Articulation Goal 1, SLP)  goal ongoing  -KW          User Key  (r) = Recorded By, (t) = Taken By, (c) = Cosigned By    Initials Name Effective Dates    Yanira Haskins, MS CCC-SLP 02/11/20 -              EDUCATION  The patient has been educated in the following areas:     Communication Impairment.    SLP Recommendation and Plan  SLP Diagnosis: Moderate dysarthria     SLC Criteria for Skilled Therapy Interventions Met: yes  Anticipated Dischage Disposition: unknown     Predicted Duration Therapy Intervention (Days): until discharge    Plan of Care Reviewed With: patient  Progress: improving      SLP GOALS     Row Name 05/04/20 1500 05/03/20 0944          Oral Nutrition/Hydration Goal 1 (SLP)    Oral Nutrition/Hydration Goal 1, SLP  --  Pt will tolerate LRD w/o any overt s/s of aspriation.  -CS     Time Frame (Oral Nutrition/Hydration Goal 1, SLP)  --  by discharge  -CS     Barriers (Oral Nutrition/Hydration Goal 1, SLP)  --  n/a  -CS     Progress/Outcomes (Oral Nutrition/Hydration Goal 1, SLP)  --  goal ongoing  -CS        Respiratory Support Goal 1 (SLP)    Improve Respiratory Support Goal 1 (SLP)  increasing length of exhalation;sustaining a vowel sound on exhalation;independently (over 90% accuracy);repeating a sentence on exhalation  -KW  --     Time Frame (Respiratory Support Goal 1, SLP)  short term goal (STG);by discharge  -KW  --     Barriers (Respiratory Support Goal 1, SLP)  n/a   -KW  --     Progress/Outcomes (Respiratory Support Goal 1, SLP)  goal ongoing  -KW  --        Phonation Goal 1 (SLP)    Improve Phonation By Goal 1 (SLP)  using loud speech;independently (over 90% accuracy)  -KW  --     Time Frame (Phonation Goal 1, SLP)  short term goal (STG);by discharge  -KW  --     Barriers (Phonation Goal 1, SLP)  n/a  -KW  --     Progress/Outcomes (Phonation Goal 1, SLP)  goal ongoing  -KW  --        Articulation Goal 1 (SLP)    Improve Articulation Goal 1 (SLP)  by over-articulating at word level;by over-articulating at phrase level;independently (over 90% accuracy)  -KW  --     Time Frame (Articulation Goal 1, SLP)  short term goal (STG);by discharge  -KW  --     Barriers (Articulation Goal 1, SLP)  n/a  -KW  --     Progress/Outcomes (Articulation Goal 1, SLP)  goal ongoing  -KW  --       User Key  (r) = Recorded By, (t) = Taken By, (c) = Cosigned By    Initials Name Provider Type    Yanira Haskins MS CCC-SLP Speech and Language Pathologist    Mandeep Wing MS CCC-SLP Speech and Language Pathologist                  Time Calculation:     Time Calculation- SLP     Row Name 05/04/20 1609             Time Calculation- SLP    SLP Start Time  1500  -KW      SLP Stop Time  1600  -KW      SLP Time Calculation (min)  60 min  -KW      SLP Received On  05/04/20  -KW      SLP Goal Re-Cert Due Date  05/14/20  -KW        User Key  (r) = Recorded By, (t) = Taken By, (c) = Cosigned By    Initials Name Provider Type    Yanira Haskins MS CCC-SLP Speech and Language Pathologist          Therapy Charges for Today     Code Description Service Date Service Provider Modifiers Qty    [id number] HC ST EVAL SPEECH AND PROD W LANG  4 5/4/2020 Yanira Ro MS CCC-PAT GN 1                     Yanira Ro MS CCC-PAT  5/4/2020

## 2020-05-04 NOTE — PLAN OF CARE
Problem: Patient Care Overview  Goal: Plan of Care Review  Outcome: Ongoing (interventions implemented as appropriate)  Flowsheets (Taken 5/4/2020 1124)  Progress: improving  Plan of Care Reviewed With: patient  Note:   Pt. Sitting in chair guarding her Lue in her Lap! This meza/l asks Pt. To lift her Lue and show what she can do and she c/o's that it won't move! Pt's Lue Moving better today with vc's from this meza/l with Pt. Able to push meza/l's hand down with tricep ext and pull hand up with bicep flex! Trace wrist flex/ext. With shd. Abduction/adduction! Pt. Keeps stating that her speech is getting worse!

## 2020-05-04 NOTE — PLAN OF CARE
Problem: Patient Care Overview  Goal: Plan of Care Review  Outcome: Ongoing (interventions implemented as appropriate)  Flowsheets (Taken 5/4/2020 9061)  Outcome Summary: RD nutrition assessment completed.  Unable to speak with pt at this time d/t pt working with her.  Will continue to monitor po intake, and follow for possible diet education needs prior to d/c

## 2020-05-04 NOTE — PROGRESS NOTES
Continued Stay Note  [facility]     Patient Name: Gaviota Erazo  MRN: [id number]  Today's Date: 5/4/2020    Admit Date: 5/1/2020    Discharge Plan     Row Name 05/04/20 1008       Plan    Plan  [facility] pending insurance precert    Plan Comments  [facility] has accepted and is starting precert with pt's insurance. Awaiting approval.    Row Name 05/04/20 1001       Plan    Plan  [facility] Referral    Patient/Family in Agreement with Plan  yes    Plan Comments  Left a message for Hayley from [facility] [phone] to check on the status of the referral. Awaiting return call.         Discharge Codes    No documentation.             MAXI Crystal

## 2020-05-04 NOTE — PLAN OF CARE
Patient down for ZAYDA this morning. Still main c/o aphasia and dysarthria- also c/o some right neck swelling- can get weak when ambulating to the bathroom with asst x1- left arm weak and  weak, will continue to monitor and notify MD of any changes

## 2020-05-04 NOTE — PLAN OF CARE
Problem: Patient Care Overview  Goal: Plan of Care Review  Outcome: Ongoing (interventions implemented as appropriate)  Flowsheets (Taken 5/4/2020 0705)  Progress: improving  Plan of Care Reviewed With: patient  Note:   SLP completed speech language evaluation this session.  Patient also working with PT.  Patients speech appeared to worsen some with fatigue.  Language appeared appropriate.  She did display some mild difficulties with complex concrete yes/no questions. Oral motor showed left sided weakness.  Slurred speech with poor breath support and inability to sustain voicing beyond 1-2 words.  Intelligibility impacted by poor breath support, poor articulation, and poor intensity.  Screened swallow due to slurred speech, no overt s/s of aspiration with thin liquids.  Ok to continue diet recommended from evaluation.  SLP will continue to follow for dysarthria.

## 2020-05-04 NOTE — PROGRESS NOTES
Continued Stay Note  [facility]     Patient Name: Gaviota Erazo  MRN: [id number]  Today's Date: 5/4/2020    Admit Date: 5/1/2020    Discharge Plan     Row Name 05/04/20 1538       Plan    Plan Comments  Per Sandra, [facility], still waiting on precert for pt to be discharged to [facility]         Discharge Codes    No documentation.             Donna Foster

## 2020-05-05 ENCOUNTER — APPOINTMENT (OUTPATIENT)
Dept: GENERAL RADIOLOGY | Facility: HOSPITAL | Age: 75
End: 2020-05-05

## 2020-05-05 ENCOUNTER — TELEPHONE (OUTPATIENT)
Dept: NEUROLOGY | Facility: CLINIC | Age: 75
End: 2020-05-05

## 2020-05-05 ENCOUNTER — HOSPITAL ENCOUNTER (INPATIENT)
Age: 75
LOS: 18 days | Discharge: NURSING, REHAB OR LONG-TERM CARE | DRG: 065 | End: 2020-05-23
Attending: PSYCHIATRY & NEUROLOGY | Admitting: PSYCHIATRY & NEUROLOGY
Payer: MEDICARE

## 2020-05-05 ENCOUNTER — APPOINTMENT (OUTPATIENT)
Dept: MRI IMAGING | Facility: HOSPITAL | Age: 75
End: 2020-05-05

## 2020-05-05 ENCOUNTER — APPOINTMENT (OUTPATIENT)
Dept: CT IMAGING | Facility: HOSPITAL | Age: 75
End: 2020-05-05

## 2020-05-05 LAB
GLUCOSE BLDC GLUCOMTR-MCNC: 154 MG/DL (ref 70–130)
GLUCOSE BLDC GLUCOMTR-MCNC: 172 MG/DL (ref 70–130)
GLUCOSE BLDC GLUCOMTR-MCNC: 187 MG/DL (ref 70–130)

## 2020-05-05 PROCEDURE — 63710000001 INSULIN LISPRO (HUMAN) PER 5 UNITS: Performed by: FAMILY MEDICINE

## 2020-05-05 PROCEDURE — 74230 X-RAY XM SWLNG FUNCJ C+: CPT

## 2020-05-05 PROCEDURE — 97116 GAIT TRAINING THERAPY: CPT

## 2020-05-05 PROCEDURE — 1180000000 HC REHAB R&B

## 2020-05-05 PROCEDURE — 97168 OT RE-EVAL EST PLAN CARE: CPT

## 2020-05-05 PROCEDURE — 70450 CT HEAD/BRAIN W/O DYE: CPT

## 2020-05-05 PROCEDURE — 92610 EVALUATE SWALLOWING FUNCTION: CPT | Performed by: SPEECH-LANGUAGE PATHOLOGIST

## 2020-05-05 PROCEDURE — 97535 SELF CARE MNGMENT TRAINING: CPT

## 2020-05-05 PROCEDURE — 82962 GLUCOSE BLOOD TEST: CPT

## 2020-05-05 PROCEDURE — 92611 MOTION FLUOROSCOPY/SWALLOW: CPT | Performed by: SPEECH-LANGUAGE PATHOLOGIST

## 2020-05-05 PROCEDURE — 99233 SBSQ HOSP IP/OBS HIGH 50: CPT | Performed by: PSYCHIATRY & NEUROLOGY

## 2020-05-05 PROCEDURE — 70551 MRI BRAIN STEM W/O DYE: CPT

## 2020-05-05 RX ORDER — DABIGATRAN ETEXILATE 150 MG/1
150 CAPSULE ORAL EVERY 12 HOURS SCHEDULED
Status: DISCONTINUED | OUTPATIENT
Start: 2020-05-06 | End: 2020-05-06 | Stop reason: HOSPADM

## 2020-05-05 RX ORDER — BUSPIRONE HYDROCHLORIDE 15 MG/1
15 TABLET ORAL EVERY 12 HOURS SCHEDULED
Qty: 60 TABLET | Refills: 3 | Status: SHIPPED | OUTPATIENT
Start: 2020-05-05

## 2020-05-05 RX ORDER — HYDROCHLOROTHIAZIDE 25 MG/1
12.5 TABLET ORAL DAILY
Status: DISCONTINUED | OUTPATIENT
Start: 2020-05-05 | End: 2020-05-06 | Stop reason: HOSPADM

## 2020-05-05 RX ORDER — CARVEDILOL 6.25 MG/1
6.25 TABLET ORAL 2 TIMES DAILY WITH MEALS
Status: DISCONTINUED | OUTPATIENT
Start: 2020-05-05 | End: 2020-05-05

## 2020-05-05 RX ORDER — CARVEDILOL 6.25 MG/1
6.25 TABLET ORAL 2 TIMES DAILY WITH MEALS
Qty: 60 TABLET | Refills: 3 | Status: SHIPPED | OUTPATIENT
Start: 2020-05-05 | End: 2020-05-06 | Stop reason: HOSPADM

## 2020-05-05 RX ORDER — ATORVASTATIN CALCIUM 80 MG/1
80 TABLET, FILM COATED ORAL NIGHTLY
Qty: 30 TABLET | Refills: 3 | Status: SHIPPED | OUTPATIENT
Start: 2020-05-05

## 2020-05-05 RX ORDER — ASPIRIN 81 MG/1
81 TABLET, CHEWABLE ORAL DAILY
Qty: 30 TABLET | Refills: 3 | Status: SHIPPED | OUTPATIENT
Start: 2020-05-05

## 2020-05-05 RX ADMIN — BARIUM SULFATE 20 ML: 0.81 POWDER, FOR SUSPENSION ORAL at 14:45

## 2020-05-05 RX ADMIN — CLOPIDOGREL 75 MG: 75 TABLET, FILM COATED ORAL at 08:36

## 2020-05-05 RX ADMIN — SODIUM CHLORIDE, PRESERVATIVE FREE 10 ML: 5 INJECTION INTRAVENOUS at 21:38

## 2020-05-05 RX ADMIN — BUSPIRONE HYDROCHLORIDE 15 MG: 10 TABLET ORAL at 21:37

## 2020-05-05 RX ADMIN — INSULIN LISPRO 2 UNITS: 100 INJECTION, SOLUTION INTRAVENOUS; SUBCUTANEOUS at 08:35

## 2020-05-05 RX ADMIN — CITALOPRAM 40 MG: 20 TABLET, FILM COATED ORAL at 08:36

## 2020-05-05 RX ADMIN — AMLODIPINE BESYLATE: 5 TABLET ORAL at 08:36

## 2020-05-05 RX ADMIN — ASPIRIN 81 MG: 81 TABLET, CHEWABLE ORAL at 08:36

## 2020-05-05 RX ADMIN — LINAGLIPTIN 5 MG: 5 TABLET, FILM COATED ORAL at 08:36

## 2020-05-05 RX ADMIN — BARIUM SULFATE 20 ML: 400 SUSPENSION ORAL at 14:45

## 2020-05-05 RX ADMIN — ATORVASTATIN CALCIUM 80 MG: 40 TABLET, FILM COATED ORAL at 21:38

## 2020-05-05 RX ADMIN — METFORMIN HYDROCHLORIDE 500 MG: 500 TABLET ORAL at 08:36

## 2020-05-05 RX ADMIN — SODIUM CHLORIDE, PRESERVATIVE FREE 10 ML: 5 INJECTION INTRAVENOUS at 08:36

## 2020-05-05 RX ADMIN — Medication 500 MCG: at 08:36

## 2020-05-05 RX ADMIN — BARIUM SULFATE 20 ML: 400 PASTE ORAL at 14:45

## 2020-05-05 RX ADMIN — BUSPIRONE HYDROCHLORIDE 15 MG: 10 TABLET ORAL at 08:36

## 2020-05-05 RX ADMIN — SODIUM CHLORIDE 100 ML/HR: 9 INJECTION, SOLUTION INTRAVENOUS at 05:50

## 2020-05-05 RX ADMIN — AMLODIPINE BESYLATE: 5 TABLET ORAL at 21:37

## 2020-05-05 RX ADMIN — CARVEDILOL 6.25 MG: 6.25 TABLET, FILM COATED ORAL at 08:36

## 2020-05-05 RX ADMIN — INSULIN LISPRO 2 UNITS: 100 INJECTION, SOLUTION INTRAVENOUS; SUBCUTANEOUS at 12:32

## 2020-05-05 NOTE — TELEPHONE ENCOUNTER
PT WAS SEEN ON 5/1/20 FOR STROKE BY NEUROLOGY AND WAS TOLD TO FOLLOW UP IN THREE WEEKS WITH ISABELL BERNABE, OVIDIO LEBLANC OR DR. GRECO. PLEASE DETERMINE VISIT TYPE AND TIMEFRAME IN WHICH PT IS TO BE SCHEDULED.      BEST CALL BACK- [phone]

## 2020-05-05 NOTE — PLAN OF CARE
Problem: Patient Care Overview  Goal: Plan of Care Review  Outcome: Ongoing (interventions implemented as appropriate)  Flowsheets (Taken 5/5/2020 4313)  Progress: no change  Plan of Care Reviewed With: patient  Outcome Summary: Pt Alert, disoriented to situation. Dysarthria and aphasia more pronounced today. Repeat CT showed larger infarct, MD and Neuro notified. MRI ordered, results relayed to Neuro and consulted with MD to change medications. Safety maintained will continue to monitor

## 2020-05-05 NOTE — PLAN OF CARE
Problem: Patient Care Overview  Goal: Plan of Care Review  Outcome: Ongoing (interventions implemented as appropriate)  Flowsheets  Taken 5/5/2020 1334 by Yanira Ro MS CCC-SLP  Progress: declining  Taken 5/4/2020 2003 by Rudolph Glass RN  Plan of Care Reviewed With: patient  Note:   Re-evaluation of swallow completed due to increasing symtoms and worsening of CVA.  Patient showed delayed cough with thin, wet voice and throat clearing with nectar, and significant pocketing of soft solids and solids.  Liquid wash did not help.  Speech now aphasic with inability to get more than a couple words out.  Poor sensation observed as food hanging out of left side of mouth without patient knowledge.    Rec:   1) Dysphagia Study due to concerns with increasing difficulty and high risk of aspiration.  Final recommnedations pending.

## 2020-05-05 NOTE — THERAPY RE-EVALUATION
Acute Care - Occupational Therapy Re-Evaluation  [facility]     Patient Name: Gaviota Erazo  : 1945  MRN: [id number]  Today's Date: 2020  Onset of Illness/Injury or Date of Surgery: 20  Date of Referral to OT: 20  Referring Physician: MARGARITA Sy    Admit Date: 2020       ICD-10-CM ICD-9-CM   1. Left arm weakness R29.898 729.89   2. Decreased activities of daily living (ADL) Z78.9 V49.89   3. Impaired mobility Z74.09 799.89   4. Dysphagia, unspecified type R13.10 787.20   5. Cerebrovascular accident (CVA), unspecified mechanism (CMS/HCC) I63.9 434.91   6. Bradycardia, unspecified  R00.1 427.89   7. Dysarthria R47.1 784.51     Patient Active Problem List   Diagnosis   • Left arm weakness   • Essential hypertension   • Hypertensive urgency   • Hypercholesterolemia   • Stroke (CMS/HCC)     Past Medical History:   Diagnosis Date   • Depression    • Hypercholesteremia    • Hypertension    • TIA (transient ischemic attack)      Past Surgical History:   Procedure Laterality Date   • HYSTERECTOMY     • SKIN CANCER EXCISION            OT ASSESSMENT FLOWSHEET (last 12 hours)      Occupational Therapy Evaluation     Row Name 20 1328                   OT Evaluation Time/Intention    Subjective Information  complains of;weakness  -CS        Mode of Treatment  occupational therapy  -CS          User Key  (r) = Recorded By, (t) = Taken By, (c) = Cosigned By    Initials Name Effective Dates    CS Enma Bennett, OTR/L, CNT 19 -                OT Recommendation and Plan  Outcome Summary/Treatment Plan (OT)  Daily Summary of Progress (OT): progress towards functional goals is fair  Plan for Continued Treatment (OT): cont OT POC  Anticipated Discharge Disposition (OT): inpatient rehabilitation facility  Planned Therapy Interventions (OT Eval): activity tolerance training, BADL retraining, functional balance retraining, IADL retraining, transfer/mobility retraining, strengthening exercise,  ROM/therapeutic exercise, patient/caregiver education/training, occupation/activity based interventions, neuromuscular control/coordination retraining  Therapy Frequency (OT Eval): 5 times/wk  Daily Summary of Progress (OT): progress towards functional goals is fair  Plan of Care Review  Plan of Care Reviewed With: patient  Plan of Care Reviewed With: patient  Outcome Summary: OT re-evaluation/tx completed.  Pt's functional status is decilning.  Her speech is more slurred, she complains of blurry vision, demonstrates increased inattention to the L side, decreased strength and AROM of her LUE, and severely decreased balance with standing and walking.  Pt required mod-max A for total body bathing, LB dressing and min-mod A for UB dressing.  Pt required mod-max A for functional mobility from the bathroom to her chair due to strong L sided lean.  Per MRI, pt's infarct has expanded approx. 1 cm in size.  OT will cont to follow to maximize her I and safety with ADLs and functional mobility.    Outcome Measures     Row Name 05/05/20 1328 05/04/20 0755 05/03/20 0807       How much help from another is currently needed...    Putting on and taking off regular lower body clothing?  2  -CS  2  -CJ  2  -CJ    Bathing (including washing, rinsing, and drying)  2  -CS  3  -CJ  3  -CJ    Toileting (which includes using toilet bed pan or urinal)  2  -CS  3  -CJ  3  -CJ    Putting on and taking off regular upper body clothing  2  -CS  2  -CJ  2  -CJ    Taking care of personal grooming (such as brushing teeth)  2  -CS  3  -CJ  3  -CJ    Eating meals  3  -CS  3  -CJ  3  -CJ    AM-PAC 6 Clicks Score (OT)  13  -CS  16  -CJ  16  -CJ       Functional Assessment    Outcome Measure Options  AM-PAC 6 Clicks Daily Activity (OT)  -CS  AM-PAC 6 Clicks Daily Activity (OT)  -CJ  --      User Key  (r) = Recorded By, (t) = Taken By, (c) = Cosigned By    Initials Name Provider Type    Jose Miguel Talamantes COTA/L Occupational Therapy Assistant    AMPARO  Enma Bennett OTR/L, ELVA Occupational Therapist          Time Calculation:   Time Calculation- OT     Row Name 05/05/20 1444             Time Calculation- OT    OT Start Time  1328  -CS      OT Stop Time  1423  -CS      OT Time Calculation (min)  55 min  -CS      Total Timed Code Minutes- OT  25 minute(s)  -CS      OT Received On  05/05/20  -CS      OT Goal Re-Cert Due Date  05/15/20  -CS         Timed Charges    16362 - OT Self Care/Mgmt Minutes  25  -CS        User Key  (r) = Recorded By, (t) = Taken By, (c) = Cosigned By    Initials Name Provider Type    CS Enma Bennett, OTR/L, ELVA Occupational Therapist        Therapy Charges for Today     Code Description Service Date Service Provider Modifiers Qty    99184971846 HC OT SELF CARE/MGMT/TRAIN EA 15 MIN 5/5/2020 Enma Bennett OTR/L, ELVA GO 2    10348795124 HC OT RE-EVAL 2 5/5/2020 Enma Bennett OTR/L, ELVA GO 1               GLADYS Xavier/L, CNT  5/5/2020

## 2020-05-05 NOTE — PLAN OF CARE
Problem: Patient Care Overview  Goal: Plan of Care Review  Outcome: Ongoing (interventions implemented as appropriate)  Flowsheets (Taken 5/5/2020 1528)  Progress: no change (Re-eval)  Plan of Care Reviewed With: patient; other (see comments) (RN)  Note:   SPEECH-LANGUAGE PATHOLOGY EVALUTION - VFSS  Subjective: The patient was seen on this date for a VFSS (Videofluoroscopic Swallowing Study).  Patient was alert and cooperative.    Significant history: CVA   Objective: Risks/benefits were reviewed with the patient, and consent was obtained. The study was completed with SLP and Radiologist present. The patient was seen in lateral view(s). Textures given included thin liquid, nectar thick liquid, honey thick liquid, puree consistency, mechanical soft consistency and regular consistency.  Assessment: The following consistencies were given in the stated order; honey thick, nectar thick, thin, pudding thick, mechanical soft, regular solid, repeat thin. Penetration during the swallow is observed with thin liquid trials secondary to reduced vestibular closure and laryngeal elevation. Penetration was shallow and no residue is present in laryngeal vestibule post swallow. Anterior loss of thin liquids 2x with large bolus extracted via straw. Premature loss 1x with thin liquid due to base of tongue weakness, across other trials the patient is observed to have adequate holding of bolus in oral cavity. Increased prep time is observed given regular solid. Clearance of oral residue post swallow with mechanical soft and regular solid is WFL.   Residue was minimal. Esophageal screen was performed and demonstrated functional esophageal swallow.  SLP Findings: Patient presents with moderate oropharyngeal dysphagia.   Recommendations: Diet Textures: thin liquid, mechanical soft consistency food. Medications should be taken whole or crushed with puree. May have water and Ice between meals after oral care, under staff or family  supervision and with the recommended strategies for safe swallowing.  Recommended Strategies: Upright for PO, small bites and sips and alternate liquids and solids. Oral care before breakfast, after all meals and PRN.  Other Recommended Evaluations: Follow up by SLP for diet toleration if recommended    Dysphagia therapy is recommended. Rationale: To address diet toleration and oral weakness.

## 2020-05-05 NOTE — THERAPY EVALUATION
Acute Care - Speech Language Pathology   Swallow Re-Evaluation [facility]     Patient Name: Gaviota Erazo  : 1945  MRN: [id number]  Today's Date: 2020  Onset of Illness/Injury or Date of Surgery: 20     Referring Physician: MARGARITA Sy      Admit Date: 2020     Re-evaluation of swallow completed due to increasing symtoms and worsening of CVA.  Patient showed delayed cough with thin, wet voice and throat clearing with nectar, and significant pocketing of soft solids and solids.  Liquid wash did not help.  Speech now aphasic with inability to get more than a couple words out.  Poor sensation observed as food hanging out of left side of mouth without patient knowledge.  Rec: 1) Dysphagia Study due to concerns with increasing difficulty and high risk of aspiration.  Final recommnedations pending.  Yanira Ro MS CCC-SLP 2020 13:37    Visit Dx:     ICD-10-CM ICD-9-CM   1. Left arm weakness R29.898 729.89   2. Decreased activities of daily living (ADL) Z78.9 V49.89   3. Impaired mobility Z74.09 799.89   4. Dysphagia, unspecified type R13.10 787.20   5. Cerebrovascular accident (CVA), unspecified mechanism (CMS/HCC) I63.9 434.91   6. Bradycardia, unspecified  R00.1 427.89   7. Dysarthria R47.1 784.51     Patient Active Problem List   Diagnosis   • Left arm weakness   • Essential hypertension   • Hypertensive urgency   • Hypercholesterolemia   • Stroke (CMS/HCC)     Past Medical History:   Diagnosis Date   • Depression    • Hypercholesteremia    • Hypertension    • TIA (transient ischemic attack)      Past Surgical History:   Procedure Laterality Date   • HYSTERECTOMY     • SKIN CANCER EXCISION          SWALLOW EVALUATION (last 72 hours)      SLP Adult Swallow Evaluation     Row Name 20 1200 20 0944                Rehab Evaluation    Document Type  evaluation  -KW  evaluation  -CS       Subjective Information  no complaints  -KW  complains of;weakness  -CS       Patient  Observations  alert;cooperative  -KW  alert;cooperative;agree to therapy  -CS       Patient/Family Observations  no family present  -KW  No family present  -CS       Patient Effort  good  -KW  good  -CS          General Information    Patient Profile Reviewed  yes  -KW  yes  -CS       Pertinent History Of Current Problem  Now has increased weakness and worsening symptoms  -KW  Left sided weakness, HTN, DM, MRI of brain 5/02/2020- Acute nonhemorrhagic multifocal ischemia of the right MCA distribution.  -CS       Current Method of Nutrition  regular textures;thin liquids  -KW  regular textures;thin liquids  -CS       Precautions/Limitations, Vision  WFL;for purposes of eval  -KW  WFL;for purposes of eval  -CS       Precautions/Limitations, Hearing  WFL  -KW  WFL  -CS       Prior Level of Function-Communication  motor speech impairment  -KW  WFL  -CS       Prior Level of Function-Swallowing  no diet consistency restrictions  -KW  no diet consistency restrictions  -CS       Plans/Goals Discussed with  patient  -KW  patient  -CS       Barriers to Rehab  none identified  -KW  none identified  -CS       Patient's Goals for Discharge  --  patient did not state  -CS          Pain Assessment    Additional Documentation  Pain Scale: FACES Pre/Post-Treatment (Group)  -KW  Pain Scale: Numbers Pre/Post-Treatment (Group)  -CS          Pain Scale: Numbers Pre/Post-Treatment    Pain Scale: Numbers, Pretreatment  --  0/10 - no pain  -CS       Pain Scale: Numbers, Post-Treatment  --  0/10 - no pain  -CS          Pain Scale: FACES Pre/Post-Treatment    Pain: FACES Scale, Pretreatment  0-->no hurt  -KW  --       Pain: FACES Scale, Post-Treatment  0-->no hurt  -KW  --          Oral Motor and Function    Dentition Assessment  natural, present and adequate  -KW  natural, present and adequate  -CS       Secretion Management  WNL/WFL  -KW  WNL/WFL  -CS       Mucosal Quality  moist, healthy  -KW  moist, healthy  -CS       Volitional Swallow   WFL  -KW  --       Volitional Cough  WFL  -KW  --          Oral Musculature and Cranial Nerve Assessment    Oral Motor General Assessment  oral labial or buccal impairment;lingual impairment;vocal impairment  -KW  oral labial or buccal impairment;lingual impairment  -CS       Oral Labial or Buccal Impairment, Detail, Cranial Nerve VII (Facial):  left labial droop;reduced taste on left  -KW  CN7: Motor Impairment;left labial droop  -CS       Lingual Impairment, Detail. Cranial Nerves IX, XII (Glossopharyngeal and Hypoglossal)  reduced strength;reduced lingual ROM  -KW  CN12: Motor Impairment;reduced strength left  -CS       Vocal Impairment, Detail. Cranial Nerve X (Vagus)  impaired throat clear/cough (see comments)  -KW  --          General Eating/Swallowing Observations    Respiratory Support Currently in Use  room air  -KW  room air  -CS       Eating/Swallowing Skills  fed by SLP;self-fed  -KW  fed by SLP;self-fed  -CS       Positioning During Eating  upright in chair  -KW  upright in chair  -CS       Utensils Used  spoon;straw  -KW  spoon;straw  -CS       Consistencies Trialed  regular textures;thin liquids;nectar/syrup-thick liquids;honey-thick liquids;pudding thick  -KW  regular textures;honey-thick liquids;nectar/syrup-thick liquids;thin liquids;pureed  -CS          Clinical Swallow Eval    Oral Prep Phase  impaired  -KW  impaired  -CS       Oral Transit  WFL  -KW  WFL  -CS       Oral Residue  impaired  -KW  WFL  -CS       Pharyngeal Phase  suspected pharyngeal impairment  -KW  no overt signs/symptoms of pharyngeal impairment  -CS       Esophageal Phase  unremarkable  -KW  unremarkable  -CS       Clinical Swallow Evaluation Summary  Re-evaluation of swallow completed due to increasing symtoms and worsening of CVA.  Patient showed delayed cough with thin, wet voice and throat clearing with nectar, and significant pocketing of soft solids and solids.  Liquid wash did not help.  Speech now aphasic with inability  to get more than a couple words out.  Poor sensation observed as food hanging out of left side of mouth without patient knowledge.  Rec: 1) Dysphagia Study due to concerns with increasing difficulty and high risk of aspiration.  Final recommnedations pending.  -KW  See eval note  -CS          Oral Prep Concerns    Oral Prep Concerns  inefficient mastication;poor rotary chew  -KW  increased prep time  -CS       Inefficient Mastication  regular consistencies;mechanical soft  -KW  --       Increased Prep Time  --  regular consistencies  -CS          Oral Residue Concerns    Oral Residue Concerns  lateral sulcus residue, left  -KW  --       Lateral Sulcus Residue, Left  mechanical soft;regular consistencies  -KW  --       Oral Residue Concerns, Comment  patient had lunch in room, she agreed to just continue with yams and two pudding cups.  -KW  --          Pharyngeal Phase Concerns    Pharyngeal Phase Concerns  wet vocal quality;cough;throat clear  -KW  --       Wet Vocal Quality  nectar  -KW  --       Cough  thin  -KW  --       Throat Clear  nectar  -KW  --          Clinical Impression    SLP Swallowing Diagnosis  mod-severe;oral dysfunction;suspected pharyngeal dysfunction  -KW  mild;oral dysfunction  -CS       Functional Impact  risk of aspiration/pneumonia  -KW  risk of aspiration/pneumonia  -CS       Rehab Potential/Prognosis, Swallowing  good, to achieve stated therapy goals  -KW  good, to achieve stated therapy goals  -CS       Swallow Criteria for Skilled Therapeutic Interventions Met  demonstrates skilled criteria  -KW  demonstrates skilled criteria  -CS          Recommendations    Therapy Frequency (Swallow)  at least;3 days per week  -KW  PRN  -CS       Predicted Duration Therapy Intervention (Days)  until discharge  -KW  until discharge  -CS       SLP Diet Recommendation  other (see comments) pending study  -KW  regular textures;thin liquids  -CS       Recommended Diagnostics  VFSS (MBS)  -KW  --        Recommended Precautions and Strategies  upright posture during/after eating;small bites of food and sips of liquid;alternate between small bites of food and sips of liquid;check mouth frequently for oral residue/pocketing  -KW  upright posture during/after eating;small bites of food and sips of liquid  -CS       SLP Rec. for Method of Medication Administration  meds crushed;with pudding or applesauce  -KW  meds whole;with thin liquids;as tolerated  -CS       Monitor for Signs of Aspiration  notify SLP if any concerns  -KW  yes;cough;gurgly voice;throat clearing;notify SLP if any concerns  -CS       Anticipated Dischage Disposition  inpatient rehabilitation facility  -KW  unknown  -CS          Swallow Goals (SLP)    Oral Nutrition/Hydration Goal Selection (SLP)  oral nutrition/hydration, SLP goal 1  -KW  oral nutrition/hydration, SLP goal 1  -CS       Labial Strengthening Goal Selection (SLP)  labial strengthening, SLP goal 1  -KW  --       Lingual Strengthening Goal Selection (SLP)  lingual strengthening, SLP goal 1  -KW  --       Pharyngeal Strengthening Exercise Goal Selection (SLP)  pharyngeal strengthening exercise, SLP goal 1  -KW  --       Swallow Compensatory Strategies Goal Selection (SLP)  swallow compensatory strategies, SLP goal 1  -KW  --       Additional Documentation  labial strengthening goal selection (SLP);lingual strengthening goal selection (SLP);pharyngeal strengthening exercise goal selection (SLP);swallow compensatory strategies goal selection (SLP)  -KW  --          Oral Nutrition/Hydration Goal 1 (SLP)    Oral Nutrition/Hydration Goal 1, SLP  Pt will tolerate LRD w/o any overt s/s of aspriation.  -KW  Pt will tolerate LRD w/o any overt s/s of aspriation.  -CS       Time Frame (Oral Nutrition/Hydration Goal 1, SLP)  by discharge  -KW  by discharge  -CS       Barriers (Oral Nutrition/Hydration Goal 1, SLP)  n/a  -KW  n/a  -CS       Progress/Outcomes (Oral Nutrition/Hydration Goal 1, SLP)  goal  ongoing  -KW  goal ongoing  -CS          Labial Strengthening Goal 1 (SLP)    Activity (Labial Strengthening Goal 1, SLP)  increase labial tone  -KW  --       Increase Labial Tone  labial resistance exercises  -KW  --       [place]/Accuracy (Labial Strengthening Goal 1, SLP)  independently (over 90% accuracy)  -KW  --       Time Frame (Labial Strengthening Goal 1, SLP)  short term goal (STG);by discharge  -KW  --       Barriers (Labial Strengthening Goal 1, SLP)  n/a  -KW  --       Progress/Outcomes (Labial Strengthening Goal 1, SLP)  goal ongoing  -KW  --          Lingual Strengthening Goal 1 (SLP)    Activity (Lingual Strengthening Goal 1, SLP)  increase lingual tone/sensation/control/coordination/movement  -KW  --       Increase Lingual Tone/Sensation/Control/Coordination/Movement  lingual movement exercises  -KW  --       [place]/Accuracy (Lingual Strengthening Goal 1, SLP)  independently (over 90% accuracy)  -KW  --       Time Frame (Lingual Strengthening Goal 1, SLP)  short term goal (STG);by discharge  -KW  --       Barriers (Lingual Strengthening Goal 1, SLP)  n/a  -KW  --       Progress/Outcomes (Lingual Strengthening Goal 1, SLP)  goal ongoing  -KW  --          Pharyngeal Strengthening Exercise Goal 1 (SLP)    Activity (Pharyngeal Strengthening Goal 1, SLP)  increase tongue base retraction  -KW  --       Increase Tongue Base Retraction  timothy;falsetto  -KW  --       [place]/Accuracy (Pharyngeal Strengthening Goal 1, SLP)  independently (over 90% accuracy)  -KW  --       Time Frame (Pharyngeal Strengthening Goal 1, SLP)  short term goal (STG);by discharge  -KW  --       Barriers (Pharyngeal Strengthening Goal 1, SLP)  n/a  -KW  --       Progress/Outcomes (Pharyngeal Strengthening Goal 1, SLP)  goal ongoing  -KW  --          Swallow Compensatory Strategies Goal 1 (SLP)    Activity (Swallow Compensatory Strategies/Techniques Goal 1, SLP)  compensatory strategies;during meal intake;during p.o.  trials;small bites;small cup sips;small straw sips;food/liquid placed on stronger right side;alternate food/liquid intake  -KW  --       [place]/Accuracy (Swallow Compensatory Strategies/Techniques Goal 1, SLP)  independently (over 90% accuracy)  -KW  --       Time Frame (Swallow Compensatory Strategies/Techniques Goal 1, SLP)  short term goal (STG);by discharge  -KW  --       Barriers (Swallow Compensatory Strategies/Techniques Goal 1, SLP)  n/a  -KW  --       Progress/Outcomes (Swallow Compensatory Strategies/Techniques Goal 1, SLP)  goal ongoing  -KW  --         User Key  (r) = Recorded By, (t) = Taken By, (c) = Cosigned By    Initials Name Effective Dates    Yanira Haskins, MS CCC-SLP 02/11/20 -     Mandeep Wing, MS CCC-SLP 04/03/18 -           EDUCATION  The patient has been educated in the following areas:   Dysphagia (Swallowing Impairment).    SLP Recommendation and Plan  SLP Swallowing Diagnosis: mod-severe, oral dysfunction, suspected pharyngeal dysfunction  SLP Diet Recommendation: other (see comments)(pending study)  Recommended Precautions and Strategies: upright posture during/after eating, small bites of food and sips of liquid, alternate between small bites of food and sips of liquid, check mouth frequently for oral residue/pocketing  SLP Rec. for Method of Medication Administration: meds crushed, with pudding or applesauce     Monitor for Signs of Aspiration: notify SLP if any concerns  Recommended Diagnostics: VFSS (MBS)  Swallow Criteria for Skilled Therapeutic Interventions Met: demonstrates skilled criteria  Anticipated Dischage Disposition: inpatient rehabilitation facility  Rehab Potential/Prognosis, Swallowing: good, to achieve stated therapy goals  Therapy Frequency (Swallow): at least, 3 days per week  Predicted Duration Therapy Intervention (Days): until discharge       Plan of Care Reviewed With: patient  Progress: declining    SLP GOALS     Row Name 05/05/20 1200 05/04/20  1500 05/03/20 0944       Oral Nutrition/Hydration Goal 1 (SLP)    Oral Nutrition/Hydration Goal 1, SLP  Pt will tolerate LRD w/o any overt s/s of aspriation.  -KW  --  Pt will tolerate LRD w/o any overt s/s of aspriation.  -CS    Time Frame (Oral Nutrition/Hydration Goal 1, SLP)  by discharge  -KW  --  by discharge  -CS    Barriers (Oral Nutrition/Hydration Goal 1, SLP)  n/a  -KW  --  n/a  -CS    Progress/Outcomes (Oral Nutrition/Hydration Goal 1, SLP)  goal ongoing  -KW  --  goal ongoing  -CS       Labial Strengthening Goal 1 (SLP)    Activity (Labial Strengthening Goal 1, SLP)  increase labial tone  -KW  --  --    Increase Labial Tone  labial resistance exercises  -KW  --  --    [place]/Accuracy (Labial Strengthening Goal 1, SLP)  independently (over 90% accuracy)  -KW  --  --    Time Frame (Labial Strengthening Goal 1, SLP)  short term goal (STG);by discharge  -KW  --  --    Barriers (Labial Strengthening Goal 1, SLP)  n/a  -KW  --  --    Progress/Outcomes (Labial Strengthening Goal 1, SLP)  goal ongoing  -KW  --  --       Lingual Strengthening Goal 1 (SLP)    Activity (Lingual Strengthening Goal 1, SLP)  increase lingual tone/sensation/control/coordination/movement  -KW  --  --    Increase Lingual Tone/Sensation/Control/Coordination/Movement  lingual movement exercises  -KW  --  --    [place]/Accuracy (Lingual Strengthening Goal 1, SLP)  independently (over 90% accuracy)  -KW  --  --    Time Frame (Lingual Strengthening Goal 1, SLP)  short term goal (STG);by discharge  -KW  --  --    Barriers (Lingual Strengthening Goal 1, SLP)  n/a  -KW  --  --    Progress/Outcomes (Lingual Strengthening Goal 1, SLP)  goal ongoing  -KW  --  --       Pharyngeal Strengthening Exercise Goal 1 (SLP)    Activity (Pharyngeal Strengthening Goal 1, SLP)  increase tongue base retraction  -KW  --  --    Increase Tongue Base Retraction  timothy;falsetto  -KW  --  --    [place]/Accuracy (Pharyngeal Strengthening Goal 1, SLP)   independently (over 90% accuracy)  -KW  --  --    Time Frame (Pharyngeal Strengthening Goal 1, SLP)  short term goal (STG);by discharge  -KW  --  --    Barriers (Pharyngeal Strengthening Goal 1, SLP)  n/a  -KW  --  --    Progress/Outcomes (Pharyngeal Strengthening Goal 1, SLP)  goal ongoing  -KW  --  --       Swallow Compensatory Strategies Goal 1 (SLP)    Activity (Swallow Compensatory Strategies/Techniques Goal 1, SLP)  compensatory strategies;during meal intake;during p.o. trials;small bites;small cup sips;small straw sips;food/liquid placed on stronger right side;alternate food/liquid intake  -KW  --  --    [place]/Accuracy (Swallow Compensatory Strategies/Techniques Goal 1, SLP)  independently (over 90% accuracy)  -KW  --  --    Time Frame (Swallow Compensatory Strategies/Techniques Goal 1, SLP)  short term goal (STG);by discharge  -KW  --  --    Barriers (Swallow Compensatory Strategies/Techniques Goal 1, SLP)  n/a  -KW  --  --    Progress/Outcomes (Swallow Compensatory Strategies/Techniques Goal 1, SLP)  goal ongoing  -KW  --  --       Respiratory Support Goal 1 (SLP)    Improve Respiratory Support Goal 1 (SLP)  --  increasing length of exhalation;sustaining a vowel sound on exhalation;independently (over 90% accuracy);repeating a sentence on exhalation  -KW  --    Time Frame (Respiratory Support Goal 1, SLP)  --  short term goal (STG);by discharge  -KW  --    Barriers (Respiratory Support Goal 1, SLP)  --  n/a  -KW  --    Progress/Outcomes (Respiratory Support Goal 1, SLP)  --  goal ongoing  -KW  --       Phonation Goal 1 (SLP)    Improve Phonation By Goal 1 (SLP)  --  using loud speech;independently (over 90% accuracy)  -KW  --    Time Frame (Phonation Goal 1, SLP)  --  short term goal (STG);by discharge  -KW  --    Barriers (Phonation Goal 1, SLP)  --  n/a  -KW  --    Progress/Outcomes (Phonation Goal 1, SLP)  --  goal ongoing  -KW  --       Articulation Goal 1 (SLP)    Improve Articulation Goal 1  (SLP)  --  by over-articulating at word level;by over-articulating at phrase level;independently (over 90% accuracy)  -KW  --    Time Frame (Articulation Goal 1, SLP)  --  short term goal (STG);by discharge  -KW  --    Barriers (Articulation Goal 1, SLP)  --  n/a  -KW  --    Progress/Outcomes (Articulation Goal 1, SLP)  --  goal ongoing  -KW  --      User Key  (r) = Recorded By, (t) = Taken By, (c) = Cosigned By    Initials Name Provider Type    Yanira Haskins MS CCC-SLP Speech and Language Pathologist    Mandeep Wing, MS CCC-SLP Speech and Language Pathologist             Time Calculation:   Time Calculation- SLP     Row Name 05/05/20 1336             Time Calculation- SLP    SLP Start Time  1200  -KW      SLP Stop Time  1300  -KW      SLP Time Calculation (min)  60 min  -KW      SLP Received On  05/05/20  -KW      SLP Goal Re-Cert Due Date  05/15/20  -KW        User Key  (r) = Recorded By, (t) = Taken By, (c) = Cosigned By    Initials Name Provider Type    Yanira Haskins MS CCC-SLP Speech and Language Pathologist          Therapy Charges for Today     Code Description Service Date Service Provider Modifiers Qty    [id number] HC ST EVAL SPEECH AND PROD W LANG  4 5/4/2020 Yanira Ro MS CCC-SLP GN 1    [id number] HC ST EVAL ORAL PHARYNG SWALLOW 4 5/5/2020 Yanira Ro MS CCC-SLP GN 1               Yanira Ro MS CCC-PAT  5/5/2020

## 2020-05-05 NOTE — MBS/VFSS/FEES
Acute Care - Speech Language Pathology   Swallow Re-Evaluation [facility]     Patient Name: Gaviota Erazo  : 1945  MRN: [id number]  Today's Date: 2020  Onset of Illness/Injury or Date of Surgery: 20     Referring Physician: MARGARITA Sy      Admit Date: 2020  SPEECH-LANGUAGE PATHOLOGY EVALUTION - VFSS  Subjective: The patient was seen on this date for a VFSS (Videofluoroscopic Swallowing Study).  Patient was alert and cooperative.    Significant history: CVA   Objective: Risks/benefits were reviewed with the patient, and consent was obtained. The study was completed with SLP and Radiologist present. The patient was seen in lateral view(s). Textures given included thin liquid, nectar thick liquid, honey thick liquid, puree consistency, mechanical soft consistency and regular consistency.  Assessment: The following consistencies were given in the stated order; honey thick, nectar thick, thin, pudding thick, mechanical soft, regular solid, repeat thin. Penetration during the swallow is observed with thin liquid trials secondary to reduced vestibular closure and laryngeal elevation. Penetration was shallow and no residue is present in laryngeal vestibule post swallow. Anterior loss of thin liquids 2x with large bolus extracted via straw. Premature loss 1x with thin liquid due to base of tongue weakness, across other trials the patient is observed to have adequate holding of bolus in oral cavity. Increased prep time is observed given regular solid. Clearance of oral residue post swallow with mechanical soft and regular solid is WFL.   Residue was minimal. Esophageal screen was performed and demonstrated functional esophageal swallow.  SLP Findings: Patient presents with moderate oropharyngeal dysphagia.   Recommendations: Diet Textures: thin liquid, mechanical soft consistency food. Medications should be taken whole or crushed with puree. May have water and Ice between meals after oral care, under  staff or family supervision and with the recommended strategies for safe swallowing.  Recommended Strategies: Upright for PO, small bites and sips and alternate liquids and solids. Oral care before breakfast, after all meals and PRN.  Other Recommended Evaluations: Follow up by SLP for diet toleration if recommended    Dysphagia therapy is recommended. Rationale: To address diet toleration and oral weakness.  Vivian Hylton, MS CCC-SLP 5/5/2020 15:27        Visit Dx:     ICD-10-CM ICD-9-CM   1. Left arm weakness R29.898 729.89   2. Decreased activities of daily living (ADL) Z78.9 V49.89   3. Impaired mobility Z74.09 799.89   4. Dysphagia, unspecified type R13.10 787.20   5. Cerebrovascular accident (CVA), unspecified mechanism (CMS/HCC) I63.9 434.91   6. Bradycardia, unspecified  R00.1 427.89   7. Dysarthria R47.1 784.51     Patient Active Problem List   Diagnosis   • Left arm weakness   • Essential hypertension   • Hypertensive urgency   • Hypercholesterolemia   • Stroke (CMS/HCC)     Past Medical History:   Diagnosis Date   • Depression    • Hypercholesteremia    • Hypertension    • TIA (transient ischemic attack)      Past Surgical History:   Procedure Laterality Date   • HYSTERECTOMY     • SKIN CANCER EXCISION          SWALLOW EVALUATION (last 72 hours)      SLP Adult Swallow Evaluation     Row Name 05/05/20 1435 05/05/20 1200 05/03/20 0944             Rehab Evaluation    Document Type  evaluation  -MM  evaluation  -KW  evaluation  -CS      Subjective Information  no complaints  -MM  no complaints  -KW  complains of;weakness  -CS      Patient Observations  alert;cooperative;agree to therapy  -MM  alert;cooperative  -KW  alert;cooperative;agree to therapy  -CS      Patient/Family Observations  No family present.   -MM  no family present  -KW  No family present  -CS      Patient Effort  good  -MM  good  -KW  good  -CS      Symptoms Noted During/After Treatment  none  -MM  --  --         General Information     Patient Profile Reviewed  yes  -MM  yes  -KW  yes  -CS      Pertinent History Of Current Problem  Increased weakness and worsening sypmtoms on 5/5. Left sided weakness, HTN, DM, MRI of brain 5/02/2020- Acute nonhemorrhagic multifocal ischemia of the right MCA distribution.  -MM  Now has increased weakness and worsening symptoms  -KW  Left sided weakness, HTN, DM, MRI of brain 5/02/2020- Acute nonhemorrhagic multifocal ischemia of the right MCA distribution.  -CS      Current Method of Nutrition  regular textures;thin liquids  -MM  regular textures;thin liquids  -KW  regular textures;thin liquids  -CS      Precautions/Limitations, Vision  WFL;for purposes of eval  -MM  WFL;for purposes of eval  -KW  WFL;for purposes of eval  -CS      Precautions/Limitations, Hearing  WFL  -MM  WFL  -KW  WFL  -CS      Prior Level of Function-Communication  motor speech impairment  -MM  motor speech impairment  -KW  WFL  -CS      Prior Level of Function-Swallowing  no diet consistency restrictions  -MM  no diet consistency restrictions  -KW  no diet consistency restrictions  -CS      Plans/Goals Discussed with  patient  -MM  patient  -KW  patient  -CS      Barriers to Rehab  none identified  -MM  none identified  -KW  none identified  -CS      Patient's Goals for Discharge  patient did not state  -MM  --  patient did not state  -CS         Pain Assessment    Additional Documentation  Pain Scale: FACES Pre/Post-Treatment (Group)  -MM  Pain Scale: FACES Pre/Post-Treatment (Group)  -KW  Pain Scale: Numbers Pre/Post-Treatment (Group)  -CS         Pain Scale: Numbers Pre/Post-Treatment    Pain Scale: Numbers, Pretreatment  --  --  0/10 - no pain  -CS      Pain Scale: Numbers, Post-Treatment  --  --  0/10 - no pain  -CS         Pain Scale: FACES Pre/Post-Treatment    Pain: FACES Scale, Pretreatment  0-->no hurt  -MM  0-->no hurt  -KW  --      Pain: FACES Scale, Post-Treatment  0-->no hurt  -MM  0-->no hurt  -KW  --         Oral Motor and  Function    Dentition Assessment  natural, present and adequate  -MM  natural, present and adequate  -KW  natural, present and adequate  -CS      Secretion Management  WNL/WFL  -MM  WNL/WFL  -KW  WNL/WFL  -CS      Mucosal Quality  moist, healthy  -MM  moist, healthy  -KW  moist, healthy  -CS      Volitional Swallow  WFL  -MM  WFL  -KW  --      Volitional Cough  WFL  -MM  WFL  -KW  --         Oral Musculature and Cranial Nerve Assessment    Oral Motor General Assessment  oral labial or buccal impairment;lingual impairment;vocal impairment  -MM  oral labial or buccal impairment;lingual impairment;vocal impairment  -KW  oral labial or buccal impairment;lingual impairment  -CS      Oral Labial or Buccal Impairment, Detail, Cranial Nerve VII (Facial):  left labial droop;reduced taste on left  -MM  left labial droop;reduced taste on left  -KW  CN7: Motor Impairment;left labial droop  -CS      Lingual Impairment, Detail. Cranial Nerves IX, XII (Glossopharyngeal and Hypoglossal)  reduced strength;reduced lingual ROM  -MM  reduced strength;reduced lingual ROM  -KW  CN12: Motor Impairment;reduced strength left  -CS      Vocal Impairment, Detail. Cranial Nerve X (Vagus)  impaired throat clear/cough (see comments)  -MM  impaired throat clear/cough (see comments)  -KW  --         General Eating/Swallowing Observations    Respiratory Support Currently in Use  --  room air  -KW  room air  -CS      Eating/Swallowing Skills  --  fed by SLP;self-fed  -KW  fed by SLP;self-fed  -CS      Positioning During Eating  --  upright in chair  -KW  upright in chair  -CS      Utensils Used  --  spoon;straw  -KW  spoon;straw  -CS      Consistencies Trialed  --  regular textures;thin liquids;nectar/syrup-thick liquids;honey-thick liquids;pudding thick  -KW  regular textures;honey-thick liquids;nectar/syrup-thick liquids;thin liquids;pureed  -CS         Clinical Swallow Eval    Oral Prep Phase  --  impaired  -KW  impaired  -CS      Oral Transit  --   WFL  -KW  WFL  -CS      Oral Residue  --  impaired  -KW  WFL  -CS      Pharyngeal Phase  --  suspected pharyngeal impairment  -KW  no overt signs/symptoms of pharyngeal impairment  -CS      Esophageal Phase  --  unremarkable  -KW  unremarkable  -CS      Clinical Swallow Evaluation Summary  --  Re-evaluation of swallow completed due to increasing symtoms and worsening of CVA.  Patient showed delayed cough with thin, wet voice and throat clearing with nectar, and significant pocketing of soft solids and solids.  Liquid wash did not help.  Speech now aphasic with inability to get more than a couple words out.  Poor sensation observed as food hanging out of left side of mouth without patient knowledge.  Rec: 1) Dysphagia Study due to concerns with increasing difficulty and high risk of aspiration.  Final recommnedations pending.  -KW  See eval note  -CS         Oral Prep Concerns    Oral Prep Concerns  --  inefficient mastication;poor rotary chew  -KW  increased prep time  -CS      Inefficient Mastication  --  regular consistencies;mechanical soft  -KW  --      Increased Prep Time  --  --  regular consistencies  -CS         Oral Residue Concerns    Oral Residue Concerns  --  lateral sulcus residue, left  -KW  --      Lateral Sulcus Residue, Left  --  mechanical soft;regular consistencies  -KW  --      Oral Residue Concerns, Comment  --  patient had lunch in room, she agreed to just continue with yams and two pudding cups.  -KW  --         Pharyngeal Phase Concerns    Pharyngeal Phase Concerns  --  wet vocal quality;cough;throat clear  -KW  --      Wet Vocal Quality  --  nectar  -KW  --      Cough  --  thin  -KW  --      Throat Clear  --  nectar  -KW  --         MBS/VFSS    Utensils Used  spoon;straw  -MM  --  --      Consistencies Trialed  honey-thick liquids;nectar/syrup-thick liquids;thin liquids;pudding thick;soft textures;regular textures  -MM  --  --         MBS/VFSS Interpretation    Oral Prep Phase  impaired  oral phase of swallowing  -MM  --  --      Oral Transit Phase  WFL  -MM  --  --      Oral Residue  impaired per CBSE with increased residue at lunch   -MM  --  --      VFSS Summary  See note for detail.   -MM  --  --         Oral Preparatory Phase    Oral Preparatory Phase  anterior loss  -MM  --  --      Anterior Loss  thin liquids;secondary to reduced labial seal  -MM  --  --         Oral Residue    Impaired Oral Residue  lateral sulci residue  -MM  --  --      Lateral Sulci Residue  -- L sided residue per CBSE at lunch. No residue on VFSS  -MM  --  --         Initiation of Pharyngeal Swallow    Initiation of Pharyngeal Swallow  WFL  -MM  --  --      Pharyngeal Phase  impaired pharyngeal phase of swallowing  -MM  --  --      Penetration During the Swallow  thin liquids;secondary to reduced vestibular closure  -MM  --  --      Response to Penetration  shallow;no response  -MM  --  --         Esophageal Phase    Esophageal Phase  see radiology report for further details  -MM  --  --         Clinical Impression    SLP Swallowing Diagnosis  moderate;oral dysfunction;pharyngeal dysfunction  -MM  mod-severe;oral dysfunction;suspected pharyngeal dysfunction  -KW  mild;oral dysfunction  -CS      Functional Impact  risk of aspiration/pneumonia  -MM  risk of aspiration/pneumonia  -KW  risk of aspiration/pneumonia  -CS      Rehab Potential/Prognosis, Swallowing  good, to achieve stated therapy goals  -MM  good, to achieve stated therapy goals  -KW  good, to achieve stated therapy goals  -CS      Swallow Criteria for Skilled Therapeutic Interventions Met  demonstrates skilled criteria  -MM  demonstrates skilled criteria  -KW  demonstrates skilled criteria  -CS         Recommendations    Therapy Frequency (Swallow)  at least;3 days per week  -MM  at least;3 days per week  -KW  PRN  -CS      Predicted Duration Therapy Intervention (Days)  until discharge  -MM  until discharge  -KW  until discharge  -CS      SLP Diet  Recommendation  other (see comments)  -MM  other (see comments) pending study  -KW  regular textures;thin liquids  -CS      Recommended Diagnostics  --  VFSS (MBS)  -KW  --      Recommended Precautions and Strategies  upright posture during/after eating;small bites of food and sips of liquid;alternate between small bites of food and sips of liquid;check mouth frequently for oral residue/pocketing  -MM  upright posture during/after eating;small bites of food and sips of liquid;alternate between small bites of food and sips of liquid;check mouth frequently for oral residue/pocketing  -KW  upright posture during/after eating;small bites of food and sips of liquid  -CS      SLP Rec. for Method of Medication Administration  meds crushed;with pudding or applesauce  -MM  meds crushed;with pudding or applesauce  -KW  meds whole;with thin liquids;as tolerated  -CS      Monitor for Signs of Aspiration  notify SLP if any concerns  -  notify SLP if any concerns  -KW  yes;cough;gurgly voice;throat clearing;notify SLP if any concerns  -CS      Anticipated Dischage Disposition  inpatient rehabilitation facility  -MM  inpatient rehabilitation facility  -KW  unknown  -CS         Swallow Goals (SLP)    Oral Nutrition/Hydration Goal Selection (SLP)  oral nutrition/hydration, SLP goal 1  -MM  oral nutrition/hydration, SLP goal 1  -KW  oral nutrition/hydration, SLP goal 1  -CS      Labial Strengthening Goal Selection (SLP)  labial strengthening, SLP goal 1  -MM  labial strengthening, SLP goal 1  -KW  --      Lingual Strengthening Goal Selection (SLP)  lingual strengthening, SLP goal 1  -MM  lingual strengthening, SLP goal 1  -KW  --      Pharyngeal Strengthening Exercise Goal Selection (SLP)  pharyngeal strengthening exercise, SLP goal 1  -MM  pharyngeal strengthening exercise, SLP goal 1  -KW  --      Swallow Compensatory Strategies Goal Selection (SLP)  swallow compensatory strategies, SLP goal 1  -MM  swallow compensatory  strategies, SLP goal 1  -KW  --      Additional Documentation  labial strengthening goal selection (SLP);lingual strengthening goal selection (SLP);pharyngeal strengthening exercise goal selection (SLP);swallow compensatory strategies goal selection (SLP)  -MM  labial strengthening goal selection (SLP);lingual strengthening goal selection (SLP);pharyngeal strengthening exercise goal selection (SLP);swallow compensatory strategies goal selection (SLP)  -KW  --         Oral Nutrition/Hydration Goal 1 (SLP)    Oral Nutrition/Hydration Goal 1, SLP  Pt will tolerate LRD w/o any overt s/s of aspriation.  -MM  Pt will tolerate LRD w/o any overt s/s of aspriation.  -KW  Pt will tolerate LRD w/o any overt s/s of aspriation.  -CS      Time Frame (Oral Nutrition/Hydration Goal 1, SLP)  by discharge  -MM  by discharge  -KW  by discharge  -CS      Barriers (Oral Nutrition/Hydration Goal 1, SLP)  n/a  -MM  n/a  -KW  n/a  -CS      Progress/Outcomes (Oral Nutrition/Hydration Goal 1, SLP)  goal ongoing  -MM  goal ongoing  -KW  goal ongoing  -CS         Labial Strengthening Goal 1 (SLP)    Activity (Labial Strengthening Goal 1, SLP)  increase labial tone  -MM  increase labial tone  -KW  --      Increase Labial Tone  labial resistance exercises  -MM  labial resistance exercises  -KW  --      [place]/Accuracy (Labial Strengthening Goal 1, SLP)  independently (over 90% accuracy)  -MM  independently (over 90% accuracy)  -KW  --      Time Frame (Labial Strengthening Goal 1, SLP)  short term goal (STG);by discharge  -MM  short term goal (STG);by discharge  -KW  --      Barriers (Labial Strengthening Goal 1, SLP)  n/a  -MM  n/a  -KW  --      Progress/Outcomes (Labial Strengthening Goal 1, SLP)  goal ongoing  -MM  goal ongoing  -KW  --         Lingual Strengthening Goal 1 (SLP)    Activity (Lingual Strengthening Goal 1, SLP)  increase lingual tone/sensation/control/coordination/movement  -MM  increase lingual  tone/sensation/control/coordination/movement  -KW  --      Increase Lingual Tone/Sensation/Control/Coordination/Movement  lingual movement exercises  -MM  lingual movement exercises  -KW  --      [place]/Accuracy (Lingual Strengthening Goal 1, SLP)  independently (over 90% accuracy)  -MM  independently (over 90% accuracy)  -KW  --      Time Frame (Lingual Strengthening Goal 1, SLP)  short term goal (STG);by discharge  -MM  short term goal (STG);by discharge  -KW  --      Barriers (Lingual Strengthening Goal 1, SLP)  n/a  -MM  n/a  -KW  --      Progress/Outcomes (Lingual Strengthening Goal 1, SLP)  goal ongoing  -MM  goal ongoing  -KW  --         Pharyngeal Strengthening Exercise Goal 1 (SLP)    Activity (Pharyngeal Strengthening Goal 1, SLP)  increase tongue base retraction  -MM  increase tongue base retraction  -KW  --      Increase Tongue Base Retraction  timothy;falsetto  -MM  timothy;falsetto  -KW  --      [place]/Accuracy (Pharyngeal Strengthening Goal 1, SLP)  independently (over 90% accuracy)  -MM  independently (over 90% accuracy)  -KW  --      Time Frame (Pharyngeal Strengthening Goal 1, SLP)  short term goal (STG);by discharge  -MM  short term goal (STG);by discharge  -KW  --      Barriers (Pharyngeal Strengthening Goal 1, SLP)  n/a  -MM  n/a  -KW  --      Progress/Outcomes (Pharyngeal Strengthening Goal 1, SLP)  goal ongoing  -MM  goal ongoing  -KW  --         Swallow Compensatory Strategies Goal 1 (SLP)    Activity (Swallow Compensatory Strategies/Techniques Goal 1, SLP)  compensatory strategies;during meal intake;during p.o. trials;small bites;small cup sips;small straw sips;food/liquid placed on stronger right side;alternate food/liquid intake  -MM  compensatory strategies;during meal intake;during p.o. trials;small bites;small cup sips;small straw sips;food/liquid placed on stronger right side;alternate food/liquid intake  -KW  --      [place]/Accuracy (Swallow Compensatory  Strategies/Techniques Goal 1, SLP)  independently (over 90% accuracy)  -MM  independently (over 90% accuracy)  -KW  --      Time Frame (Swallow Compensatory Strategies/Techniques Goal 1, SLP)  short term goal (STG);by discharge  -MM  short term goal (STG);by discharge  -KW  --      Barriers (Swallow Compensatory Strategies/Techniques Goal 1, SLP)  n/a  -MM  n/a  -KW  --      Progress/Outcomes (Swallow Compensatory Strategies/Techniques Goal 1, SLP)  goal ongoing  -MM  goal ongoing  -KW  --        User Key  (r) = Recorded By, (t) = Taken By, (c) = Cosigned By    Initials Name Effective Dates    Yanira Haskins, MS CCC-SLP 02/11/20 -     Mandeep Wing, MS CCC-SLP 04/03/18 -     Vivian Milan, MS CCC-SLP 02/11/20 -           EDUCATION  The patient has been educated in the following areas:   Dysphagia (Swallowing Impairment) Oral Care/Hydration Modified Diet Instruction.    SLP Recommendation and Plan  SLP Swallowing Diagnosis: moderate, oral dysfunction, pharyngeal dysfunction  SLP Diet Recommendation: other (see comments)  Recommended Precautions and Strategies: upright posture during/after eating, small bites of food and sips of liquid, alternate between small bites of food and sips of liquid, check mouth frequently for oral residue/pocketing  SLP Rec. for Method of Medication Administration: meds crushed, with pudding or applesauce     Monitor for Signs of Aspiration: notify SLP if any concerns     Swallow Criteria for Skilled Therapeutic Interventions Met: demonstrates skilled criteria  Anticipated Dischage Disposition: inpatient rehabilitation facility  Rehab Potential/Prognosis, Swallowing: good, to achieve stated therapy goals  Therapy Frequency (Swallow): at least, 3 days per week  Predicted Duration Therapy Intervention (Days): until discharge            SLP GOALS     Row Name 05/05/20 1435 05/05/20 1200 05/04/20 1500       Oral Nutrition/Hydration Goal 1 (SLP)    Oral Nutrition/Hydration Goal  1, SLP  Pt will tolerate LRD w/o any overt s/s of aspriation.  -MM  Pt will tolerate LRD w/o any overt s/s of aspriation.  -KW  --    Time Frame (Oral Nutrition/Hydration Goal 1, SLP)  by discharge  -MM  by discharge  -KW  --    Barriers (Oral Nutrition/Hydration Goal 1, SLP)  n/a  -MM  n/a  -KW  --    Progress/Outcomes (Oral Nutrition/Hydration Goal 1, SLP)  goal ongoing  -MM  goal ongoing  -KW  --       Labial Strengthening Goal 1 (SLP)    Activity (Labial Strengthening Goal 1, SLP)  increase labial tone  -MM  increase labial tone  -KW  --    Increase Labial Tone  labial resistance exercises  -MM  labial resistance exercises  -KW  --    [place]/Accuracy (Labial Strengthening Goal 1, SLP)  independently (over 90% accuracy)  -MM  independently (over 90% accuracy)  -KW  --    Time Frame (Labial Strengthening Goal 1, SLP)  short term goal (STG);by discharge  -MM  short term goal (STG);by discharge  -KW  --    Barriers (Labial Strengthening Goal 1, SLP)  n/a  -MM  n/a  -KW  --    Progress/Outcomes (Labial Strengthening Goal 1, SLP)  goal ongoing  -MM  goal ongoing  -KW  --       Lingual Strengthening Goal 1 (SLP)    Activity (Lingual Strengthening Goal 1, SLP)  increase lingual tone/sensation/control/coordination/movement  -MM  increase lingual tone/sensation/control/coordination/movement  -KW  --    Increase Lingual Tone/Sensation/Control/Coordination/Movement  lingual movement exercises  -MM  lingual movement exercises  -KW  --    [place]/Accuracy (Lingual Strengthening Goal 1, SLP)  independently (over 90% accuracy)  -MM  independently (over 90% accuracy)  -KW  --    Time Frame (Lingual Strengthening Goal 1, SLP)  short term goal (STG);by discharge  -MM  short term goal (STG);by discharge  -KW  --    Barriers (Lingual Strengthening Goal 1, SLP)  n/a  -MM  n/a  -KW  --    Progress/Outcomes (Lingual Strengthening Goal 1, SLP)  goal ongoing  -MM  goal ongoing  -KW  --       Pharyngeal Strengthening Exercise  Goal 1 (SLP)    Activity (Pharyngeal Strengthening Goal 1, SLP)  increase tongue base retraction  -MM  increase tongue base retraction  -KW  --    Increase Tongue Base Retraction  timothy;falsetto  -MM  timothy;falsetto  -KW  --    [place]/Accuracy (Pharyngeal Strengthening Goal 1, SLP)  independently (over 90% accuracy)  -MM  independently (over 90% accuracy)  -KW  --    Time Frame (Pharyngeal Strengthening Goal 1, SLP)  short term goal (STG);by discharge  -MM  short term goal (STG);by discharge  -KW  --    Barriers (Pharyngeal Strengthening Goal 1, SLP)  n/a  -MM  n/a  -KW  --    Progress/Outcomes (Pharyngeal Strengthening Goal 1, SLP)  goal ongoing  -MM  goal ongoing  -KW  --       Swallow Compensatory Strategies Goal 1 (SLP)    Activity (Swallow Compensatory Strategies/Techniques Goal 1, SLP)  compensatory strategies;during meal intake;during p.o. trials;small bites;small cup sips;small straw sips;food/liquid placed on stronger right side;alternate food/liquid intake  -MM  compensatory strategies;during meal intake;during p.o. trials;small bites;small cup sips;small straw sips;food/liquid placed on stronger right side;alternate food/liquid intake  -KW  --    [place]/Accuracy (Swallow Compensatory Strategies/Techniques Goal 1, SLP)  independently (over 90% accuracy)  -MM  independently (over 90% accuracy)  -KW  --    Time Frame (Swallow Compensatory Strategies/Techniques Goal 1, SLP)  short term goal (STG);by discharge  -MM  short term goal (STG);by discharge  -KW  --    Barriers (Swallow Compensatory Strategies/Techniques Goal 1, SLP)  n/a  -MM  n/a  -KW  --    Progress/Outcomes (Swallow Compensatory Strategies/Techniques Goal 1, SLP)  goal ongoing  -MM  goal ongoing  -KW  --       Respiratory Support Goal 1 (SLP)    Improve Respiratory Support Goal 1 (SLP)  --  --  increasing length of exhalation;sustaining a vowel sound on exhalation;independently (over 90% accuracy);repeating a sentence on exhalation   -KW    Time Frame (Respiratory Support Goal 1, SLP)  --  --  short term goal (STG);by discharge  -KW    Barriers (Respiratory Support Goal 1, SLP)  --  --  n/a  -KW    Progress/Outcomes (Respiratory Support Goal 1, SLP)  --  --  goal ongoing  -KW       Phonation Goal 1 (SLP)    Improve Phonation By Goal 1 (SLP)  --  --  using loud speech;independently (over 90% accuracy)  -KW    Time Frame (Phonation Goal 1, SLP)  --  --  short term goal (STG);by discharge  -KW    Barriers (Phonation Goal 1, SLP)  --  --  n/a  -KW    Progress/Outcomes (Phonation Goal 1, SLP)  --  --  goal ongoing  -KW       Articulation Goal 1 (SLP)    Improve Articulation Goal 1 (SLP)  --  --  by over-articulating at word level;by over-articulating at phrase level;independently (over 90% accuracy)  -KW    Time Frame (Articulation Goal 1, SLP)  --  --  short term goal (STG);by discharge  -KW    Barriers (Articulation Goal 1, SLP)  --  --  n/a  -KW    Progress/Outcomes (Articulation Goal 1, SLP)  --  --  goal ongoing  -KW    Row Name 05/03/20 0944             Oral Nutrition/Hydration Goal 1 (SLP)    Oral Nutrition/Hydration Goal 1, SLP  Pt will tolerate LRD w/o any overt s/s of aspriation.  -CS      Time Frame (Oral Nutrition/Hydration Goal 1, SLP)  by discharge  -CS      Barriers (Oral Nutrition/Hydration Goal 1, SLP)  n/a  -CS      Progress/Outcomes (Oral Nutrition/Hydration Goal 1, SLP)  goal ongoing  -CS        User Key  (r) = Recorded By, (t) = Taken By, (c) = Cosigned By    Initials Name Provider Type    Yanira Haskins, MS CCC-SLP Speech and Language Pathologist    CS Mandeep Savage, MS CCC-SLP Speech and Language Pathologist    MM Vivian Hylton, MS CCC-SLP Speech and Language Pathologist             Time Calculation:   Time Calculation- SLP     Row Name 05/05/20 1336             Time Calculation- SLP    SLP Start Time  1200  -KW      SLP Stop Time  1300  -KW      SLP Time Calculation (min)  60 min  -KW      SLP Received On  05/05/20   -MICHELLE      SLP Goal Re-Cert Due Date  05/15/20  -MICHELLE        User Key  (r) = Recorded By, (t) = Taken By, (c) = Cosigned By    Initials Name Provider Type    Yanira Haskins MS CCC-SLP Speech and Language Pathologist                   Vivian Hylton, MS CCC-SLP  5/5/2020

## 2020-05-05 NOTE — THERAPY TREATMENT NOTE
Acute Care - Physical Therapy Treatment Note  [facility]     Patient Name: Gaviota Erazo  : 1945  MRN: [id number]  Today's Date: 2020  Onset of Illness/Injury or Date of Surgery: 20     Referring Physician: MARGARITA Sy    Admit Date: 2020    Visit Dx:    ICD-10-CM ICD-9-CM   1. Left arm weakness R29.898 729.89   2. Decreased activities of daily living (ADL) Z78.9 V49.89   3. Impaired mobility Z74.09 799.89   4. Dysphagia, unspecified type R13.10 787.20   5. Cerebrovascular accident (CVA), unspecified mechanism (CMS/HCC) I63.9 434.91   6. Bradycardia, unspecified  R00.1 427.89   7. Dysarthria R47.1 784.51     Patient Active Problem List   Diagnosis   • Left arm weakness   • Essential hypertension   • Hypertensive urgency   • Hypercholesterolemia   • Stroke (CMS/[facility])       Therapy Treatment    Rehabilitation Treatment Summary     Row Name 20 1311             Treatment Time/Intention    Discipline  physical therapy assistant  -      Document Type  therapy note (daily note)  -      Subjective Information  complains of;weakness  -LC2      Mode of Treatment  physical therapy  -      Existing Precautions/Restrictions  fall L. sided weakness  -2      Recorded by [] Donna Shaffer, [facility] 20 1311  [LC2] Donna Shaffer, [facility] 20 1340      Row Name 20 1311             Sit-Stand Transfer    Sit-Stand [place] (Transfers)  verbal cues;contact guard;minimum assist (75% patient effort)  -      Recorded by [LC] Donna Shaffer, PTA 20 1340      Row Name 20 1311             Stand-Sit Transfer    Stand-Sit [place] (Transfers)  verbal cues;contact guard;minimum assist (75% patient effort)  -      Recorded by [] Donna Shaffer, [facility] 20 1340      Row Name 20 1311             Gait/Stairs Assessment/Training    [place] Level (Gait)  verbal cues;minimum assist (75% patient effort)  -      Assistive Device (Gait)  other (see comments) A  -       Distance in Feet (Gait)  50' x2 then 20' to BR  -LC2      Pattern (Gait)  swing-to;step-to  -LC2      Deviations/Abnormal Patterns (Gait)  base of support, narrow;gait speed decreased  -LC2      Comment (Gait/Stairs)  patient required more assist today and began to drag LLE  -LC3      Recorded by [LC] Donna Shaffer, PTA 05/05/20 1340  [LC2] Donna Shaffer, PTA 05/05/20 1343  [LC3] Donna Shaffer, PTA 05/05/20 1347      Row Name 05/05/20 1311             Static Standing Balance    Level of [place] (Supported Standing, Static Balance)  contact guard assist  -LC      Recorded by [LC] Donna Shaffer, PTA 05/05/20 1347      Row Name 05/05/20 1311             Positioning and Restraints    Pre-Treatment Position  sitting in chair/recliner  -LC      Post Treatment Position  bathroom  -LC      Bathroom  sitting;with OT  -LC      Recorded by [LC] Donna Shaffer, [facility] 05/05/20 1347      Row Name 05/05/20 1311             Pain Scale: Numbers Pre/Post-Treatment    Pain Scale: Numbers, Pretreatment  0/10 - no pain  -LC      Pain Scale: Numbers, Post-Treatment  0/10 - no pain  -LC      Recorded by [LC] Donna Shaffer, [facility] 05/05/20 1347      Row Name 05/05/20 1311             Plan of Care Review    Plan of Care Reviewed With  patient  -LC      Progress  declining  -LC2      Outcome Summary  PT tx completed. Patient up in chair, speech more slurred and aphasic on this date. Required CGA  for sit to stands and HHA on R side. Amb 50' x2 with Min A and seated rest break. Patient had 2 significant LOB during both trials when turning to sit in chair and required max A to recover. Patient presented with LLE neglect with turn which caused her to cross over with RLE. Patient amb to BR with Min A and mod cues for sequencing in order to avoid objects on L side. Pt left with OT on shower bench. Will cont to monitor and treat, possible PT Re-eval tomorrow.   -LC3      Recorded by [LC] Donna Shaffer, PTA 05/05/20 1347  [LC2] Donna Shaffer, [facility]  05/05/20 1357  [LC3] Donna Shaffer, PTA 05/05/20 1356        User Key  (r) = Recorded By, (t) = Taken By, (c) = Cosigned By    Initials Name Effective Dates Discipline    LC Donna Shaffer, PTA 10/18/19 -  PT               Rehab Goal Summary     Row Name 05/05/20 1200             Swallow Goals (SLP)    Oral Nutrition/Hydration Goal Selection (SLP)  oral nutrition/hydration, SLP goal 1  -KW      Labial Strengthening Goal Selection (SLP)  labial strengthening, SLP goal 1  -KW      Lingual Strengthening Goal Selection (SLP)  lingual strengthening, SLP goal 1  -KW      Pharyngeal Strengthening Exercise Goal Selection (SLP)  pharyngeal strengthening exercise, SLP goal 1  -KW      Swallow Compensatory Strategies Goal Selection (SLP)  swallow compensatory strategies, SLP goal 1  -KW      Additional Documentation  labial strengthening goal selection (SLP);lingual strengthening goal selection (SLP);pharyngeal strengthening exercise goal selection (SLP);swallow compensatory strategies goal selection (SLP)  -KW         Oral Nutrition/Hydration Goal 1 (SLP)    Oral Nutrition/Hydration Goal 1, SLP  Pt will tolerate LRD w/o any overt s/s of aspriation.  -KW      Time Frame (Oral Nutrition/Hydration Goal 1, SLP)  by discharge  -KW      Barriers (Oral Nutrition/Hydration Goal 1, SLP)  n/a  -KW      Progress/Outcomes (Oral Nutrition/Hydration Goal 1, SLP)  goal ongoing  -KW         Labial Strengthening Goal 1 (SLP)    Activity (Labial Strengthening Goal 1, SLP)  increase labial tone  -KW      Increase Labial Tone  labial resistance exercises  -KW      [place]/Accuracy (Labial Strengthening Goal 1, SLP)  independently (over 90% accuracy)  -KW      Time Frame (Labial Strengthening Goal 1, SLP)  short term goal (STG);by discharge  -KW      Barriers (Labial Strengthening Goal 1, SLP)  n/a  -KW      Progress/Outcomes (Labial Strengthening Goal 1, SLP)  goal ongoing  -KW         Lingual Strengthening Goal 1 (SLP)    Activity (Lingual  Strengthening Goal 1, SLP)  increase lingual tone/sensation/control/coordination/movement  -KW      Increase Lingual Tone/Sensation/Control/Coordination/Movement  lingual movement exercises  -KW      [place]/Accuracy (Lingual Strengthening Goal 1, SLP)  independently (over 90% accuracy)  -KW      Time Frame (Lingual Strengthening Goal 1, SLP)  short term goal (STG);by discharge  -KW      Barriers (Lingual Strengthening Goal 1, SLP)  n/a  -KW      Progress/Outcomes (Lingual Strengthening Goal 1, SLP)  goal ongoing  -KW         Pharyngeal Strengthening Exercise Goal 1 (SLP)    Activity (Pharyngeal Strengthening Goal 1, SLP)  increase tongue base retraction  -KW      Increase Tongue Base Retraction  timothy;falsetto  -KW      [place]/Accuracy (Pharyngeal Strengthening Goal 1, SLP)  independently (over 90% accuracy)  -KW      Time Frame (Pharyngeal Strengthening Goal 1, SLP)  short term goal (STG);by discharge  -KW      Barriers (Pharyngeal Strengthening Goal 1, SLP)  n/a  -KW      Progress/Outcomes (Pharyngeal Strengthening Goal 1, SLP)  goal ongoing  -KW         Swallow Compensatory Strategies Goal 1 (SLP)    Activity (Swallow Compensatory Strategies/Techniques Goal 1, SLP)  compensatory strategies;during meal intake;during p.o. trials;small bites;small cup sips;small straw sips;food/liquid placed on stronger right side;alternate food/liquid intake  -KW      [place]/Accuracy (Swallow Compensatory Strategies/Techniques Goal 1, SLP)  independently (over 90% accuracy)  -KW      Time Frame (Swallow Compensatory Strategies/Techniques Goal 1, SLP)  short term goal (STG);by discharge  -KW      Barriers (Swallow Compensatory Strategies/Techniques Goal 1, SLP)  n/a  -KW      Progress/Outcomes (Swallow Compensatory Strategies/Techniques Goal 1, SLP)  goal ongoing  -KW        User Key  (r) = Recorded By, (t) = Taken By, (c) = Cosigned By    Initials Name Provider Type Discipline    Yanira Haskins MS CCC-SLP  Speech and Language Pathologist SLP              PT Recommendation and Plan     Plan of Care Reviewed With: patient  Progress: declining  Outcome Summary: PT tx completed. Patient up in chair, speech more slurred and aphasic on this date. Required CGA  for sit to stands and HHA on R side. Amb 50' x2 with Min A and seated rest break. Patient had 2 significant LOB during both trials when turning to sit in chair and required max A to recover. Patient presented with LLE neglect with turn which caused her to cross over with RLE. Patient amb to BR with Min A and mod cues for sequencing in order to avoid objects on L side. Pt left with OT on shower bench. Will cont to monitor and treat, possible PT Re-eval tomorrow.   Outcome Measures     Row Name 05/04/20 0755 05/03/20 0807          How much help from another is currently needed...    Putting on and taking off regular lower body clothing?  2  -CJ  2  -CJ     Bathing (including washing, rinsing, and drying)  3  -CJ  3  -CJ     Toileting (which includes using toilet bed pan or urinal)  3  -CJ  3  -CJ     Putting on and taking off regular upper body clothing  2  -CJ  2  -CJ     Taking care of personal grooming (such as brushing teeth)  3  -CJ  3  -CJ     Eating meals  3  -CJ  3  -CJ     AM-PAC 6 Clicks Score (OT)  16  -  16  -        Functional Assessment    Outcome Measure Options  AM-PAC 6 Clicks Daily Activity (OT)  -  --       User Key  (r) = Recorded By, (t) = Taken By, (c) = Cosigned By    Initials Name Provider Type    CJ Jose Miguel Castaneda, JENARO/L Occupational Therapy Assistant         Time Calculation:   PT Charges     Row Name 05/05/20 1356             Time Calculation    Start Time  1311  -      Stop Time  1341  -      Time Calculation (min)  30 min  -      PT Received On  05/05/20  -         Time Calculation- PT    Total Timed Code Minutes- PT  30 minute(s)  -        User Key  (r) = Recorded By, (t) = Taken By, (c) = Cosigned By    Initials Name  Provider Type     Donna Shaffer PTA Physical Therapy Assistant        Therapy Charges for Today     Code Description Service Date Service Provider Modifiers Qty    75583198881 HC GAIT TRAINING EA 15 MIN 5/4/2020 Donna Shaffer, HUMAZ GP 2    20218779209 HC PT THER PROC EA 15 MIN 5/4/2020 Donna Shaffer, HUMZA GP 1    71092744667 HC GAIT TRAINING EA 15 MIN 5/5/2020 Donna Shaffer, HUZMA GP 2          PT G-Codes  Outcome Measure Options: AM-PAC 6 Clicks Daily Activity (OT)  AM-PAC 6 Clicks Score (PT): 20  AM-PAC 6 Clicks Score (OT): 16  Modified Miller Scale: 4 - Moderately severe disability.  Unable to walk without assistance, and unable to attend to own bodily needs without assistance.    Donna Shaffer PTA  5/5/2020

## 2020-05-05 NOTE — DISCHARGE SUMMARY
Hospital Discharge Summary    Gaviota Erazo  :  1945  MRN:  [id number]    Admit date:  2020  Discharge date:      Admitting Physician:    Nhan Muir MD    Discharge Diagnoses:      Stroke (CMS/HCC)    Left arm weakness    Essential hypertension    Hypertensive urgency    Hypercholesterolemia      Hospital Course:   The patient was admitted for the above surgical/medical indication.  Please see admission H&P for further details concerning the admission.  The patient was seen daily and progress noted via daily updates in the progress notes.  The patient improved throughout her stay.  They reached maximum medical improvement and were considered stable for discharge home.  They understand the importance of follow-up concerning any abnormal lab values/x-rays.  All questions were answered to the best of my ability prior to their discharge home.    Pleasant 74-year-old white female who presented to the emergency room with a 4 to 5-day history of left arm weakness and slurred speech.  X-rays confirmed a CVA.  Neurology was consulted.  Patient antihypertensive and diabetic medications were adjusted.  CTA of the neck noted.  Transesophageal echo noted.  Patient started on Plavix and aspirin.   consulted for admission to [facility] rehab unit.  She will be transferred there later today if pre-CERT is done.        Discharge Medications:         Discharge Medications      New Medications      Instructions Start Date   aspirin 81 MG chewable tablet   81 mg, Oral, Daily      atorvastatin 80 MG tablet  Commonly known as:  LIPITOR  Replaces:  simvastatin 40 MG tablet   80 mg, Oral, Nightly      busPIRone 15 MG tablet  Commonly known as:  BUSPAR   15 mg, Oral, Every 12 Hours Scheduled      carvedilol 6.25 MG tablet  Commonly known as:  COREG   6.25 mg, Oral, 2 Times Daily With Meals      linagliptin 5 MG tablet tablet  Commonly known as:  TRADJENTA   5 mg, Oral, Daily         Continue These Medications       Instructions Start Date   amLODIPine-benazepril 5-20 MG per capsule  Commonly known as:  LOTREL 5-20   1 capsule, Oral, Daily      citalopram 40 MG tablet  Commonly known as:  CeleXA   40 mg, Oral, Daily      clopidogrel 75 MG tablet  Commonly known as:  PLAVIX   75 mg, Oral, Daily      metFORMIN 500 MG tablet  Commonly known as:  GLUCOPHAGE   500 mg, Oral, 2 Times Daily With Meals         Stop These Medications    levocetirizine 5 MG tablet  Commonly known as:  XYZAL     simvastatin 40 MG tablet  Commonly known as:  ZOCOR  Replaced by:  atorvastatin 80 MG tablet            Consults: Neurology    Significant Diagnostic Studies:      EKG: normal EKG, normal sinus rhythm, unchanged from previous tracings.      Treatments:   Work-up of acute CVA, see neurology note for recommendations    Disposition:   [facility] rehab  Follow up with Nhan Muir MD in 1-2 weeks.    Signed:  Nhan Muir MD   5/5/2020, 07:52

## 2020-05-05 NOTE — PLAN OF CARE
Problem: Patient Care Overview  Goal: Plan of Care Review  Outcome: Ongoing (interventions implemented as appropriate)  Flowsheets  Taken 5/5/2020 1334 by Yanira Ro MS CCC-SLP  Progress: declining  Taken 5/5/2020 1328 by Enma Bennett, OTR/L, CNT  Plan of Care Reviewed With: patient  Outcome Summary: OT re-evaluation/tx completed.  Pt's functional status is decilning.  Her speech is more slurred, she complains of blurry vision, demonstrates increased inattention to the L side, decreased strength and AROM of her LUE, and severely decreased balance with standing and walking.  Pt required mod-max A for total body bathing, LB dressing and min-mod A for UB dressing.  Pt required mod-max A for functional mobility from the bathroom to her chair due to strong L sided lean.  Per MRI, pt's infarct has expanded approx. 1 cm in size.  OT will cont to follow to maximize her I and safety with ADLs and functional mobility.

## 2020-05-05 NOTE — PROGRESS NOTES
Continued Stay Note   [place]     Patient Name: Gaviota Erazo  MRN: [id number]  Today's Date: 5/5/2020    Admit Date: 5/1/2020    Discharge Plan     Row Name 05/05/20 1537       Plan    Plan  [facility]    Patient/Family in Agreement with Plan  yes    Plan Comments  Pt's insurance has approved [facility]. Informed pt's nurse Alma and she states pt may not get to d/c today because she has had a decline. Will follow.         Discharge Codes    No documentation.       Expected Discharge Date and Time     Expected Discharge Date Expected Discharge Time    May 5, 2020             MAXI Crystal

## 2020-05-05 NOTE — PROGRESS NOTES
Neurology Progress Note      Chief Complaint:  F/u stroke    Subjective     Subjective:  Patient up in chair. Speech somewhat more slow and dysarthric with more pronounced left lower facial weakness. CT head done this AM shows larger stroke right frontal lobe 2.5 cm compared to 1.1 2 days ago. Patient feels like she is drooling more on left side.       Medications:  Current Facility-Administered Medications   Medication Dose Route Frequency Provider Last Rate Last Dose   • amLODIPine (NORVASC) 5 mg, lisinopril (PRINIVIL,ZESTRIL) 20 mg   Oral Q24H Nhan Muir MD       • aspirin chewable tablet 81 mg  81 mg Oral Daily Flory Chaney APRN   81 mg at 05/05/20 0836   • atorvastatin (LIPITOR) tablet 80 mg  80 mg Oral Nightly Suri Tyler MD   80 mg at 05/04/20 2042   • busPIRone (BUSPAR) tablet 15 mg  15 mg Oral Q12H Nhan Muir MD   15 mg at 05/05/20 0836   • carvedilol (COREG) tablet 6.25 mg  6.25 mg Oral BID With Meals Nhan Muir MD   6.25 mg at 05/05/20 0836   • citalopram (CeleXA) tablet 40 mg  40 mg Oral Daily Nhan Muir MD   40 mg at 05/05/20 0836   • clopidogrel (PLAVIX) tablet 75 mg  75 mg Oral Daily Nhan Muir MD   75 mg at 05/05/20 0836   • cyanocobalamin (VITAMIN B-12) tablet 500 mcg  500 mcg Oral Daily Flory Chaney APRN   500 mcg at 05/05/20 0836   • dextrose (D50W) 25 g/ 50mL Intravenous Solution 25 g  25 g Intravenous Q15 Min PRN Nhan Muir MD       • dextrose (GLUTOSE) oral gel 15 g  15 g Oral Q15 Min PRN Nhan Muir MD       • glucagon (human recombinant) (GLUCAGEN DIAGNOSTIC) injection 1 mg  1 mg Subcutaneous Q15 Min PRN Nhan Muir MD       • insulin lispro (humaLOG) injection 0-9 Units  0-9 Units Subcutaneous TID AC Nhan Muir MD   2 Units at 05/05/20 0835   • linagliptin (TRADJENTA) tablet 5 mg  5 mg Oral Daily Nhan Muir MD   5 mg at 05/05/20 0836   • LORazepam (ATIVAN)  tablet 1 mg  1 mg Oral Q6H PRN Nhan Muir MD       • metFORMIN (GLUCOPHAGE) tablet 500 mg  500 mg Oral BID With Meals Nhan Muir MD   500 mg at 05/05/20 0836   • sodium chloride 0.9 % flush 10 mL  10 mL Intravenous PRN Thomas Rosales MD       • sodium chloride 0.9 % flush 10 mL  10 mL Intravenous Q12H Nhan Muri MD   10 mL at 05/05/20 0836   • sodium chloride 0.9 % flush 10 mL  10 mL Intravenous PRN Nhan Muir MD           Review of Systems:   -A 14 point review of systems is completed and is negative except for dysarthria, left lower facial weakness, left arm more than left leg weakness.         Objective      Vital Signs  Temp:  [98 °F (36.7 °C)-99.7 °F (37.6 °C)] 98.3 °F (36.8 °C)  Heart Rate:  [] 93  Resp:  [14-18] 16  BP: (140-175)/(69-90) 140/90    Telemetry: S-ST     Physical Exam:  Physical Exam:  HEENT:  Neck supple  CVS:  Regular rate and rhythm.  No murmurs  Carotid Examination:  No bruits  Lungs:  Clear to auscultation  Abdomen:  Non-tender, Non-distended  Extremities:  No signs of peripheral edema     Neurologic Exam:     -Awake, Alert, Oriented X 3  -No word finding difficulties-but has speech hesitancy  -No aphasia  -mild to moderate dysarthria  -Follows simple and complex commands     Cranial nerves II through XII   · EOMI on right but left eye deviates medially and appears patient has to force left eye to move laterally  · No facial sensory loss  · Increased left facial droop --UMN VII-spares forehead  · Hearing intact to finger rub and voice  · Turns head side to side.  · Tongue is midline     Motor: (strength out of 5:  1= minimal movement, 2 = movement in plane of gravity, 3 = movement against gravity, 4 = movement against some resistance, 5 = full strength)     -Right Upper Ext: Proximal: 5 Distal: 5  -Left Upper Ext: Proximal: 4   Distal: 54     -Right Lower Ext: Proximal: 5 Distal: 5  -Left Lower Ext: Proximal: 4+  Distal: 4+       Left  absent and right  strong. Left hand in a soft fist position.    DTR:  2+ throughout in all four extremities  No babinski's     Sensory:  -Intact to light touch, pinprick, temperature, pain, and proprioception     Coordination/Gait:  -No ataxia/dysmetria on finger to nose of right upper extremity and heel to shin of bilateral extremities but more difficult for patient to perform on left.   No  of left hand.             Results Review:    I reviewed the patient's new clinical results.    Results from last 7 days   Lab Units 05/02/20  0438 05/01/20  1622   WBC 10*3/mm3 10.59 9.63   HEMOGLOBIN g/dL 13.0 13.7   HEMATOCRIT % 39.1 41.3   PLATELETS 10*3/mm3 278 268        Results from last 7 days   Lab Units 05/03/20  0341 05/02/20  0438 05/01/20  1622   SODIUM mmol/L 139 140 142   POTASSIUM mmol/L 4.3 3.8 4.6   CHLORIDE mmol/L 102 102 100   CO2 mmol/L 23.0 23.0 26.0   BUN mg/dL 20 20 18   CREATININE mg/dL 0.82 1.04* 0.93   CALCIUM mg/dL 9.5 10.0 10.1   BILIRUBIN mg/dL  --   --  0.4   ALK PHOS U/L  --   --  94   ALT (SGPT) U/L  --   --  17   AST (SGOT) U/L  --   --  16   GLUCOSE mg/dL 192* 265* 214*        Lab Results   Component Value Date    PROTIME 13.1 05/01/2020    INR 1.03 05/01/2020     No components found for: POCGLUC  No components found for: A1C  Lab Results   Component Value Date    HDL 33 (L) 05/02/2020    LDL 85 05/02/2020     No components found for: B12  No results found for: TSH  Imaging Results (Last 24 Hours)     Procedure Component Value Units Date/Time    CT Head Without Contrast [[id number]] Collected:  05/05/20 1048     Updated:  05/05/20 1057    Narrative:       EXAMINATION:  CT HEAD WO CONTRAST-  5/5/2020 10:37 AM CDT     HISTORY: Follow-up for stroke. R13.10-Dysphagia, unspecified;  I63.9-Cerebral infarction, unspecified; R47.1-Dysarthria and anarthria.     TECHNIQUE: Multiple axial images were obtained through the brain without  contrast infusion. Multiplanar images were  reconstructed.     DLP: 630 mGy-cm. Automated dosage control was utilized.     COMPARISON: 5/3/2020.     FINDINGS: An infarct in the right frontal periventricular white matter  is larger on the current study measuring 2.5 cm obliquely and previously  measuring about 1.1 cm. Another area of infarct in the right frontal  lobe just above the lateral ventricles is similar compared to the prior  study. There is no hemorrhage. There is a mild degree of atrophy.  Vascular calcification is noted. The visualized paranasal sinuses and  mastoid air cells are clear.       Impression:       1. An area of white matter infarct in the right frontal lobe is larger  on the current study, measuring 2.5 cm obliquely and previously  measuring 1.1 cm. Another smaller infarct in the right frontal white  matter is stable.  2. No hemorrhage.  3. Mild atrophy.     This report was finalized on 05/05/2020 10:54 by Dr. Sagar Bhatia MD.          CT head 5/3/2020        CT head done today 5/5/2020 shows slightly enlarged stroke compared to 5/3/2020.     Assessment/Plan     Hospital Problem List      Stroke (CMS/HCC)    Left arm weakness    Essential hypertension    Hypertensive urgency    Hypercholesterolemia    Impression:  1. Left MCA territory strokes suspicious for artery to artery emboli but cannot exclude cardiac CTA did not show evidence for emboli or thrombus but there was atherosclerotic disease without high grade stenosis.  There is irregularity at right M1 (and A2)  c/w atherosclerotic disease which may be the basis for stroke  There may be slight progression of symptoms in the same territory or increased edema from stroke --however she state this began several days ago so there should not be the increased edema  2. Hypertension   3. Mixed hyperlipidemia with LDL of 85 and goal of < 70 to reduce future risk of stroke. Her triglycerides are elevated at 331  4. DM  with elevated hemoglobin A1c at 8.9 and goal of < 7.0 to reduce future  risk of stroke   5. NIHSS = 6  6. B12 deficiency, low normal at 337.        Plan:  1. ASA 81 mg and Plavix 75 mg for dual antiplatelet therapy.  2. Lipitor 80 mg for LDL goal less than 70.   3. DM management for A1C goal less than 7.  4. Will need ZIO patch at discharge.  5. Replace B12 1000 mcg IM yesterday and then 500 mcg daily.   6. Refer to acute rehab pending.  7. Will need follow up with neurology in 3 weeks.  8. SCD for DVT prophylaxis.   9. Continue OT/ST/PT  10. CT head done today shows progression and slightly larger compared to 5/3/2020 and having worsening dysarthria and left lower facial weakness.left eye is deviating medially and patient seems to have to force left eye to move laterally.  Patient on dual antiplatelet therapy. Discussed findings with Dr. Aston Gaspar. 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   11. Need to maximize BP control and blood glucose management.      Addendum:  Alma BARR contacted me stating patient having more difficulty forming words and more left sided weakness. She had also contacted Dr. Aston Gaspar. MRI brain was done showing new areas of embolic strokes. The decision made to d/c ASA and Plavix and start Pradaxa 150 mg BID. This was explained to patient as well as risk/benefits of anticoagulation with continued embolic strokes versus risk of bleed. Patient is agreeable.  ZAYDA did not reveal thrombus and telemetry has shown sinus rhythm. I did call patient grandson, Mandeep Erazo [phone], and gave update on patient condition and plan. Mandeep was agreeable to stopping antiplatelet and starting anticoagulation.  All questions answered to best of my ability.   Flory Chaney, APRN  05/05/20  11:46

## 2020-05-05 NOTE — PLAN OF CARE
Problem: Patient Care Overview  Goal: Plan of Care Review  Flowsheets  Taken 5/5/2020 1334 by Yanira Ro MS CCC-SLP  Progress: declining  Taken 5/5/2020 1311 by Donna Shaffer PTA  Plan of Care Reviewed With: patient  Outcome Summary: PT tx completed. Patient up in chair, speech more slurred and aphasic on this date. Required CGA  for sit to stands and HHA on R side. Amb 50' x2 with Min A and seated rest break. Patient had 2 significant LOB during both trials when turning to sit in chair and required max A to recover. Patient presented with LLE neglect with turn which caused her to cross over with RLE and loose balance to L side. Patient amb to BR with Min A and mod cues for sequencing in order to avoid objects on L side. Pt left with OT on shower bench. Will cont to monitor and treat, possible PT Re-eval tomorrow.

## 2020-05-05 NOTE — PLAN OF CARE
Problem: Patient Care Overview  Goal: Plan of Care Review  Outcome: Ongoing (interventions implemented as appropriate)  Flowsheets  Taken 5/5/2020 5065  Progress: improving  Outcome Summary: Pt is A&Ox4. No complaints of any pain. Pt up in chair most of shift. ST on tele. Left arm still has some weakness. Up x1 to BR. No neuro changes noted. VSS. Safety maintained.  Taken 5/4/2020 2003  Plan of Care Reviewed With: patient

## 2020-05-06 ENCOUNTER — APPOINTMENT (OUTPATIENT)
Dept: ULTRASOUND IMAGING | Facility: HOSPITAL | Age: 75
End: 2020-05-06

## 2020-05-06 ENCOUNTER — APPOINTMENT (OUTPATIENT)
Dept: CARDIOLOGY | Facility: HOSPITAL | Age: 75
End: 2020-05-06

## 2020-05-06 VITALS
WEIGHT: 153.38 LBS | RESPIRATION RATE: 16 BRPM | DIASTOLIC BLOOD PRESSURE: 79 MMHG | SYSTOLIC BLOOD PRESSURE: 128 MMHG | TEMPERATURE: 98.3 F | BODY MASS INDEX: 30.11 KG/M2 | HEIGHT: 60 IN | HEART RATE: 91 BPM | OXYGEN SATURATION: 96 %

## 2020-05-06 PROBLEM — I63.9 ACUTE ISCHEMIC STROKE (HCC): Status: ACTIVE | Noted: 2020-05-06

## 2020-05-06 LAB
GLUCOSE BLD-MCNC: 133 MG/DL (ref 70–99)
GLUCOSE BLDC GLUCOMTR-MCNC: 139 MG/DL (ref 70–130)
GLUCOSE BLDC GLUCOMTR-MCNC: 151 MG/DL (ref 70–130)
GLUCOSE BLDC GLUCOMTR-MCNC: 156 MG/DL (ref 70–130)
PERFORMED ON: ABNORMAL

## 2020-05-06 PROCEDURE — 97110 THERAPEUTIC EXERCISES: CPT

## 2020-05-06 PROCEDURE — 99232 SBSQ HOSP IP/OBS MODERATE 35: CPT | Performed by: CLINICAL NURSE SPECIALIST

## 2020-05-06 PROCEDURE — 2500000003 HC RX 250 WO HCPCS: Performed by: PSYCHIATRY & NEUROLOGY

## 2020-05-06 PROCEDURE — 0296T HC EXT ECG > 48HR TO 21 DAY RCRD W/CONECT INTL RCRD: CPT

## 2020-05-06 PROCEDURE — 97535 SELF CARE MNGMENT TRAINING: CPT

## 2020-05-06 PROCEDURE — 1180000000 HC REHAB R&B

## 2020-05-06 PROCEDURE — 82962 GLUCOSE BLOOD TEST: CPT

## 2020-05-06 PROCEDURE — 82947 ASSAY GLUCOSE BLOOD QUANT: CPT

## 2020-05-06 PROCEDURE — 6370000000 HC RX 637 (ALT 250 FOR IP): Performed by: PSYCHIATRY & NEUROLOGY

## 2020-05-06 PROCEDURE — 93970 EXTREMITY STUDY: CPT

## 2020-05-06 PROCEDURE — 93970 EXTREMITY STUDY: CPT | Performed by: SURGERY

## 2020-05-06 PROCEDURE — 63710000001 INSULIN LISPRO (HUMAN) PER 5 UNITS: Performed by: FAMILY MEDICINE

## 2020-05-06 PROCEDURE — 92526 ORAL FUNCTION THERAPY: CPT | Performed by: SPEECH-LANGUAGE PATHOLOGIST

## 2020-05-06 RX ORDER — ATORVASTATIN CALCIUM 80 MG/1
80 TABLET, FILM COATED ORAL NIGHTLY
Status: DISCONTINUED | OUTPATIENT
Start: 2020-05-06 | End: 2020-05-20

## 2020-05-06 RX ORDER — ASPIRIN 81 MG/1
81 TABLET, CHEWABLE ORAL DAILY
Status: DISCONTINUED | OUTPATIENT
Start: 2020-05-07 | End: 2020-05-23 | Stop reason: HOSPADM

## 2020-05-06 RX ORDER — BUSPIRONE HYDROCHLORIDE 15 MG/1
15 TABLET ORAL 2 TIMES DAILY
COMMUNITY

## 2020-05-06 RX ORDER — AMLODIPINE BESYLATE AND BENAZEPRIL HYDROCHLORIDE 5; 20 MG/1; MG/1
1 CAPSULE ORAL DAILY
Status: DISCONTINUED | OUTPATIENT
Start: 2020-05-06 | End: 2020-05-06 | Stop reason: CLARIF

## 2020-05-06 RX ORDER — HYDROCHLOROTHIAZIDE 12.5 MG/1
12.5 TABLET ORAL DAILY
Qty: 30 TABLET | Refills: 3 | Status: SHIPPED | OUTPATIENT
Start: 2020-05-07

## 2020-05-06 RX ORDER — AMLODIPINE BESYLATE AND BENAZEPRIL HYDROCHLORIDE 5; 20 MG/1; MG/1
1 CAPSULE ORAL DAILY
Status: ON HOLD | COMMUNITY
End: 2020-05-22 | Stop reason: HOSPADM

## 2020-05-06 RX ORDER — CITALOPRAM 40 MG/1
40 TABLET ORAL DAILY
Status: DISCONTINUED | OUTPATIENT
Start: 2020-05-07 | End: 2020-05-23 | Stop reason: HOSPADM

## 2020-05-06 RX ORDER — AMLODIPINE BESYLATE 5 MG/1
5 TABLET ORAL DAILY
Status: DISCONTINUED | OUTPATIENT
Start: 2020-05-07 | End: 2020-05-09

## 2020-05-06 RX ORDER — LISINOPRIL 20 MG/1
20 TABLET ORAL DAILY
Status: ON HOLD | COMMUNITY
End: 2020-05-22 | Stop reason: HOSPADM

## 2020-05-06 RX ORDER — ACETAMINOPHEN 325 MG/1
650 TABLET ORAL EVERY 4 HOURS PRN
Status: DISCONTINUED | OUTPATIENT
Start: 2020-05-06 | End: 2020-05-23 | Stop reason: HOSPADM

## 2020-05-06 RX ORDER — ASPIRIN 81 MG/1
81 TABLET, CHEWABLE ORAL DAILY
COMMUNITY
End: 2020-08-27

## 2020-05-06 RX ORDER — SODIUM PHOSPHATE, DIBASIC AND SODIUM PHOSPHATE, MONOBASIC 7; 19 G/133ML; G/133ML
1 ENEMA RECTAL DAILY PRN
Status: DISCONTINUED | OUTPATIENT
Start: 2020-05-06 | End: 2020-05-23 | Stop reason: HOSPADM

## 2020-05-06 RX ORDER — LISINOPRIL 20 MG/1
20 TABLET ORAL DAILY
Status: DISCONTINUED | OUTPATIENT
Start: 2020-05-07 | End: 2020-05-09

## 2020-05-06 RX ORDER — BISACODYL 10 MG
10 SUPPOSITORY, RECTAL RECTAL DAILY PRN
Status: DISCONTINUED | OUTPATIENT
Start: 2020-05-06 | End: 2020-05-23 | Stop reason: HOSPADM

## 2020-05-06 RX ORDER — DABIGATRAN ETEXILATE 150 MG/1
150 CAPSULE ORAL EVERY 12 HOURS SCHEDULED
Qty: 60 CAPSULE | Refills: 3 | Status: SHIPPED | OUTPATIENT
Start: 2020-05-06

## 2020-05-06 RX ORDER — CLOPIDOGREL BISULFATE 75 MG/1
75 TABLET ORAL DAILY
Status: DISCONTINUED | OUTPATIENT
Start: 2020-05-07 | End: 2020-05-13

## 2020-05-06 RX ORDER — CITALOPRAM 40 MG/1
40 TABLET ORAL DAILY
COMMUNITY
End: 2020-08-27

## 2020-05-06 RX ORDER — POLYETHYLENE GLYCOL 3350 17 G/17G
17 POWDER, FOR SOLUTION ORAL DAILY
Status: DISCONTINUED | OUTPATIENT
Start: 2020-05-07 | End: 2020-05-23 | Stop reason: HOSPADM

## 2020-05-06 RX ORDER — HYDROCHLOROTHIAZIDE 25 MG/1
12.5 TABLET ORAL DAILY
Status: DISCONTINUED | OUTPATIENT
Start: 2020-05-07 | End: 2020-05-11

## 2020-05-06 RX ORDER — CLOPIDOGREL BISULFATE 75 MG/1
75 TABLET ORAL DAILY
Status: ON HOLD | COMMUNITY
End: 2020-05-22 | Stop reason: HOSPADM

## 2020-05-06 RX ORDER — LISINOPRIL 20 MG/1
20 TABLET ORAL DAILY
Status: DISCONTINUED | OUTPATIENT
Start: 2020-05-07 | End: 2020-05-14

## 2020-05-06 RX ORDER — ATORVASTATIN CALCIUM 80 MG/1
80 TABLET, FILM COATED ORAL NIGHTLY
Status: ON HOLD | COMMUNITY
End: 2020-05-22 | Stop reason: HOSPADM

## 2020-05-06 RX ORDER — BUSPIRONE HYDROCHLORIDE 15 MG/1
15 TABLET ORAL 2 TIMES DAILY
Status: DISCONTINUED | OUTPATIENT
Start: 2020-05-06 | End: 2020-05-23 | Stop reason: HOSPADM

## 2020-05-06 RX ORDER — HYDROCHLOROTHIAZIDE 12.5 MG/1
12.5 TABLET ORAL DAILY
Status: ON HOLD | COMMUNITY
End: 2020-05-22 | Stop reason: HOSPADM

## 2020-05-06 RX ORDER — AMLODIPINE BESYLATE 5 MG/1
5 TABLET ORAL DAILY
Status: DISCONTINUED | OUTPATIENT
Start: 2020-05-07 | End: 2020-05-23 | Stop reason: HOSPADM

## 2020-05-06 RX ORDER — DABIGATRAN ETEXILATE 150 MG/1
150 CAPSULE, COATED PELLETS ORAL 2 TIMES DAILY
Status: DISCONTINUED | OUTPATIENT
Start: 2020-05-06 | End: 2020-05-12

## 2020-05-06 RX ORDER — AMLODIPINE BESYLATE 5 MG/1
5 TABLET ORAL DAILY
COMMUNITY

## 2020-05-06 RX ORDER — DABIGATRAN ETEXILATE 150 MG/1
150 CAPSULE, COATED PELLETS ORAL 2 TIMES DAILY
COMMUNITY

## 2020-05-06 RX ADMIN — LINAGLIPTIN 5 MG: 5 TABLET, FILM COATED ORAL at 08:20

## 2020-05-06 RX ADMIN — PHENYLEPHRINE HYDROCHLORIDE 1 SPRAY: 0.5 SPRAY NASAL at 21:03

## 2020-05-06 RX ADMIN — ATORVASTATIN CALCIUM 80 MG: 80 TABLET, FILM COATED ORAL at 21:08

## 2020-05-06 RX ADMIN — CITALOPRAM 40 MG: 20 TABLET, FILM COATED ORAL at 08:19

## 2020-05-06 RX ADMIN — HYDROCHLOROTHIAZIDE 12.5 MG: 25 TABLET ORAL at 08:20

## 2020-05-06 RX ADMIN — DABIGATRAN ETEXILATE MESYLATE 150 MG: 150 CAPSULE ORAL at 21:08

## 2020-05-06 RX ADMIN — DABIGATRAN ETEXILATE MESYLATE 150 MG: 150 CAPSULE ORAL at 08:20

## 2020-05-06 RX ADMIN — AMLODIPINE BESYLATE: 5 TABLET ORAL at 08:19

## 2020-05-06 RX ADMIN — Medication 500 MCG: at 08:19

## 2020-05-06 RX ADMIN — SODIUM CHLORIDE, PRESERVATIVE FREE 10 ML: 5 INJECTION INTRAVENOUS at 08:34

## 2020-05-06 RX ADMIN — INSULIN LISPRO 2 UNITS: 100 INJECTION, SOLUTION INTRAVENOUS; SUBCUTANEOUS at 08:34

## 2020-05-06 RX ADMIN — METFORMIN HYDROCHLORIDE 500 MG: 500 TABLET ORAL at 21:08

## 2020-05-06 RX ADMIN — METFORMIN HYDROCHLORIDE 500 MG: 500 TABLET ORAL at 08:20

## 2020-05-06 RX ADMIN — BUSPIRONE HYDROCHLORIDE 15 MG: 10 TABLET ORAL at 08:20

## 2020-05-06 RX ADMIN — BUSPIRONE HYDROCHLORIDE 15 MG: 15 TABLET ORAL at 21:08

## 2020-05-06 RX ADMIN — METFORMIN HYDROCHLORIDE 500 MG: 500 TABLET ORAL at 17:27

## 2020-05-06 NOTE — DISCHARGE PLACEMENT REQUEST
"[facility] [phone]  Gaviota Hale (74 y.o. Female)     Date of Birth Social Security Number Address Home Phone MRN    1945  [address] [place] [id number] [phone] [id number]    [employer] Mar[employer] Status          Buddhism        Admission Date Admission Type Admitting Provider Attending Provider Department, Room/Bed    5/1/20 Emergency Nhan Muir MD Eickholz, James Noah, MD [facility] 3A, 353/1    Discharge Date Discharge Disposition Discharge Destination         Rehab Facility or Unit (DC - External)              Attending Provider:  Nhan Muir MD    Allergies:  No Known Allergies    Isolation:  None   Infection:  None   Code Status:  CPR    Ht:  152.4 cm (60\")   Wt:  69.6 kg (153 lb 6 oz)    Admission Cmt:  None   Principal Problem:  Stroke (CMS/HCC) [I63.9]                 Active Insurance as of 5/1/2020     Primary Coverage     Payor Plan Insurance Group Employer/Plan Group    ANTH MEDICARE REPLACEMENT ANTHEM MEDICARE ADVANTAGE [id number]     Payor Plan Address Payor Plan Phone Number Payor Plan Fax Number Effective Dates    [address] [phone]  8/1/2019 - None Entered    [facility] [id number]       Subscriber Name Subscriber Birth Date Member ID       GAVIOTA HALE 1945 [id number]                 Emergency Contacts      (Rel.) Home Phone Work Phone Mobile Phone    Mandeep Hale (Grandchild) [phone] -- [phone]    Willam Hale (Grandchild) [phone] -- [phone]              "

## 2020-05-06 NOTE — PROGRESS NOTES
Neurology Progress Note      Chief Complaint:  F/u stroke    Subjective     Subjective:  Sitting up in chair feeding self breakfast. Continues with dysarthria and some aphasia with slow speech saying a few words at a time. Expect to d/c to acute rehab today. LE doppler studies pending.       Medications:  Current Facility-Administered Medications   Medication Dose Route Frequency Provider Last Rate Last Dose   • amLODIPine (NORVASC) 5 mg, lisinopril (PRINIVIL,ZESTRIL) 20 mg   Oral BID Nhan Muir MD       • atorvastatin (LIPITOR) tablet 80 mg  80 mg Oral Nightly Suri Tyler MD   80 mg at 05/05/20 2138   • busPIRone (BUSPAR) tablet 15 mg  15 mg Oral Q12H Nhan Muir MD   15 mg at 05/06/20 0820   • citalopram (CeleXA) tablet 40 mg  40 mg Oral Daily Nhan Muir MD   40 mg at 05/06/20 0819   • cyanocobalamin (VITAMIN B-12) tablet 500 mcg  500 mcg Oral Daily Flory Chaney APRN   500 mcg at 05/06/20 0819   • dabigatran etexilate (PRADAXA) capsule 150 mg  150 mg Oral Q12H Suri Tyler MD   150 mg at 05/06/20 0820   • dextrose (D50W) 25 g/ 50mL Intravenous Solution 25 g  25 g Intravenous Q15 Min PRN Nhan Muir MD       • dextrose (GLUTOSE) oral gel 15 g  15 g Oral Q15 Min PRN Nhan Muir MD       • glucagon (human recombinant) (GLUCAGEN DIAGNOSTIC) injection 1 mg  1 mg Subcutaneous Q15 Min PRN Nhan Muir MD       • hydroCHLOROthiazide (HYDRODIURIL) tablet 12.5 mg  12.5 mg Oral Daily Nhan Muir MD   12.5 mg at 05/06/20 0820   • insulin lispro (humaLOG) injection 0-9 Units  0-9 Units Subcutaneous TID AC Nhan Muir MD   2 Units at 05/06/20 0834   • linagliptin (TRADJENTA) tablet 5 mg  5 mg Oral Daily Nhan Muir MD   5 mg at 05/06/20 0820   • LORazepam (ATIVAN) tablet 1 mg  1 mg Oral Q6H PRN Nhan Muir MD       • metFORMIN (GLUCOPHAGE) tablet 500 mg  500 mg Oral BID With Meals Nhan Muir  MD Nayan   500 mg at 05/06/20 0820   • sodium chloride 0.9 % flush 10 mL  10 mL Intravenous PRN Thomas Rosales MD       • sodium chloride 0.9 % flush 10 mL  10 mL Intravenous Q12H Nhan Muir MD   10 mL at 05/06/20 0834   • sodium chloride 0.9 % flush 10 mL  10 mL Intravenous PRN Nhan Muir MD           Review of Systems:    -A 14 point review of systems is completed and is negative except for except for dysarthria, left lower facial weakness, left arm more than left leg weakness.         Objective      Vital Signs  Temp:  [98.2 °F (36.8 °C)-98.8 °F (37.1 °C)] 98.2 °F (36.8 °C)  Heart Rate:  [75-92] 88  Resp:  [16-18] 16  BP: (135-156)/(78-94) 135/94    Telemetry: S 76-92    Physical Exam:  HEENT:  Neck supple  CVS:  Regular rate and rhythm.  No murmurs  Carotid Examination:  No bruits  Lungs:  Clear to auscultation  Abdomen:  Non-tender, Non-distended  Extremities:  No signs of peripheral edema     Neurologic Exam:     -Awake, Alert, Oriented X 3  -No word finding difficulties-but has speech hesitancy  -No aphasia  -moderate dysarthria  -Follows simple and complex commands     Cranial nerves II through XII   · EOMI on right but left eye deviates medially and appears patient has to force left eye to move laterally and this is improved compared to yesterday  · No facial sensory loss  · Increased left facial droop --UMN VII-spares forehead  · Hearing intact to finger rub and voice  · Turns head side to side.  · Tongue is midline     Motor: (strength out of 5:  1= minimal movement, 2 = movement in plane of gravity, 3 = movement against gravity, 4 = movement against some resistance, 5 = full strength)     -Right Upper Ext: Proximal: 5 Distal: 5  -Left Upper Ext: Proximal: 4   Distal: 4     -Right Lower Ext: Proximal: 5 Distal: 5  -Left Lower Ext: Proximal: 4+  Distal: 4+      Left  absent and right  strong. Left hand in a soft fist position.     DTR:  2+ throughout in all four  extremities  No babinski's     Sensory:  -Intact to light touch, pinprick, temperature, pain, and proprioception     Coordination/Gait:  -No ataxia/dysmetria on finger to nose of right upper extremity and heel to shin of bilateral extremities but more difficult for patient to perform on left.   No  of left hand.           Results Review:    I reviewed the patient's new clinical results.    Results from last 7 days   Lab Units 05/02/20  0438 05/01/20  1622   WBC 10*3/mm3 10.59 9.63   HEMOGLOBIN g/dL 13.0 13.7   HEMATOCRIT % 39.1 41.3   PLATELETS 10*3/mm3 278 268        Results from last 7 days   Lab Units 05/03/20  0341 05/02/20  0438 05/01/20  1622   SODIUM mmol/L 139 140 142   POTASSIUM mmol/L 4.3 3.8 4.6   CHLORIDE mmol/L 102 102 100   CO2 mmol/L 23.0 23.0 26.0   BUN mg/dL 20 20 18   CREATININE mg/dL 0.82 1.04* 0.93   CALCIUM mg/dL 9.5 10.0 10.1   BILIRUBIN mg/dL  --   --  0.4   ALK PHOS U/L  --   --  94   ALT (SGPT) U/L  --   --  17   AST (SGOT) U/L  --   --  16   GLUCOSE mg/dL 192* 265* 214*        Lab Results   Component Value Date    PROTIME 13.1 05/01/2020    INR 1.03 05/01/2020     No components found for: POCGLUC  No components found for: A1C  Lab Results   Component Value Date    HDL 33 (L) 05/02/2020    LDL 85 05/02/2020     No components found for: B12  No results found for: TSH  Imaging Results (Last 24 Hours)     Procedure Component Value Units Date/Time    MRI Brain Without Contrast [[id number]] Collected:  05/05/20 1528     Updated:  05/05/20 1543    Narrative:       MRI BRAIN WO CONTRAST-     Indication: Neuro deficit(s), subacute; R29.898-Other symptoms and signs  involving the musculoskeletal system; Z78.9-Other specified health  status; Z74.09-Other reduced mobility; R13.10-Dysphagia, unspecified;  I63.9-Cerebral infarction, unspecified; R00.1-Bradycardia, unspecified;  R47.1-Dysarthria and anarthria     Comparison: Brain MR 5/2/2020     Findings:     Redemonstrated multifocal areas of  diffusion restriction in the RIGHT  MCA distribution with dominant large focus in the RIGHT periventricular  white matter. The dominant focus of diffusion restriction has  significantly increased in size from the brain MR performed 5/2/2020.  There are also multiple new tiny foci of diffusion restriction at the  gray-white junction in the RIGHT cerebral hemisphere.     Major physiologic flow voids are maintained. No new foci of increased  susceptibility to suggest hemorrhage. Unchanged tiny punctate focus of  susceptibility in the RIGHT occipital lobe likely represents remote  microhemorrhage. No extra-axial fluid collection.     No midline shift or mass effect. Lateral ventricles appear nondilated.  Basilar cisterns are patent. Chronic microvascular ischemic white matter  change is again demonstrated. Sella turcica and its contents appear  unremarkable. No acute orbital finding. Prior LEFT cataract surgery.  Trace mastoid effusion. RIGHT maxillary sinus mucus retention cyst.       Impression:       Impression:     Multiple new areas of acute infarct in the RIGHT MCA distribution when  compared to a brain MR from 5/2/2020. Findings are concerning for  ongoing/new embolic disease.  This report was finalized on 05/05/2020 15:40 by Dr. Sebastien Paris MD.    FL Video Swallow With Speech Single Contrast [[id number]] Collected:  05/05/20 1458     Updated:  05/05/20 1503    Narrative:       FL VIDEO SWALLOW W SPEECH SINGLE-CONTRAST-     Indication: worsening swallow; R29.898-Other symptoms and signs  involving the musculoskeletal system; Z78.9-Other specified health  status; Z74.09-Other reduced mobility; R13.10-Dysphagia, unspecified;  I63.9-Cerebral infarction, unspecified; R00.1-Bradycardia, unspecified;  R47.1-Dysarthria and anarthria     Comparison: None     Fluoroscopy time: 1 minute 34 seconds  Fluoroscopy dose: 6.42 mGy  Number of images: One       Impression:       Findings/impression:     The speech pathologist  was present and supervised the administration of  multiple consistencies of barium to the patient orally, during  intermittent lateral fluoroscopy and digital video capture. A single  episode of mild penetration with thin barium occurred. There is some  mild external spilling of oral contrast. No other episodes of  penetration. No episodes of aspiration with any of the attempted  consistencies. Please review the speech pathologist report for more  information and recommendations.  This report was finalized on 05/05/2020 15:00 by Dr. Sebastien Paris MD.    CT Head Without Contrast [[id number]] Collected:  05/05/20 1048     Updated:  05/05/20 1057    Narrative:       EXAMINATION:  CT HEAD WO CONTRAST-  5/5/2020 10:37 AM CDT     HISTORY: Follow-up for stroke. R13.10-Dysphagia, unspecified;  I63.9-Cerebral infarction, unspecified; R47.1-Dysarthria and anarthria.     TECHNIQUE: Multiple axial images were obtained through the brain without  contrast infusion. Multiplanar images were reconstructed.     DLP: 630 mGy-cm. Automated dosage control was utilized.     COMPARISON: 5/3/2020.     FINDINGS: An infarct in the right frontal periventricular white matter  is larger on the current study measuring 2.5 cm obliquely and previously  measuring about 1.1 cm. Another area of infarct in the right frontal  lobe just above the lateral ventricles is similar compared to the prior  study. There is no hemorrhage. There is a mild degree of atrophy.  Vascular calcification is noted. The visualized paranasal sinuses and  mastoid air cells are clear.       Impression:       1. An area of white matter infarct in the right frontal lobe is larger  on the current study, measuring 2.5 cm obliquely and previously  measuring 1.1 cm. Another smaller infarct in the right frontal white  matter is stable.  2. No hemorrhage.  3. Mild atrophy.     This report was finalized on 05/05/2020 10:54 by Dr. Sagar Bhatia MD.            MRI brain personally  reviewed by me showing new areas of infarct.     Assessment/Plan     Hospital Problem List      Stroke (CMS/HCC)    Left arm weakness    Essential hypertension    Hypertensive urgency    Hypercholesterolemia    Impression:  1. Left MCA territory strokes suspicious for artery to artery emboli but cannot exclude cardiac CTA did not show evidence for emboli or thrombus but there was atherosclerotic disease without high grade stenosis.  There is irregularity at right M1 (and A2)  c/w atherosclerotic disease which may be the basis for stroke  There may be slight progression of symptoms in the same territory or increased edema from stroke --however she state this began several days ago so there should not be the increased edema. 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 Hypertension   3. Mixed hyperlipidemia with LDL of 85 and goal of < 70 to reduce future risk of stroke. Her triglycerides are elevated at 331  4. DM  with elevated hemoglobin A1c at 8.9 and goal of < 7.0 to reduce future risk of stroke   5. NIHSS = 9  6. B12 deficiency, low normal at 337.     Plan:  1. ASA and Plavix d/c and Pradaxa 150 mg BID started yesterday after discussion with patient and grandsonMandeep,  risk/benefits of anticoagulation with continued embolic strokes versus risk of bleed.   2. Lipitor 80 mg for LDL goal less than 70.   3. DM management for A1C goal less than 7.  4. Will need ZIO patch at discharge.  5. Replace B12 500 mcg daily.   6.  acute rehab for d/c planning and hopefully can go today.   7. Will need follow up with neurology in 3 weeks.  8. SCD for DVT prophylaxis.   9. Continue OT/ST/PT  10.LE doppler studies pending.   11. Need to maximize BP control and blood glucose management.      Flory Chaney, APRN  05/06/20  09:33

## 2020-05-06 NOTE — THERAPY TREATMENT NOTE
Acute Care - Occupational Therapy Treatment Note  [facility]     Patient Name: Gaviota Erazo  : 1945  MRN: [id number]  Today's Date: 2020  Onset of Illness/Injury or Date of Surgery: 20  Date of Referral to OT: 20  Referring Physician: MARGARITA Sy    Admit Date: 2020       ICD-10-CM ICD-9-CM   1. Left arm weakness R29.898 729.89   2. Decreased activities of daily living (ADL) Z78.9 V49.89   3. Impaired mobility Z74.09 799.89   4. Dysphagia, unspecified type R13.10 787.20   5. Cerebrovascular accident (CVA), unspecified mechanism (CMS/HCC) I63.9 434.91   6. Bradycardia, unspecified  R00.1 427.89   7. Dysarthria R47.1 784.51     Patient Active Problem List   Diagnosis   • Left arm weakness   • Essential hypertension   • Hypertensive urgency   • Hypercholesterolemia   • Stroke (CMS/HCC)     Past Medical History:   Diagnosis Date   • Depression    • Hypercholesteremia    • Hypertension    • TIA (transient ischemic attack)      Past Surgical History:   Procedure Laterality Date   • HYSTERECTOMY     • SKIN CANCER EXCISION         Therapy Treatment    Rehabilitation Treatment Summary     Row Name 20 1009 20 0730          Treatment Time/Intention    Discipline  speech language pathologist  -MM  occupational therapy assistant  -CJ     Document Type  therapy note (daily note)  -MM2  therapy note (daily note)  -CJ     Subjective Information  no complaints  -MM2  complains of;weakness;fatigue  -CJ     Mode of Treatment  individual therapy;speech-language pathology  -MM2  occupational therapy  -CJ     Patient/Family Observations  No family present  -MM2  --     Care Plan Review  care plan/treatment goals reviewed  -MM2  --     Therapy Frequency (Swallow)  at least;3 days per week  -MM2  --     Therapy Frequency (SLP SLC)  at least;3 days per week  -MM2  --     Patient Effort  good  -MM2  adequate  -CJ     Existing Precautions/Restrictions  --  fall L. sided weakness!  -CJ     Recorded  by [MM] Vivian Hylton, MS CCC-SLP 05/06/20 1044  [MM2] Vivian Hylton, MS CCC-SLP 05/06/20 1047 [CJ] Jose Miguel Castaneda EASON/L 05/06/20 1117     Row Name 05/06/20 0730             Bed Mobility Assessment/Treatment    Comment (Bed Mobility)  up in chair!  -CJ      Recorded by [CJ] Jose Miguel Castaneda COTA/L 05/06/20 1117      Row Name 05/06/20 0730             Functional Mobility    Functional Mobility- Ind. Level  set up required;verbal cues required;maximum assist (25% patient effort);moderate assist (50% patient effort)  -CJ      Functional Mobility-Distance (Feet)  3  -CJ      Recorded by [CJ] Jose Miguel Castaneda COTA/L 05/06/20 1117      Row Name 05/06/20 0730             Sit-Stand Transfer    Sit-Stand [place] (Transfers)  set up;verbal cues;minimum assist (75% patient effort)  -CJ      Recorded by [CJ] Jose Miguel Castaneda COTA/L 05/06/20 1117      Row Name 05/06/20 0730             Stand-Sit Transfer    Stand-Sit [place] (Transfers)  set up;verbal cues;minimum assist (75% patient effort)  -CJ      Recorded by [CJ] Jose Miguel Castaneda COTA/L 05/06/20 1117      Row Name 05/06/20 0730             ADL Assessment/Intervention    70568 - OT Self Care/Mgmt Minutes  25  -CJ      BADL Assessment/Intervention  grooming;toileting  -CJ      Recorded by [CJ] Jose Miguel Castaneda COTA/L 05/06/20 1117      Row Name 05/06/20 0730             Grooming Assessment/Training    [place] Level (Grooming)  hair care, combing/brushing;wash face, hands;minimum assist (75% patient effort);set up R. hand!  -      Grooming Position  supported sitting  -CJ      Recorded by [CJ] Jose Miguel Castaneda COTA/L 05/06/20 1117      Row Name 05/06/20 0730             Toileting Assessment/Training    [place] Level (Toileting)  toileting skills;set up;verbal cues;moderate assist (50% patient effort)  -      Assistive Devices (Toileting)  commode, bedside without drop arms  -      Toileting Position  supported  standing;supported sitting  -CJ      Recorded by [CJ] Jose Miguel Castaneda COTA/L 05/06/20 1117      Row Name 05/06/20 1100             General ROM    LT Upper Ext  Lt Shoulder ABduction;Lt Shoulder Extension;Lt Shoulder Flexion;Lt Shoulder Horizontal Abduction;Lt Shoulder External Rotation;Lt Shoulder Internal Rotation;Lt Elbow Extension/Flexion;Lt Elbow Supination;Lt Elbow Pronation;Lt Wrist Flexion;Lt Wrist Extension  -CJ      Left Hand  Fingers MCP Flexion;Fingers MCP Extension;Fingers MCP ABDuction;Fingers MCP ADDuction;Fingers PIP Flexion;Fingers DIP Flexion;Thumb CM Flexion;Thumb CM Extension  -CJ      Recorded by [CJ] Jose Miguel Castaneda COTA/L 05/06/20 1117      Row Name 05/06/20 1100             Positioning and Restraints    Pre-Treatment Position  sitting in chair/recliner  -CJ      Post Treatment Position  chair  -CJ      In Chair  sitting;call light within reach;encouraged to call for assist;exit alarm on  -CJ      Recorded by [CJ] Jose Miguel Castaneda COTA/L 05/06/20 1117      Row Name 05/06/20 1100 05/06/20 1009          Pain Assessment    Additional Documentation  Pain Scale 2: Word Pre/Post-Treatment (Group)  -  Pain Scale: FACES Pre/Post-Treatment (Group)  -MM     Recorded by [CJ] Jose Miguel Castaneda COTA/LARISA 05/06/20 1117 [MM] Vivian Hylton MS CCC-SLP 05/06/20 1047     [facility] Name 05/06/20 1100             Pain Scale: Numbers Pre/Post-Treatment    Pain Scale: Numbers, Pretreatment  0/10 - no pain  -      Pain Scale: Numbers, Post-Treatment  0/10 - no pain  -CJ      Recorded by [CJ] Jose Miguel Castaneda COTA/L 05/06/20 1117      [facility] Name 05/06/20 1009             Pain Scale: FACES Pre/Post-Treatment    Pain: FACES Scale, Pretreatment  0-->no hurt  -MM      Pain: FACES Scale, Post-Treatment  0-->no hurt  -MM      Recorded by [MM] Vivian Hylton MS CCC-SLP 05/06/20 1047      Row Name 05/06/20 1100 05/06/20 0730          Outcome Summary/Treatment Plan (OT)    Daily Summary of Progress (OT)   progress towards functional goals is fair  -CJ  progress towards functional goals is fair  -CJ     Barriers to Overall Progress (OT)  Fall/L. sided weakness!  -CJ  Fall/L. sided weakness  -CJ     Plan for Continued Treatment (OT)  --  Plan d/c!  -CJ     Recorded by [CJ] Jose Miguel Castaneda EASON/L 05/06/20 1117 [CJ] Jose Miguel Castaneda, EASON/L 05/06/20 1117     Row Name 05/06/20 1009             Outcome Summary/Treatment Plan (SLP)    Daily Summary of Progress (SLP)  progress toward functional goals as expected  -MM      Barriers to Overall Progress (SLP)  n/a  -MM      Plan for Continued Treatment (SLP)  Continue to follow   -MM      Anticipated Dischage Disposition  inpatient rehabilitation facility  -MM      Recorded by [MM] Vivian Hylton MS CCC-SLP 05/06/20 1047        User Key  (r) = Recorded By, (t) = Taken By, (c) = Cosigned By    Initials Name Effective Dates Discipline     Jose Miguel Castaneda EASON/L 08/02/16 -  OT    MM Vivian Hylton MS CCC-SLP 02/11/20 -  SLP           Rehab Goal Summary     Row Name 05/06/20 1009             Swallow Goals (SLP)    Oral Nutrition/Hydration Goal Selection (SLP)  oral nutrition/hydration, SLP goal 1  -MM      Labial Strengthening Goal Selection (SLP)  labial strengthening, SLP goal 1  -MM      Lingual Strengthening Goal Selection (SLP)  lingual strengthening, SLP goal 1  -MM      Pharyngeal Strengthening Exercise Goal Selection (SLP)  pharyngeal strengthening exercise, SLP goal 1  -MM      Swallow Compensatory Strategies Goal Selection (SLP)  swallow compensatory strategies, SLP goal 1  -MM      Additional Documentation  labial strengthening goal selection (SLP);lingual strengthening goal selection (SLP);pharyngeal strengthening exercise goal selection (SLP);swallow compensatory strategies goal selection (SLP)  -MM         Oral Nutrition/Hydration Goal 1 (SLP)    Oral Nutrition/Hydration Goal 1, SLP  Pt will tolerate LRD w/o any overt s/s of aspriation.  -MM       Time Frame (Oral Nutrition/Hydration Goal 1, SLP)  by discharge  -MM      Barriers (Oral Nutrition/Hydration Goal 1, SLP)  n/a  -MM      Progress/Outcomes (Oral Nutrition/Hydration Goal 1, SLP)  continuing progress toward goal  -MM         Labial Strengthening Goal 1 (SLP)    Activity (Labial Strengthening Goal 1, SLP)  increase labial tone  -MM      Increase Labial Tone  labial resistance exercises  -MM      [place]/Accuracy (Labial Strengthening Goal 1, SLP)  independently (over 90% accuracy)  -MM      Time Frame (Labial Strengthening Goal 1, SLP)  short term goal (STG);by discharge  -MM      Barriers (Labial Strengthening Goal 1, SLP)  n/a  -MM      Progress/Outcomes (Labial Strengthening Goal 1, SLP)  continuing progress toward goal  -MM         Lingual Strengthening Goal 1 (SLP)    Activity (Lingual Strengthening Goal 1, SLP)  increase lingual tone/sensation/control/coordination/movement  -MM      Increase Lingual Tone/Sensation/Control/Coordination/Movement  lingual movement exercises  -MM      [place]/Accuracy (Lingual Strengthening Goal 1, SLP)  independently (over 90% accuracy)  -MM      Time Frame (Lingual Strengthening Goal 1, SLP)  short term goal (STG);by discharge  -MM      Barriers (Lingual Strengthening Goal 1, SLP)  n/a  -MM      Progress/Outcomes (Lingual Strengthening Goal 1, SLP)  continuing progress toward goal  -MM         Pharyngeal Strengthening Exercise Goal 1 (SLP)    Activity (Pharyngeal Strengthening Goal 1, SLP)  increase tongue base retraction  -MM      Increase Tongue Base Retraction  timothy;falsetto  -MM      [place]/Accuracy (Pharyngeal Strengthening Goal 1, SLP)  independently (over 90% accuracy)  -MM      Time Frame (Pharyngeal Strengthening Goal 1, SLP)  short term goal (STG);by discharge  -MM      Barriers (Pharyngeal Strengthening Goal 1, SLP)  n/a  -MM      Progress/Outcomes (Pharyngeal Strengthening Goal 1, SLP)  goal ongoing  -MM         Swallow Compensatory  Strategies Goal 1 (SLP)    Activity (Swallow Compensatory Strategies/Techniques Goal 1, SLP)  compensatory strategies;during meal intake;during p.o. trials;small bites;small cup sips;small straw sips;food/liquid placed on stronger right side;alternate food/liquid intake  -MM      [place]/Accuracy (Swallow Compensatory Strategies/Techniques Goal 1, SLP)  independently (over 90% accuracy)  -MM      Time Frame (Swallow Compensatory Strategies/Techniques Goal 1, SLP)  short term goal (STG);by discharge  -MM      Barriers (Swallow Compensatory Strategies/Techniques Goal 1, SLP)  n/a  -MM      Progress/Outcomes (Swallow Compensatory Strategies/Techniques Goal 1, SLP)  continuing progress toward goal  -MM        User Key  (r) = Recorded By, (t) = Taken By, (c) = Cosigned By    Initials Name Provider Type Discipline    Vivian Milan, MS CCC-SLP Speech and Language Pathologist SLP            OT Recommendation and Plan  Outcome Summary/Treatment Plan (OT)  Daily Summary of Progress (OT): progress towards functional goals is fair  Barriers to Overall Progress (OT): Fall/L. sided weakness!  Plan for Continued Treatment (OT): Plan d/c!  Daily Summary of Progress (OT): progress towards functional goals is fair  Plan of Care Review  Plan of Care Reviewed With: patient  Plan of Care Reviewed With: patient  Outcome Measures     Row Name 05/06/20 0730 05/05/20 1328 05/04/20 0755       How much help from another is currently needed...    Putting on and taking off regular lower body clothing?  2  -CJ  2  -CS  2  -CJ    Bathing (including washing, rinsing, and drying)  2  -CJ  2  -CS  3  -CJ    Toileting (which includes using toilet bed pan or urinal)  2  -CJ  2  -CS  3  -CJ    Putting on and taking off regular upper body clothing  2  -CJ  2  -CS  2  -CJ    Taking care of personal grooming (such as brushing teeth)  2  -CJ  2  -CS  3  -CJ    Eating meals  3  -CJ  3  -CS  3  -CJ    AM-PAC 6 Clicks Score (OT)  13  -CJ  13   -  16  -       Functional Assessment    Outcome Measure Options  --  AM-PAC 6 Clicks Daily Activity (OT)  -  AM-PAC 6 Clicks Daily Activity (OT)  -      User Key  (r) = Recorded By, (t) = Taken By, (c) = Cosigned By    Initials Name Provider Type     Jose Miguel Castaneda COTA/L Occupational Therapy Assistant    CS Enma Bennett, OTR/L, CNT Occupational Therapist           Time Calculation:   Time Calculation- OT     Row Name 05/06/20 0730             Time Calculation- OT    OT Start Time  0730  -      OT Stop Time  0832  -      OT Time Calculation (min)  62 min  -      Total Timed Code Minutes- OT  62 minute(s)  -      TCU Minutes- OT  62 min  -      OT Received On  05/06/20  -      OT Goal Re-Cert Due Date  05/15/20  -         Timed Charges    89069 - OT Self Care/Mgmt Minutes  25  -        User Key  (r) = Recorded By, (t) = Taken By, (c) = Cosigned By    Initials Name Provider Type     Jose Miguel Castaneda COTA/L Occupational Therapy Assistant        Therapy Charges for Today     Code Description Service Date Service Provider Modifiers Qty    72234770764 HC OT SELF CARE/MGMT/TRAIN EA 15 MIN 5/6/2020 Jose Miguel Castaneda COTA/L GO 2    23827049955 HC OT THER PROC EA 15 MIN 5/6/2020 Jose Miguel Castaneda COTA/L GO 2               GLENDA Krishnamurthy  5/6/2020

## 2020-05-06 NOTE — NURSING NOTE
[facility] contacted for transport to [facility].

## 2020-05-06 NOTE — PLAN OF CARE
Problem: Patient Care Overview  Goal: Plan of Care Review  Outcome: Ongoing (interventions implemented as appropriate)  Flowsheets (Taken 5/6/2020 7531)  Progress: no change  Plan of Care Reviewed With: patient  Note:   VSS. No change in neuro status this shift, NIH- 7. Pt has been sleeping in chair, refuses to get in bed. Up to bsc with assist of 2. Voiding. Becomes frustrated when communicating, at times not wanting to talk, explained that she just needs to be patient with herself. SCD in place at . Safety maintained.

## 2020-05-06 NOTE — PLAN OF CARE
Problem: Patient Care Overview  Goal: Plan of Care Review  Outcome: Ongoing (interventions implemented as appropriate)  Flowsheets (Taken 5/6/2020 1118)  Progress: declining  Plan of Care Reviewed With: patient  Note:   Pt. Required mod-max. Assist from this meza/l for transfers chair-bsc for toileting Nsg assisted with bottoms, pt. Leans to her L. During transfer and has no ability with moving Le's for amb! L. Hand splinted with resting hand splint to decrease tone in [place], Lue ranged with prom with this meza/l requesting pt. To assist with aarom, but trace active mov't noted at this tx, Pt's speech is weak and slurred fro previous tx of 5/4/2020!

## 2020-05-06 NOTE — DISCHARGE SUMMARY
Hospital Discharge Summary    Gaviota Erazo  :  1945  MRN:  [id number]    Admit date:  2020  Discharge date:      Admitting Physician:    Nhan Muir MD    Discharge Diagnoses:      Stroke (CMS/HCC)    Left arm weakness    Essential hypertension    Hypertensive urgency    Hypercholesterolemia      Hospital Course:   The patient was admitted for the above surgical/medical indication.  Please see admission H&P for further details concerning the admission.  The patient was seen daily and progress noted via daily updates in the progress notes.  The patient improved throughout her stay.  They reached maximum medical improvement and were considered stable for discharge home.  They understand the importance of follow-up concerning any abnormal lab values/x-rays.  All questions were answered to the best of my ability prior to their discharge home.    Pleasant 74-year-old white female who presented to the emergency room with a 4 to 5-day history of left arm weakness and slurred speech.  X-rays confirmed a CVA.  Neurology was consulted.  Patient antihypertensive and diabetic medications were adjusted.  CTA of the neck noted.  Transesophageal echo noted.  Patient started on Plavix and aspirin.   consulted for admission to [facility] rehab unit.  She will be transferred there later today if pre-CERT is done.    Patient was initially scheduled for discharge to [facility] inpatient rehab on 2020.  Unfortunately despite dual antiplatelet therapy she extended her stroke in the same area.  Conference with neurology and Pradaxa started.  Risk versus benefit discussed with patient.  Medically stable for discharge to [facility] today.  Antihypertensive adjusted today to increase the combination of Norvasc/lisinopril 5/20 to twice daily, and added hydrochlorothiazide 12.5 mg.  Goal blood pressure less than 120/80 and will follow inpatient at [facility]        See new changes listed above and per  sheet on discharge planning for accurate med list        Consults: Neurology    Significant Diagnostic Studies:      EKG: normal EKG, normal sinus rhythm, unchanged from previous tracings.      Treatments:   Work-up of acute CVA, see neurology note for recommendations    Disposition:   [facility] rehab  Follow up with Nhan Muir MD in 1-2 weeks.    Signed:  Nhan Muir MD   5/6/2020, 09:30

## 2020-05-06 NOTE — PAYOR COMM NOTE
"5/5 CLINICAL UPDATE  [id number]   [id number]    Gaviota Erazo (74 y.o. Female)     Date of Birth Social Security Number Address Home Phone MRN    1945  [address] [place] [id number] [phone] [id number]    Faith Marital Status          Voodoo        Admission Date Admission Type Admitting Provider Attending Provider Department, Room/Bed    5/1/20 Emergency Nhan Muir MD Eickholz, James Noah, MD [facility] 3A, 353/1    Discharge Date Discharge Disposition Discharge Destination                       Attending Provider:  Nhan Muir MD    Allergies:  No Known Allergies    Isolation:  None   Infection:  None   Code Status:  CPR    Ht:  152.4 cm (60\")   Wt:  69.6 kg (153 lb 6 oz)    Admission Cmt:  None   Principal Problem:  Stroke (CMS/LTAC, located within [facility]) [I63.9]                 Active Insurance as of 5/1/2020     Primary Coverage     Payor Plan Insurance Group Employer/Plan Group    ANTH MEDICARE REPLACEMENT ANTH MEDICARE ADVANTAGE [id number]     Payor Plan Address Payor Plan Phone Number Payor Plan Fax Number Effective Dates    [address] [phone]  8/1/2019 - None Entered    [facility] [id number]       Subscriber Name Subscriber Birth Date Member ID       GAVIOTA ERAZO 1945 [id number]                 Emergency Contacts      (Rel.) Home Phone Work Phone Mobile Phone    Mandeep Erazo (Grandchild) [phone] -- [phone]    Willam Erazo (Grandchild) [phone] -- [phone]               Physician Progress Notes (last 48 hours) (Notes from 05/04/20 0638 through 05/06/20 0638)      Flory Chaney APRN at 05/05/20 1146     Attestation signed by Suri Tyler MD at 05/05/20 5615    I have reviewed the documentation above and agree.  I independently saw and examined the patient   Patient noted increased dysarthria and greater difficulty walking and speaking.  Feels depressed over this.  On exam she is awake and alert. " "Answering all questions appropriately. Speech is more dysarthric . EOMI and her face droops a bit more on the left. Her left arm is slightly weaker than yesterday.  But strength testing on the left is 5/5  1.  MRI obtained due to progression of symptoms while she was on dual antiplatelet therapy. This does show stroke larger but in same territory of the right MCA. Her ZAYDA yesterday and her telemetry is not showing evidence as to cause of progressing stroke  2. Continues to have oropharyngeal dysphagia  3. Worsening dysarthria and right sided weakness.   4. Will begin Pradaxa  5. Zio Patch for 2 weeks  6. Inpatient rehab  7. Likely would benefit from antidepressant as she freely admits she is feeling down.   8. Speech therapy recommends \" Diet Textures: thin liquid, mechanical soft consistency food. Medications should be taken whole or crushed with puree\"  9. Discussed alternative antihypertensive w Dr Muir due to coreg interaction                      Neurology Progress Note      Chief Complaint:  F/u stroke    Subjective     Subjective:  Patient up in chair. Speech somewhat more slow and dysarthric with more pronounced left lower facial weakness. CT head done this AM shows larger stroke right frontal lobe 2.5 cm compared to 1.1 2 days ago. Patient feels like she is drooling more on left side.       Medications:  Current Facility-Administered Medications   Medication Dose Route Frequency Provider Last Rate Last Dose   • amLODIPine (NORVASC) 5 mg, lisinopril (PRINIVIL,ZESTRIL) 20 mg   Oral Q24H Nhan Muir MD       • aspirin chewable tablet 81 mg  81 mg Oral Daily Flory Chaney APRN   81 mg at 05/05/20 0836   • atorvastatin (LIPITOR) tablet 80 mg  80 mg Oral Nightly Suri Tyler MD   80 mg at 05/04/20 2042   • busPIRone (BUSPAR) tablet 15 mg  15 mg Oral Q12H Nhan Muir MD   15 mg at 05/05/20 0836   • carvedilol (COREG) tablet 6.25 mg  6.25 mg Oral BID With Meals Nhan Muir" MD Nayan   6.25 mg at 05/05/20 0836   • citalopram (CeleXA) tablet 40 mg  40 mg Oral Daily Nhan Muir MD   40 mg at 05/05/20 0836   • clopidogrel (PLAVIX) tablet 75 mg  75 mg Oral Daily Nhan Muir MD   75 mg at 05/05/20 0836   • cyanocobalamin (VITAMIN B-12) tablet 500 mcg  500 mcg Oral Daily Flory Chaney APRN   500 mcg at 05/05/20 0836   • dextrose (D50W) 25 g/ 50mL Intravenous Solution 25 g  25 g Intravenous Q15 Min PRN Nhan Muir MD       • dextrose (GLUTOSE) oral gel 15 g  15 g Oral Q15 Min PRN Nhan Muir MD       • glucagon (human recombinant) (GLUCAGEN DIAGNOSTIC) injection 1 mg  1 mg Subcutaneous Q15 Min PRN Nhan Muir MD       • insulin lispro (humaLOG) injection 0-9 Units  0-9 Units Subcutaneous TID AC Nhan Muir MD   2 Units at 05/05/20 0835   • linagliptin (TRADJENTA) tablet 5 mg  5 mg Oral Daily Nhan Muir MD   5 mg at 05/05/20 0836   • LORazepam (ATIVAN) tablet 1 mg  1 mg Oral Q6H PRN Nhan Muir MD       • metFORMIN (GLUCOPHAGE) tablet 500 mg  500 mg Oral BID With Meals Nhan Muir MD   500 mg at 05/05/20 0836   • sodium chloride 0.9 % flush 10 mL  10 mL Intravenous PRN Thomas Rosales MD       • sodium chloride 0.9 % flush 10 mL  10 mL Intravenous Q12H Nhan Muir MD   10 mL at 05/05/20 0836   • sodium chloride 0.9 % flush 10 mL  10 mL Intravenous PRN Nhan Muir MD           Review of Systems:   -A 14 point review of systems is completed and is negative except for dysarthria, left lower facial weakness, left arm more than left leg weakness.         Objective      Vital Signs  Temp:  [98 °F (36.7 °C)-99.7 °F (37.6 °C)] 98.3 °F (36.8 °C)  Heart Rate:  [] 93  Resp:  [14-18] 16  BP: (140-175)/(69-90) 140/90    Telemetry: S-ST     Physical Exam:  Physical Exam:  HEENT:  Neck supple  CVS:  Regular rate and rhythm.  No murmurs  Carotid Examination:  No  bruits  Lungs:  Clear to auscultation  Abdomen:  Non-tender, Non-distended  Extremities:  No signs of peripheral edema     Neurologic Exam:     -Awake, Alert, Oriented X 3  -No word finding difficulties-but has speech hesitancy  -No aphasia  -mild to moderate dysarthria  -Follows simple and complex commands     Cranial nerves II through XII   · EOMI on right but left eye deviates medially and appears patient has to force left eye to move laterally  · No facial sensory loss  · Increased left facial droop --UMN VII-spares forehead  · Hearing intact to finger rub and voice  · Turns head side to side.  · Tongue is midline     Motor: (strength out of 5:  1= minimal movement, 2 = movement in plane of gravity, 3 = movement against gravity, 4 = movement against some resistance, 5 = full strength)     -Right Upper Ext: Proximal: 5 Distal: 5  -Left Upper Ext: Proximal: 4   Distal: 54     -Right Lower Ext: Proximal: 5 Distal: 5  -Left Lower Ext: Proximal: 4+  Distal:  4+      Left  absent and right  strong. Left hand in a soft fist position.    DTR:  2+ throughout in all four extremities  No babinski's     Sensory:  -Intact to light touch, pinprick, temperature, pain, and proprioception     Coordination/Gait:  -No ataxia/dysmetria on finger to nose of right upper extremity and heel to shin of bilateral extremities but more difficult for patient to perform on left.   No  of left hand.             Results Review:    I reviewed the patient's new clinical results.    Results from last 7 days   Lab Units 05/02/20  0438 05/01/20  1622   WBC 10*3/mm3 10.59 9.63   HEMOGLOBIN g/dL 13.0 13.7   HEMATOCRIT % 39.1 41.3   PLATELETS 10*3/mm3 278 268        Results from last 7 days   Lab Units 05/03/20  0341 05/02/20  0438 05/01/20  1622   SODIUM mmol/L 139 140 142   POTASSIUM mmol/L 4.3 3.8 4.6   CHLORIDE mmol/L 102 102 100   CO2 mmol/L 23.0 23.0 26.0   BUN mg/dL 20 20 18   CREATININE mg/dL 0.82 1.04* 0.93   CALCIUM mg/dL 9.5  10.0 10.1   BILIRUBIN mg/dL  --   --  0.4   ALK PHOS U/L  --   --  94   ALT (SGPT) U/L  --   --  17   AST (SGOT) U/L  --   --  16   GLUCOSE mg/dL 192* 265* 214*        Lab Results   Component Value Date    PROTIME 13.1 05/01/2020    INR 1.03 05/01/2020     No components found for: POCGLUC  No components found for: A1C  Lab Results   Component Value Date    HDL 33 (L) 05/02/2020    LDL 85 05/02/2020     No components found for: B12  No results found for: TSH  Imaging Results (Last 24 Hours)     Procedure Component Value Units Date/Time    CT Head Without Contrast [[id number]] Collected:  05/05/20 1048     Updated:  05/05/20 1057    Narrative:       EXAMINATION:  CT HEAD WO CONTRAST-  5/5/2020 10:37 AM CDT     HISTORY: Follow-up for stroke. R13.10-Dysphagia, unspecified;  I63.9-Cerebral infarction, unspecified; R47.1-Dysarthria and anarthria.     TECHNIQUE: Multiple axial images were obtained through the brain without  contrast infusion. Multiplanar images were reconstructed.     DLP: 630 mGy-cm. Automated dosage control was utilized.     COMPARISON: 5/3/2020.     FINDINGS: An infarct in the right frontal periventricular white matter  is larger on the current study measuring 2.5 cm obliquely and previously  measuring about 1.1 cm. Another area of infarct in the right frontal  lobe just above the lateral ventricles is similar compared to the prior  study. There is no hemorrhage. There is a mild degree of atrophy.  Vascular calcification is noted. The visualized paranasal sinuses and  mastoid air cells are clear.       Impression:       1. An area of white matter infarct in the right frontal lobe is larger  on the current study, measuring 2.5 cm obliquely and previously  measuring 1.1 cm. Another smaller infarct in the right frontal white  matter is stable.  2. No hemorrhage.  3. Mild atrophy.     This report was finalized on 05/05/2020 10:54 by Dr. Sagar Bhatia MD.          CT head 5/3/2020        CT head done today  5/5/2020 shows slightly enlarged stroke compared to 5/3/2020.     Assessment/Plan     Hospital Problem List      Stroke (CMS/HCC)    Left arm weakness    Essential hypertension    Hypertensive urgency    Hypercholesterolemia    Impression:  1. Left MCA territory strokes suspicious for artery to artery emboli but cannot exclude cardiac CTA did not show evidence for emboli or thrombus but there was atherosclerotic disease without high grade stenosis.  There is irregularity at right M1 (and A2)  c/w atherosclerotic disease which may be the basis for stroke  There may be slight progression of symptoms in the same territory or increased edema from stroke --however she state this began several days ago so there should not be the increased edema  2. Hypertension   3. Mixed hyperlipidemia with LDL of 85 and goal of < 70 to reduce future risk of stroke. Her triglycerides are elevated at 331  4. DM  with elevated hemoglobin A1c at 8.9 and goal of < 7.0 to reduce future risk of stroke   5. NIHSS = 6  6. B12 deficiency, low normal at 337.        Plan:  1. ASA 81 mg and Plavix 75 mg for dual antiplatelet therapy.  2. Lipitor 80 mg for LDL goal less than 70.   3. DM management for A1C goal less than 7.  4. Will need ZIO patch at discharge.  5. Replace B12 1000 mcg IM yesterday and then 500 mcg daily.   6. Refer to acute rehab pending.  7. Will need follow up with neurology in 3 weeks.  8. SCD for DVT prophylaxis.   9. Continue OT/ST/PT  10. CT head done today shows progression and slightly larger compared to 5/3/2020 and having worsening dysarthria and left lower facial weakness.left eye is deviating medially and patient seems to have to force left eye to move laterally.  Patient on dual antiplatelet therapy. Discussed findings with Dr. Aston Gaspar. 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   11. Need to maximize BP control and blood glucose management.      Addendum:  Alma BARR contacted me stating patient having more difficulty  forming words and more left sided weakness. She had also contacted Dr. Aston Gaspar. MRI brain was done showing new areas of embolic strokes. The decision made to d/c ASA and Plavix and start Pradaxa 150 mg BID. This was explained to patient as well as risk/benefits of anticoagulation with continued embolic strokes versus risk of bleed. Patient is agreeable.  ZAYDA did not reveal thrombus and telemetry has shown sinus rhythm. I did call patient grandsonMandeep [phone], and gave update on patient condition and plan. Mandeep was agreeable to stopping antiplatelet and starting anticoagulation.  All questions answered to best of my ability.   MARGARITA Meredith  05/05/20  11:46    Electronically signed by Suri Tyler MD at 05/05/20 1853     Flory Chaney APRN at 05/04/20 1505     Attestation signed by Suri Tyler MD at 05/04/20 1953    I have reviewed the documentation above and agree.  I independently saw and examined the patient     Patient followed  left MCA territory strokes--multiple She had some dysarthria worsened when fatigued and that can come and go. Still needing assistance with walking and ambulated 50 feet today 2 times. She is a little tearful about her situation. She is asking for help to go the bathroom and feels like it is urgent.    On exam she is alert and oriented with dysarthria normal cranial nerves except a left facial droop --UMN   Her motor examination shows 5/5 strength except left upper extremity is about 2/5 and maybe weaker distally-but she made little effort.     Patient had a repeat head CT on 5/3 when clinically she had  shown progression of her left facial droop and greater left arm weakness.  CT was unchanged but she is continuing to have waxing and waning of speech difficulty.     ZAYDA performed this AM is normal  Zio patched for 14 day continued monitoring.   CT Head in AM  If Head CT is ok, the it is ok with neurology for patient  to be discharged  to inpatient rehab from neuro perspective  She should be seen in neurology clinic follow up in 3 to 4 weeks                    Neurology Progress Note      Chief Complaint:  F/u stroke    Subjective     Subjective:  Sitting up in chair. PT at bedside. ZAYDA this AM and no thrombus seen. No complaints. Referral to acute rehab pending.       Medications:  Current Facility-Administered Medications   Medication Dose Route Frequency Provider Last Rate Last Dose   • amLODIPine (NORVASC) 5 mg, lisinopril (PRINIVIL,ZESTRIL) 20 mg   Oral Q24H Nhan Muir MD       • atorvastatin (LIPITOR) tablet 80 mg  80 mg Oral Nightly Suri Tyler MD   80 mg at 05/03/20 2115   • busPIRone (BUSPAR) tablet 15 mg  15 mg Oral Q12H Nhan Muir MD   15 mg at 05/04/20 1254   • citalopram (CeleXA) tablet 40 mg  40 mg Oral Daily Nhan Muir MD   40 mg at 05/04/20 1251   • clopidogrel (PLAVIX) tablet 75 mg  75 mg Oral Daily Nhan Muir MD   75 mg at 05/04/20 1250   • dextrose (D50W) 25 g/ 50mL Intravenous Solution 25 g  25 g Intravenous Q15 Min PRN Nhan Muir MD       • dextrose (GLUTOSE) oral gel 15 g  15 g Oral Q15 Min PRN Nhan Muir MD       • glucagon (human recombinant) (GLUCAGEN DIAGNOSTIC) injection 1 mg  1 mg Subcutaneous Q15 Min PRN Nhan Muir MD       • insulin lispro (humaLOG) injection 0-9 Units  0-9 Units Subcutaneous TID AC Nhan Muir MD   4 Units at 05/04/20 1245   • linagliptin (TRADJENTA) tablet 5 mg  5 mg Oral Daily Nhan Muir MD   5 mg at 05/04/20 1250   • LORazepam (ATIVAN) tablet 1 mg  1 mg Oral Q6H PRN Nhan Muir MD       • metFORMIN (GLUCOPHAGE) tablet 500 mg  500 mg Oral BID With Meals Nhan Muir MD   500 mg at 05/04/20 1253   • sodium chloride 0.9 % flush 10 mL  10 mL Intravenous PRN Thomas Rosales MD       • sodium chloride 0.9 % flush 10 mL  10 mL Intravenous Q12H Nhan Muir,  MD   10 mL at 05/04/20 1255   • sodium chloride 0.9 % flush 10 mL  10 mL Intravenous PRN Nhan Muir MD       • sodium chloride 0.9 % infusion  100 mL/hr Intravenous Continuous Nhan Muir MD 0 mL/hr at 05/02/20 1554         Review of Systems:   -A 14 point review of systems is completed and is negative except for left arm more than left leg weakness, dysarthria.      Objective      Vital Signs  Temp:  [97.4 °F (36.3 °C)-98.7 °F (37.1 °C)] 98.7 °F (37.1 °C)  Heart Rate:  [80-97] 80  Resp:  [14-23] 14  BP: (144-175)/(74-99) 155/78    Telemetry: S-St     Physical Exam:  HEENT:  Neck supple  CVS:  Regular rate and rhythm.  No murmurs  Carotid Examination:  No bruits  Lungs:  Clear to auscultation  Abdomen:  Non-tender, Non-distended  Extremities:  No signs of peripheral edema     Neurologic Exam:     -Awake, Alert, Oriented X 3  -No word finding difficulties-  -No aphasia  -mild to moderate dysarthria  -Follows simple and complex commands     Cranial nerves II through XII   · EOMI  · No facial sensory loss  · Increased left facial droop --UMN VII-spares forehead  · Hearing intact to finger rub and voice  · Turns head side to side.  · Tongue is midline     Motor: (strength out of 5:  1= minimal movement, 2 = movement in plane of gravity, 3 = movement against gravity, 4 = movement against some resistance, 5 = full strength)     -Right Upper Ext: Proximal: 5 Distal: 5  -Left Upper Ext: Proximal: 4   Distal: 54     -Right Lower Ext: Proximal:  5 Distal:  5  -Left Lower Ext: Proximal: 5-  Distal:  5_     DTR:  2+ throughout in all four extremities  No babinski's     Sensory:  -Intact to light touch, pinprick, temperature, pain, and proprioception     Coordination/Gait:  -No ataxia/dysmetria on finger to nose of right upper extremity and heel to shin of bilateral extremities but more difficult for patient to perform on left.   No  of left hand.                     Results Review:    I reviewed  the patient's new clinical results.    Results from last 7 days   Lab Units 05/02/20  0438 05/01/20  1622   WBC 10*3/mm3 10.59 9.63   HEMOGLOBIN g/dL 13.0 13.7   HEMATOCRIT % 39.1 41.3   PLATELETS 10*3/mm3 278 268        Results from last 7 days   Lab Units 05/03/20  0341 05/02/20  0438 05/01/20  1622   SODIUM mmol/L 139 140 142   POTASSIUM mmol/L 4.3 3.8 4.6   CHLORIDE mmol/L 102 102 100   CO2 mmol/L 23.0 23.0 26.0   BUN mg/dL 20 20 18   CREATININE mg/dL 0.82 1.04* 0.93   CALCIUM mg/dL 9.5 10.0 10.1   BILIRUBIN mg/dL  --   --  0.4   ALK PHOS U/L  --   --  94   ALT (SGPT) U/L  --   --  17   AST (SGOT) U/L  --   --  16   GLUCOSE mg/dL 192* 265* 214*        Lab Results   Component Value Date    PROTIME 13.1 05/01/2020    INR 1.03 05/01/2020     No components found for: POCGLUC  No components found for: A1C  Lab Results   Component Value Date    HDL 33 (L) 05/02/2020    LDL 85 05/02/2020     No components found for: B12  No results found for: TSH    Imaging Results (Last 24 Hours)     Procedure Component Value Units Date/Time    CT Head Without Contrast [[id number]] Collected:  05/03/20 1557     Updated:  05/03/20 1603    Narrative:       CT HEAD WO CONTRAST- 5/3/2020 3:38 PM CDT     HISTORY: Speech changes (or aphasia), new or progressive; right MCA  ischemia     COMPARISON: 05/01/2020     DOSE LENGTH PRODUCT: 639 mGy cm. Automated exposure control was also  utilized to decrease patient radiation dose.     TECHNIQUE: Axial images of brain obtained without contrast     FINDINGS:  Similar hypodensities of the right basal ganglia and right  centrum semiovale. Old ischemia suspected of the right occipital lobe.  No intracranial hemorrhage. Chronic vessel vessel ischemic changes.  Ventricles are similar in size and configuration. No midline shifting.  No extra-axial fluid collection.     Visible paranasal sinuses and mastoid air cells are well-aerated.  Hyperostosis frontalis internus.       Impression:       1. Stable CT  head exam compared to 05/01/2020. Acute/subacute ischemic  foci of the right middle cerebral artery territory with no intracranial  hemorrhage or prominent mass effect.   This report was finalized on 05/03/2020 16:00 by Dr. Lina Perrin MD.        Results for orders placed during the hospital encounter of 05/01/20   Adult Transesophageal Echo (ZAYDA) W/ Cont if Necessary Per Protocol    Narrative · Left ventricular systolic function is normal.  · No evidence of a left atrial appendage thrombus.  · No obvious clots, shunts, masses or vegetations.  · No hemodynamically significant valvular abnormalities.          ZAYDA:  Interpretation Summary     · Left ventricular systolic function is normal.  · No evidence of a left atrial appendage thrombus.  · No obvious clots, shunts, masses or vegetations.  · No hemodynamically significant valvular abnormalities.          Assessment/Plan     Hospital Problem List      Stroke (CMS/HCC)    Left arm weakness    Essential hypertension    Hypertensive urgency    Hypercholesterolemia    Impression:  1. Left MCA territory strokes suspicious for artery to artery emboli but cannot exclude cardiac CTA did not show evidence for emboli or thrombus but there was atherosclerotic disease without high grade stenosis.  There is irregularity at right M1 (and A2)  c/w atherosclerotic disease which may be the basis for stroke  There may be slight progression of symptoms in the same territory or increased edema from stroke --however she state this began several days ago so there should not be the increased edema  2. Hypertension   3. Mixed hyperlipidemia with LDL of 85 and goal of < 70 to reduce future risk of stroke. Her triglycerides are elevated at 331  4. DM  with elevated hemoglobin A1c at 8.9 and goal of < 7.0 to reduce future risk of stroke   5. NIHSS = 6  6. B12 deficiency, low normal at 337.     Plan:  1. ASA 81 mg added to Plavix 75 mg for dual antiplatelet therapy.  2. Lipitor 80 mg for  LDL goal less than 70.   3. DM management for A1C goal less than 7.  4. Will need ZIO patch at discharge.  5. Replace B12 1000 mcg IM today and then 500 mcg daily.   6. Refer to acute rehab pending.  7. Will need follow up with neurology in 3 weeks.  8. SCD for DVT prophylaxis.   9. Continue OT/ST/PT  10. Repeat CT head in AM for surveillance.       Flory Chaney, APRN  05/04/20  15:05    Electronically signed by Suri Tyler MD at 05/04/20 1953     Nhan Muir MD at 05/04/20 0805          Gaviota Erazo is a 74 y.o. female patient.  Anxious about the diagnosis of acute CVA.  Work-up in progress.  Plan transesophageal echo today  Reviewed neuro note.        Current Facility-Administered Medications   Medication Dose Route Frequency Provider Last Rate Last Dose   • amLODIPine (NORVASC) 5 mg, lisinopril (PRINIVIL,ZESTRIL) 20 mg   Oral Q24H Nhan Muir MD       • atorvastatin (LIPITOR) tablet 80 mg  80 mg Oral Nightly Suri Tyler MD   80 mg at 05/03/20 2115   • busPIRone (BUSPAR) tablet 15 mg  15 mg Oral Q12H Nhan Muir MD   15 mg at 05/03/20 2115   • citalopram (CeleXA) tablet 40 mg  40 mg Oral Daily Nhan Muir MD   40 mg at 05/03/20 0953   • clopidogrel (PLAVIX) tablet 75 mg  75 mg Oral Daily Nhan Muir MD   75 mg at 05/03/20 0953   • dextrose (D50W) 25 g/ 50mL Intravenous Solution 25 g  25 g Intravenous Q15 Min PRN Nhan Muir MD       • dextrose (GLUTOSE) oral gel 15 g  15 g Oral Q15 Min PRN Nhan Muir MD       • glucagon (human recombinant) (GLUCAGEN DIAGNOSTIC) injection 1 mg  1 mg Subcutaneous Q15 Min PRN Nhan Muir MD       • insulin lispro (humaLOG) injection 0-9 Units  0-9 Units Subcutaneous TID AC Nhan Muir MD   4 Units at 05/03/20 9754   • LORazepam (ATIVAN) tablet 1 mg  1 mg Oral Q6H PRN Nhan Muir MD       • metFORMIN (GLUCOPHAGE) tablet 500 mg  500 mg Oral BID With Meals  "Nhan Muir MD       • sodium chloride 0.9 % flush 10 mL  10 mL Intravenous PRN Thomas Rosales MD       • sodium chloride 0.9 % flush 10 mL  10 mL Intravenous Q12H Nhan Muir MD   10 mL at 05/02/20 2016   • sodium chloride 0.9 % flush 10 mL  10 mL Intravenous PRN Nhan Muir MD       • sodium chloride 0.9 % infusion  100 mL/hr Intravenous Continuous Nhan Muir MD   Stopped at 05/02/20 1554     ALLERGIES:  No Known Allergies    Stroke (CMS/LTAC, located within [facility])    Left arm weakness    Essential hypertension    Hypertensive urgency    Hypercholesterolemia    Blood pressure 144/74, pulse 86, temperature 98.3 °F (36.8 °C), temperature source Oral, resp. rate 14, height 152.4 cm (60\"), weight 69.6 kg (153 lb 6 oz), SpO2 95 %.      Subjective:  Symptoms:  Stable.  She reports weakness and anxiety.  No shortness of breath or chest pain.    Diet:  Adequate intake.    Activity level: Impaired due to weakness.    Pain:  She reports no pain.      Review of Systems   Respiratory: Negative for shortness of breath.    Cardiovascular: Negative for chest pain.   Neurological: Positive for weakness.     Objective:  General Appearance:  Comfortable and well-appearing.    Vital signs: (most recent): Blood pressure 144/74, pulse 86, temperature 98.3 °F (36.8 °C), temperature source Oral, resp. rate 14, height 152.4 cm (60\"), weight 69.6 kg (153 lb 6 oz), SpO2 95 %.  Vital signs are normal.    Output: Producing urine and minimal stool output.    HEENT: Normal HEENT exam.  (Left-sided facial droop noted)    Lungs:  Normal effort and normal respiratory rate.    Heart: Normal rate.  Regular rhythm.    Abdomen: Abdomen is soft.  Bowel sounds are normal.   There is no abdominal tenderness.     Extremities: Decreased range of motion.    Skin:  Warm and dry.              Labs:  Lab Results (last 72 hours)     Procedure Component Value Units Date/Time    POC Glucose Once [[id number]]  (Abnormal) Collected:  " 05/03/20 0945    Specimen:  Blood Updated:  05/03/20 0956     Glucose 267 mg/dL      Comment: : [id number] Nelia KimnMeter ID: [id number]       Extra Tubes [[id number]] Collected:  05/03/20 0341    Specimen:  Blood, Venous Line Updated:  05/03/20 0500    Narrative:       The following orders were created for panel order Extra Tubes.  Procedure                               Abnormality         Status                     ---------                               -----------         ------                     Lavender Top[[id number]]                                     Final result                 Please view results for these tests on the individual orders.    Lavender Top [[id number]] Collected:  05/03/20 0341    Specimen:  Blood Updated:  05/03/20 0500     Extra Tube hold for add-on     Comment: Auto resulted       Basic Metabolic Panel [[id number]]  (Abnormal) Collected:  05/03/20 0341    Specimen:  Blood Updated:  05/03/20 0447     Glucose 192 mg/dL      BUN 20 mg/dL      Creatinine 0.82 mg/dL      Sodium 139 mmol/L      Potassium 4.3 mmol/L      Chloride 102 mmol/L      CO2 23.0 mmol/L      Calcium 9.5 mg/dL      eGFR Non African Amer 68 mL/min/1.73      BUN/Creatinine Ratio 24.4     Anion Gap 14.0 mmol/L     Narrative:       GFR Normal >60  Chronic Kidney Disease <60  Kidney Failure <15      Vitamin B12 [[id number]]  (Normal) Collected:  05/02/20 0754    Specimen:  Blood Updated:  05/02/20 2202     Vitamin B-12 337 pg/mL     Narrative:       Results may be falsely increased if patient taking Biotin.      POC Glucose Once [[id number]]  (Abnormal) Collected:  05/02/20 1733    Specimen:  Blood Updated:  05/02/20 1744     Glucose 212 mg/dL      Comment: : [id number] Megan Reese DestinyMeter ID: [id number]       POC Glucose Once [[id number]]  (Abnormal) Collected:  05/02/20 1106    Specimen:  Blood Updated:  05/02/20 1121     Glucose 262 mg/dL      Comment: : [id number] Deny RenndyMeter ID: [id number]        Lipid Panel [[id number]]  (Abnormal) Collected:  05/02/20 0438    Specimen:  Blood Updated:  05/02/20 0751     Total Cholesterol 184 mg/dL      Triglycerides 331 mg/dL      HDL Cholesterol 33 mg/dL      LDL Cholesterol  85 mg/dL      VLDL Cholesterol 66.2 mg/dL      LDL/HDL Ratio 2.57    Narrative:       Cholesterol Reference Ranges  (U.S. Department of Health and Human Services ATP III Classifications)    Desirable          <200 mg/dL  Borderline High    200-239 mg/dL  High Risk          >240 mg/dL      Triglyceride Reference Ranges  (U.S. Department of Health and Human Services ATP III Classifications)    Normal           <150 mg/dL  Borderline High  150-199 mg/dL  High             200-499 mg/dL  Very High        >500 mg/dL    HDL Reference Ranges  (U.S. Department of Health and Human Services ATP III Classifcations)    Low     <40 mg/dl (major risk factor for CHD)  High    >60 mg/dl ('negative' risk factor for CHD)        LDL Reference Ranges  (U.S. Department of Health and Human Services ATP III Classifcations)    Optimal          <100 mg/dL  Near Optimal     100-129 mg/dL  Borderline High  130-159 mg/dL  High             160-189 mg/dL  Very High        >189 mg/dL    Hemoglobin A1c [[id number]]  (Abnormal) Collected:  05/02/20 0438    Specimen:  Blood Updated:  05/02/20 0751     Hemoglobin A1C 8.90 %     Narrative:       Hemoglobin A1C Ranges:    Increased Risk for Diabetes  5.7% to 6.4%  Diabetes                     >= 6.5%  Diabetic Goal                < 7.0%    Basic Metabolic Panel [[id number]]  (Abnormal) Collected:  05/02/20 0438    Specimen:  Blood Updated:  05/02/20 0539     Glucose 265 mg/dL      BUN 20 mg/dL      Creatinine 1.04 mg/dL      Sodium 140 mmol/L      Potassium 3.8 mmol/L      Chloride 102 mmol/L      CO2 23.0 mmol/L      Calcium 10.0 mg/dL      eGFR Non African Amer 52 mL/min/1.73      BUN/Creatinine Ratio 19.2     Anion Gap 15.0 mmol/L     Narrative:       GFR Normal >60  Chronic Kidney  Disease <60  Kidney Failure <15      CBC Auto Differential [[id number]]  (Abnormal) Collected:  05/02/20 0438    Specimen:  Blood Updated:  05/02/20 0521     WBC 10.59 10*3/mm3      RBC 4.42 10*6/mm3      Hemoglobin 13.0 g/dL      Hematocrit 39.1 %      MCV 88.5 fL      MCH 29.4 pg      MCHC 33.2 g/dL      RDW 13.2 %      RDW-SD 42.5 fl      MPV 10.7 fL      Platelets 278 10*3/mm3      Neutrophil % 50.7 %      Lymphocyte % 38.4 %      Monocyte % 8.6 %      Eosinophil % 1.5 %      Basophil % 0.7 %      Immature Grans % 0.1 %      Neutrophils, Absolute 5.37 10*3/mm3      Lymphocytes, Absolute 4.07 10*3/mm3      Monocytes, Absolute 0.91 10*3/mm3      Eosinophils, Absolute 0.16 10*3/mm3      Basophils, Absolute 0.07 10*3/mm3      Immature Grans, Absolute 0.01 10*3/mm3      nRBC 0.0 /100 WBC     Hunt Draw [[id number]] Collected:  05/01/20 1622    Specimen:  Blood Updated:  05/01/20 1745    Narrative:       The following orders were created for panel order Hunt Draw.  Procedure                               Abnormality         Status                     ---------                               -----------         ------                     Light Blue Top[[id number]]                                   Final result               Green Top (Gel)[[id number]]                                  Final result               Lavender Top[[id number]]                                     Final result               Red Top[[id number]]                                          Final result                 Please view results for these tests on the individual orders.    Light Blue Top [[id number]] Collected:  05/01/20 1632    Specimen:  Blood Updated:  05/01/20 1745     Extra Tube hold for add-on     Comment: Auto resulted       Green Top (Gel) [[id number]] Collected:  05/01/20 1622    Specimen:  Blood Updated:  05/01/20 1730     Extra Tube Hold for add-ons.     Comment: Auto resulted.       Lavender Top [[id number]] Collected:  05/01/20 1622     Specimen:  Blood Updated:  05/01/20 1730     Extra Tube hold for add-on     Comment: Auto resulted       Red Top [[id number]] Collected:  05/01/20 1622    Specimen:  Blood Updated:  05/01/20 1730     Extra Tube Hold for add-ons.     Comment: Auto resulted.       Comprehensive Metabolic Panel [[id number]]  (Abnormal) Collected:  05/01/20 1622    Specimen:  Blood Updated:  05/01/20 1704     Glucose 214 mg/dL      BUN 18 mg/dL      Creatinine 0.93 mg/dL      Sodium 142 mmol/L      Potassium 4.6 mmol/L      Chloride 100 mmol/L      CO2 26.0 mmol/L      Calcium 10.1 mg/dL      Total Protein 7.4 g/dL      Albumin 4.50 g/dL      ALT (SGPT) 17 U/L      AST (SGOT) 16 U/L      Alkaline Phosphatase 94 U/L      Total Bilirubin 0.4 mg/dL      eGFR Non African Amer 59 mL/min/1.73      Globulin 2.9 gm/dL      A/G Ratio 1.6 g/dL      BUN/Creatinine Ratio 19.4     Anion Gap 16.0 mmol/L     Narrative:       GFR Normal >60  Chronic Kidney Disease <60  Kidney Failure <15      Troponin [[id number]]  (Normal) Collected:  05/01/20 1622    Specimen:  Blood Updated:  05/01/20 1702     Troponin T <0.010 ng/mL     Narrative:       Troponin T Reference Range:  <= 0.03 ng/mL-   Negative for AMI  >0.03 ng/mL-     Abnormal for myocardial necrosis.  Clinicians would have to utilize clinical acumen, EKG, Troponin and serial changes to determine if it is an Acute Myocardial Infarction or myocardial injury due to an underlying chronic condition.       Results may be falsely decreased if patient taking Biotin.      BNP [[id number]]  (Normal) Collected:  05/01/20 1622    Specimen:  Blood Updated:  05/01/20 1701     proBNP 86.3 pg/mL     Narrative:       Among patients with dyspnea, NT-proBNP is highly sensitive for the detection of acute congestive heart failure. In addition NT-proBNP of <300 pg/ml effectively rules out acute congestive heart failure with 99% negative predictive value.    Results may be falsely decreased if patient taking Biotin.       Protime-INR [[id number]]  (Normal) Collected:  05/01/20 1632    Specimen:  Blood Updated:  05/01/20 1650     Protime 13.1 Seconds      INR 1.03    CBC & Differential [[id number]] Collected:  05/01/20 1622    Specimen:  Blood Updated:  05/01/20 1642    Narrative:       The following orders were created for panel order CBC & Differential.  Procedure                               Abnormality         Status                     ---------                               -----------         ------                     CBC Auto Differential[[id number]]        Normal              Final result                 Please view results for these tests on the individual orders.    CBC Auto Differential [[id number]]  (Normal) Collected:  05/01/20 1622    Specimen:  Blood Updated:  05/01/20 1642     WBC 9.63 10*3/mm3      RBC 4.64 10*6/mm3      Hemoglobin 13.7 g/dL      Hematocrit 41.3 %      MCV 89.0 fL      MCH 29.5 pg      MCHC 33.2 g/dL      RDW 13.0 %      RDW-SD 42.5 fl      MPV 10.7 fL      Platelets 268 10*3/mm3      Neutrophil % 60.7 %      Lymphocyte % 29.1 %      Monocyte % 8.0 %      Eosinophil % 1.2 %      Basophil % 0.6 %      Immature Grans % 0.4 %      Neutrophils, Absolute 5.84 10*3/mm3      Lymphocytes, Absolute 2.80 10*3/mm3      Monocytes, Absolute 0.77 10*3/mm3      Eosinophils, Absolute 0.12 10*3/mm3      Basophils, Absolute 0.06 10*3/mm3      Immature Grans, Absolute 0.04 10*3/mm3      nRBC 0.0 /100 WBC     POC Glucose Once [[id number]]  (Abnormal) Collected:  05/01/20 1616    Specimen:  Blood Updated:  05/01/20 1627     Glucose 207 mg/dL      Comment: : [id number] Champion DaileyMeter ID: [id number]             Imaging Results (Last 72 Hours)     Procedure Component Value Units Date/Time    CT Head Without Contrast [[id number]] Collected:  05/03/20 1557     Updated:  05/03/20 1603    Narrative:       CT HEAD WO CONTRAST- 5/3/2020 3:38 PM CDT     HISTORY: Speech changes (or aphasia), new or progressive; right  MCA  ischemia     COMPARISON: 05/01/2020     DOSE LENGTH PRODUCT: 639 mGy cm. Automated exposure control was also  utilized to decrease patient radiation dose.     TECHNIQUE: Axial images of brain obtained without contrast     FINDINGS:  Similar hypodensities of the right basal ganglia and right  centrum semiovale. Old ischemia suspected of the right occipital lobe.  No intracranial hemorrhage. Chronic vessel vessel ischemic changes.  Ventricles are similar in size and configuration. No midline shifting.  No extra-axial fluid collection.     Visible paranasal sinuses and mastoid air cells are well-aerated.  Hyperostosis frontalis internus.       Impression:       1. Stable CT head exam compared to 05/01/2020. Acute/subacute ischemic  foci of the right middle cerebral artery territory with no intracranial  hemorrhage or prominent mass effect.   This report was finalized on 05/03/2020 16:00 by Dr. Lina Perrin MD.    MRI Brain Without Contrast [[id number]] Collected:  05/02/20 1149     Updated:  05/02/20 1159    Narrative:       HISTORY: Left arm weakness     MRI BRAIN: Multiplanar imaging the brain performed without contrast.     COMPARISON: CT head 05/01/2020     FINDINGS: Scattered foci of diffusion restriction seen throughout the  right cerebral hemisphere within the right MCA distribution involving  the frontal parietal and temporal lobes. No diffusion restriction of the  cerebellum or the left cerebral hemisphere. No intracranial hemorrhage.  Mild cerebral and cerebellar volume loss. Corpus callosum intact. No  sellar or intracranial mass. Old lacunar infarct suspected of the right  basal ganglia. Prominent perivascular spaces along the basal ganglia  bilaterally. Hyperintense FLAIR signal changes of the periventricular  white matter regions favoring underlying chronic small vessel ischemia.     Hyperostosis frontalis internus. Chronic mucosal thickening of the  paranasal sinuses. Normal flow-voids in the  distal internal carotid and  basilar arteries. Normal flow void in the sagittal sinus.       Impression:       1. Scattered foci of diffusion restriction within the right MCA  distribution suggestive for acute nonhemorrhagic multifocal ischemia of  the right MCA distribution.  2. Chronic small vessel ischemic changes suspected with mild cerebral  volume loss.  This report was finalized on 05/02/2020 11:56 by Dr. Lina Perrin MD.    CT Angiogram Head [[id number]] Collected:  05/01/20 1852     Updated:  05/01/20 1903    Narrative:       EXAM:   CT NECK ANGIOGRAM  CT HEAD ANGIOGRAM 05/01/2020     COMPARISON:   None.      HISTORY:  74 years-old Female. Stroke      TECHNIQUE:      Multiple CT images were obtained of the of the head and neck after the  administration of IV contrast. 3D MIP reformats were generated in the  axial, sagittal and coronal planes were sent to PACS for interpretation.        Grading of carotid artery stenosis is performed by NASCET criteria.     FINDINGS:      CT Neck Angiogram:     Common origin of the innominate and right common carotid artery. The  visualized bilateral common carotid arteries demonstrate mild  atherosclerotic disease, more atherosclerotic disease at the  bifurcation. The bilateral subclavian arteries are patent.     Mild calcification along the lateral aspect of the right carotid bulb  with less than 30% stenosis. The remaining cervical right ICA is without  flow-limiting stenosis or occlusion.     Calcification of the left carotid bifurcation with less than 50%  stenosis.     The bilateral proximal vertebral arteries are well opacified. The right  vertebral artery is diminutive and terminates into a PICA the left C3/C4  demonstrate more atherosclerotic disease with mild stenosis.     CT Head Angiogram:     The intracranial right ICA demonstrates atherosclerotic disease in the  carotid siphon, with mild to moderate stenosis. The right carotid  terminus is visualized. The  right A1 is diminutive. The right A2 is also  diminutive. The MCA bifurcation is visualized with irregular appearance  of the A1 and M1, reflecting atherosclerotic disease. There is prompt  opacification of the proximal M2 segments. There is no aneurysm or  vascular malformation.     The left intracranial ICA demonstrates atherosclerotic disease with mild  to moderate stenosis in the left carotid siphon. The left carotid  terminus is visualized. The left A1, A2, M1, visualized proximal M2  segments demonstrate irregularity, reflecting atherosclerotic disease,  but without flow-limiting stenosis or occlusion. No aneurysm or other  vascular malformation identified.        The visualized posterior fossa circulation demonstrates no focal  flow-limiting stenosis or occlusion in the basilar artery. The left PCA  is fetal in origin. The bilateral ACAs are well opacified.       Impression:       CT Angiography Of The Neck With Intravenous Contrast   1. No evidence of high-grade arterial stenosis or underlying dissection.     CT Angiography Of The Head With Intravenous Contrast  1.   Atherosclerotic disease, without focal high-grade stenosis or  occlusion.  This report was finalized on 05/01/2020 19:00 by Dr. Zenaida Gonzalez MD.    CT Angiogram Neck [[id number]] Collected:  05/01/20 1852     Updated:  05/01/20 1903    Narrative:       EXAM:   CT NECK ANGIOGRAM  CT HEAD ANGIOGRAM 05/01/2020     COMPARISON:   None.      HISTORY:  74 years-old Female. Stroke      TECHNIQUE:      Multiple CT images were obtained of the of the head and neck after the  administration of IV contrast. 3D MIP reformats were generated in the  axial, sagittal and coronal planes were sent to PACS for interpretation.        Grading of carotid artery stenosis is performed by NASCET criteria.     FINDINGS:      CT Neck Angiogram:     Common origin of the innominate and right common carotid artery. The  visualized bilateral common carotid arteries  demonstrate mild  atherosclerotic disease, more atherosclerotic disease at the  bifurcation. The bilateral subclavian arteries are patent.     Mild calcification along the lateral aspect of the right carotid bulb  with less than 30% stenosis. The remaining cervical right ICA is without  flow-limiting stenosis or occlusion.     Calcification of the left carotid bifurcation with less than 50%  stenosis.     The bilateral proximal vertebral arteries are well opacified. The right  vertebral artery is diminutive and terminates into a PICA the left C3/C4  demonstrate more atherosclerotic disease with mild stenosis.     CT Head Angiogram:     The intracranial right ICA demonstrates atherosclerotic disease in the  carotid siphon, with mild to moderate stenosis. The right carotid  terminus is visualized. The right A1 is diminutive. The right A2 is also  diminutive. The MCA bifurcation is visualized with irregular appearance  of the A1 and M1, reflecting atherosclerotic disease. There is prompt  opacification of the proximal M2 segments. There is no aneurysm or  vascular malformation.     The left intracranial ICA demonstrates atherosclerotic disease with mild  to moderate stenosis in the left carotid siphon. The left carotid  terminus is visualized. The left A1, A2, M1, visualized proximal M2  segments demonstrate irregularity, reflecting atherosclerotic disease,  but without flow-limiting stenosis or occlusion. No aneurysm or other  vascular malformation identified.        The visualized posterior fossa circulation demonstrates no focal  flow-limiting stenosis or occlusion in the basilar artery. The left PCA  is fetal in origin. The bilateral ACAs are well opacified.       Impression:       CT Angiography Of The Neck With Intravenous Contrast   1. No evidence of high-grade arterial stenosis or underlying dissection.     CT Angiography Of The Head With Intravenous Contrast  1.   Atherosclerotic disease, without focal  high-grade stenosis or  occlusion.  This report was finalized on 05/01/2020 19:00 by Dr. Zenaida Gonzalez MD.    CT Head Without Contrast Stroke Protocol [[id number]] Collected:  05/01/20 1847     Updated:  05/01/20 1854    Narrative:       EXAM: CT OF THE HEAD WITHOUT IV CONTRAST 05/01/2020     COMPARISON: None      INDICATION: Female, 74 years-old. Left arm weakness      PROCEDURE: Non contrast enhanced head CT was performed.      Radiation dose equals .2 mGy-cm.  Automated exposure control dose  reduction technique was implemented.     FINDINGS:     7 mm hypodensity within the right frontal centrum semiovale, consistent  with age-indeterminate infarct (image 19, series 4). A right temporal  hypodensity measuring 7 mm (image 16, series 4) is age-indeterminate.  Age-indeterminate right basal ganglia infarct. A more chronic appearing  lacunar infarct in the right frontal lobe anteriorly (image 13, series  4).     There is no acute intracranial hemorrhage.     Confluent periventricular hypodensity, favored to reflect mild to  moderate chronic microvascular changes.     The basal cisterns are preserved. There is no midline shift. No acute  hydrocephalus.     Mastoid air cells are clear.          Impression:       1.  Age-indeterminate infarct in the right basal ganglia, right centrum  semiovale and the posterior right temporal lobe.  2.  More chronic appearing right anterior frontal lobe lacunar infarct.  This report was finalized on 05/01/2020 18:51 by Dr. Zenaida Gonzalez MD.    XR Chest 1 View [[id number]] Collected:  05/01/20 1714     Updated:  05/01/20 1718    Narrative:       Exam:   XR CHEST 1 VW-       Date:  05/01/2020      History:  Female, age  74 years;Stroke Protocol (Onset > 12 hrs);  R29.898-Other symptoms and signs involving the musculoskeletal system     COMPARISON:  None.     Findings :  Low lung volumes. Chronic interstitial lung changes.  The heart and mediastinum are normal in size. Lungs are  without focal  infiltrate, mass or effusions.  The bones show no acute pathology.         Impression:       Impression:     1.  Low lung volumes.  2.  Otherwise, no acute cardiopulmonary process.     This report was finalized on 05/01/2020 17:15 by Dr. Zenaida Gonzalez MD.                Assessment:    Condition: In stable condition.  Improving.   (Type 2 DM- review labs and home medication. Discuss with patient importance of blood sugar control in the healing process. Review diet, medical,and lifestyle modifications for optimal medical treatment. Will restart their regular diabetic regimen and make consult for diabetic education / dietician available upon request.  Hemoglobin A1c 8.9- will adjust medication today.  Tradjenta added.    Anxiety/depression- currently on Celexa 40 mg.  Reluctant to add Wellbutrin with questionable seizure activity.  Add BuSpar for anxiety today      Check transesophageal echo.    Agree will need inpatient rehab but she does not have much social support at home.  Consult  for inpatient rehab at [facility]).     Plan:   Encourage ambulation and per physical therapy.      Patient Active Problem List   Diagnosis   • Left arm weakness   • Essential hypertension   • Hypertensive urgency   • Hypercholesterolemia   • Stroke (CMS/[facility])     Nhan Muir MD  5/4/2020                                                  Electronically signed by Nhan Muir MD at 05/04/20 0808       Consult Notes (last 48 hours) (Notes from 05/04/20 0638 through 05/06/20 0638)    No notes of this type exist for this encounter.

## 2020-05-06 NOTE — THERAPY TREATMENT NOTE
Acute Care - Speech Language Pathology   Swallow Treatment Note [facility]     Patient Name: Gaviota Erazo  : 1945  MRN: [id number]  Today's Date: 2020  Onset of Illness/Injury or Date of Surgery: 20     Referring Physician: MARGARITA Sy      Admit Date: 2020  Swallow treatment completed. Patient is alert and cooperative in chair. RN report poor PO intake. She reports poor appetite. She complted trials of thin liquids with no overt s/s of aspiration. mech soft trials with functional chew but prolonged mastication. Cued for lingual sweep to clear mild residue and thin liquid wash. Patient OK to continue mech soft diet with thin liquids. May require cueing for residue removal and encouragment for increased PO intake. Plan to d/c to inpatient rehab today. Recommend continued swallowing and speech therapy.  Vivian Hylton, MS CCC-SLP 2020 10:55    Visit Dx:      ICD-10-CM ICD-9-CM   1. Left arm weakness R29.898 729.89   2. Decreased activities of daily living (ADL) Z78.9 V49.89   3. Impaired mobility Z74.09 799.89   4. Dysphagia, unspecified type R13.10 787.20   5. Cerebrovascular accident (CVA), unspecified mechanism (CMS/HCC) I63.9 434.91   6. Bradycardia, unspecified  R00.1 427.89   7. Dysarthria R47.1 784.51     Patient Active Problem List   Diagnosis   • Left arm weakness   • Essential hypertension   • Hypertensive urgency   • Hypercholesterolemia   • Stroke (CMS/HCC)       Therapy Treatment  Rehabilitation Treatment Summary     Row Name 20 1009             Treatment Time/Intention    Discipline  speech language pathologist  -MM      Document Type  therapy note (daily note)  -MM2      Subjective Information  no complaints  -MM2      Mode of Treatment  individual therapy;speech-language pathology  -MM2      Patient/Family Observations  No family present  -MM2      Care Plan Review  care plan/treatment goals reviewed  -MM2      Therapy Frequency (Swallow)  at least;3 days per  week  -MM2      Therapy Frequency (SLP SLC)  at least;3 days per week  -MM2      Patient Effort  good  -MM2      Recorded by [MM] Vivian Hylton MS CCC-SLP 05/06/20 1044  [MM2] Vivian Hylton MS CCC-SLP 05/06/20 1047      Row Name 05/06/20 1009             Pain Assessment    Additional Documentation  Pain Scale: FACES Pre/Post-Treatment (Group)  -MM      Recorded by [MM] Vivian Hylton MS CCC-SLP 05/06/20 1047      Row Name 05/06/20 1009             Pain Scale: FACES Pre/Post-Treatment    Pain: FACES Scale, Pretreatment  0-->no hurt  -MM      Pain: FACES Scale, Post-Treatment  0-->no hurt  -MM      Recorded by [MM] Vivian Hylton MS CCC-SLP 05/06/20 1047      Row Name 05/06/20 1009             Outcome Summary/Treatment Plan (SLP)    Daily Summary of Progress (SLP)  progress toward functional goals as expected  -MM      Barriers to Overall Progress (SLP)  n/a  -MM      Plan for Continued Treatment (SLP)  Continue to follow   -MM      Anticipated Dischage Disposition  inpatient rehabilitation facility  -MM      Recorded by [MM] Vivian Hylton MS CCC-SLP 05/06/20 1047        User Key  (r) = Recorded By, (t) = Taken By, (c) = Cosigned By    Initials Name Effective Dates Discipline    MM Vivian Hylton MS CCC-SLP 02/11/20 -  SLP          Outcome Summary  Outcome Summary/Treatment Plan (SLP)  Daily Summary of Progress (SLP): progress toward functional goals as expected (05/06/20 1009 : Vivian Hylton, MS CCC-SLP)  Barriers to Overall Progress (SLP): n/a (05/06/20 1009 : Vivian Hylton, MS CCC-SLP)  Plan for Continued Treatment (SLP): Continue to follow  (05/06/20 1009 : Vivian Hylton, MS CCC-SLP)  Anticipated Dischage Disposition: inpatient rehabilitation facility (05/06/20 1009 : Vivian Hylton, MS [facility]SL[facility])      SLP GOALS     Row Name 05/06/20 1009 05/05/20 1435 05/05/20 1200       Oral Nutrition/Hydration Goal 1 (SLP)    Oral Nutrition/Hydration Goal 1, SLP  Pt  will tolerate LRD w/o any overt s/s of aspriation.  -MM  Pt will tolerate LRD w/o any overt s/s of aspriation.  -MM  Pt will tolerate LRD w/o any overt s/s of aspriation.  -KW    Time Frame (Oral Nutrition/Hydration Goal 1, SLP)  by discharge  -MM  by discharge  -MM  by discharge  -KW    Barriers (Oral Nutrition/Hydration Goal 1, SLP)  n/a  -MM  n/a  -MM  n/a  -KW    Progress/Outcomes (Oral Nutrition/Hydration Goal 1, SLP)  continuing progress toward goal  -MM  goal ongoing  -MM  goal ongoing  -KW       Labial Strengthening Goal 1 (SLP)    Activity (Labial Strengthening Goal 1, SLP)  increase labial tone  -MM  increase labial tone  -MM  increase labial tone  -KW    Increase Labial Tone  labial resistance exercises  -MM  labial resistance exercises  -MM  labial resistance exercises  -KW    [place]/Accuracy (Labial Strengthening Goal 1, SLP)  independently (over 90% accuracy)  -MM  independently (over 90% accuracy)  -MM  independently (over 90% accuracy)  -KW    Time Frame (Labial Strengthening Goal 1, SLP)  short term goal (STG);by discharge  -MM  short term goal (STG);by discharge  -MM  short term goal (STG);by discharge  -KW    Barriers (Labial Strengthening Goal 1, SLP)  n/a  -MM  n/a  -MM  n/a  -KW    Progress/Outcomes (Labial Strengthening Goal 1, SLP)  continuing progress toward goal  -MM  goal ongoing  -MM  goal ongoing  -KW       Lingual Strengthening Goal 1 (SLP)    Activity (Lingual Strengthening Goal 1, SLP)  increase lingual tone/sensation/control/coordination/movement  -MM  increase lingual tone/sensation/control/coordination/movement  -MM  increase lingual tone/sensation/control/coordination/movement  -KW    Increase Lingual Tone/Sensation/Control/Coordination/Movement  lingual movement exercises  -MM  lingual movement exercises  -MM  lingual movement exercises  -KW    [place]/Accuracy (Lingual Strengthening Goal 1, SLP)  independently (over 90% accuracy)  -MM  independently (over 90%  accuracy)  -MM  independently (over 90% accuracy)  -KW    Time Frame (Lingual Strengthening Goal 1, SLP)  short term goal (STG);by discharge  -MM  short term goal (STG);by discharge  -MM  short term goal (STG);by discharge  -KW    Barriers (Lingual Strengthening Goal 1, SLP)  n/a  -MM  n/a  -MM  n/a  -KW    Progress/Outcomes (Lingual Strengthening Goal 1, SLP)  continuing progress toward goal  -MM  goal ongoing  -MM  goal ongoing  -KW       Pharyngeal Strengthening Exercise Goal 1 (SLP)    Activity (Pharyngeal Strengthening Goal 1, SLP)  increase tongue base retraction  -MM  increase tongue base retraction  -MM  increase tongue base retraction  -KW    Increase Tongue Base Retraction  timothy;falsetto  -MM  timothy;falsetto  -MM  timothy;falsetto  -KW    [place]/Accuracy (Pharyngeal Strengthening Goal 1, SLP)  independently (over 90% accuracy)  -MM  independently (over 90% accuracy)  -MM  independently (over 90% accuracy)  -KW    Time Frame (Pharyngeal Strengthening Goal 1, SLP)  short term goal (STG);by discharge  -MM  short term goal (STG);by discharge  -MM  short term goal (STG);by discharge  -KW    Barriers (Pharyngeal Strengthening Goal 1, SLP)  n/a  -MM  n/a  -MM  n/a  -KW    Progress/Outcomes (Pharyngeal Strengthening Goal 1, SLP)  goal ongoing  -MM  goal ongoing  -MM  goal ongoing  -KW       Swallow Compensatory Strategies Goal 1 (SLP)    Activity (Swallow Compensatory Strategies/Techniques Goal 1, SLP)  compensatory strategies;during meal intake;during p.o. trials;small bites;small cup sips;small straw sips;food/liquid placed on stronger right side;alternate food/liquid intake  -MM  compensatory strategies;during meal intake;during p.o. trials;small bites;small cup sips;small straw sips;food/liquid placed on stronger right side;alternate food/liquid intake  -MM  compensatory strategies;during meal intake;during p.o. trials;small bites;small cup sips;small straw sips;food/liquid placed on stronger right  side;alternate food/liquid intake  -KW    [place]/Accuracy (Swallow Compensatory Strategies/Techniques Goal 1, SLP)  independently (over 90% accuracy)  -MM  independently (over 90% accuracy)  -MM  independently (over 90% accuracy)  -KW    Time Frame (Swallow Compensatory Strategies/Techniques Goal 1, SLP)  short term goal (STG);by discharge  -MM  short term goal (STG);by discharge  -MM  short term goal (STG);by discharge  -KW    Barriers (Swallow Compensatory Strategies/Techniques Goal 1, SLP)  n/a  -MM  n/a  -MM  n/a  -KW    Progress/Outcomes (Swallow Compensatory Strategies/Techniques Goal 1, SLP)  continuing progress toward goal  -MM  goal ongoing  -MM  goal ongoing  -KW    Row Name 05/04/20 1500             Respiratory Support Goal 1 (SLP)    Improve Respiratory Support Goal 1 (SLP)  increasing length of exhalation;sustaining a vowel sound on exhalation;independently (over 90% accuracy);repeating a sentence on exhalation  -KW      Time Frame (Respiratory Support Goal 1, SLP)  short term goal (STG);by discharge  -KW      Barriers (Respiratory Support Goal 1, SLP)  n/a  -KW      Progress/Outcomes (Respiratory Support Goal 1, SLP)  goal ongoing  -KW         Phonation Goal 1 (SLP)    Improve Phonation By Goal 1 (SLP)  using loud speech;independently (over 90% accuracy)  -KW      Time Frame (Phonation Goal 1, SLP)  short term goal (STG);by discharge  -KW      Barriers (Phonation Goal 1, SLP)  n/a  -KW      Progress/Outcomes (Phonation Goal 1, SLP)  goal ongoing  -KW         Articulation Goal 1 (SLP)    Improve Articulation Goal 1 (SLP)  by over-articulating at word level;by over-articulating at phrase level;independently (over 90% accuracy)  -KW      Time Frame (Articulation Goal 1, SLP)  short term goal (STG);by discharge  -KW      Barriers (Articulation Goal 1, SLP)  n/a  -KW      Progress/Outcomes (Articulation Goal 1, SLP)  goal ongoing  -KW        User Key  (r) = Recorded By, (t) = Taken By, (c) = Cosigned  By    Initials Name Provider Type    Yanira Haskins, MS CCC-SLP Speech and Language Pathologist    Vivian Milan MS CCC-SLP Speech and Language Pathologist          EDUCATION  The patient has been educated in the following areas:   Dysphagia (Swallowing Impairment) Oral Care/Hydration Modified Diet Instruction.    SLP Recommendation and Plan  Daily Summary of Progress (SLP): progress toward functional goals as expected  Barriers to Overall Progress (SLP): n/a  Plan for Continued Treatment (SLP): Continue to follow   Anticipated Dischage Disposition: inpatient rehabilitation facility                    Time Calculation:   Time Calculation- SLP     Row Name 05/06/20 1054             Time Calculation- SLP    SLP Start Time  1009  -MM      SLP Stop Time  1035  -MM      SLP Time Calculation (min)  26 min  -MM      SLP Received On  05/06/20  -MM        User Key  (r) = Recorded By, (t) = Taken By, (c) = Cosigned By    Initials Name Provider Type    Vivian Milan MS CCC-SLP Speech and Language Pathologist          Therapy Charges for Today     Code Description Service Date Service Provider Modifiers Qty    92009315060 HC ST MOTION FLUORO EVAL SWALLOW 4 5/5/2020 Vivian Hylton MS CCC-SLP GN 1    79828650563 HC ST TREATMENT SWALLOW 2 5/6/2020 Vivian Hylton MS CCC-SLP GN 1                 Vivian Hylton MS CCC-PAT  5/6/2020

## 2020-05-06 NOTE — PLAN OF CARE
Problem: Patient Care Overview  Goal: Plan of Care Review  Outcome: Ongoing (interventions implemented as appropriate)  Flowsheets (Taken 5/6/2020 1043)  Progress: no change  Plan of Care Reviewed With: patient; other (see comments) (MOMO Carbajal)  Outcome Summary: Swallow treatment completed. Patient is alert and cooperative in chair. RN report poor PO intake. She reports poor appetite. She complted trials of thin liquids with no overt s/s of aspiration. mech soft trials with functional chew but prolonged mastication. Cued for lingual sweep to clear mild residue and thin liquid wash. Patient OK to continue mech soft diet with thin liquids. May require cueing for residue removal and encouragment for increased PO intake. Plan to d/c to inpatient rehab today. Recommend continued swallowing and speech therapy.

## 2020-05-06 NOTE — PROGRESS NOTES
Continued Stay Note  [facility]     Patient Name: Gaviota Erazo  MRN: [id number]  Today's Date: 5/6/2020    Admit Date: 5/1/2020    Discharge Plan     Row Name 05/06/20 1021       Plan    Plan  [facility]    Patient/Family in Agreement with Plan  yes    Final Discharge Disposition Code  62 - inpatient rehab facility    Final Note  Pt is being discharged to [facility] today. Cristinaona from there aware of d/c today, she has already pulled d/c summary from the system.  She is calling grandson to inform of needed information for rehab. RN saying pt will need to travel via [facility] [phone].  Report to be given to [phone], pt will be going to room 818.        Discharge Codes    No documentation.       Expected Discharge Date and Time     Expected Discharge Date Expected Discharge Time    May 6, 2020             MAXI Clark

## 2020-05-06 NOTE — PLAN OF CARE
Problem: Patient Care Overview  Goal: Plan of Care Review  5/6/2020 1253 by Solomon Washburn, RN  Outcome: Ongoing (interventions implemented as appropriate)  Flowsheets  Taken 5/6/2020 1250 by Solomon Washburn, RN  Progress: improving  Taken 5/6/2020 1118 by Jose Miguel Castaneda COTA/L  Plan of Care Reviewed With: patient  Taken 5/6/2020 1253 by Solomon Washburn, RN  Outcome Summary: Pt sat up in chair today with assist of 2. Pt alert to person and place, slurred speech continues and left side weakness. Continue to remind her to turn. Spoke with her family today.

## 2020-05-07 LAB
ANION GAP SERPL CALCULATED.3IONS-SCNC: 16 MMOL/L (ref 7–19)
BASOPHILS ABSOLUTE: 0.1 K/UL (ref 0–0.2)
BASOPHILS ABSOLUTE: 0.1 K/UL (ref 0–0.2)
BASOPHILS RELATIVE PERCENT: 0.4 % (ref 0–1)
BASOPHILS RELATIVE PERCENT: 0.5 % (ref 0–1)
BUN BLDV-MCNC: 14 MG/DL (ref 8–23)
CALCIUM SERPL-MCNC: 9.3 MG/DL (ref 8.8–10.2)
CHLORIDE BLD-SCNC: 100 MMOL/L (ref 98–111)
CO2: 25 MMOL/L (ref 22–29)
CREAT SERPL-MCNC: 0.9 MG/DL (ref 0.5–0.9)
EOSINOPHILS ABSOLUTE: 0.2 K/UL (ref 0–0.6)
EOSINOPHILS ABSOLUTE: 0.2 K/UL (ref 0–0.6)
EOSINOPHILS RELATIVE PERCENT: 2 % (ref 0–5)
EOSINOPHILS RELATIVE PERCENT: 2 % (ref 0–5)
GFR NON-AFRICAN AMERICAN: >60
GLUCOSE BLD-MCNC: 142 MG/DL (ref 74–109)
GLUCOSE BLD-MCNC: 151 MG/DL (ref 70–99)
HCT VFR BLD CALC: 39.4 % (ref 37–47)
HCT VFR BLD CALC: 39.9 % (ref 37–47)
HEMOGLOBIN: 12.9 G/DL (ref 12–16)
HEMOGLOBIN: 13 G/DL (ref 12–16)
IMMATURE GRANULOCYTES #: 0.1 K/UL
IMMATURE GRANULOCYTES #: 0.1 K/UL
LYMPHOCYTES ABSOLUTE: 2.8 K/UL (ref 1.1–4.5)
LYMPHOCYTES ABSOLUTE: 3 K/UL (ref 1.1–4.5)
LYMPHOCYTES RELATIVE PERCENT: 23.1 % (ref 20–40)
LYMPHOCYTES RELATIVE PERCENT: 25 % (ref 20–40)
MCH RBC QN AUTO: 29.7 PG (ref 27–31)
MCH RBC QN AUTO: 30.3 PG (ref 27–31)
MCHC RBC AUTO-ENTMCNC: 32.3 G/DL (ref 33–37)
MCHC RBC AUTO-ENTMCNC: 33 G/DL (ref 33–37)
MCV RBC AUTO: 91.7 FL (ref 81–99)
MCV RBC AUTO: 91.8 FL (ref 81–99)
MONOCYTES ABSOLUTE: 1.2 K/UL (ref 0–0.9)
MONOCYTES ABSOLUTE: 1.2 K/UL (ref 0–0.9)
MONOCYTES RELATIVE PERCENT: 9.6 % (ref 0–10)
MONOCYTES RELATIVE PERCENT: 9.7 % (ref 0–10)
NEUTROPHILS ABSOLUTE: 7.5 K/UL (ref 1.5–7.5)
NEUTROPHILS ABSOLUTE: 7.9 K/UL (ref 1.5–7.5)
NEUTROPHILS RELATIVE PERCENT: 62.4 % (ref 50–65)
NEUTROPHILS RELATIVE PERCENT: 64.5 % (ref 50–65)
PDW BLD-RTO: 13.2 % (ref 11.5–14.5)
PDW BLD-RTO: 13.2 % (ref 11.5–14.5)
PERFORMED ON: ABNORMAL
PLATELET # BLD: 257 K/UL (ref 130–400)
PLATELET # BLD: 267 K/UL (ref 130–400)
PMV BLD AUTO: 10.7 FL (ref 9.4–12.3)
PMV BLD AUTO: 10.8 FL (ref 9.4–12.3)
POTASSIUM REFLEX MAGNESIUM: 3.6 MMOL/L (ref 3.5–5)
PREALBUMIN: 20 MG/DL (ref 20–40)
RBC # BLD: 4.29 M/UL (ref 4.2–5.4)
RBC # BLD: 4.35 M/UL (ref 4.2–5.4)
SODIUM BLD-SCNC: 141 MMOL/L (ref 136–145)
WBC # BLD: 12 K/UL (ref 4.8–10.8)
WBC # BLD: 12.2 K/UL (ref 4.8–10.8)

## 2020-05-07 PROCEDURE — 6370000000 HC RX 637 (ALT 250 FOR IP): Performed by: PSYCHIATRY & NEUROLOGY

## 2020-05-07 PROCEDURE — 92522 EVALUATE SPEECH PRODUCTION: CPT

## 2020-05-07 PROCEDURE — 97535 SELF CARE MNGMENT TRAINING: CPT

## 2020-05-07 PROCEDURE — 82947 ASSAY GLUCOSE BLOOD QUANT: CPT

## 2020-05-07 PROCEDURE — 97167 OT EVAL HIGH COMPLEX 60 MIN: CPT

## 2020-05-07 PROCEDURE — 97110 THERAPEUTIC EXERCISES: CPT

## 2020-05-07 PROCEDURE — 1180000000 HC REHAB R&B

## 2020-05-07 PROCEDURE — 99223 1ST HOSP IP/OBS HIGH 75: CPT | Performed by: PSYCHIATRY & NEUROLOGY

## 2020-05-07 PROCEDURE — 36415 COLL VENOUS BLD VENIPUNCTURE: CPT

## 2020-05-07 PROCEDURE — 97116 GAIT TRAINING THERAPY: CPT

## 2020-05-07 PROCEDURE — 80048 BASIC METABOLIC PNL TOTAL CA: CPT

## 2020-05-07 PROCEDURE — 96125 COGNITIVE TEST BY HC PRO: CPT

## 2020-05-07 PROCEDURE — 84134 ASSAY OF PREALBUMIN: CPT

## 2020-05-07 PROCEDURE — 92610 EVALUATE SWALLOWING FUNCTION: CPT

## 2020-05-07 PROCEDURE — 97161 PT EVAL LOW COMPLEX 20 MIN: CPT

## 2020-05-07 PROCEDURE — 85025 COMPLETE CBC W/AUTO DIFF WBC: CPT

## 2020-05-07 RX ADMIN — ATORVASTATIN CALCIUM 80 MG: 80 TABLET, FILM COATED ORAL at 20:34

## 2020-05-07 RX ADMIN — CITALOPRAM 40 MG: 40 TABLET ORAL at 08:15

## 2020-05-07 RX ADMIN — HYDROCHLOROTHIAZIDE 12.5 MG: 25 TABLET ORAL at 08:19

## 2020-05-07 RX ADMIN — METFORMIN HYDROCHLORIDE 500 MG: 500 TABLET ORAL at 08:19

## 2020-05-07 RX ADMIN — METFORMIN HYDROCHLORIDE 500 MG: 500 TABLET ORAL at 17:47

## 2020-05-07 RX ADMIN — BUSPIRONE HYDROCHLORIDE 15 MG: 15 TABLET ORAL at 20:34

## 2020-05-07 RX ADMIN — AMLODIPINE BESYLATE 5 MG: 5 TABLET ORAL at 08:21

## 2020-05-07 RX ADMIN — LINAGLIPTIN 5 MG: 5 TABLET, FILM COATED ORAL at 08:20

## 2020-05-07 RX ADMIN — LISINOPRIL 20 MG: 20 TABLET ORAL at 08:22

## 2020-05-07 RX ADMIN — AMLODIPINE BESYLATE 5 MG: 10 TABLET ORAL at 08:16

## 2020-05-07 RX ADMIN — DABIGATRAN ETEXILATE MESYLATE 150 MG: 150 CAPSULE ORAL at 20:34

## 2020-05-07 RX ADMIN — CLOPIDOGREL BISULFATE 75 MG: 75 TABLET ORAL at 08:18

## 2020-05-07 RX ADMIN — DABIGATRAN ETEXILATE MESYLATE 150 MG: 150 CAPSULE ORAL at 08:18

## 2020-05-07 RX ADMIN — BUSPIRONE HYDROCHLORIDE 15 MG: 15 TABLET ORAL at 08:18

## 2020-05-07 RX ADMIN — ASPIRIN 81 MG 81 MG: 81 TABLET ORAL at 08:19

## 2020-05-07 RX ADMIN — POLYETHYLENE GLYCOL 3350 17 G: 17 POWDER, FOR SOLUTION ORAL at 08:21

## 2020-05-07 RX ADMIN — LISINOPRIL 20 MG: 20 TABLET ORAL at 08:15

## 2020-05-07 NOTE — CONSULTS
Referring Provider: Dr. Trinidad May  Reason for Consultation: Medical management    No care team member to display    Chief complaint left upper extremity weakness    Subjective . History of present illness:    Adrián Chappell is a 76-year-old female with a history of hypertension, TIA, diabetes mellitus type 2, mixed dyslipidemia, anxiety and depression. I personally saw her via telehealth with complaints of left upper extremity weakness and advised her present directly to the emergency room as she revealed symptoms of cerebral vascular accident. She was compliant and presented to [facility] on 5/1/20. Her symptoms included  left arm weakness x 3 days,some choking on liquids at times and with her legs going weak. She was hypertensive on presentation with B/P 173/101. CT done showed an age-indeterminate infarct in the right basal ganglia,right centrum semiovale and the posterior right temporal lobe. CTA head/neck didn't reveal ay high-grade stenosis or occlusion. She was admitted to  Dr. Yonas Joyce with consult for neurology. She was seen by Dr. Jason Davis. MRI done showed an acute and subacute right MCA stroke. She was not given TPA d/t being outside of the timeframe. She was placed on ASA and Plavix for dual antiplatelet therapy. Dr. Jason Davis recommended Zio Patch at discharge. She was also found to have a hemoglobin A1C of 8.9 and adjustments have been made to her medications. . She was followed by ST/ PT/OT. Patient became more slow and dysarthric,left sided weakness worsening,left eye was deviating medially and patient seemed to have to force her left eye to move laterally. Repeat CT on 5/5/20 showed larger stroke right frontal lobe 2.5 cm compared prior scan on 5/3/20. Per Dr. 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 On 5/5/20 afternoon, she was having more difficulty forming words and more left sided weakness.  MRI brain was done showing new areas of embolic strokes. The decision made to d/c ASA and Plavix and start Pradaxa 150 mg BID. IVELISSE did not reveal thrombus and telemetry has shown sinus rhythm. She has been transferred to [facility] for rehab admitted to Dr. Jonah Álvarez, we are being consulted for monitoring and management of her chronic medical conditions. REVIEW OF SYSTEMS:    CONSTITUTIONAL:  Negative for anorexia, chills, fevers, night sweats and weight loss  EYES:  negative for eye dryness, icterus and redness  HEENT:   negative for dental problems, epistaxis, facial trauma and thrush  RESPIRATORY:  negative for chest tightness, cough, dyspnea on exertion, pneumonia and sputum  CARDIOVASCULAR: negative for chest pain, dyspnea, exertional chest pressure/discomfort, irregular heart beat, palpitations, paroxysmal nocturnal dyspnea and syncope  GASTROINTESTINAL:  negative for abdominal pain, hematemesis, jaundice, melena and rectal bleeding  MUSCULOSKELETAL:  negative for muscle weakness, myalgias and neck pain, chronic joint pain noted  NEUROLOGICAL: No seizure reported,  left hemiparesis secondary to stroke  INTEGUMENT: negative for pruritus, rash, skin color change and skin lesion(s)   A Full 14 point review of systems is negative outside those listed above and in the HPI      History    No past medical history on file. No past surgical history on file. No family history on file.   Social History     Tobacco Use    Smoking status: Not on file   Substance Use Topics    Alcohol use: Not on file    Drug use: Not on file     Medications Prior to Admission: metFORMIN (GLUCOPHAGE) 500 MG tablet, Take 500 mg by mouth 2 times daily (with meals)  citalopram (CELEXA) 40 MG tablet, Take 40 mg by mouth daily  amLODIPine-benazepril (LOTREL) 5-20 MG per capsule, Take 1 capsule by mouth daily  clopidogrel (PLAVIX) 75 MG tablet, Take 75 mg by mouth daily  atorvastatin (LIPITOR) 80 MG tablet, Take 80 mg by mouth nightly  aspirin 81 MG chewable tablet, Take 81 mg by mouth daily  busPIRone (BUSPAR) 15 MG tablet, Take 15 mg by mouth 2 times daily  linaGLIPtin (TRADJENTA PO), Take 5 mg by mouth daily  amLODIPine (NORVASC) 5 MG tablet, Take 5 mg by mouth daily  lisinopril (PRINIVIL;ZESTRIL) 20 MG tablet, Take 20 mg by mouth daily  hydrochlorothiazide (HYDRODIURIL) 12.5 MG tablet, Take 12.5 mg by mouth daily  dabigatran (PRADAXA) 150 MG capsule, Take 150 mg by mouth 2 times daily  Patient has no known allergies. Objective     Vital Signs   All pre-op and post op vitals reviewed           Physical Exam:  Constitutional: oriented to person, place, and time. appears well-developed. HEENT:   Head: Normocephalic and atraumatic. Eyes: Pupils are equal, round, and reactive to light. Neck: Neck supple. Cardiovascular: Regular rhythm and normal heart sounds. No lift or rub appreciated. Pulmonary/Chest: Effort normal and breath sounds normal. CTAB. No labored breating. Abdominal: Soft. Bowel sounds are normal. There is no appreciable  distension. There is no point tenderness. no rebounding or guarding. Musculoskeletal: Normal range of motion on other than surgically repaired joint . no edema or tenderness other than surgical area. Post-op changes noted  Neurological:  alert and oriented to person, place, and time. Left facial droop, left hemiparesis upper and lower extremity  Skin: Skin is warm and dry. No new rashes appreciated.     Results Review:   I reviewed the patient's new imaging results and agree with the interpretation     Admitting diagnosis: Multiple right hemisphere strokes    Active Problems: Treatment recommendations    Acute ischemic stroke   Diabetes mellitus type 2-- metformin twice daily, Tradjenta  Essential hypertension-- Norvasc, Zestril, HCTZ  Slow transit constipation-- stool softener, laxative, bowel regimen  Generalized anxiety disorder--BuSpar  Major depressive disorder--Celexa, counseling provided  Mixed dyslipidemia--on statin therapy    Well-known to

## 2020-05-07 NOTE — PROGRESS NOTES
attempted due to medical condition or safety concerns  CARE Score: 88  Discharge Goal: Independent    12 Steps  Reason if not Attempted: Not attempted due to medical condition or safety concerns  CARE Score: 88  Discharge Goal: Supervision or touching assistance    Wheel 50 Feet with Two Turns  Reason if not Attempted: Not attempted due to medical condition or safety concerns  CARE Score: 88  Discharge Goal: Independent    Wheel 150 Feet  Reason if not Attempted: Not attempted due to medical condition or safety concerns  CARE Score: 88  Discharge Goal: Independent      LAST TREATMENT TIME  PT Individual Minutes  Time In: 0900  Time Out: 1000  Minutes: 60

## 2020-05-07 NOTE — THERAPY DISCHARGE NOTE
Acute Care - Speech Language Pathology Discharge Summary  [facility]       Patient Name: Gaviota Erazo  : 1945  MRN: [id number]    Today's Date: 2020  Onset of Illness/Injury or Date of Surgery: 20       Referring Physician: MARGARITA Sy        Admit Date: 2020      SLP Recommendation and Plan  Mechanical soft solids and thin liquids    Visit Dx:    ICD-10-CM ICD-9-CM   1. Left arm weakness R29.898 729.89   2. Decreased activities of daily living (ADL) Z78.9 V49.89   3. Impaired mobility Z74.09 799.89   4. Dysphagia, unspecified type R13.10 787.20   5. Cerebrovascular accident (CVA), unspecified mechanism (CMS/HCC) I63.9 434.91   6. Bradycardia, unspecified  R00.1 427.89   7. Dysarthria R47.1 784.51               SLP GOALS     Row Name 20 1200 20 1009 20 1435       Oral Nutrition/Hydration Goal 1 (SLP)    Oral Nutrition/Hydration Goal 1, SLP  Pt will tolerate LRD w/o any overt s/s of aspriation.  -MB  Pt will tolerate LRD w/o any overt s/s of aspriation.  -MM  Pt will tolerate LRD w/o any overt s/s of aspriation.  -MM    Time Frame (Oral Nutrition/Hydration Goal 1, SLP)  by discharge  -MB  by discharge  -MM  by discharge  -MM    Barriers (Oral Nutrition/Hydration Goal 1, SLP)  n/a  -MB  n/a  -MM  n/a  -MM    Progress/Outcomes (Oral Nutrition/Hydration Goal 1, SLP)  goal partially met  -MB  continuing progress toward goal  -MM  goal ongoing  -MM       Labial Strengthening Goal 1 (SLP)    Activity (Labial Strengthening Goal 1, SLP)  increase labial tone  -MB  increase labial tone  -MM  increase labial tone  -MM    Increase Labial Tone  labial resistance exercises  -MB  labial resistance exercises  -MM  labial resistance exercises  -MM    [place]/Accuracy (Labial Strengthening Goal 1, SLP)  independently (over 90% accuracy)  -MB  independently (over 90% accuracy)  -MM  independently (over 90% accuracy)  -MM    Time Frame (Labial Strengthening Goal 1, SLP)  short term goal  (STG);by discharge  -MB  short term goal (STG);by discharge  -MM  short term goal (STG);by discharge  -MM    Barriers (Labial Strengthening Goal 1, SLP)  n/a  -MB  n/a  -MM  n/a  -MM    Progress/Outcomes (Labial Strengthening Goal 1, SLP)  goal partially met  -MB  continuing progress toward goal  -MM  goal ongoing  -MM       Lingual Strengthening Goal 1 (SLP)    Activity (Lingual Strengthening Goal 1, SLP)  increase lingual tone/sensation/control/coordination/movement  -MB  increase lingual tone/sensation/control/coordination/movement  -MM  increase lingual tone/sensation/control/coordination/movement  -MM    Increase Lingual Tone/Sensation/Control/Coordination/Movement  lingual movement exercises  -MB  lingual movement exercises  -MM  lingual movement exercises  -MM    [place]/Accuracy (Lingual Strengthening Goal 1, SLP)  independently (over 90% accuracy)  -MB  independently (over 90% accuracy)  -MM  independently (over 90% accuracy)  -MM    Time Frame (Lingual Strengthening Goal 1, SLP)  short term goal (STG);by discharge  -MB  short term goal (STG);by discharge  -MM  short term goal (STG);by discharge  -MM    Barriers (Lingual Strengthening Goal 1, SLP)  n/a  -MB  n/a  -MM  n/a  -MM    Progress/Outcomes (Lingual Strengthening Goal 1, SLP)  goal partially met  -MB  continuing progress toward goal  -MM  goal ongoing  -MM       Pharyngeal Strengthening Exercise Goal 1 (SLP)    Activity (Pharyngeal Strengthening Goal 1, SLP)  increase tongue base retraction  -MB  increase tongue base retraction  -MM  increase tongue base retraction  -MM    Increase Tongue Base Retraction  timothy;falsetto  -MB  timothy;falsetto  -MM  timothy;falsetto  -MM    [place]/Accuracy (Pharyngeal Strengthening Goal 1, SLP)  independently (over 90% accuracy)  -MB  independently (over 90% accuracy)  -MM  independently (over 90% accuracy)  -MM    Time Frame (Pharyngeal Strengthening Goal 1, SLP)  short term goal (STG);by discharge  -MB   short term goal (STG);by discharge  -MM  short term goal (STG);by discharge  -MM    Barriers (Pharyngeal Strengthening Goal 1, SLP)  n/a  -MB  n/a  -MM  n/a  -MM    Progress/Outcomes (Pharyngeal Strengthening Goal 1, SLP)  goal not met  -MB  goal ongoing  -MM  goal ongoing  -MM       Swallow Compensatory Strategies Goal 1 (SLP)    Activity (Swallow Compensatory Strategies/Techniques Goal 1, SLP)  compensatory strategies;during meal intake;during p.o. trials;small bites;small cup sips;small straw sips;food/liquid placed on stronger right side;alternate food/liquid intake  -MB  compensatory strategies;during meal intake;during p.o. trials;small bites;small cup sips;small straw sips;food/liquid placed on stronger right side;alternate food/liquid intake  -MM  compensatory strategies;during meal intake;during p.o. trials;small bites;small cup sips;small straw sips;food/liquid placed on stronger right side;alternate food/liquid intake  -MM    [place]/Accuracy (Swallow Compensatory Strategies/Techniques Goal 1, SLP)  independently (over 90% accuracy)  -MB  independently (over 90% accuracy)  -MM  independently (over 90% accuracy)  -MM    Time Frame (Swallow Compensatory Strategies/Techniques Goal 1, SLP)  short term goal (STG);by discharge  -MB  short term goal (STG);by discharge  -MM  short term goal (STG);by discharge  -MM    Barriers (Swallow Compensatory Strategies/Techniques Goal 1, SLP)  n/a  -MB  n/a  -MM  n/a  -MM    Progress/Outcomes (Swallow Compensatory Strategies/Techniques Goal 1, SLP)  goal partially met  -MB  continuing progress toward goal  -MM  goal ongoing  -MM       Respiratory Support Goal 1 (SLP)    Improve Respiratory Support Goal 1 (SLP)  increasing length of exhalation;sustaining a vowel sound on exhalation;independently (over 90% accuracy);repeating a sentence on exhalation  -MB  --  --    Time Frame (Respiratory Support Goal 1, SLP)  short term goal (STG);by discharge  -MB  --  --     Progress/Outcomes (Respiratory Support Goal 1, SLP)  goal not met  -MB  --  --       Phonation Goal 1 (SLP)    Improve Phonation By Goal 1 (SLP)  using loud speech;independently (over 90% accuracy)  -MB  --  --    Time Frame (Phonation Goal 1, SLP)  short term goal (STG);by discharge  -MB  --  --    Barriers (Phonation Goal 1, SLP)  n/a  -MB  --  --    Progress/Outcomes (Phonation Goal 1, SLP)  goal not met  -MB  --  --       Articulation Goal 1 (SLP)    Improve Articulation Goal 1 (SLP)  by over-articulating at word level;by over-articulating at phrase level;independently (over 90% accuracy)  -MB  --  --    Time Frame (Articulation Goal 1, SLP)  short term goal (STG);by discharge  -MB  --  --    Barriers (Articulation Goal 1, SLP)  n/a  -MB  --  --    Progress/Outcomes (Articulation Goal 1, SLP)  goal not met  -MB  --  --    Row Name 05/05/20 1200 05/04/20 1500          Oral Nutrition/Hydration Goal 1 (SLP)    Oral Nutrition/Hydration Goal 1, SLP  Pt will tolerate LRD w/o any overt s/s of aspriation.  -KW  --     Time Frame (Oral Nutrition/Hydration Goal 1, SLP)  by discharge  -KW  --     Barriers (Oral Nutrition/Hydration Goal 1, SLP)  n/a  -KW  --     Progress/Outcomes (Oral Nutrition/Hydration Goal 1, SLP)  goal ongoing  -KW  --        Labial Strengthening Goal 1 (SLP)    Activity (Labial Strengthening Goal 1, SLP)  increase labial tone  -KW  --     Increase Labial Tone  labial resistance exercises  -KW  --     [place]/Accuracy (Labial Strengthening Goal 1, SLP)  independently (over 90% accuracy)  -KW  --     Time Frame (Labial Strengthening Goal 1, SLP)  short term goal (STG);by discharge  -KW  --     Barriers (Labial Strengthening Goal 1, SLP)  n/a  -KW  --     Progress/Outcomes (Labial Strengthening Goal 1, SLP)  goal ongoing  -KW  --        Lingual Strengthening Goal 1 (SLP)    Activity (Lingual Strengthening Goal 1, SLP)  increase lingual tone/sensation/control/coordination/movement  -KW  --      Increase Lingual Tone/Sensation/Control/Coordination/Movement  lingual movement exercises  -KW  --     [place]/Accuracy (Lingual Strengthening Goal 1, SLP)  independently (over 90% accuracy)  -KW  --     Time Frame (Lingual Strengthening Goal 1, SLP)  short term goal (STG);by discharge  -KW  --     Barriers (Lingual Strengthening Goal 1, SLP)  n/a  -KW  --     Progress/Outcomes (Lingual Strengthening Goal 1, SLP)  goal ongoing  -KW  --        Pharyngeal Strengthening Exercise Goal 1 (SLP)    Activity (Pharyngeal Strengthening Goal 1, SLP)  increase tongue base retraction  -KW  --     Increase Tongue Base Retraction  timothy;falsetto  -KW  --     [place]/Accuracy (Pharyngeal Strengthening Goal 1, SLP)  independently (over 90% accuracy)  -KW  --     Time Frame (Pharyngeal Strengthening Goal 1, SLP)  short term goal (STG);by discharge  -KW  --     Barriers (Pharyngeal Strengthening Goal 1, SLP)  n/a  -KW  --     Progress/Outcomes (Pharyngeal Strengthening Goal 1, SLP)  goal ongoing  -KW  --        Swallow Compensatory Strategies Goal 1 (SLP)    Activity (Swallow Compensatory Strategies/Techniques Goal 1, SLP)  compensatory strategies;during meal intake;during p.o. trials;small bites;small cup sips;small straw sips;food/liquid placed on stronger right side;alternate food/liquid intake  -KW  --     [place]/Accuracy (Swallow Compensatory Strategies/Techniques Goal 1, SLP)  independently (over 90% accuracy)  -KW  --     Time Frame (Swallow Compensatory Strategies/Techniques Goal 1, SLP)  short term goal (STG);by discharge  -KW  --     Barriers (Swallow Compensatory Strategies/Techniques Goal 1, SLP)  n/a  -KW  --     Progress/Outcomes (Swallow Compensatory Strategies/Techniques Goal 1, SLP)  goal ongoing  -KW  --        Respiratory Support Goal 1 (SLP)    Improve Respiratory Support Goal 1 (SLP)  --  increasing length of exhalation;sustaining a vowel sound on exhalation;independently (over 90%  accuracy);repeating a sentence on exhalation  -KW     Time Frame (Respiratory Support Goal 1, SLP)  --  short term goal (STG);by discharge  -KW     Barriers (Respiratory Support Goal 1, SLP)  --  n/a  -KW     Progress/Outcomes (Respiratory Support Goal 1, SLP)  --  goal ongoing  -KW        Phonation Goal 1 (SLP)    Improve Phonation By Goal 1 (SLP)  --  using loud speech;independently (over 90% accuracy)  -KW     Time Frame (Phonation Goal 1, SLP)  --  short term goal (STG);by discharge  -KW     Barriers (Phonation Goal 1, SLP)  --  n/a  -KW     Progress/Outcomes (Phonation Goal 1, SLP)  --  goal ongoing  -KW        Articulation Goal 1 (SLP)    Improve Articulation Goal 1 (SLP)  --  by over-articulating at word level;by over-articulating at phrase level;independently (over 90% accuracy)  -KW     Time Frame (Articulation Goal 1, SLP)  --  short term goal (STG);by discharge  -KW     Barriers (Articulation Goal 1, SLP)  --  n/a  -KW     Progress/Outcomes (Articulation Goal 1, SLP)  --  goal ongoing  -KW       User Key  (r) = Recorded By, (t) = Taken By, (c) = Cosigned By    Initials Name Provider Type    Ruby Weeks, CCC-SLP Speech and Language Pathologist    Yanira Haskins, MS CCC-SLP Speech and Language Pathologist    MM Vivian Hylton, MS CCC-SLP Speech and Language Pathologist                  SLP Discharge Summary  Anticipated Dischage Disposition: inpatient rehabilitation facility  Reason for Discharge: discharge from this facility  Progress Toward Achieving Short/long Term Goals: goals partially met within established timelines  Discharge Destination: [facility] CCC-SLP  5/7/2020

## 2020-05-07 NOTE — PROGRESS NOTES
Occupational Therapy   Occupational Therapy Initial Assessment  Date: 2020   Patient Name: Shyanne Mata  MRN: [id number]     : 1945    Date of Service: 2020    Discharge Recommendations:  Continue to assess pending progress       Assessment   Assessment: Evaluation completed and tx initiated. The patient is significantly impaired for all ADL and mobility but shows potential for functional gains with skilled therapy intervention. Will likely not achieve a level where she can be home alone at discharge. Treatment Diagnosis: Multiple right hemispheric Strokes  Activity Tolerance  Activity Tolerance: Patient Tolerated treatment well  Safety Devices  Safety Devices in place: Yes  Type of devices: Call light within reach; Chair alarm in place; Left in chair           Patient Diagnosis(es): There were no encounter diagnoses. has no past medical history on file. has no past surgical history on file.     Treatment Diagnosis: Multiple right hemispheric Strokes      Restrictions  Restrictions/Precautions  Restrictions/Precautions: Weight Bearing, Fall Risk    Subjective   General  Chart Reviewed: Yes  Patient assessed for rehabilitation services?: Yes  Family / Caregiver Present: No  Patient Currently in Pain: No  Vital Signs  Patient Currently in Pain: No  Social/Functional History  Social/Functional History  Lives With: Other (comment)(grandson who works)  Type of Home: House  Home Layout: One level  Home Access: Stairs to enter with rails  Entrance Stairs - Number of Steps: 3  Bathroom Shower/Tub: Tub/Shower unit  Bathroom Toilet: Standard  ADL Assistance: Independent  Homemaking Assistance: Independent  Ambulation Assistance: Independent  Transfer Assistance: Independent       Objective       20 [facility] or touching assistance   CARE Score 4   Discharge Goal 6   Oral Hygiene   Assistance Needed Partial/moderate assistance   CARE Score 3   Discharge Goal 6 Training, Equipment Evaluation, Education, & procurement, Self-Care / ADL, Positioning    G-Code     OutComes Score                                                  AM-PAC Score             Goals  Short term goals  Time Frame for Short term goals: 1-2 weeks  Short term goal 1: Perform transfers with min A after set up  Short term goal 2: Don shirt with min A  Short term goal 3: Attend to left visual field and left side body with min prompts  Short term goal 4: Perform 4 SROM exercises with good form after set up with min prompts  Short term goal 5: Develop any beginning level movement in LUE not associated with the flexor synergy pattern  Short term goal 6: Stand with CGA with use of RUE propping for 2 mins consistently  Short term goal 7: Demo ability to propel wheelchair (in appropriately sized wheelchair) with supervision in open spaces  Long term goals  Time Frame for Long term goals : 3 weeks  Long term goal 1: Perform transfers with CGA  Long term goal 2: Consistently attend to Left side body and environment  Long term goal 3: Perform UB dressing with supervision after set up  Long term goal 4: Perform LB dressing with min A  Long term goal 5: Perform toileting with min A  Long term goal 7:  Independent with comprehensive HEP, and DME recommendations       Therapy Time   Individual Concurrent Group Co-treatment   Time In 1000         Time Out 1100         Minutes [address] [place], [place] Electronically signed by Kristopher Almonte OT on 5/7/2020 at 12:14 PM

## 2020-05-07 NOTE — PROGRESS NOTES
Daren Farias arrived to room # 818. Presented with: CVA  Mental Status: Patient is oriented and alert. Vitals:    05/06/20 1936   BP: (!) 160/92   Pulse: 96   Resp: 18   Temp: 97.2 °F (36.2 °C)   SpO2: 94%     Patient safety contract and falls prevention contract reviewed with patient Yes. Oriented Patient to room. Call light within reach. Yes.   Needs, issues or concerns expressed at this time: no.      Electronically signed by William Luna RN on 5/6/2020 at 10:21 PM

## 2020-05-07 NOTE — H&P
related to her diabetes. She is felt to need a stay on Rehab for continued PT/OT/SPT and for medical management for maximium BP control and blood glucose management. On 5/5/20 afternoon, she was having more difficulty forming words and more left sided weakness. MRI brain was done showing new areas of embolic strokes. The decision made to d/c ASA and Plavix and start Pradaxa 150 mg BID. This was explained to patient as well as risk/benefits of anticoagulation with continued embolic strokes versus risk of bleed. Patient is agreeable. IVELISSE did not reveal thrombus and telemetry has shown sinus rhythm. She is now medically stable and is participating w/therapy. She is ready to begin rehab w/plan of returning home after rehab dc w/support from her family. REVIEW OF SYSTEMS:  Constitutional - No fever or chills. No diaphoresis or significant fatigue. HENT -  No tinnitus or significant hearing loss. Eyes - no sudden vision change or eye pain  Respiratory - no significant shortness of breath or cough  Cardiovascular - no chest pain No palpitations or significant leg swelling  Gastrointestinal - no abdominal swelling or pain. Genitourinary - No difficulty urinating, dysuria  Musculoskeletal - no back pain or myalgia. Skin - no color change or rash  Neurologic - No seizures. No lateralizing weakness. Hematologic - no easy bruising or excessive bleeding. Psychiatric - no severe anxiety or nervousness. All other review of systems are negative. Past Medical History:  No past medical history on file. Past Surgical History:  No past surgical history on file.     Medications in Hospital:      Current Facility-Administered Medications:     metFORMIN (GLUCOPHAGE) tablet 500 mg, 500 mg, Oral, BID , Deidra Hook MD, 500 mg at 05/06/20 2108    citalopram (CELEXA) tablet 40 mg, 40 mg, Oral, Daily, Deidra Hook MD    clopidogrel (PLAVIX) tablet 75 mg, 75 mg, Oral, Daily, Deidra Hook MD    atorvastatin (LIPITOR) tablet 80 mg, 80 mg, Oral, Nightly, Sree Maravilla MD, 80 mg at 05/06/20 2108    aspirin chewable tablet 81 mg, 81 mg, Oral, Daily, Sree Maravilla MD    busPIRone (BUSPAR) tablet 15 mg, 15 mg, Oral, BID, Sree Maravilla MD, 15 mg at 05/06/20 2108    linagliptin (TRADJENTA) tablet 5 mg, 5 mg, Oral, Daily, Sree Maravilla MD    amLODIPine (NORVASC) tablet 5 mg, 5 mg, Oral, Daily, Sree Maravilla MD    lisinopril (PRINIVIL;ZESTRIL) tablet 20 mg, 20 mg, Oral, Daily, Sree Maravilla MD    hydroCHLOROthiazide (HYDRODIURIL) tablet 12.5 mg, 12.5 mg, Oral, Daily, Sree Maravilla MD    dabigatran (PRADAXA) capsule 150 mg, 150 mg, Oral, BID, Sree Maravilla MD, 150 mg at 05/06/20 2108    acetaminophen (TYLENOL) tablet 650 mg, 650 mg, Oral, Q4H PRN, Sree Maravilla MD    polyethylene glycol [facility]) packet 17 g, 17 g, Oral, Daily, Sree Maravilla MD    bisacodyl (DULCOLAX) suppository 10 mg, 10 mg, Rectal, Daily PRN, Sree Maravilla MD    fleet rectal enema 1 enema, 1 enema, Rectal, Daily PRN, Sree Maravilla MD    amLODIPine (NORVASC) tablet 5 mg, 5 mg, Oral, Daily **AND** lisinopril (PRINIVIL;ZESTRIL) tablet 20 mg, 20 mg, Oral, Daily, Sree Maravilla MD    phenylephrine (JJ-SYNEPHRINE) 0.5 % nasal spray 1 spray, 1 spray, Each Nostril, Q6H PRN, Sree Maravilla MD, 1 spray at 05/06/20 2103    Allergies:  Patient has no known allergies. Social History:   TOBACCO:   has no history on file for tobacco.  ETOH:   has no history on file for alcohol. Family History:   No family history on file. Physical Exam:    Vitals: BP (!) 162/91   Pulse 90   Temp 96.6 °F (35.9 °C)   Resp 20   Ht 5' (1.524 m)   Wt 152 lb 3.2 oz (69 kg)   SpO2 91%   BMI 29.72 kg/m²     Constitutional - well developed, well nourished.     Eyes - conjunctiva normal.  Pupils react to light  Ear, nose, throat -hearing intact to finger rub No scars, masses, or lesions over external nose or ears, no atrophy of tongue  Neck-symmetric, no masses noted, no jugular vein distension  Respiration- chest wall appears symmetric, good expansion,   normal effort without use of accessory muscles  Cardiovascular- RRR without m,r,g  Musculoskeletal - no significant wasting of muscles noted, no bony deformities  Extremities-no clubbing, cyanosis or edema  Skin - warm, dry, and intact. No rash, erythema, or pallor. Psychiatric - mood, affect, and behavior appear normal.      Neurological exam  Awake, alert, fluent oriented x 3 appropriate affect  Attention and concentration appear appropriate  Recent and remote memory appears unremarkable  Speech shows significant dysarthria  No clear issues with language of fund of knowledge    Cranial Nerve Exam   CN II- Visual fields difficult to assess  CN III, IV,VI-EOMI, No nystagmus, conjugate eye movements, no ptosis  CN VII-left facial droop  CN VIII-Hearing intact   CN IX and X- Palate elevates in midline  CN XI-good shoulder shrug  CN XII-Tongue midline with no fasciculations or fibrillations    Motor Exam  Normal on the right. 4/5 left leg.  0/5 left upper extremity    Reflexes   Absent throughout  No clonus ankles  No Juarez's sign bilateral hands    Tremors- no tremors in hands or head noted    Gait  Not tested    Coordination  Finger to nose-unremarkable on the right        CBC:   Recent Labs     05/07/20  0457   WBC 12.0*  12.2*   HGB 12.9  13.0     257       BMP:    Recent Labs     05/07/20  0457      K 3.6      CO2 25   BUN 14   CREATININE 0.9   GLUCOSE 142*       Hepatic: No results for input(s): AST, ALT, ALB, BILITOT, ALKPHOS in the last 72 hours. Lipids: No results for input(s): CHOL, HDL in the last 72 hours. Invalid input(s): LDLCALCU    INR: No results for input(s): INR in the last 72 hours. Assessment and Plan     1. Multiple right hemispheric strokes possibly embolic-continue Pradaxa. PT/OT/SLP  2. Diabetes-continue oral hypoglycemics and monitoring  3.   DVT improve the patient's functional capacity or adaptation to impairments. In addition, therapy treatments will begin within 36 hours from midnight of the day of the patient's admission to the inpatient rehabilitation facility    Expected duration and frequency therapy: 180 minutes per day, 5 days per week    Interdisciplinary team: The patient demonstrates the need for an interdisciplinary team for active management of the following medical issues including ataxia, motor planning, balance, disease management, elimination, endurance, family training, education, independent ADLs, pain management, precautions, range of motion, safety, strength, and transfers    I have reviewed the preadmission screening documents and concur with the findings. I believe the patient meets criteria and is sufficiently stable to allow participation in the program. This requires an intensive level of therapy, close medical supervision, and an interdisciplinary team approach provided through an individualized plan of care. I approve admitting this patient for an intensive inpatient rehabilitation program.      Marbella Burroughs.  David Tilley MD

## 2020-05-07 NOTE — PLAN OF CARE
[facility] TREATMENT PLAN      Oscar Kumar    : 1945  Acct #: [[id number]]  MRN: [id number]   PHYSICIAN:  Donna Guadalupe MD  Primary Problem    Patient Active Problem List   Diagnosis    Acute ischemic stroke [facility])       Rehabilitation Diagnosis:     Acute ischemic stroke ([facility] [address] [I63.9]  Acute ischemic stroke ([facility] [facility] [I63.9]       ADMIT DATE:2020   CARE PLAN     NURSING:  Oscar Kumar while on this unit will:     [] Be continent of bowel and bladder      [x] Have an adequate number of bowel movements   [] Urinate with no urinary retention >300ml in bladder   [] Complete bladder protocol with yi removal   [] Maintain O2 SATs at ___%   [] Have pain managed while on ARU        [] Be pain free by discharge    [x] Have no skin breakdown while on ARU   [] Have improved skin integrity via wound measurements   [] Have no signs/symptoms of infection at the wound site  [x] Be free from injury during hospitalization   [x] Complete education with patient/family with understanding demonstrated for:  [] Adjustment   [] Other:   Nursing interventions may include bowel/bladder training, education for medical assistive devices, medication education, O2 saturation management, energy conservation, stress management techniques, fall prevention, alarms protocol, seating and positioning, skin/wound care, pressure relief instruction,dressing changes,  infection protection, DVT prophylaxis, and/or assistance with in room safety with transfers to bed, toilet, wheelchair, shower as well as bathroom activities and hygiene.      Patient/caregiver education for:   [] Disease/sustained injury/management      [] Medication Use   [] Surgical intervention   [] Safety   [] Body mechanics and or joint protection   [] Health maintenance       IRF-TAHMINA  Bladder and Bowel Continence  Bladder Continence: Stress incontinence only  Bowel Continence: Always continent  Swallowing/Nutritional Status  Swallowing/Nutritional Status: Modified food consistency/supervision    PHYSICAL THERAPY:  Goals:                  Short term goals  Time Frame for Short term goals: 2 WEEKS  Short term goal 1: BED MOBILITY CGA  Short term goal 2: TRANSFERS CGA  Short term goal 3: AMBULATE 25 FT WITH A.D. CGA  Short term goal 4: UP/DOWN 4 STEPS 1 RAIL MIN A  Short term goal 5: PROPEL  FT CGA            Long term goals  Time Frame for Long term goals : 4 WEEKS  Long term goal 1: INDEP BED MOB  Long term goal 2: TF SURFACE TO SURFACE INDEP  Long term goal 3: AMBULATE 150 FT WITH A.D. INDEP  Long term goal 4: UP/DOWN 4 STEPS WITH ONE RAIL INDEP  Long term goal 5: PROPEL  FT INDEP  These goals were reviewed with this patient at the time of assessment and [address] is in agreement.      Plan of Care: Frequency:  [] 5 days per week, 90 minutes per day                             [x]  5 days per week, 60 minutes per day               Current Treatment Recommendations: ROM, Strengthening, Balance Training, Functional Mobility Training, Transfer Training, Endurance Training, Wheelchair Mobility Training, Gait Training, Stair training, Home Exercise Program, Safety Education & Training, Patient/Caregiver Education & Training, Equipment Evaluation, Education, & procurement    IRF-TAHMINA  Roll Left and Right  Assistance Needed: Supervision or touching assistance  CARE Score: 4  Discharge Goal: Independent  Sit to Lying  Assistance Needed: Partial/moderate assistance  CARE Score: 3  Discharge Goal: Independent  Lying to Sitting on Side of Bed  Assistance Needed: Partial/moderate assistance  CARE Score: 3  Discharge Goal: Independent  Sit to Stand  Assistance Needed: Partial/moderate assistance  CARE Score: 3  Discharge Goal: Independent  Chair/Bed-to-Chair Transfer  Assistance Needed: Partial/moderate assistance  CARE Score: 3  Discharge Goal: Independent  Car Transfer  Reason if not Attempted: Not attempted due to medical condition or safety concerns  CARE Score: 88  Discharge Goal: Supervision or touching assistance  Walk 10 Feet  Assistance Needed: Supervision or touching assistance  CARE Score: 4  Discharge Goal: Independent  Walk 50 Feet with Two Turns  Reason if not Attempted: Not attempted due to medical condition or safety concerns  CARE Score: 88  Discharge Goal: Set-up or clean-up assistance  Walk 150 Feet  Reason if not Attempted: Not attempted due to medical condition or safety concerns  CARE Score: 88  Discharge Goal: Independent  Walking 10 Feet on Uneven Surfaces  Reason if not Attempted: Not attempted due to medical condition or safety concerns  CARE Score: 88  Discharge Goal: Not Attempted  1 Step (Curb)  Reason if not Attempted: Not attempted due to medical condition or safety concerns  CARE Score: 88  Discharge Goal: Independent  4 Steps  Reason if not Attempted: Not attempted due to medical condition or safety concerns  CARE Score: 88  Discharge Goal: Independent  12 Steps  Reason if not Attempted: Not attempted due to medical condition or safety concerns  CARE Score: 88  Discharge Goal: Supervision or touching assistance  Wheel 50 Feet with Two Turns  Reason if not Attempted: Not attempted due to medical condition or safety concerns  CARE Score: 88  Discharge Goal: Independent  Wheel 150 Feet  Reason if not Attempted: Not attempted due to medical condition or safety concerns  CARE Score: 88  Discharge Goal: Independent    OCCUPATIONAL THERAPY:  Goals:             Short term goals  Time Frame for Short term goals: 1-2 weeks  Short term goal 1: Perform transfers with min A after set up  Short term goal 2: Don shirt with min A  Short term goal 3: Attend to left visual field and left side body with min prompts  Short term goal 4: Perform 4 SROM exercises with good form after set up with min prompts  Short term goal 5: Develop any beginning level movement in LUE not associated with the flexor synergy pattern  Short term goal 6: Stand with CGA with use of RUE with discharge planning, patient/family counseling,  and coordination with insurance during ARU stay. Patient Goals: Be able to return home. Activities Prior to Admit:                         Jay Molina will be seen a minimum of 3 hours of therapy per day/a minimum of 5 out of 7 days per week. [] In this rare instance due to the nature of this patient's medical involvement, this patient will be seen 15 hours per week (900 minutes within a 7 day period). Treatments may include therapeutic exercises, gait training, neuromuscular re-ed, transfer training, community reintegration, bed mobility, w/c mobility and training, self care, home mgmt, cognitive training, energy conservation,dysphagia tx, speech/language/communication therapy, group therapy, and patient/family education. In addition, dietician/nutritionist may monitor calorie count as well as intake and collaboratively work with SLP on dietary upgrades. Neuropsychology/Psychology may evaluate and provide necessary support. Medical issues being managed closely and that require 24 hour availability of a physician:   [] Swallowing Precautions  [] Bowel/Bladder Fx  [] Weight bearing precautions   [] Wound Care    [x] Pain Mgmt   [] Infection Protection   [x] DVT Prophylaxis   [] Fall Precautions  [x] Fluid/Electrolyte/Nutrition Balance   [] Voice Protection   [] Respiratory  [] Other:    Medical Prognosis: [] Good  [x] Fair    [] Guarded   Total expected IRF days:  17  Anticipated discharge destination:    [] Home Independently   [x] Home with supervision    []SNF     [] Other                                           Physician anticipated functional outcomes:  Ambulate household distances independently with assistive device.    IPOC brief synthesis: Acute inpatient rehabilitation with occupational   physical therapy and speech therapy, 180 minutes, 5 every 7   days will address basic and advancing mobility with self-care   instruction and adaptive equipment training. Caregiver education will   be offered. Expected length of stay prior to the supervised level of   functional discharge to home with home care is 17 days. Assessment and Plan:  1. Multiple right hemispheric strokes possibly embolic-continue Pradaxa. PT/OT/SLP  2. Diabetes-continue oral hypoglycemics and monitoring  3. DVT prophylaxis-Pradaxa  4. Essential hypertension-continue current medications and monitoring  5. Hyperlipidemia-continue statin  6.   Anxiety/depression-continue mood enhancers            Case Mgmt: Electronically Signed by Ada Ramos Admissions /Discharge Coordinator 5/8/2020 8:01 AM    OT: Electronically signed by Aleix Diaz OT on 5/7/2020 at 12:15 PM    PT:Electronically signed by Javy Pineda PT on 5/7/2020 at 4:00 PM      RN: Electronically signed by Penny Triplett RN on 5/6/20 at 10:14 PM CDT      ST: Electronically signed by Dea Schirmer, SLP on 5/7/2020 at 3:13 PM

## 2020-05-07 NOTE — PLAN OF CARE
Problem: Falls - Risk of:  Goal: Will remain free from falls  Description: Will remain free from falls  5/7/2020 1008 by Anuj Alves RN  Outcome: Ongoing  5/6/2020 2220 by Murphy Echavarria RN  Outcome: Ongoing  Goal: Absence of physical injury  Description: Absence of physical injury  5/7/2020 1008 by Anuj Alves RN  Outcome: Ongoing  5/6/2020 2220 by Murphy Echavarria RN  Outcome: Ongoing     Problem: HEMODYNAMIC STATUS  Goal: Patient has stable vital signs and fluid balance  5/7/2020 1008 by Anuj Alves RN  Outcome: Ongoing  5/6/2020 2220 by Murphy Echavarria RN  Outcome: Ongoing     Problem: ACTIVITY INTOLERANCE/IMPAIRED MOBILITY  Goal: Mobility/activity is maintained at optimum level for patient  5/7/2020 1008 by Anuj Alves RN  Outcome: Ongoing  5/6/2020 2220 by Murphy Echavarria RN  Outcome: Ongoing     Problem: COMMUNICATION IMPAIRMENT  Goal: Ability to express needs and understand communication  5/7/2020 1008 by Anuj Alves RN  Outcome: Ongoing  5/6/2020 2220 by Murphy Echavarria RN  Outcome: Ongoing     Problem: Safety:  Goal: Free from accidental physical injury  Description: Free from accidental physical injury  5/7/2020 1008 by Anuj Alves RN  Outcome: Ongoing  5/6/2020 2220 by Murphy Echavarria RN  Outcome: Ongoing  Goal: Free from intentional harm  Description: Free from intentional harm  5/7/2020 1008 by Anuj Alves RN  Outcome: Ongoing  5/6/2020 2220 by Murphy Echavarria RN  Outcome: Ongoing     Problem: Daily Care:  Goal: Daily care needs are met  Description: Daily care needs are met  5/7/2020 1008 by Anuj Alves RN  Outcome: Ongoing  5/6/2020 2220 by Murphy Echavarria RN  Outcome: Ongoing     Problem: Pain:  Goal: Patient's pain/discomfort is manageable  Description: Patient's pain/discomfort is manageable  5/7/2020 1008 by Anuj Alves RN  Outcome: Ongoing  5/6/2020 2220 by Murphy Echavarria RN  Outcome: Ongoing     Problem: Skin

## 2020-05-07 NOTE — PROGRESS NOTES
[facility]  [facility]ECH [facility]AP[facility]  [facility] 8 REHAB UNIT  Speech - Language - Cognitive Evaluation    TIME   Time In: 0800  Time Out: 0900  Minutes: 61         Name: Ceci Ohara  YOB: 1945  Gender: female  Referring Physician: Mikel Resendiz    Admitting diagnosis: Multiple right hemisphere strokes     Secondary diagnoses: Diabetes, hypertension, hyperlipidemia, anxiety/depression     History of Present Illness/Injury: This 75 y.o. female  with history of hyperlipidemia,hypercholesteremia,HTN,TIA,anxiety,and depression. She presented to [facility] on 5/1/20 with c/o left arm weakness x 3 days,some choking on liquids at times and with her legs going weak. She was hypertensive on presentation with B/P 173/101. CT done showed an age-indeterminate infarct in the right basal ganglia,right centrum semiovale and the posterior right temporal lobe. CTA head/neck didn't reveal ay high-grade stenosis or occlusion. She was admitted to the her PCP Dr. Luis Avalos with consult for neurology. She was seen by Dr. May Melendez. MRI done showed an acute and subacute right MCA stroke. She was not given TPA d/t being outside of the timeframe. She was placed on ASA and Plavix for dual antiplatelet therapy. Dr. May Melendez recommended Zio Patch at discharge. She was also found to have a hemoglobin A1C of 8.9 and adjustments have been made to her medications. She was evaluated by SPT for swallow and passed but was noted to have anomia and dysarthria. She is also participating with both PT/OT. Patient became more slow and dysarthric,left sided weakness worsening,left eye was deviating medially and patient seemed to have to force her left eye to move laterally. Repeat CT on 5/5/20 showed larger stroke right frontal lobe 2.5 cm compared prior scan on 5/3/20. Per Dr. 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  She is felt to need a stay on Rehab for continued PT/OT/SPT and for medical management for

## 2020-05-07 NOTE — THERAPY DISCHARGE NOTE
Acute Care - Occupational Therapy Discharge Summary  [facility]     Patient Name: Gaviota Erazo  : 1945  MRN: [id number]    Today's Date: 2020  Onset of Illness/Injury or Date of Surgery: 20    Date of Referral to OT: 20  Referring Physician: MARGARITA Sy      Admit Date: 2020        OT Recommendation and Plan    Visit Dx:    ICD-10-CM ICD-9-CM   1. Left arm weakness R29.898 729.89   2. Decreased activities of daily living (ADL) Z78.9 V49.89   3. Impaired mobility Z74.09 799.89   4. Dysphagia, unspecified type R13.10 787.20   5. Cerebrovascular accident (CVA), unspecified mechanism (CMS/[facility]) I63.9 434.91   6. Bradycardia, unspecified  R00.1 427.89   7. Dysarthria R47.1 784.51               Rehab Goal Summary     Row Name 20 0700             Bathing Goal 1 (OT)    Activity/Assistive Device (Bathing Goal 1, OT)  bathing skills, all  -AC      Jennings Level/Cues Needed (Bathing Goal 1, OT)  minimum assist (75% or more patient effort)  -AC      Time Frame (Bathing Goal 1, OT)  10 days  -AC      Progress/Outcomes (Bathing Goal 1, OT)  goal not met  -AC         Dressing Goal 1 (OT)    Activity/Assistive Device (Dressing Goal 1, OT)  upper body dressing;lower body dressing  -AC      Jennings/Cues Needed (Dressing Goal 1, OT)  minimum assist (75% or more patient effort);verbal cues required  -AC      Time Frame (Dressing Goal 1, OT)  10 days  -AC      Progress/Outcome (Dressing Goal 1, OT)  goal not met  -AC         ROM Goal 1 (OT)    ROM Goal 1 (OT)  Pt will demonstrate full AROM in gravity eliminated plane of each joint of LUE.  -AC      Time Frame (ROM Goal 1, OT)  10 days  -AC      Progress/Outcome (ROM Goal 1, OT)  goal not met  -AC         Coordination Goal 1 (OT)    Activity/Assistive Device (Coordination Goal 1, OT)  FM task;GM task  -AC      Jennings Level/Cues Needed (Coordination Goal 1, OT)  minimum assist (75% or more patient effort);verbal cues required  -AC       Time Frame (Coordination Goal 1, OT)  10 days  -AC      Progress/Outcomes (Coordination Goal 1, OT)  goal not met  -AC        User Key  (r) = Recorded By, (t) = Taken By, (c) = Cosigned By    Initials Name Provider Type Discipline    AC Anival Hughes, OTR/L, CNT Occupational Therapist OT          Outcome Measures     Row Name 05/06/20 0730 05/05/20 1328 05/04/20 0755       How much help from another is currently needed...    Putting on and taking off regular lower body clothing?  2  -CJ  2  -CS  2  -CJ    Bathing (including washing, rinsing, and drying)  2  -CJ  2  -CS  3  -CJ    Toileting (which includes using toilet bed pan or urinal)  2  -CJ  2  -CS  3  -CJ    Putting on and taking off regular upper body clothing  2  -CJ  2  -CS  2  -CJ    Taking care of personal grooming (such as brushing teeth)  2  -CJ  2  -CS  3  -CJ    Eating meals  3  -CJ  3  -CS  3  -CJ    AM-PAC 6 Clicks Score (OT)  13  -CJ  13  -CS  16  -CJ       Functional Assessment    Outcome Measure Options  --  AM-PAC 6 Clicks Daily Activity (OT)  -CS  AM-PAC 6 Clicks Daily Activity (OT)  -CJ      User Key  (r) = Recorded By, (t) = Taken By, (c) = Cosigned By    Initials Name Provider Type    CJ Jose Miguel Castaneda, EASON/L Occupational Therapy Assistant    CS Enma Bennett, OTR/L, CNT Occupational Therapist          Therapy Suggested Charges     Code   Minutes Charges    46650 (CPT®) Hc Ot Neuromusc Re Education Ea 15 Min      97570 (CPT®) Hc Ot Ther Proc Ea 15 Min      85882 (CPT®) Hc Ot Therapeutic Act Ea 15 Min      93186 (CPT®) Hc Ot Manual Therapy Ea 15 Min      09942 (CPT®) Hc Ot Iontophoresis Ea 15 Min      01754 (CPT®) Hc Ot Elec Stim Ea-Per 15 Min      24649 (CPT®) Hc Ot Ultrasound Ea 15 Min      50736 (CPT®) Hc Ot Self Care/Mgmt/Train Ea 15 Min 25 2    Total  25 2              OT Discharge Summary  Anticipated Discharge Disposition (OT): inpatient rehabilitation facility  Reason for Discharge: Discharge from facility  Outcomes  Achieved: Refer to plan of care for updates on goals achieved  Discharge Destination: Inpatient rehabilitation facility      Anival Hughes, GLADYS/L, CNT  5/7/2020

## 2020-05-07 NOTE — PAYOR COMM NOTE
"DC TO ACUTE REHAB 20  [id number]    Gaviota Hale (74 y.o. Female)     Date of Birth Social Security Number Address Home Phone MRN    1945  [address] [place] [id number] [phone] [id number]    Jain Marital Status          Confucianism        Admission Date Admission Type Admitting Provider Attending Provider Department, Room/Bed    20 Emergency Nhan Muir MD  [facility] 3A, 353/    Discharge Date Discharge Disposition Discharge Destination        2020 Rehab Facility or Unit (DC - External)              Attending Provider:  (none)   Allergies:  No Known Allergies    Isolation:  None   Infection:  None   Code Status:  Prior    Ht:  152.4 cm (60\")   Wt:  69.6 kg (153 lb 6 oz)    Admission Cmt:  None   Principal Problem:  Stroke (CMS/HCC) [I63.9]                 Active Insurance as of 2020     Primary Coverage     Payor Plan Insurance Group Employer/Plan Group    ANTH MEDICARE REPLACEMENT ANTH MEDICARE ADVANTAGE [id number]     Payor Plan Address Payor Plan Phone Number Payor Plan Fax Number Effective Dates    [address] [phone]  2019 - None Entered    [facility] [address]       Subscriber Name Subscriber Birth Date Member ID       GAVIOTA HALE 1945 [id number]                 Emergency Contacts      (Rel.) Home Phone Work Phone Mobile Phone    Mandeep Hale (Grandchild) [phone] -- [phone]    Willam Hale (Grandchild) [phone] -- [phone]               Discharge Summary      Nhan Muir MD at 20 0752          Hospital Discharge Summary    Gaviota Hale  :  1945  MRN:  [id number]    Admit date:  2020  Discharge date:      Admitting Physician:    Nhan Muir MD    Discharge Diagnoses:      Stroke (CMS/HCC)    Left arm weakness    Essential hypertension    Hypertensive urgency    Hypercholesterolemia      Hospital Course:   The patient was admitted for the above " surgical/medical indication.  Please see admission H&P for further details concerning the admission.  The patient was seen daily and progress noted via daily updates in the progress notes.  The patient improved throughout her stay.  They reached maximum medical improvement and were considered stable for discharge home.  They understand the importance of follow-up concerning any abnormal lab values/x-rays.  All questions were answered to the best of my ability prior to their discharge home.    Pleasant 74-year-old white female who presented to the emergency room with a 4 to 5-day history of left arm weakness and slurred speech.  X-rays confirmed a CVA.  Neurology was consulted.  Patient antihypertensive and diabetic medications were adjusted.  CTA of the neck noted.  Transesophageal echo noted.  Patient started on Plavix and aspirin.   consulted for admission to [facility] rehab unit.  She will be transferred there later today if pre-CERT is done.        Discharge Medications:         Discharge Medications      New Medications      Instructions Start Date   aspirin 81 MG chewable tablet   81 mg, Oral, Daily      atorvastatin 80 MG tablet  Commonly known as:  LIPITOR  Replaces:  simvastatin 40 MG tablet   80 mg, Oral, Nightly      busPIRone 15 MG tablet  Commonly known as:  BUSPAR   15 mg, Oral, Every 12 Hours Scheduled      carvedilol 6.25 MG tablet  Commonly known as:  COREG   6.25 mg, Oral, 2 Times Daily With Meals      linagliptin 5 MG tablet tablet  Commonly known as:  TRADJENTA   5 mg, Oral, Daily         Continue These Medications      Instructions Start Date   amLODIPine-benazepril 5-20 MG per capsule  Commonly known as:  LOTREL 5-20   1 capsule, Oral, Daily      citalopram 40 MG tablet  Commonly known as:  CeleXA   40 mg, Oral, Daily      clopidogrel 75 MG tablet  Commonly known as:  PLAVIX   75 mg, Oral, Daily      metFORMIN 500 MG tablet  Commonly known as:  GLUCOPHAGE   500 mg, Oral, 2 Times  Daily With Meals         Stop These Medications    levocetirizine 5 MG tablet  Commonly known as:  XYZAL     simvastatin 40 MG tablet  Commonly known as:  ZOCOR  Replaced by:  atorvastatin 80 MG tablet            Consults: Neurology    Significant Diagnostic Studies:      EKG: normal EKG, normal sinus rhythm, unchanged from previous tracings.      Treatments:   Work-up of acute CVA, see neurology note for recommendations    Disposition:   [facility] rehab  Follow up with Nhan Muir MD in 1-2 weeks.    Signed:  Nhan Muir MD   2020, 07:52    Electronically signed by Nhan Muir MD at 20 7904     Nhan Muir MD at 20 0922        Hospital Discharge Summary    Gaviota Erazo  :  1945  MRN:  [id number]    Admit date:  2020  Discharge date:      Admitting Physician:    Nhan Muir MD    Discharge Diagnoses:      Stroke (CMS/HCC)    Left arm weakness    Essential hypertension    Hypertensive urgency    Hypercholesterolemia      Hospital Course:   The patient was admitted for the above surgical/medical indication.  Please see admission H&P for further details concerning the admission.  The patient was seen daily and progress noted via daily updates in the progress notes.  The patient improved throughout her stay.  They reached maximum medical improvement and were considered stable for discharge home.  They understand the importance of follow-up concerning any abnormal lab values/x-rays.  All questions were answered to the best of my ability prior to their discharge home.    Pleasant 74-year-old white female who presented to the emergency room with a 4 to 5-day history of left arm weakness and slurred speech.  X-rays confirmed a CVA.  Neurology was consulted.  Patient antihypertensive and diabetic medications were adjusted.  CTA of the neck noted.  Transesophageal echo noted.  Patient started on Plavix and aspirin.   consulted for  admission to [facility] rehab unit.  She will be transferred there later today if pre-CERT is done.    Patient was initially scheduled for discharge to [facility] inpatient rehab on 5/5/2020.  Unfortunately despite dual antiplatelet therapy she extended her stroke in the same area.  Conference with neurology and Pradaxa started.  Risk versus benefit discussed with patient.  Medically stable for discharge to [facility] rehab today.  Antihypertensive adjusted today to increase the combination of Norvasc/lisinopril 5/20 to twice daily, and added hydrochlorothiazide 12.5 mg.  Goal blood pressure less than 120/80 and will follow inpatient at [facility]        See new changes listed above and per sheet on discharge planning for accurate med list        Consults: Neurology    Significant Diagnostic Studies:      EKG: normal EKG, normal sinus rhythm, unchanged from previous tracings.      Treatments:   Work-up of acute CVA, see neurology note for recommendations    Disposition:   [facility]  Follow up with Nhan Muir MD in 1-2 weeks.    Signed:  Nhan Muir MD   5/6/2020, 09:30    Electronically signed by Nhan Muir MD at 05/06/20 0999

## 2020-05-07 NOTE — PLAN OF CARE
Problem: Falls - Risk of:  Goal: Will remain free from falls  Description: Will remain free from falls  Outcome: Ongoing  Goal: Absence of physical injury  Description: Absence of physical injury  Outcome: Ongoing     Problem: HEMODYNAMIC STATUS  Goal: Patient has stable vital signs and fluid balance  Outcome: Ongoing     Problem: ACTIVITY INTOLERANCE/IMPAIRED MOBILITY  Goal: Mobility/activity is maintained at optimum level for patient  Outcome: Ongoing     Problem: COMMUNICATION IMPAIRMENT  Goal: Ability to express needs and understand communication  Outcome: Ongoing     Problem: Safety:  Goal: Free from accidental physical injury  Description: Free from accidental physical injury  Outcome: Ongoing  Goal: Free from intentional harm  Description: Free from intentional harm  Outcome: Ongoing     Problem: Daily Care:  Goal: Daily care needs are met  Description: Daily care needs are met  Outcome: Ongoing     Problem: Pain:  Goal: Patient's pain/discomfort is manageable  Description: Patient's pain/discomfort is manageable  Outcome: Ongoing     Problem: Skin Integrity:  Goal: Skin integrity will stabilize  Description: Skin integrity will stabilize  Outcome: Ongoing     Problem: Discharge Planning:  Goal: Patients continuum of care needs are met  Description: Patients continuum of care needs are met  Outcome: Ongoing

## 2020-05-07 NOTE — THERAPY DISCHARGE NOTE
Acute Care - Physical Therapy Discharge Summary  [facility]       Patient Name: Gaviota rEazo  : 1945  MRN: [id number]    Today's Date: 2020  Onset of Illness/Injury or Date of Surgery: 20       Referring Physician: MARGARITA Sy      Admit Date: 2020      PT Recommendation and Plan    Visit Dx:    ICD-10-CM ICD-9-CM   1. Left arm weakness R29.898 729.89   2. Decreased activities of daily living (ADL) Z78.9 V49.89   3. Impaired mobility Z74.09 799.89   4. Dysphagia, unspecified type R13.10 787.20   5. Cerebrovascular accident (CVA), unspecified mechanism (CMS/HCC) I63.9 434.91   6. Bradycardia, unspecified  R00.1 427.89   7. Dysarthria R47.1 784.51       Outcome Measures     Row Name 20 0730 20 1328          How much help from another is currently needed...    Putting on and taking off regular lower body clothing?  2  -  2  -CS     Bathing (including washing, rinsing, and drying)  2  -  2  -CS     Toileting (which includes using toilet bed pan or urinal)  2  -  2  -CS     Putting on and taking off regular upper body clothing  2  -  2  -CS     Taking care of personal grooming (such as brushing teeth)  2  -  2  -CS     Eating meals  3  -  3  -CS     AM-PAC 6 Clicks Score (OT)  13  -  13  -CS        Functional Assessment    Outcome Measure Options  --  AM-PAC 6 Clicks Daily Activity (OT)  -CS       User Key  (r) = Recorded By, (t) = Taken By, (c) = Cosigned By    Initials Name Provider Type     Jose Miguel Castaneda EASON/L Occupational Therapy Assistant    Enma Sarkar, OTR/L, CNT Occupational Therapist              Rehab Goal Summary     Row Name 20 1400 20 1200 20 0700       Physical Therapy Goals    Transfer Goal Selection (PT)  transfer, PT goal 1  -MARIUM  --  --       Bed Mobility Goal 1 (PT)    Activity/Assistive Device (Bed Mobility Goal 1, PT)  bed mobility activities, all  -MARIUM  --  --    [place] Level/Cues Needed (Bed Mobility Goal 1,  PT)  independent  -MARIUM  --  --    Time Frame (Bed Mobility Goal 1, PT)  by discharge  -MARIUM  --  --    Progress/Outcomes (Bed Mobility Goal 1, PT)  goal not met  -MARIUM  --  --       Transfer Goal 1 (PT)    Activity/Assistive Device (Transfer Goal 1, PT)  sit-to-stand/stand-to-sit  -MARIUM  --  --    [place] Level/Cues Needed (Transfer Goal 1, PT)  independent  -MARIUM  --  --    Progress/Outcome (Transfer Goal 1, PT)  goal not met  -MARIUM  --  --       Gait Training Goal 1 (PT)    [place] Level (Gait Training Goal 1, PT)  standby assist  -MARIUM  --  --    Distance (Gait Goal 1, PT)  120ft  -MARIUM  --  --    Time Frame (Gait Training Goal 1, PT)  by discharge  -MARIUM  --  --    Progress/Outcome (Gait Training Goal 1, PT)  goal not met  -MARIUM  --  --       Bathing Goal 1 (OT)    Activity/Assistive Device (Bathing Goal 1, OT)  --  --  bathing skills, all  -AC    [place] Level/Cues Needed (Bathing Goal 1, OT)  --  --  minimum assist (75% or more patient effort)  -AC    Time Frame (Bathing Goal 1, OT)  --  --  10 days  -AC    Progress/Outcomes (Bathing Goal 1, OT)  --  --  goal not met  -AC       Dressing Goal 1 (OT)    Activity/Assistive Device (Dressing Goal 1, OT)  --  --  upper body dressing;lower body dressing  -AC    [place]/Cues Needed (Dressing Goal 1, OT)  --  --  minimum assist (75% or more patient effort);verbal cues required  -AC    Time Frame (Dressing Goal 1, OT)  --  --  10 days  -AC    Progress/Outcome (Dressing Goal 1, OT)  --  --  goal not met  -AC       ROM Goal 1 (OT)    ROM Goal 1 (OT)  --  --  Pt will demonstrate full AROM in gravity eliminated plane of each joint of LUE.  -AC    Time Frame (ROM Goal 1, OT)  --  --  10 days  -AC    Progress/Outcome (ROM Goal 1, OT)  --  --  goal not met  -AC       Coordination Goal 1 (OT)    Activity/Assistive Device (Coordination Goal 1, OT)  --  --  FM task;GM task  -AC    [place] Level/Cues Needed (Coordination Goal 1, OT)  --  --  minimum assist (75% or more  patient effort);verbal cues required  -AC    Time Frame (Coordination Goal 1, OT)  --  --  10 days  -AC    Progress/Outcomes (Coordination Goal 1, OT)  --  --  goal not met  -AC       Oral Nutrition/Hydration Goal 1 (SLP)    Oral Nutrition/Hydration Goal 1, SLP  --  Pt will tolerate LRD w/o any overt s/s of aspriation.  -MB  --    Time Frame (Oral Nutrition/Hydration Goal 1, SLP)  --  by discharge  -MB  --    Barriers (Oral Nutrition/Hydration Goal 1, SLP)  --  n/a  -MB  --    Progress/Outcomes (Oral Nutrition/Hydration Goal 1, SLP)  --  goal partially met  -MB  --       Labial Strengthening Goal 1 (SLP)    Activity (Labial Strengthening Goal 1, SLP)  --  increase labial tone  -MB  --    Increase Labial Tone  --  labial resistance exercises  -MB  --    [place]/Accuracy (Labial Strengthening Goal 1, SLP)  --  independently (over 90% accuracy)  -MB  --    Time Frame (Labial Strengthening Goal 1, SLP)  --  short term goal (STG);by discharge  -MB  --    Barriers (Labial Strengthening Goal 1, SLP)  --  n/a  -MB  --    Progress/Outcomes (Labial Strengthening Goal 1, SLP)  --  goal partially met  -MB  --       Lingual Strengthening Goal 1 (SLP)    Activity (Lingual Strengthening Goal 1, SLP)  --  increase lingual tone/sensation/control/coordination/movement  -MB  --    Increase Lingual Tone/Sensation/Control/Coordination/Movement  --  lingual movement exercises  -MB  --    [place]/Accuracy (Lingual Strengthening Goal 1, SLP)  --  independently (over 90% accuracy)  -MB  --    Time Frame (Lingual Strengthening Goal 1, SLP)  --  short term goal (STG);by discharge  -MB  --    Barriers (Lingual Strengthening Goal 1, SLP)  --  n/a  -MB  --    Progress/Outcomes (Lingual Strengthening Goal 1, SLP)  --  goal partially met  -MB  --       Pharyngeal Strengthening Exercise Goal 1 (SLP)    Activity (Pharyngeal Strengthening Goal 1, SLP)  --  increase tongue base retraction  -MB  --    Increase Tongue Base Retraction  --   kelsey  -MB  --    [place]/Accuracy (Pharyngeal Strengthening Goal 1, SLP)  --  independently (over 90% accuracy)  -MB  --    Time Frame (Pharyngeal Strengthening Goal 1, SLP)  --  short term goal (STG);by discharge  -MB  --    Barriers (Pharyngeal Strengthening Goal 1, SLP)  --  n/a  -MB  --    Progress/Outcomes (Pharyngeal Strengthening Goal 1, SLP)  --  goal not met  -MB  --       Swallow Compensatory Strategies Goal 1 (SLP)    Activity (Swallow Compensatory Strategies/Techniques Goal 1, SLP)  --  compensatory strategies;during meal intake;during p.o. trials;small bites;small cup sips;small straw sips;food/liquid placed on stronger right side;alternate food/liquid intake  -MB  --    [place]/Accuracy (Swallow Compensatory Strategies/Techniques Goal 1, SLP)  --  independently (over 90% accuracy)  -MB  --    Time Frame (Swallow Compensatory Strategies/Techniques Goal 1, SLP)  --  short term goal (STG);by discharge  -MB  --    Barriers (Swallow Compensatory Strategies/Techniques Goal 1, SLP)  --  n/a  -MB  --    Progress/Outcomes (Swallow Compensatory Strategies/Techniques Goal 1, SLP)  --  goal partially met  -MB  --       Respiratory Support Goal 1 (SLP)    Improve Respiratory Support Goal 1 (SLP)  --  increasing length of exhalation;sustaining a vowel sound on exhalation;independently (over 90% accuracy);repeating a sentence on exhalation  -MB  --    Time Frame (Respiratory Support Goal 1, SLP)  --  short term goal (STG);by discharge  -MB  --    Progress/Outcomes (Respiratory Support Goal 1, SLP)  --  goal not met  -MB  --       Phonation Goal 1 (SLP)    Improve Phonation By Goal 1 (SLP)  --  using loud speech;independently (over 90% accuracy)  -MB  --    Time Frame (Phonation Goal 1, SLP)  --  short term goal (STG);by discharge  -MB  --    Barriers (Phonation Goal 1, SLP)  --  n/a  -MB  --    Progress/Outcomes (Phonation Goal 1, SLP)  --  goal not met  -MB  --       Articulation Goal 1 (SLP)     Improve Articulation Goal 1 (SLP)  --  by over-articulating at word level;by over-articulating at phrase level;independently (over 90% accuracy)  -MB  --    Time Frame (Articulation Goal 1, SLP)  --  short term goal (STG);by discharge  -MB  --    Barriers (Articulation Goal 1, SLP)  --  n/a  -MB  --    Progress/Outcomes (Articulation Goal 1, SLP)  --  goal not met  -MB  --      User Key  (r) = Recorded By, (t) = Taken By, (c) = Cosigned By    Initials Name Provider Type Discipline    Ruby Weeks, CCC-SLP Speech and Language Pathologist SLP    Anival Salgado, OTR/L, CNT Occupational Therapist OT    Anastacio Reinoso, PTA Physical Therapy Assistant PT              PT Discharge Summary  Anticipated Discharge Disposition (PT): inpatient rehabilitation facility  Reason for Discharge: Discharge from facility  Outcomes Achieved: Refer to plan of care for updates on goals achieved  Discharge Destination: Inpatient rehabilitation facility      Anastacio Louie, HUMZA   5/7/2020

## 2020-05-08 ENCOUNTER — APPOINTMENT (OUTPATIENT)
Dept: GENERAL RADIOLOGY | Age: 75
DRG: 065 | End: 2020-05-08
Attending: PSYCHIATRY & NEUROLOGY
Payer: MEDICARE

## 2020-05-08 PROCEDURE — 97130 THER IVNTJ EA ADDL 15 MIN: CPT

## 2020-05-08 PROCEDURE — 97129 THER IVNTJ 1ST 15 MIN: CPT

## 2020-05-08 PROCEDURE — 97110 THERAPEUTIC EXERCISES: CPT

## 2020-05-08 PROCEDURE — 74230 X-RAY XM SWLNG FUNCJ C+: CPT

## 2020-05-08 PROCEDURE — 97535 SELF CARE MNGMENT TRAINING: CPT

## 2020-05-08 PROCEDURE — 1180000000 HC REHAB R&B

## 2020-05-08 PROCEDURE — 6370000000 HC RX 637 (ALT 250 FOR IP): Performed by: NURSE PRACTITIONER

## 2020-05-08 PROCEDURE — 92611 MOTION FLUOROSCOPY/SWALLOW: CPT

## 2020-05-08 PROCEDURE — 92526 ORAL FUNCTION THERAPY: CPT

## 2020-05-08 PROCEDURE — 6370000000 HC RX 637 (ALT 250 FOR IP): Performed by: PSYCHIATRY & NEUROLOGY

## 2020-05-08 RX ORDER — LORAZEPAM 0.5 MG/1
0.5 TABLET ORAL EVERY 8 HOURS PRN
Status: DISCONTINUED | OUTPATIENT
Start: 2020-05-08 | End: 2020-05-09

## 2020-05-08 RX ORDER — CLONIDINE HYDROCHLORIDE 0.1 MG/1
0.1 TABLET ORAL EVERY 4 HOURS PRN
Status: DISCONTINUED | OUTPATIENT
Start: 2020-05-08 | End: 2020-05-23 | Stop reason: HOSPADM

## 2020-05-08 RX ADMIN — CITALOPRAM 40 MG: 40 TABLET ORAL at 09:12

## 2020-05-08 RX ADMIN — DABIGATRAN ETEXILATE MESYLATE 150 MG: 150 CAPSULE ORAL at 20:10

## 2020-05-08 RX ADMIN — AMLODIPINE BESYLATE 5 MG: 10 TABLET ORAL at 09:21

## 2020-05-08 RX ADMIN — POLYETHYLENE GLYCOL 3350 17 G: 17 POWDER, FOR SOLUTION ORAL at 09:12

## 2020-05-08 RX ADMIN — DABIGATRAN ETEXILATE MESYLATE 150 MG: 150 CAPSULE ORAL at 09:12

## 2020-05-08 RX ADMIN — LORAZEPAM 0.5 MG: 0.5 TABLET ORAL at 20:10

## 2020-05-08 RX ADMIN — BUSPIRONE HYDROCHLORIDE 15 MG: 15 TABLET ORAL at 09:12

## 2020-05-08 RX ADMIN — LINAGLIPTIN 5 MG: 5 TABLET, FILM COATED ORAL at 09:12

## 2020-05-08 RX ADMIN — METFORMIN HYDROCHLORIDE 500 MG: 500 TABLET ORAL at 09:12

## 2020-05-08 RX ADMIN — LISINOPRIL 20 MG: 20 TABLET ORAL at 09:21

## 2020-05-08 RX ADMIN — BUSPIRONE HYDROCHLORIDE 15 MG: 15 TABLET ORAL at 20:10

## 2020-05-08 RX ADMIN — AMLODIPINE BESYLATE 5 MG: 5 TABLET ORAL at 10:21

## 2020-05-08 RX ADMIN — ATORVASTATIN CALCIUM 80 MG: 80 TABLET, FILM COATED ORAL at 20:10

## 2020-05-08 RX ADMIN — METFORMIN HYDROCHLORIDE 500 MG: 500 TABLET ORAL at 17:08

## 2020-05-08 RX ADMIN — ASPIRIN 81 MG 81 MG: 81 TABLET ORAL at 09:12

## 2020-05-08 RX ADMIN — LISINOPRIL 20 MG: 20 TABLET ORAL at 10:22

## 2020-05-08 RX ADMIN — PHENYLEPHRINE HYDROCHLORIDE 1 SPRAY: 0.5 SPRAY NASAL at 20:10

## 2020-05-08 RX ADMIN — HYDROCHLOROTHIAZIDE 12.5 MG: 25 TABLET ORAL at 09:12

## 2020-05-08 RX ADMIN — CLOPIDOGREL BISULFATE 75 MG: 75 TABLET ORAL at 09:12

## 2020-05-08 ASSESSMENT — PAIN SCALES - GENERAL: PAINLEVEL_OUTOF10: 0

## 2020-05-08 NOTE — PROGRESS NOTES
05/08/20 0900   Restrictions/Precautions   Restrictions/Precautions Weight Bearing; Fall Risk   Vision   Vision Impaired   Transfers   Sit to Stand Moderate Assistance   Stand to sit Moderate Assistance   Bed to Chair Minimal assistance  (homero stedy)   Exercises   Comments multiple sit stands with homero stedy for on and off toilet, seated ex ble's 1 set 10 reps,    Conditions Requiring Skilled Therapeutic Intervention   Assessment increaed tone left side, mild perserveration with activity, fatigue quickly, easily distracted   Activity Tolerance   Activity Tolerance Patient limited by fatigue

## 2020-05-08 NOTE — PROCEDURES
INSTRUMENTAL SWALLOW REPORT  MODIFIED BARIUM SWALLOW    NAME: Papi Tapia     : 1945  MRN: [id number]     Date of Eval: 2020     Ordering Physician: Dr. Leesa Kanner  Radiologist: Dr. Jose Serra    Referring Diagnosis(es):   Oropharyngeal dysphagia R13.12, Acute ischemic CVA I63.9      HISTORY OF PRESENT ILLNESS:   Per Neurology: This 75 y.o. female  with history of hyperlipidemia,hypercholesteremia,HTN,TIA,anxiety,and depression. She presented to [facility] on 20 with c/o left arm weakness x 3 days,some choking on liquids at times and with her legs going weak. She was hypertensive on presentation with B/P 173/101. CT done showed an age-indeterminate infarct in the right basal ganglia,right centrum semiovale and the posterior right temporal lobe. CTA head/neck didn't reveal ay high-grade stenosis or occlusion. She was admitted to the her PCP Dr. Paul Joseph with consult for neurology. She was seen by Dr. Rock Hector. MRI done showed an acute and subacute right MCA stroke. She was not given TPA d/t being outside of the timeframe. She was placed on ASA and Plavix for dual antiplatelet therapy. Dr. Rock Hector recommended Zio Patch at discharge. She was also found to have a hemoglobin A1C of 8.9 and adjustments have been made to her medications. She was evaluated by SPT for swallow and passed but was noted to have anomia and dysarthria. She is also participating with both PT/OT. Patient became more slow and dysarthric,left sided weakness worsening,left eye was deviating medially and patient seemed to have to force her left eye to move laterally. Repeat CT on 20 showed larger stroke right frontal lobe 2.5 cm compared prior scan on 5/3/20. Per Dr. 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 She is felt to need a stay on Rehab for continued PT/OT/SPT and for medical management for maximium BP control and blood glucose management.  On 20 afternoon, she was having more swallowing anatomy and physiology. Full results of the MBSS were reviewed with the patient, her nurse and the charge nurse. Patient Education Response: Verbalizes understanding      Goals:    Long Term:   Goal 1: The patient will maintain adequate hydration/nutrition with optimum safety and efficiency of swallowing function on po intake without overt s/s of aspiration for the highest appropriate diet level    Short Term:  Goal 1: The patient will tolerate pureed diet with nectar thick liquids with no overt s/s of aspiration   Goal 2: Patient staff will follow swallow safety recommendations to decrease risk of penetration/aspiration during PO intake. Goal 3: The patient will complete oral motor exercises to improve labial/lingual strengthening/ROM with min verbal cues at 100% accuracy. Goal 4: The patient will complete pharyngeal strengthening exercises (jorge luis, effortful swallow, mendolsohn) with mod verbal cues at 100% accuracy to improve airway protection.           Esophageal Phase  Esophageal Screen: [facility]          Brayden Muse, 5/8/2020, 4:25 PM    Electronically Signed By:  Brayden Muse M.S. CCC-SLP  5/8/2020,4:29 PM.

## 2020-05-08 NOTE — PROGRESS NOTES
[facility]  INPATIENT SPEECH THERAPY  [facility] 8 REHAB UNIT      TIME   Time In: 1100  Time Out: 1200  Minutes: 60       [x]Daily Note  []Progress Note    Date: 2020  Patient Name: Jay Molina        MRN: [id number]    Account #: [de-identified]  : 1945  (75 y.o.)  Gender: female   Primary Provider: Burke Brownlee MD  Swallowing Status/Diet: Dysphagia 1 puree, with honey thick liquids    PATIENT DIAGNOSIS(ES):    Diagnosis: RIGHT MCA STROKE; LEFT WEAKNESS  Additional Pertinent Hx: HTN, TIA, ANXIETY, DEPRESSION    RESTRICTIONS/PRECAUTIONS:    Restrictions/Precautions  Restrictions/Precautions: Weight Bearing, Fall Risk      Subjective: The patient was upright in her wheelchair. The patient attempted all therapy tasks. Objective:  She answered demographic information questions with minimal cues. She was able to answer orientations at 60%. Automatic speech tasks were completed with minimal cues. Very slow processing was noted. She complains of worsening dysphagia. Recommend repeat MBSS as she is refusing to eat due to difficulty passing pureed foods through the pharynx. She states she is fearful of choking. She did drink some nectar thick liquids this morning without overt s/s of aspiration. She fed herself lunch after encouragement was provided. She reported some bites of puree did become lodged in the pharynx, however, after prompts to use a nectar thick wash, this did clear. Weak hyolaryngeal elevation and delayed swallow responses were observed. No overt s/s of aspiration observed. No episodes of choking were observed following bites of puree. An MBSS has been scheduled for 3:00 this afternoon due to increased difficulty reported by the patient. Recommended Diet and Intervention  Diet Solids Recommendation: Dysphagia Pureed (Dysphagia I)  Liquid Consistency Recommendation: Mildly Thick (Nectar)     Compensatory Swallowing Strategies  Compensatory Swallowing Strategies:  Alternate solids and liquids; Check for pocketing of food on the Left;Lingual sweep;Small bites/sips; No straws;Remain upright for 30-45 minutes after meals;Upright as possible for all oral intake;Eat/Feed slowly      SHORT TERM GOAL #1:  Goal 1: The patient will complete simple/complex naming tasks with min verbal cues at 80% accuracy in order to improve verbal expression and thought organization for functional communication in all settings. SHORT TERM GOAL #2:  Goal 2: The patient will follow 2/3 step commands related to functional living environment with 100% accuracy and min verbal cues in order to increase functional integration into environment. SHORT TERM GOAL #3:  Goal 3: The patient will complete executive function tasks with 100% accuracy and min verbal cues during daily living activities to improve safety and awareness in functional living environment. SHORT TERM GOAL #4:  Goal 4: Locate items left/right of midline at page level for target cancellation tasks/trail-making/visual closure/address checking/editing/reading sentences (aloud)/paragraphs/signs/maps with minimal cues. SHORT TERM GOAL #5:  Goal 5: The patient will demonstrate functional problem solving and safety awareness with 100% accuracy and min verbal cues for daily living tasks in order to increase safe interaction with environment and decreased assitance from caregivers. Swallowing Short Term Goals  Short-term Goals  Goal 1: The patient will tolerate pureed diet with nectar thick liquids with no overt s/s of aspiration   Goal 2: Patient staff will follow swallow safety recommendations to decrease risk of penetration/aspiration during PO intake. Goal 3: The patient will complete oral motor exercises to improve labial/lingual strengthening/ROM with min verbal cues at 100% accuracy.    Goal 4: The patient will complete laryngeal strengthening exercises (jorge luis, effortful swallow, mendolsohn) with mod verbal cues at 100% accuracy to improve airway

## 2020-05-08 NOTE — PROGRESS NOTES
tenderness. no rebounding or guarding. Musculoskeletal: Normal range of motion on other than surgically repaired joint . no edema or tenderness other than surgical area. Post-op changes noted  Neurological:  alert and oriented to person, place, and time. Left facial droop, left hemiparesis upper and lower extremity  Skin: Skin is warm and dry. No new rashes appreciated. Assessment and Plan:     Primary Problem:  Multiple right hemisphere strokes    Hospital Problem list/treatment recommendations: Active Problems:    Multiple right hemispheric strokes possibly embolic-continue Pradaxa. PT/OT/SLP  Diabetes mellitus type 2-- continue to monitor, continue metformin & Tradjenta  Essential hypertension-- Norvasc, Zestril, HCTZ  Slow transit constipation-- stool softener, laxative, bowel regimen  Generalized anxiety disorder--BuSpar  Major depressive disorder--Celexa, counseling provided  Mixed dyslipidemia--on statin therapy    Continue to maximize efforts post stroke rehab program  Elevated blood pressure noted will add clonidine PRN  Discharge planning: Pending    Reviewed treatment plans with the patient and nursing staff  30 minutes spent in face to face interaction and coordination of care. The patient was seen on rounds today. Reviewed the last 24 hours of labs and x-rays that are available. Updated overnight status with nursing staff. Reviewed the case with Jack Vaughan PA-C. All questions were answered to the patient's/family's understanding. Patient is medically stable, please see orders for further details. I have discussed the care of Drew. ísimo 6, including pertinent history and exam findings with the ARNP/PA. I have seen and examined the patient and the key elements of all parts of the encounter have been performed by me. I agree with the assessment and plan as outlined by the ARNP/PA. Please refer to my separate note for complete documentation.      Electronically signed by Reynaldo Cordoba

## 2020-05-09 PROCEDURE — 6370000000 HC RX 637 (ALT 250 FOR IP): Performed by: PSYCHIATRY & NEUROLOGY

## 2020-05-09 PROCEDURE — 97535 SELF CARE MNGMENT TRAINING: CPT

## 2020-05-09 PROCEDURE — 1180000000 HC REHAB R&B

## 2020-05-09 PROCEDURE — 97129 THER IVNTJ 1ST 15 MIN: CPT

## 2020-05-09 PROCEDURE — 6370000000 HC RX 637 (ALT 250 FOR IP): Performed by: NURSE PRACTITIONER

## 2020-05-09 PROCEDURE — 97530 THERAPEUTIC ACTIVITIES: CPT

## 2020-05-09 PROCEDURE — 92526 ORAL FUNCTION THERAPY: CPT

## 2020-05-09 PROCEDURE — 97110 THERAPEUTIC EXERCISES: CPT

## 2020-05-09 PROCEDURE — 97130 THER IVNTJ EA ADDL 15 MIN: CPT

## 2020-05-09 PROCEDURE — 99232 SBSQ HOSP IP/OBS MODERATE 35: CPT | Performed by: PSYCHIATRY & NEUROLOGY

## 2020-05-09 RX ADMIN — BUSPIRONE HYDROCHLORIDE 15 MG: 15 TABLET ORAL at 10:05

## 2020-05-09 RX ADMIN — HYDROCHLOROTHIAZIDE 12.5 MG: 25 TABLET ORAL at 10:06

## 2020-05-09 RX ADMIN — METFORMIN HYDROCHLORIDE 500 MG: 500 TABLET ORAL at 10:05

## 2020-05-09 RX ADMIN — ASPIRIN 81 MG 81 MG: 81 TABLET ORAL at 10:05

## 2020-05-09 RX ADMIN — LISINOPRIL 20 MG: 20 TABLET ORAL at 10:05

## 2020-05-09 RX ADMIN — AMLODIPINE BESYLATE 5 MG: 10 TABLET ORAL at 10:05

## 2020-05-09 RX ADMIN — DABIGATRAN ETEXILATE MESYLATE 150 MG: 150 CAPSULE ORAL at 21:02

## 2020-05-09 RX ADMIN — CLOPIDOGREL BISULFATE 75 MG: 75 TABLET ORAL at 10:05

## 2020-05-09 RX ADMIN — ATORVASTATIN CALCIUM 80 MG: 80 TABLET, FILM COATED ORAL at 21:02

## 2020-05-09 RX ADMIN — LORAZEPAM 0.5 MG: 0.5 TABLET ORAL at 06:59

## 2020-05-09 RX ADMIN — CITALOPRAM 40 MG: 40 TABLET ORAL at 10:05

## 2020-05-09 RX ADMIN — DABIGATRAN ETEXILATE MESYLATE 150 MG: 150 CAPSULE ORAL at 10:05

## 2020-05-09 RX ADMIN — LINAGLIPTIN 5 MG: 5 TABLET, FILM COATED ORAL at 10:05

## 2020-05-09 RX ADMIN — BUSPIRONE HYDROCHLORIDE 15 MG: 15 TABLET ORAL at 21:02

## 2020-05-09 RX ADMIN — METFORMIN HYDROCHLORIDE 500 MG: 500 TABLET ORAL at 17:28

## 2020-05-09 NOTE — PROGRESS NOTES
Occupational Therapy     05/09/20 0900   Restrictions/Precautions   Restrictions/Precautions Fall Risk   Subjective   Subjective Pt stated she was thirsty but declined water \"it make me pee\". Reviewed risk of dehydration. ADL   Grooming Maximum assistance   UE Bathing Minimal assistance   LE Bathing Moderate assistance   UE Dressing Moderate assistance   LE Dressing Moderate assistance   Balance   Standing Balance Contact guard assistance   Bed mobility   Supine to Sit Moderate assistance   Transfers   Stand Pivot Transfers Minimal assistance   Transfer Comments w/c -> recliner   Wheelchair Bed Transfers   Wheelchair/Bed - Technique Stand pivot   Level of Asssistance Minimal assistance   Type of ROM/Therapeutic Exercise   Type of ROM/Therapeutic Exercise PROM   Assessment   Performance deficits / Impairments Decreased functional mobility ; Decreased ADL status; Decreased ROM; Decreased strength;Decreased cognition;Decreased endurance;Decreased balance;Decreased vision/visual deficit; Decreased high-level IADLs   Assessment pt resistant to donning resting hand splint \"it's too hot\" . Educated pt on benefits of splinting to decrease tone and improve ability to complete hygiene. Will readdress next session. Timed Code Treatment Minutes 60 Minutes   Activity Tolerance   Activity Tolerance Patient Tolerated treatment well   Safety Devices   Safety Devices in place Yes   Type of devices Call light within reach; Chair alarm in place   Other Comments   Comments pt seated in recliner at end of tx

## 2020-05-09 NOTE — PROGRESS NOTES
Speech Therapy    ANALISA   Time In: 10:00  Time Out: 11:00  Minutes: 60     [x]? Daily Note  []? Progress Note     Date: 20  Patient Name: Roney Bower        MRN: [id number]    Account #: [de-identified]  : [birth year]  (75 y.o.)  Gender: Female   Primary Provider: Airam Montero MD  Swallowing Status/Diet: Dysphagia 1 puree; honey thick liquids    Subjective:  Patient was considered alert, in bedside chair, upon entry. Patient appeared lethargic towards middle and end of treatment session and required constant stimulation to maintain alertness to participate in therapeutic activities. Objective:   Monitored patient's swallowing function. With puree consistency presentations administered by SLP, patient exhibited slow oral prep with decreased rotary jaw movement noted. Oral transit of puree consistency primarily measured 1-2 seconds in length and min oral cavity residue was observed post swallows; residue was slow but cleared from the mouth with additional dry swallows. Oral transit of nectar thick liquid presentations, administered via spoon and cup by SLP, primarily measured 1-2 seconds in length. Laryngeal elevation during swallow initiation was considered to be sluggish and mild-moderately decreased for swallow airway protection. Even so, no outward S/S penetration/aspiration was noted with any puree consistency presentation or nectar thick liquid presentation administered during treatment session. At this time, would recommend continuation puree consistency and nectar thick liquids. Do recommend meds crushed in pudding/applesauce (patient exhibited difficulty transiting whole pills in pudding/applesauce; patient swallowed the puree consistency and the pills remained at front of the mouth; patient required multiple presentations of puree and nectar thick liquid washes to clear). Along with swallow monitoring, targeted expressive language with emphasis on strategies to utilize to assist during times of anomia. address functional deficits as outlined in the established plan of care.     Electronically signed by MYRON Garay on 5/9/2020 at 10:55 AM

## 2020-05-09 NOTE — PROGRESS NOTES
shoulder shrug  CN XII-Tongue midline with no fasciculations or fibrillations          Motor function   normal on the right. No movement in left upper extremity. Increased tone there. 3+/5 left leg     Sensory function Intact to light touch     Cerebellar F-N intact     Tremor None present     Gait                  Not tested           Nursing/pcp notes, imaging,labs and vitals reviewed. PT,OT and/or speech notes reviewed    Lab Results   Component Value Date    WBC 12.2 (H) 05/07/2020    WBC 12.0 (H) 05/07/2020    HGB 13.0 05/07/2020    HGB 12.9 05/07/2020    HCT 39.4 05/07/2020    HCT 39.9 05/07/2020    MCV 91.8 05/07/2020    MCV 91.7 05/07/2020     05/07/2020     05/07/2020     Lab Results   Component Value Date     05/07/2020    K 3.6 05/07/2020     05/07/2020    CO2 25 05/07/2020    BUN 14 05/07/2020    CREATININE 0.9 05/07/2020    GLUCOSE 142 (H) 05/07/2020    CALCIUM 9.3 05/07/2020    LABGLOM >60 05/07/2020   No results found for: INRNo results found for: PHENYTOIN, ESR, CRP    Impressions: The patient exhibited silent laryngeal penetration nearing the vocal folds with thin liquids. No significant residue was observed this study. She is very anxious about her swallowing and requires encouragement from staff. She is safe to consume a soft solid or regular diet, however, due to witnessed anxiety, recommend she remain on a dysphagia 1 diet, puree with nectar thickened liquids. Recommend continue whole meds in applesauce or pudding and tray set up. Recommend consider medications for anxiety near meal time.       05/08/20 0900   Restrictions/Precautions   Restrictions/Precautions Weight Bearing; Fall Risk   Vision   Vision Impaired   Transfers   Sit to Stand Moderate Assistance   Stand to sit Moderate Assistance   Bed to Chair Minimal assistance  (homero tolentino)   Exercises   Comments multiple sit stands with homero tolentino for on and off toilet, seated ex ble's 1 set 10 reps, Conditions Requiring Skilled Therapeutic Intervention   Assessment increaed tone left side, mild perserveration with activity, fatigue quickly, easily distracted   Activity Tolerance   Activity Tolerance Patient limited by fatigue     05/07/20 1208    Eating   Assistance Needed Supervision or touching assistance   CARE Score 4   Discharge Goal 6   Oral Hygiene   Assistance Needed Partial/moderate assistance   CARE Score 3   Discharge Goal 6   [address] assistance   CARE Score 2   Discharge Goal 3   Shower/Bathe Self   Assistance Needed Substantial/maximal assistance   CARE Score 2   Discharge Goal 3   Upper Body Dressing   Discharge Goal 4   Lower Body Dressing   Assistance Needed Substantial/maximal assistance   CARE Score 2   Discharge Goal 3   Putting On/Taking Off Footwear   Assistance Needed Dependent   CARE Score 1   Discharge Goal 3          RECORD REVIEW: Previous medical records, medications were reviewed at today's visit    IMPRESSION:   1.  Multiple right hemispheric strokes possibly embolic-continue Pradaxa. PT/OT/SLP  2. Diabetes-continue oral hypoglycemics and monitoring  3. DVT prophylaxis-Pradaxa  4. Essential hypertension-continue current medications and monitoring  5. Hyperlipidemia-continue statin  6. Anxiety/depression-continue mood enhancers. Reduce Ativan due to lethargy  Staff next week.

## 2020-05-09 NOTE — PROGRESS NOTES
Duplicate order for Lisinopril 20mg daily and Norvasc 5mg daily. I called [address] [facility] and they confirmed that patient is only taking Lotrel 5-20mg daily. Dr. Ruiz Spotted to be notified.

## 2020-05-09 NOTE — PROGRESS NOTES
Gets together: Not on file     Attends [employer] service: Not on file     Active member of club or organization: Not on file     Attends meetings of clubs or organizations: Not on file     Relationship status: Not on file    Intimate partner violence     Fear of current or ex partner: Not on file     Emotionally abused: Not on file     Physically abused: Not on file     Forced sexual activity: Not on file   Other Topics Concern    Not on file   Social History Narrative    Not on file       Labs:  Hematology:  Recent Labs     207   WBC 12.0*  12.2*   HGB 12.9  13.0   HCT 39.9  39.4     257     Chemistry:  Recent Labs     20      K 3.6      CO2 25   GLUCOSE 142*   BUN 14   CREATININE 0.9   ANIONGAP 16   LABGLOM >60   CALCIUM 9.3     No results for input(s): PROT, LABALBU, LABA1C, Y0NMWXP, C8FOUBT, FT4, TSH, AST, ALT, LDH, GGT, ALKPHOS, BILITOT, BILIDIR, AMMONIA, AMYLASE, LIPASE, LACTATE, CHOL, HDL, LDLCHOLESTEROL, CHOLHDLRATIO, TRIG, VLDL, PHENYTOIN, PHENYF in the last 72 hours. Objective:     Vitals: /73   Pulse 90   Temp 97 °F (36.1 °C) (Temporal)   Resp 16   Ht 5' (1.524 m)   Wt 152 lb 3.2 oz (69 kg)   SpO2 92%   BMI 29.72 kg/m²      Intake/Output Summary (Last 24 hours) at 2020 0904  Last data filed at 2020  Gross per 24 hour   Intake 270 ml   Output --   Net 270 ml    Temp (24hrs), Av °F (36.1 °C), Min:97 °F (36.1 °C), Max:97 °F (36.1 °C)    Glucose:  Recent Labs     20  0503   POCGLU 133* 151*     Physical Examination:   Constitutional: oriented to person, place, and time. appears well-developed. HEENT:   Head: Normocephalic and atraumatic. Eyes: Pupils are equal, round, and reactive to light. Neck: Neck supple. Cardiovascular: Regular rhythm and normal heart sounds. No lift or rub appreciated. Pulmonary/Chest: Effort normal and breath sounds normal. CTAB. No labored breating. Abdominal: Soft.  Bowel sounds are normal. There is no appreciable  distension. There is no point tenderness. no rebounding or guarding. Musculoskeletal: Normal range of motion on other than surgically repaired joint . no edema or tenderness other than surgical area. Post-op changes noted  Neurological:  alert and oriented to person, place, and time. Left facial droop, left hemiparesis upper and lower extremity  Skin: Skin is warm and dry. No new rashes appreciated. Assessment and Plan:     Primary Problem:  Multiple right hemisphere strokes    Hospital Problem list/treatment recommendations: Active Problems:    Multiple right hemispheric strokes possibly embolic-continue Pradaxa. PT/OT/SLP  Diabetes mellitus type 2-- continue to monitor, continue metformin & Tradjenta  Essential hypertension-- Norvasc, Zestril, HCTZ  Slow transit constipation-- stool softener, laxative, bowel regimen  Generalized anxiety disorder--BuSpar  Major depressive disorder--Celexa, counseling provided  Mixed dyslipidemia--on statin therapy    Continue to maximize efforts post stroke rehab program  Elevated blood pressure noted will add clonidine PRN  Discharge planning: Pending    Reviewed treatment plans with the patient and nursing staff  30 minutes spent in face to face interaction and coordination of care. Electronically signed by Husam Flores PA-C on 5/9/2020 at 9:04 AM     I have discussed the care of [address], including pertinent history and exam findings with the ARNP/PA. I have seen and examined the patient and the key elements of all parts of the encounter have been performed by me. I agree with the assessment and plan as outlined by the PATI/PA. Please refer to my separate note for complete documentation.      Electronically signed by Shan Up MD on 5/9/2020 at 12:31 PM

## 2020-05-10 PROCEDURE — 6370000000 HC RX 637 (ALT 250 FOR IP): Performed by: PSYCHIATRY & NEUROLOGY

## 2020-05-10 PROCEDURE — 1180000000 HC REHAB R&B

## 2020-05-10 RX ORDER — DIPHENHYDRAMINE HYDROCHLORIDE 50 MG/ML
25 INJECTION INTRAMUSCULAR; INTRAVENOUS EVERY 6 HOURS PRN
Status: DISCONTINUED | OUTPATIENT
Start: 2020-05-10 | End: 2020-05-23 | Stop reason: HOSPADM

## 2020-05-10 RX ADMIN — DABIGATRAN ETEXILATE MESYLATE 150 MG: 150 CAPSULE ORAL at 20:22

## 2020-05-10 RX ADMIN — ATORVASTATIN CALCIUM 80 MG: 80 TABLET, FILM COATED ORAL at 20:22

## 2020-05-10 RX ADMIN — ASPIRIN 81 MG 81 MG: 81 TABLET ORAL at 08:08

## 2020-05-10 RX ADMIN — BUSPIRONE HYDROCHLORIDE 15 MG: 15 TABLET ORAL at 20:22

## 2020-05-10 RX ADMIN — CLOPIDOGREL BISULFATE 75 MG: 75 TABLET ORAL at 08:09

## 2020-05-10 RX ADMIN — HYDROCHLOROTHIAZIDE 25 MG: 25 TABLET ORAL at 08:09

## 2020-05-10 RX ADMIN — METFORMIN HYDROCHLORIDE 500 MG: 500 TABLET ORAL at 08:09

## 2020-05-10 RX ADMIN — METFORMIN HYDROCHLORIDE 500 MG: 500 TABLET ORAL at 17:24

## 2020-05-10 RX ADMIN — LISINOPRIL 20 MG: 20 TABLET ORAL at 08:09

## 2020-05-10 RX ADMIN — ACETAMINOPHEN 650 MG: 325 TABLET ORAL at 02:45

## 2020-05-10 RX ADMIN — AMLODIPINE BESYLATE 5 MG: 10 TABLET ORAL at 08:09

## 2020-05-10 RX ADMIN — CITALOPRAM 40 MG: 40 TABLET ORAL at 08:09

## 2020-05-10 RX ADMIN — DABIGATRAN ETEXILATE MESYLATE 150 MG: 150 CAPSULE ORAL at 08:08

## 2020-05-10 RX ADMIN — BUSPIRONE HYDROCHLORIDE 15 MG: 15 TABLET ORAL at 08:09

## 2020-05-10 RX ADMIN — LINAGLIPTIN 5 MG: 5 TABLET, FILM COATED ORAL at 08:08

## 2020-05-10 ASSESSMENT — PAIN SCALES - GENERAL
PAINLEVEL_OUTOF10: 0
PAINLEVEL_OUTOF10: 6
PAINLEVEL_OUTOF10: 0

## 2020-05-10 NOTE — PROGRESS NOTES
[facility]  Daily Progress Note  V[place]rera  MRN: [id number] LOS: 5    Admit Date: 5/5/2020   5/10/2020 8:45 AM          Chief Complaint:  Acute ischemic stroke with left hemiparesis    Interval History:    Reviewed overnight events and nursing notes. Status:  not changed  Patient awake no distress  No report of chest pain or shortness of breath    No New Complaints       DIET:  Dietary Nutrition Supplements: Other Oral Supplement (see comment)  DIET DYSPHAGIA PUREED; Carb Control: 4 carb choices (60 gms)/meal; Mildly Thick (Nectar)    Medications:      metFORMIN  500 mg Oral BID WC    citalopram  40 mg Oral Daily    clopidogrel  75 mg Oral Daily    atorvastatin  80 mg Oral Nightly    aspirin  81 mg Oral Daily    busPIRone  15 mg Oral BID    linagliptin  5 mg Oral Daily    hydrochlorothiazide  12.5 mg Oral Daily    dabigatran  150 mg Oral BID    polyethylene glycol  17 g Oral Daily    amLODIPine  5 mg Oral Daily    And    lisinopril  20 mg Oral Daily       Data:     Code Status: Full Code    No family history on file.   Social History     Socioeconomic History    Marital status:      Spouse name: Not on file    Number of children: Not on file    Years of education: Not on file    Highest education level: Not on file   Occupational History    Not on file   Social Needs    Financial resource strain: Not on file    Food insecurity     Worry: Not on file     Inability: Not on file    Transportation needs     Medical: Not on file     Non-medical: Not on file   Tobacco Use    Smoking status: Not on file   Substance and Sexual Activity    Alcohol use: Not on file    Drug use: Not on file    Sexual activity: Not on file   Lifestyle    Physical activity     Days per week: Not on file     Minutes per session: Not on file    Stress: Not on file   Relationships    Social connections     Talks on phone: Not on file     Gets together: Not on file     Attends L[employer] service: Not on

## 2020-05-11 LAB
ANION GAP SERPL CALCULATED.3IONS-SCNC: 16 MMOL/L (ref 7–19)
BASOPHILS ABSOLUTE: 0.1 K/UL (ref 0–0.2)
BASOPHILS RELATIVE PERCENT: 0.5 % (ref 0–1)
BUN BLDV-MCNC: 33 MG/DL (ref 8–23)
CALCIUM SERPL-MCNC: 9.2 MG/DL (ref 8.8–10.2)
CHLORIDE BLD-SCNC: 100 MMOL/L (ref 98–111)
CO2: 28 MMOL/L (ref 22–29)
CREAT SERPL-MCNC: 1.4 MG/DL (ref 0.5–0.9)
EOSINOPHILS ABSOLUTE: 0.2 K/UL (ref 0–0.6)
EOSINOPHILS RELATIVE PERCENT: 1.4 % (ref 0–5)
GFR NON-AFRICAN AMERICAN: 37
GLUCOSE BLD-MCNC: 148 MG/DL (ref 74–109)
HCT VFR BLD CALC: 39.5 % (ref 37–47)
HEMOGLOBIN: 13 G/DL (ref 12–16)
IMMATURE GRANULOCYTES #: 0 K/UL
LYMPHOCYTES ABSOLUTE: 3.5 K/UL (ref 1.1–4.5)
LYMPHOCYTES RELATIVE PERCENT: 29.2 % (ref 20–40)
MCH RBC QN AUTO: 29.9 PG (ref 27–31)
MCHC RBC AUTO-ENTMCNC: 32.9 G/DL (ref 33–37)
MCV RBC AUTO: 90.8 FL (ref 81–99)
MONOCYTES ABSOLUTE: 1.2 K/UL (ref 0–0.9)
MONOCYTES RELATIVE PERCENT: 10.2 % (ref 0–10)
NEUTROPHILS ABSOLUTE: 7 K/UL (ref 1.5–7.5)
NEUTROPHILS RELATIVE PERCENT: 58.4 % (ref 50–65)
PDW BLD-RTO: 13.1 % (ref 11.5–14.5)
PLATELET # BLD: 295 K/UL (ref 130–400)
PMV BLD AUTO: 10.8 FL (ref 9.4–12.3)
POTASSIUM REFLEX MAGNESIUM: 3.7 MMOL/L (ref 3.5–5)
RBC # BLD: 4.35 M/UL (ref 4.2–5.4)
SODIUM BLD-SCNC: 144 MMOL/L (ref 136–145)
WBC # BLD: 11.9 K/UL (ref 4.8–10.8)

## 2020-05-11 PROCEDURE — 97530 THERAPEUTIC ACTIVITIES: CPT

## 2020-05-11 PROCEDURE — 99232 SBSQ HOSP IP/OBS MODERATE 35: CPT | Performed by: PSYCHIATRY & NEUROLOGY

## 2020-05-11 PROCEDURE — 97129 THER IVNTJ 1ST 15 MIN: CPT

## 2020-05-11 PROCEDURE — 36415 COLL VENOUS BLD VENIPUNCTURE: CPT

## 2020-05-11 PROCEDURE — 97130 THER IVNTJ EA ADDL 15 MIN: CPT

## 2020-05-11 PROCEDURE — 6370000000 HC RX 637 (ALT 250 FOR IP): Performed by: PSYCHIATRY & NEUROLOGY

## 2020-05-11 PROCEDURE — 92526 ORAL FUNCTION THERAPY: CPT

## 2020-05-11 PROCEDURE — 97110 THERAPEUTIC EXERCISES: CPT

## 2020-05-11 PROCEDURE — 85025 COMPLETE CBC W/AUTO DIFF WBC: CPT

## 2020-05-11 PROCEDURE — 1180000000 HC REHAB R&B

## 2020-05-11 PROCEDURE — 80048 BASIC METABOLIC PNL TOTAL CA: CPT

## 2020-05-11 RX ADMIN — ATORVASTATIN CALCIUM 80 MG: 80 TABLET, FILM COATED ORAL at 20:05

## 2020-05-11 RX ADMIN — METFORMIN HYDROCHLORIDE 500 MG: 500 TABLET ORAL at 16:58

## 2020-05-11 RX ADMIN — ASPIRIN 81 MG 81 MG: 81 TABLET ORAL at 07:41

## 2020-05-11 RX ADMIN — DABIGATRAN ETEXILATE MESYLATE 150 MG: 150 CAPSULE ORAL at 07:40

## 2020-05-11 RX ADMIN — DABIGATRAN ETEXILATE MESYLATE 150 MG: 150 CAPSULE ORAL at 20:05

## 2020-05-11 RX ADMIN — HYDROCHLOROTHIAZIDE 12.5 MG: 25 TABLET ORAL at 07:39

## 2020-05-11 RX ADMIN — LINAGLIPTIN 5 MG: 5 TABLET, FILM COATED ORAL at 07:40

## 2020-05-11 RX ADMIN — LISINOPRIL 20 MG: 20 TABLET ORAL at 07:40

## 2020-05-11 RX ADMIN — CITALOPRAM 40 MG: 40 TABLET ORAL at 07:39

## 2020-05-11 RX ADMIN — AMLODIPINE BESYLATE 5 MG: 10 TABLET ORAL at 07:41

## 2020-05-11 RX ADMIN — CLOPIDOGREL BISULFATE 75 MG: 75 TABLET ORAL at 07:40

## 2020-05-11 RX ADMIN — METFORMIN HYDROCHLORIDE 500 MG: 500 TABLET ORAL at 07:39

## 2020-05-11 RX ADMIN — BUSPIRONE HYDROCHLORIDE 15 MG: 15 TABLET ORAL at 07:40

## 2020-05-11 RX ADMIN — BUSPIRONE HYDROCHLORIDE 15 MG: 15 TABLET ORAL at 20:05

## 2020-05-11 ASSESSMENT — PAIN SCALES - GENERAL
PAINLEVEL_OUTOF10: 0

## 2020-05-11 NOTE — PROGRESS NOTES
[facility]  Daily Progress Note  Josiah Pineda  MRN: [id number] LOS: 6    Admit Date: 5/5/2020 5/11/2020 7:26 AM          Chief Complaint:  Acute ischemic stroke with left hemiparesis    Interval History:    Reviewed overnight events and nursing notes. Status:  not changed  Patient awake no distress  No report of chest pain or shortness of breath  No New Complaints voiced      DIET:  Dietary Nutrition Supplements: Other Oral Supplement (see comment)  DIET DYSPHAGIA PUREED; Carb Control: 4 carb choices (60 gms)/meal; Mildly Thick (Nectar)    Medications:      metFORMIN  500 mg Oral BID WC    citalopram  40 mg Oral Daily    clopidogrel  75 mg Oral Daily    atorvastatin  80 mg Oral Nightly    aspirin  81 mg Oral Daily    busPIRone  15 mg Oral BID    linagliptin  5 mg Oral Daily    hydrochlorothiazide  12.5 mg Oral Daily    dabigatran  150 mg Oral BID    polyethylene glycol  17 g Oral Daily    amLODIPine  5 mg Oral Daily    And    lisinopril  20 mg Oral Daily       Data:     Code Status: Full Code    No family history on file.   Social History     Socioeconomic History    Marital status:      Spouse name: Not on file    Number of children: Not on file    Years of education: Not on file    Highest education level: Not on file   Occupational History    Not on file   Social Needs    Financial resource strain: Not on file    Food insecurity     Worry: Not on file     Inability: Not on file    Transportation needs     Medical: Not on file     Non-medical: Not on file   Tobacco Use    Smoking status: Not on file   Substance and Sexual Activity    Alcohol use: Not on file    Drug use: Not on file    Sexual activity: Not on file   Lifestyle    Physical activity     Days per week: Not on file     Minutes per session: Not on file    Stress: Not on file   Relationships    Social connections     Talks on phone: Not on file     Gets together: Not on file     Attends Denominational service: Not no rebounding or guarding. Musculoskeletal: Normal range of motion on other than surgically repaired joint . no edema or tenderness other than surgical area. Post-op changes noted  Neurological:  alert and oriented to person, place, and time. Left facial droop, left hemiparesis upper and lower extremity  Skin: Skin is warm and dry. No new rashes appreciated. Assessment and Plan:     Primary Problem:  Multiple right hemisphere strokes    Hospital Problem list/treatment recommendations: Active Problems:    Multiple right hemispheric strokes possibly embolic-continue Pradaxa. PT/OT/SLP  Diabetes mellitus type 2-- continue to monitor, continue metformin & Tradjenta  Essential hypertension--BP stable, continue Norvasc, Zestril, HCTZ  Slow transit constipation-- stool softener, laxative, bowel regimen  Generalized anxiety disorder--BuSpar  Major depressive disorder--Celexa, counseling provided  Mixed dyslipidemia--on statin therapy  EVAN secondary to prerenal azotemia/dehydration--monitor renal function,    avoid nephrotoxic medications, increase fluids, may need IV if unable to    increase oral intake. BUN elevated. Need accurate I&Os    Looks better, up in chair, communication improving. Still with significant dysphasia, complains of minimal fluid intake speech therapy & dietary following, awaiting recommendations. Continue to maximize efforts post stroke rehab program  Elevated blood pressure noted will add clonidine PRN  Discharge planning: Pending  Reviewed treatment plans with the patient and nursing staff  20 minutes spent in face to face interaction and coordination of care. Electronically signed by Melia Chatman PA-C on 5/11/2020 at 7:26 AM       Follow BP  Making slow steady improvement    I have discussed the care of [address], including pertinent history and exam findings with the ARNP/PA.   I have seen and examined the patient and the key elements of all parts of the encounter have

## 2020-05-11 NOTE — PATIENT CARE CONFERENCE
Recommend meds in pudding/applesauce. Will continue to follow.      Recommended Diet:  Solid consistency: Minced and moist   Liquid consistency: Mildly Thick (Nectar)  Liquid administration via: Cup  Medication administration: Meds in puree     Safe Swallow Protocol:  Compensatory Swallowing Strategies: Alternate solids and liquids  Check for pocketing of food on the Left  Lingual sweep  Small bites/sips  No straws  Remain upright for 30-45 minutes after meals  Upright as possible for all oral intake  Eat/Feed slowly     SHORT TERM GOAL #1:  Goal 1: The patient will complete simple/complex naming tasks with min verbal cues at 80% accuracy in order to improve verbal expression and thought organization for functional communication in all settings.      SHORT TERM GOAL #2:  Goal 2: The patient will follow 2/3 step commands related to functional living environment with 100% accuracy and min verbal cues in order to increase functional integration into environment.      SHORT TERM GOAL #3:  Goal 3: The patient will complete executive function tasks with 100% accuracy and min verbal cues during daily living activities to improve safety and awareness in functional living environment.      SHORT TERM GOAL #4:  Goal 4: Locate items left/right of midline at page level for target cancellation tasks/trail-making/visual closure/address checking/editing/reading sentences (aloud)/paragraphs/signs/maps with minimal cues.     SHORT TERM GOAL #5:  Goal 5:  The patient will demonstrate functional problem solving and safety awareness with 100% accuracy and min verbal cues for daily living tasks in order to increase safe interaction with environment and decreased assistance from caregivers.      Swallowing Short Term Goals  Short-term Goals  Goal 1: The patient will tolerate pureed diet with nectar thick liquids with no overt s/s of aspiration MET; New Goal: Patient will tolerate minced and moist consistency and nectar thick liquids within the medical record of Candy Zavala

## 2020-05-11 NOTE — PROGRESS NOTES
Occupational Therapy     05/11/20 1700   Subjective   Subjective Pt assisted with bed mobility (at max A) for functional positioning to eat. Splint removed and skin integrity assessed. No signs of redness or swelling. Less tone noted but will likely return with reflexive movement. Pt tolerated splint for 2.5 hours without complaint.       Assessment   Timed Code Treatment Minutes 15 Minutes

## 2020-05-11 NOTE — PROGRESS NOTES
[facility]  INPATIENT SPEECH THERAPY  [facility] [address] [facility] UNIT  TIME   [id number]    [x]Daily Note  []Progress Note    Date: 2020  Patient Name: Candy Car        MRN: [id number]    Account #: [de-identified]  : 1945  (75 y.o.)  Gender: female   Primary Provider: Ham Trevino MD  Precautions: Fall/aspiration   Swallowing Status/Diet: Puree with nectar thick liquids      Subjective: Patient fatigued and required encouragement to participate in therapy tasks. Patient stating \"Im just so sleepy. \"     Objective:   Targeted expressive language. Simple/concrete category naming task completed. Patient was to name 3 items within a category. Patient completed with 100% accuracy. Delayed/slow responses noted during task. Some phonemic paraphasias/errors also noted. Same task was completed with abstract categories. Patient completed with 88% accuracy independently. Following of 2 step commands during a structured activity completed with 100% accuracy (slow processing was noted). Throughout the session patient presented with delayed/slow processing, decreased verbal initiation and initiation to complete tasks, decreased responsiveness, and limited verbalizations. Patient required extended time to respond when asked any question or during any task. Visual scanning/attention targeted. Patients menu was utilized. Patient was asked to read different sections of the menu. Patient required cues to scan to the left. Increased difficulty noted with this task. Patient required mod verbal cues for reading/and completing menu. When asked to locate items on her lunch tray patient required mod/max cues to scan to the left for location of items. Swallow function monitored with lunch time meal. Patient able to feed herself with tray set up. Minced and moist consistency, puree and nectar thick liquids were all on tray. Spillage of pureed consistency noted on the left side.  Patient would not try minced and moist TERM GOAL #5:  Goal 5: The patient will demonstrate functional problem solving and safety awareness with 100% accuracy and min verbal cues for daily living tasks in order to increase safe interaction with environment and decreased assitance from caregivers. Swallowing Short Term Goals  Short-term Goals  Goal 1: The patient will tolerate pureed diet with nectar thick liquids with no overt s/s of aspiration   Goal 2: Patient staff will follow swallow safety recommendations to decrease risk of penetration/aspiration during PO intake. Goal 3: The patient will complete oral motor exercises to improve labial/lingual strengthening/ROM with min verbal cues at 100% accuracy. Goal 4: The patient will complete laryngeal strengthening exercises (jorge luis, effortful swallow, mendolsohn) with mod verbal cues at 100% accuracy to improve airway protection.       ASSESSMENT:  Assessment: [x]Progressing towards goals          []Not Progressing towards goals    Patient Tolerance of Treatment:   []Tolerated well []Tolerated fair []Required rest breaks [x]Fatigued    Education:  Learner:  [x]Patient          []Significant Other          []Other  Education provided regarding:  [x]Goals and POC   [x]Diet and swallowing precautions    []Home Exercise Program  []Progress and/or discharge information  Method of Education:  [x]Discussion          [x]Demonstration          []Handout          []Other  Evaluation of Education:   []Verbalized understanding   []Demonstrates without assistance  []Demonstrates with assistance  [x]Needs further instruction     [x]No evidence of learning                  [x]No family present      Plan: [x]Continue with current plan of care    []Modify current plan of care as follows:    []Discharge patient    Discharge Location:    Services/Supervision Recommended:      [x]Patient continues to require treatment by a licensed therapist to address functional deficits as outlined in the established plan of care.

## 2020-05-12 PROCEDURE — 97530 THERAPEUTIC ACTIVITIES: CPT

## 2020-05-12 PROCEDURE — 99232 SBSQ HOSP IP/OBS MODERATE 35: CPT | Performed by: PSYCHIATRY & NEUROLOGY

## 2020-05-12 PROCEDURE — 97130 THER IVNTJ EA ADDL 15 MIN: CPT

## 2020-05-12 PROCEDURE — 97129 THER IVNTJ 1ST 15 MIN: CPT

## 2020-05-12 PROCEDURE — 92526 ORAL FUNCTION THERAPY: CPT

## 2020-05-12 PROCEDURE — 97116 GAIT TRAINING THERAPY: CPT

## 2020-05-12 PROCEDURE — 1180000000 HC REHAB R&B

## 2020-05-12 PROCEDURE — 6370000000 HC RX 637 (ALT 250 FOR IP): Performed by: PSYCHIATRY & NEUROLOGY

## 2020-05-12 PROCEDURE — 97535 SELF CARE MNGMENT TRAINING: CPT

## 2020-05-12 PROCEDURE — 97110 THERAPEUTIC EXERCISES: CPT

## 2020-05-12 RX ORDER — DABIGATRAN ETEXILATE 75 MG/1
75 CAPSULE, COATED PELLETS ORAL 2 TIMES DAILY
Status: DISCONTINUED | OUTPATIENT
Start: 2020-05-12 | End: 2020-05-18 | Stop reason: DRUGHIGH

## 2020-05-12 RX ADMIN — BUSPIRONE HYDROCHLORIDE 15 MG: 15 TABLET ORAL at 20:10

## 2020-05-12 RX ADMIN — METFORMIN HYDROCHLORIDE 500 MG: 500 TABLET ORAL at 17:02

## 2020-05-12 RX ADMIN — DABIGATRAN ETEXILATE MESYLATE 75 MG: 75 CAPSULE ORAL at 20:10

## 2020-05-12 RX ADMIN — ATORVASTATIN CALCIUM 80 MG: 80 TABLET, FILM COATED ORAL at 20:10

## 2020-05-12 ASSESSMENT — PAIN DESCRIPTION - FREQUENCY: FREQUENCY: CONTINUOUS

## 2020-05-12 ASSESSMENT — PAIN SCALES - GENERAL
PAINLEVEL_OUTOF10: 0

## 2020-05-12 ASSESSMENT — PAIN DESCRIPTION - DESCRIPTORS: DESCRIPTORS: ACHING

## 2020-05-12 ASSESSMENT — PAIN DESCRIPTION - PROGRESSION: CLINICAL_PROGRESSION: GRADUALLY WORSENING

## 2020-05-12 ASSESSMENT — PAIN DESCRIPTION - ONSET: ONSET: ON-GOING

## 2020-05-12 ASSESSMENT — PAIN DESCRIPTION - LOCATION: LOCATION: BUTTOCKS;BACK

## 2020-05-12 ASSESSMENT — PAIN DESCRIPTION - PAIN TYPE: TYPE: ACUTE PAIN

## 2020-05-12 ASSESSMENT — PAIN SCALES - WONG BAKER: WONGBAKER_NUMERICALRESPONSE: 6

## 2020-05-12 NOTE — PROGRESS NOTES
goals   · Goals: po intake 50% or greater.   Weight stable    · Monitoring: Meal Intake, Supplement Intake, Weight, Pertinent Labs, Chewing/Swallowing      Electronically signed by Johnathan Daley MS, RD, LD on 5/12/20 at 1:58 PM CDT    Contact Number: [phone]

## 2020-05-12 NOTE — PROGRESS NOTES
Patient:   Yojana Perez  MR#:    [id number]   Room:    [facility]-   YOB: 1945  Date of Progress Note: 5/12/2020  Time of Note                           10:16 AM  Consulting Physician:   Michael Viramontes M.D. Attending Physician:  Michael Viramontes MD     CHIEF COMPLAINT: Left-sided weakness with dysarthria and dysphagia  Subjective  This 75 y.o. female  with history of hyperlipidemia,hypercholesteremia,HTN,TIA,anxiety,and depression. She [facility]/20 with c/o left arm weakness x 3 days,some choking on liquids at times and with her legs going weak. She was hypertensive on presentation with B/P 173/101. CT done showed an age-indeterminate infarct in the right basal ganglia,right centrum semiovale and the posterior right temporal lobe. CTA head/neck didn't reveal ay high-grade stenosis or occlusion. She was admitted to the her PCP Dr. Niraj Waters with consult for neurology. She was seen by Dr. Renée Ortega. MRI done showed an acute and subacute right MCA stroke. She was not given TPA d/t being outside of the timeframe. She was placed on ASA and Plavix for dual antiplatelet therapy. Dr. Renée Ortega recommended Zio Patch at discharge. She was also found to have a hemoglobin A1C of 8.9 and adjustments have been made to her medications. She was evaluated by SPT for swallow and passed but was noted to have anomia and dysarthria. She is also participating with both PT/OT. Patient became more slow and dysarthric,left sided weakness worsening,left eye was deviating medially and patient seemed to have to force her left eye to move laterally. Repeat CT on 5/5/20 showed larger stroke right frontal lobe 2.5 cm compared prior scan on 5/3/20. Per Dr. 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 She is felt to need a stay on Rehab for continued PT/OT/SPT and for medical management for maximium BP control and blood glucose management.  On 5/5/20 afternoon, she was having more difficulty forming words and more left sided weakness. MRI brain was done showing new areas of embolic strokes. The decision made to d/c ASA and Plavix and start Pradaxa 150 mg BID. This was explained to patient as well as risk/benefits of anticoagulation with continued embolic strokes versus risk of bleed. Patient is agreeable. IVELISSE did not reveal thrombus and telemetry has shown sinus rhythm. No complaints today. Refusing medications  REVIEW OF SYSTEMS:  Constitutional: No fevers No chills  Neck:No stiffness  Respiratory: No shortness of breath  Cardiovascular: No chest pain No palpitations  Gastrointestinal: No abdominal pain    Genitourinary: No Dysuria  Neurological: No headache, no confusion      PHYSICAL EXAM:  /83   Pulse 89   Temp 96.4 °F (35.8 °C) (Temporal)   Resp 16   Ht 5' (1.524 m)   Wt 152 lb 4.8 oz (69.1 kg)   SpO2 97%   BMI 29.74 kg/m²     Constitutional: she appears well-developed and well-nourished. Eyes - conjunctiva normal.  Pupils react to light  Ear, nose, throat -hearing intact to voice. No scars, masses, or lesions over external nose or ears, no atrophy of tongue  Neck-symmetric, no masses noted, no jugular vein distension  Respiration- chest wall appears symmetric, good expansion,   normal effort without use of accessory muscles  Cardiovascular- RRR  Musculoskeletal - no significant wasting of muscles noted, no bony deformities, gait no gross ataxia  Extremities-no clubbing, cyanosis or edema  Skin - warm, dry, and intact. No rash, erythema, or pallor. Psychiatric - mood, affect, and behavior appear normal.      Neurology  NEUROLOGICAL EXAM:      Mental status   Drowsy but awakens to voice.   Follows simple commands       Cranial Nerves   CN II- Visual fields grossly unremarkable  CN III, IV,VI-EOMI, No nystagmus, conjugate eye movements, no ptosis  CN VII-left facial droop  CN VIII-Hearing intact   CN IX and X- Palate elevates in midline  CN XI-good shoulder shrug  CN

## 2020-05-13 LAB
GLUCOSE BLD-MCNC: 141 MG/DL (ref 70–99)
GLUCOSE BLD-MCNC: 151 MG/DL (ref 70–99)
GLUCOSE BLD-MCNC: 156 MG/DL (ref 70–99)
PERFORMED ON: ABNORMAL

## 2020-05-13 PROCEDURE — 6370000000 HC RX 637 (ALT 250 FOR IP): Performed by: PSYCHIATRY & NEUROLOGY

## 2020-05-13 PROCEDURE — 82947 ASSAY GLUCOSE BLOOD QUANT: CPT

## 2020-05-13 PROCEDURE — 97110 THERAPEUTIC EXERCISES: CPT

## 2020-05-13 PROCEDURE — 97530 THERAPEUTIC ACTIVITIES: CPT

## 2020-05-13 PROCEDURE — 99233 SBSQ HOSP IP/OBS HIGH 50: CPT | Performed by: PSYCHIATRY & NEUROLOGY

## 2020-05-13 PROCEDURE — 1180000000 HC REHAB R&B

## 2020-05-13 PROCEDURE — 97535 SELF CARE MNGMENT TRAINING: CPT

## 2020-05-13 PROCEDURE — 92507 TX SP LANG VOICE COMM INDIV: CPT

## 2020-05-13 PROCEDURE — 92526 ORAL FUNCTION THERAPY: CPT

## 2020-05-13 RX ORDER — LORAZEPAM 0.5 MG/1
0.5 TABLET ORAL NIGHTLY PRN
Status: DISCONTINUED | OUTPATIENT
Start: 2020-05-13 | End: 2020-05-23 | Stop reason: HOSPADM

## 2020-05-13 RX ADMIN — BUSPIRONE HYDROCHLORIDE 15 MG: 15 TABLET ORAL at 21:01

## 2020-05-13 RX ADMIN — METFORMIN HYDROCHLORIDE 500 MG: 500 TABLET ORAL at 08:42

## 2020-05-13 RX ADMIN — AMLODIPINE BESYLATE 5 MG: 10 TABLET ORAL at 08:42

## 2020-05-13 RX ADMIN — LISINOPRIL 20 MG: 20 TABLET ORAL at 08:42

## 2020-05-13 RX ADMIN — BUSPIRONE HYDROCHLORIDE 15 MG: 15 TABLET ORAL at 08:42

## 2020-05-13 RX ADMIN — ATORVASTATIN CALCIUM 80 MG: 80 TABLET, FILM COATED ORAL at 21:01

## 2020-05-13 RX ADMIN — DABIGATRAN ETEXILATE MESYLATE 75 MG: 75 CAPSULE ORAL at 21:01

## 2020-05-13 RX ADMIN — LINAGLIPTIN 5 MG: 5 TABLET, FILM COATED ORAL at 08:42

## 2020-05-13 RX ADMIN — LORAZEPAM 0.5 MG: 0.5 TABLET ORAL at 21:49

## 2020-05-13 RX ADMIN — DABIGATRAN ETEXILATE MESYLATE 75 MG: 75 CAPSULE ORAL at 08:42

## 2020-05-13 RX ADMIN — ASPIRIN 81 MG 81 MG: 81 TABLET ORAL at 08:42

## 2020-05-13 RX ADMIN — METFORMIN HYDROCHLORIDE 500 MG: 500 TABLET ORAL at 17:07

## 2020-05-13 RX ADMIN — CITALOPRAM 40 MG: 40 TABLET ORAL at 08:42

## 2020-05-13 ASSESSMENT — PAIN SCALES - WONG BAKER: WONGBAKER_NUMERICALRESPONSE: 2

## 2020-05-13 ASSESSMENT — PAIN DESCRIPTION - LOCATION: LOCATION: BUTTOCKS

## 2020-05-13 ASSESSMENT — PAIN DESCRIPTION - ONSET: ONSET: ON-GOING

## 2020-05-13 ASSESSMENT — PAIN DESCRIPTION - PROGRESSION: CLINICAL_PROGRESSION: GRADUALLY WORSENING

## 2020-05-13 ASSESSMENT — PAIN DESCRIPTION - PAIN TYPE: TYPE: ACUTE PAIN

## 2020-05-13 ASSESSMENT — PAIN DESCRIPTION - DESCRIPTORS: DESCRIPTORS: DISCOMFORT;ACHING

## 2020-05-13 ASSESSMENT — PAIN DESCRIPTION - FREQUENCY: FREQUENCY: CONTINUOUS

## 2020-05-13 NOTE — PROGRESS NOTES
penetration/aspiration observed, patient verbalized agreeable in attempting minced and moist consistency. When asked, patient voiced request to continue nectar thick liquids. At this time, would recommend minced and moist consistency. Continue nectar thick liquids. Recommend meds in pudding/applesauce. Will continue to follow. Recommended Diet:  Solid consistency: Minced and moist   Liquid consistency: Mildly Thick (Nectar)  Liquid administration via: Cup  Medication administration: Meds in puree     Safe Swallow Protocol:  Compensatory Swallowing Strategies: Alternate solids and liquids  Check for pocketing of food on the Left  Lingual sweep  Small bites/sips  No straws  Remain upright for 30-45 minutes after meals  Upright as possible for all oral intake  Eat/Feed slowly     SHORT TERM GOAL #1:  Goal 1: The patient will complete simple/complex naming tasks with min verbal cues at 80% accuracy in order to improve verbal expression and thought organization for functional communication in all settings.      SHORT TERM GOAL #2:  Goal 2: The patient will follow 2/3 step commands related to functional living environment with 100% accuracy and min verbal cues in order to increase functional integration into environment.      SHORT TERM GOAL #3:  Goal 3: The patient will complete executive function tasks with 100% accuracy and min verbal cues during daily living activities to improve safety and awareness in functional living environment.      SHORT TERM GOAL #4:  Goal 4: Locate items left/right of midline at page level for target cancellation tasks/trail-making/visual closure/address checking/editing/reading sentences (aloud)/paragraphs/signs/maps with minimal cues.     SHORT TERM GOAL #5:  Goal 5:  The patient will demonstrate functional problem solving and safety awareness with 100% accuracy and min verbal cues for daily living tasks in order to increase safe interaction with environment and decreased assistance from caregivers.      Swallowing Short Term Goals  Short-term Goals  Goal 1: The patient will tolerate pureed diet with nectar thick liquids with no overt s/s of aspiration MET; New Goal: Patient will tolerate minced and moist consistency and nectar thick liquids with no overt S/S penetration/aspiration   Goal 2: Patient staff will follow swallow safety recommendations to decrease risk of penetration/aspiration during PO intake. Goal 3: The patient will complete oral motor exercises to improve labial/lingual strengthening/ROM with min verbal cues at 100% accuracy. Goal 4: The patient will complete laryngeal strengthening exercises (jorge luis, effortful swallow, mendolsohn) with mod verbal cues at 100% accuracy to improve airway protection.      Comprehension: 4 - Patient understands basic needs 75-90%+ of the time  Expression: 4 - Expresses basic ideas/needs 75-90%+ of the time  Social Interaction: 4 - Patient appropriate 75-90%+ of the time  Problem Solvin - Patient solves simple/routine tasks 75-90%+   Memory: 4 - Patient remembers 75-90%+ of the time     ASSESSMENT:  Assessment: [x]? Progressing towards goals           []? Not Progressing towards goals     Patient Tolerance of Treatment:       [x]? Tolerated well          []? Tolerated fair           []? Required rest breaks          []? Fatigued     Education:  Learner:  [x]? Patient          []? Significant Other          []? Other  Education provided regarding:  [x]? Goals and POC                               [x]? Diet and swallowing precautions                  []?Home Exercise Program                 []?Progress and/or discharge information  Method of Education:  [x]? Discussion          [x]? Demonstration          []? Handout          []? Other  Evaluation of Education:   []? Verbalized understanding                           []?Demonstrates without assistance  [x]? Demonstrates with assistance                    [x]? Needs further instruction []?No evidence of learning                              [x]? No family present                    Plan:   [x]? Continue with current plan of care                []?Modify current plan of care as follows:               []?Discharge patient                          Discharge Location:                          Services/Supervision Recommended:       [x]? Patient continues to require treatment by a licensed therapist to address functional deficits as outlined in the established plan of care.     Electronically signed by MYRON Jacobsen on 5/13/2020 at 2:31 PM

## 2020-05-13 NOTE — PROGRESS NOTES
Physical Therapy  Tamara Palmer  [id number]     05/13/20 7082   Subjective   Subjective Agrees to work with therapy. Patient states she is sleepy and her butt is hurting. Bed Mobility   Supine to Sit Moderate assistance   Sit to Supine Moderate assistance   Transfers   Sit to Stand Moderate Assistance   Stand to sit Moderate Assistance   Bed to Chair Moderate assistance   Comment patient stood from bed and transferred to w/c   Exercises   Hip Flexion 15   Hip Abduction 15   Knee Long Arc Quad 15   Ankle Pumps 15   Comments B LE ex's in sitting, R LE A-AAROM secondary to fatigue, L LE PROM   Other Activities   Comment Patient has difficulty staying on task. Worked with patient on standing balance and attempting to weight shift. Patient states she is sleepy and her butt hurts from sitting so much. Patient requested to return to bed after treatment. Short term goals   Time Frame for Short term goals 2 WEEKS   Short term goal 1 BED MOBILITY CGA   Short term goal 2 TRANSFERS CGA   Short term goal 3 AMBULATE 25 FT WITH A.D. CGA   Short term goal 4 UP/DOWN 4 STEPS 1 RAIL MIN A   Short term goal 5 PROPEL  FT CGA   Long term goals   Time Frame for Long term goals  4 WEEKS   Long term goal 1 INDEP BED MOB   Long term goal 2 TF SURFACE TO SURFACE INDEP   Long term goal 3 AMBULATE 150 FT WITH A.D. INDEP   Long term goal 4 UP/DOWN 4 STEPS WITH ONE RAIL INDEP   Long term goal 5 PROPEL  FT INDEP   Activity Tolerance   Activity Tolerance Patient limited by fatigue   Safety Devices   Type of devices Bed alarm in place;Call light within reach; Left in bed   Electronically signed by Jose Enrique Matta PTA on 5/13/2020 at 1:53 PM

## 2020-05-13 NOTE — PROGRESS NOTES
Christian service: Not on file     Active member of club or organization: Not on file     Attends meetings of clubs or organizations: Not on file     Relationship status: Not on file    Intimate partner violence     Fear of current or ex partner: Not on file     Emotionally abused: Not on file     Physically abused: Not on file     Forced sexual activity: Not on file   Other Topics Concern    Not on file   Social History Narrative    Not on file       Labs:  Hematology:  Recent Labs     20  0407   WBC 11.9*   HGB 13.0   HCT 39.5        Chemistry:  Recent Labs     20  0407      K 3.7      CO2 28   GLUCOSE 148*   BUN 33*   CREATININE 1.4*   ANIONGAP 16   LABGLOM 37*   CALCIUM 9.2     No results for input(s): PROT, LABALBU, LABA1C, T3YWEHF, V5MPDYN, FT4, TSH, AST, ALT, LDH, GGT, ALKPHOS, BILITOT, BILIDIR, AMMONIA, AMYLASE, LIPASE, LACTATE, CHOL, HDL, LDLCHOLESTEROL, CHOLHDLRATIO, TRIG, VLDL, PHENYTOIN, PHENYF in the last 72 hours. Objective:     Vitals: /83   Pulse 91   Temp 96 °F (35.6 °C) (Temporal)   Resp 16   Ht 5' (1.524 m)   Wt 152 lb 4.8 oz (69.1 kg)   SpO2 96%   BMI 29.74 kg/m²      Intake/Output Summary (Last 24 hours) at 2020 0806  Last data filed at 2020 1827  Gross per 24 hour   Intake 240 ml   Output --   Net 240 ml    Temp (24hrs), Av.7 °F (35.9 °C), Min:96 °F (35.6 °C), Max:97.4 °F (36.3 °C)    Glucose:  No results for input(s): POCGLU in the last 72 hours. Physical Examination:   Constitutional: oriented to person, place, and time. appears well-developed. HEENT:   Head: Normocephalic and atraumatic. Eyes: Pupils are equal, round, and reactive to light. Neck: Neck supple. Cardiovascular: Regular rhythm and normal heart sounds. No lift or rub appreciated. Pulmonary/Chest: Effort normal and breath sounds normal. CTAB. No labored breating. Abdominal: Soft. Bowel sounds are normal. There is no appreciable  distension.  There is no point tenderness. no rebounding or guarding. Musculoskeletal: Normal range of motion on other than surgically repaired joint . no edema or tenderness other than surgical area. Post-op changes noted  Neurological:  alert and oriented to person, place, and time. Left facial droop, left hemiparesis upper and lower extremity  Skin: Skin is warm and dry. No new rashes appreciated. Assessment and Plan:     Primary Problem:  Multiple right hemisphere strokes    Hospital Problem list/treatment recommendations: Active Problems:    Multiple right hemispheric strokes possibly embolic-continue Pradaxa. PT/OT/SLP  Diabetes mellitus type 2-- continue to monitor, continue metformin & Tradjenta  Essential hypertension--BP stable, continue Norvasc, Zestril, HCTZ  Slow transit constipation-- stool softener, laxative, bowel regimen  Generalized anxiety disorder--BuSpar  Major depressive disorder--Celexa, counseling provided  Mixed dyslipidemia--on statin therapy  EVAN secondary to prerenal azotemia/dehydration--monitor renal function,    avoid nephrotoxic medications, increase fluids, may need IV if unable to    increase oral intake. BUN elevated. Need accurate I&Os    Continues to make  improvement  Speech therapy note reviewed  Continue to maximize efforts post stroke rehab program  Continue current medication regimen  Blood pressure improved, PRN clonidine added  Discharge planning: Pending  Updated family of patient's disease process, plan of care and progress    20 minutes spent in face to face interaction and coordination of care. Electronically signed by James Lr PA-C on 5/13/2020 at 8:06 AM       Maximize rehab efforts. Blood sugar/pressure controlled. Not much family support at home. Will likely need skilled nursing facility upon discharge. I have discussed the care of Drew. Hyacinth 6, including pertinent history and exam findings with the ARNP/PA.   I have seen and examined the patient and the key

## 2020-05-13 NOTE — PROGRESS NOTES
Occupational Therapy     05/13/20 1000   Pain Assessment   Patient Currently in Pain Yes   Pain Assessment Faces   Kirkpatrick-Sanchez Pain Rating 2   Pain Type Acute pain   Pain Location Buttocks   Pain Descriptors Discomfort;Aching   Pain Frequency Continuous   Clinical Progression Gradually worsening   Balance   Sitting Balance Stand by assistance   Standing Balance Minimal assistance  (At GB and at mat table, L knee block.)   Standing Balance   Time 1 minute x2 trials. Activity Static standing   Comment At mat table, L knee block. Transfers   Stand Pivot Transfers Minimal assistance  (Bed>w/c, w/c>recliner. Towards R.)   Sit to stand Minimal assistance   Stand to sit Minimal assistance   Toilet Transfers   Toilet - Technique Stand pivot   Equipment Used Grab bars   Toilet Transfer Minimal assistance   Assessment   Performance deficits / Impairments Decreased functional mobility ; Decreased ADL status; Decreased ROM; Decreased strength;Decreased cognition;Decreased endurance;Decreased balance;Decreased vision/visual deficit; Decreased high-level IADLs   Treatment Diagnosis Multiple right hemispheric Strokes   Timed Code Treatment Minutes 60 Minutes   Activity Tolerance   Activity Tolerance Patient Tolerated treatment well   Safety Devices   Safety Devices in place Yes   Type of devices Call light within reach; Chair alarm in place   Plan   Current Treatment Recommendations Strengthening;ROM;Balance Training;Functional Mobility Training; Wheelchair Mobility Training; Safety Education & Training;Patient/Caregiver Education & Training;Equipment Evaluation, Education, & procurement;Self-Care / ADL;Positioning      05/13/20 1000   Oral Hygiene   Assistance Needed Partial/moderate assistance   CARE Score 3   Toileting Hygiene   Assistance Needed Substantial/maximal assistance   CARE Score 2   Shower/Bathe Self   Assistance Needed Partial/moderate assistance   Comment Mod A.    CARE Score 3   Upper Body Dressing   Assistance Needed Substantial/maximal assistance   CARE Score 2   Lower Body Dressing   Assistance Needed Substantial/maximal assistance   CARE Score 2   Putting On/Taking Off Footwear   Assistance Needed Dependent   CARE Score 1

## 2020-05-13 NOTE — PROGRESS NOTES
there.  3+/5 left leg     Sensory function Intact to light touch     Cerebellar F-N intact     Tremor None present     Gait                  Not tested           Nursing/pcp notes, imaging,labs and vitals reviewed. PT,OT and/or speech notes reviewed    Lab Results   Component Value Date    WBC 11.9 (H) 05/11/2020    HGB 13.0 05/11/2020    HCT 39.5 05/11/2020    MCV 90.8 05/11/2020     05/11/2020     Lab Results   Component Value Date     05/11/2020    K 3.7 05/11/2020     05/11/2020    CO2 28 05/11/2020    BUN 33 (H) 05/11/2020    CREATININE 1.4 (H) 05/11/2020    GLUCOSE 148 (H) 05/11/2020    CALCIUM 9.2 05/11/2020    LABGLOM 37 (A) 05/11/2020   No results found for: INRNo results found for: PHENYTOIN, ESR, CRP  PHYSICAL THERAPY  STRENGTH  Strength RLE  Strength RLE: Exception  Comment: GROSSLY 4/5  Strength LLE  Strength LLE: Exception  Comment: GROSSLY 3/5  ROM  AROM RLE (degrees)  RLE AROM: WFL  AROM LLE (degrees)  LLE AROM : WFL  BED MOBILITY  Bed Mobility  Bridging: Other(comment)(x 10)  Rolling: Minimal assistance(BILAT WITH RAILS)  Supine to Sit: Moderate assistance  Sit to Supine: Moderate assistance  TRANSFERS  Transfers  Sit to Stand: Moderate Assistance  Stand to sit: Moderate Assistance  Bed to Chair: Moderate assistance  Comment: Patient stood from recliner with therapist HH to chair  WHEELCHAIR PROPULSION  Propulsion 1  Propulsion: Manual  Method: LUCA PAGAN  Distance: 5 FT  AMBULATION  Ambulation 1  Surface: level tile  Device: Parallel Bars  Other Apparatus: Wheelchair follow  Assistance:  Moderate assistance  Quality of Gait: MOD assist for weight shifting  Gait Deviations: Decreased step length, Decreased step height, Slow Becca, Shuffles  Distance: 2'  Comments: Patient C/O of fatigue and had to sit  STAIRS  GOALS:  Short term goals  Time Frame for Short term goals: 2 WEEKS  Short term goal 1: BED MOBILITY CGA  Short term goal 2: TRANSFERS CGA  Short term goal 3: AMBULATE 25 FT WITH A.D. CGA  Short term goal 4: UP/DOWN 4 STEPS 1 RAIL MIN A  Short term goal 5: PROPEL  FT CGA     Long term goals  Time Frame for Long term goals : 4 WEEKS  Long term goal 1: INDEP BED MOB  Long term goal 2: TF SURFACE TO SURFACE INDEP  Long term goal 3: AMBULATE 150 FT WITH A.D. INDEP  Long term goal 4: UP/DOWN 4 STEPS WITH ONE RAIL INDEP  Long term goal 5: PROPEL  FT INDEP     ASSESSMENT:  Assessment: Assisted patient back to w/c post toileting and left with all needs in reach.         SPEECH THERAPY  Precautions: Fall/aspiration   Swallowing Status/Diet: Pureed with nectar thick liquids        Subjective: Patient alert and cooperative with all therapy tasks. Patient could not get comfortable and required repositioning, transfering several times during the session.     Objective:   Memory function targeted. Patient was able to recall have therapy this morning. Patient recalled walking in parallel bars and other activities completed. Patient oriented to person, place, time, and situation.      Increased verbalizations noted today in comparison to yesterdays session. Continue to have difficulty expressing wants and needs.      Decreased initiation both verbally and during ADL tasks. Slow/delayed processing also noted with all tasks.      Verbal expression targeted as well. Patient was given a word to create a sentence with. Patient was able to formulate sentences with min verbal cues at 90% accuracy. . Neologisms were noted. Patient was able to self correct errors.         Swallow function was monitored with lunch time meal. Patient had trials of minced and moist consistency with puree, nectar thick liquids, and thin liquids. Patient continues to have poor appetite. Patient continues to state \" I cant swallow,\" however was able to swallow all consistencies today. No pocketing was observed during meal. Patient did present with slow oral prep and mastication.  Min mod residue post swallow, which was cleared with additional dry swallows and liquid wash. No overt s/s of aspiration were observed with any minced and moist, puree, nectar thick, or thin liquids.      Thin liquids administered via cup. Mild sluggish laryngeal elevation noted for airway protection, however, no overt s/s of aspiration today.      At this time recommend continuation of current diet with trials of minced and moist.      Recommended Diet:  Solid consistency: Dysphagia Pureed (Dysphagia I)  Liquid consistency: Mildly Thick (Nectar)  Liquid administration via: Cup     Medication administration: Meds in puree     Safe Swallow Protocol:  Compensatory Swallowing Strategies: Alternate solids and liquids  Check for pocketing of food on the Left  Lingual sweep  Small bites/sips  No straws  Remain upright for 30-45 minutes after meals  Upright as possible for all oral intake  Eat/Feed slowly        SHORT TERM GOAL #1:  Goal 1: The patient will complete simple/complex naming tasks with min verbal cues at 80% accuracy in order to improve verbal expression and thought organization for functional communication in all settings.      SHORT TERM GOAL #2:  Goal 2: The patient will follow 2/3 step commands related to functional living environment with 100% accuracy and min verbal cues in order to increase functional integration into environment.      SHORT TERM GOAL #3:  Goal 3: The patient will complete executive function tasks with 100% accuracy and min verbal cues during daily living activities to improve safety and awareness in functional living environment.      SHORT TERM GOAL #4:  Goal 4: Locate items left/right of midline at page level for target cancellation tasks/trail-making/visual closure/address checking/editing/reading sentences (aloud)/paragraphs/signs/maps with minimal cues.     SHORT TERM GOAL #5:  Goal 5:  The patient will demonstrate functional problem solving and safety awareness with 100% accuracy and min verbal cues for decision making and planning for discharge  ELOS:5/25. Restaff.   Likely snf

## 2020-05-14 LAB
ANION GAP SERPL CALCULATED.3IONS-SCNC: 16 MMOL/L (ref 7–19)
BASOPHILS ABSOLUTE: 0.1 K/UL (ref 0–0.2)
BASOPHILS RELATIVE PERCENT: 0.5 % (ref 0–1)
BUN BLDV-MCNC: 44 MG/DL (ref 8–23)
CALCIUM SERPL-MCNC: 9.4 MG/DL (ref 8.8–10.2)
CHLORIDE BLD-SCNC: 99 MMOL/L (ref 98–111)
CO2: 28 MMOL/L (ref 22–29)
CREAT SERPL-MCNC: 1.7 MG/DL (ref 0.5–0.9)
EOSINOPHILS ABSOLUTE: 0.2 K/UL (ref 0–0.6)
EOSINOPHILS RELATIVE PERCENT: 1.8 % (ref 0–5)
GFR NON-AFRICAN AMERICAN: 29
GLUCOSE BLD-MCNC: 116 MG/DL (ref 70–99)
GLUCOSE BLD-MCNC: 123 MG/DL (ref 70–99)
GLUCOSE BLD-MCNC: 123 MG/DL (ref 74–109)
GLUCOSE BLD-MCNC: 146 MG/DL (ref 70–99)
HCT VFR BLD CALC: 39.6 % (ref 37–47)
HEMOGLOBIN: 13 G/DL (ref 12–16)
IMMATURE GRANULOCYTES #: 0 K/UL
LYMPHOCYTES ABSOLUTE: 4.2 K/UL (ref 1.1–4.5)
LYMPHOCYTES RELATIVE PERCENT: 38.7 % (ref 20–40)
MCH RBC QN AUTO: 29.7 PG (ref 27–31)
MCHC RBC AUTO-ENTMCNC: 32.8 G/DL (ref 33–37)
MCV RBC AUTO: 90.6 FL (ref 81–99)
MONOCYTES ABSOLUTE: 1.1 K/UL (ref 0–0.9)
MONOCYTES RELATIVE PERCENT: 10 % (ref 0–10)
NEUTROPHILS ABSOLUTE: 5.4 K/UL (ref 1.5–7.5)
NEUTROPHILS RELATIVE PERCENT: 48.7 % (ref 50–65)
PDW BLD-RTO: 13 % (ref 11.5–14.5)
PERFORMED ON: ABNORMAL
PLATELET # BLD: 311 K/UL (ref 130–400)
PMV BLD AUTO: 11 FL (ref 9.4–12.3)
POTASSIUM REFLEX MAGNESIUM: 3.8 MMOL/L (ref 3.5–5)
RBC # BLD: 4.37 M/UL (ref 4.2–5.4)
SODIUM BLD-SCNC: 143 MMOL/L (ref 136–145)
WBC # BLD: 11 K/UL (ref 4.8–10.8)

## 2020-05-14 PROCEDURE — 97530 THERAPEUTIC ACTIVITIES: CPT

## 2020-05-14 PROCEDURE — 36415 COLL VENOUS BLD VENIPUNCTURE: CPT

## 2020-05-14 PROCEDURE — 85025 COMPLETE CBC W/AUTO DIFF WBC: CPT

## 2020-05-14 PROCEDURE — 97129 THER IVNTJ 1ST 15 MIN: CPT

## 2020-05-14 PROCEDURE — 99232 SBSQ HOSP IP/OBS MODERATE 35: CPT | Performed by: PSYCHIATRY & NEUROLOGY

## 2020-05-14 PROCEDURE — 6370000000 HC RX 637 (ALT 250 FOR IP): Performed by: PSYCHIATRY & NEUROLOGY

## 2020-05-14 PROCEDURE — 82947 ASSAY GLUCOSE BLOOD QUANT: CPT

## 2020-05-14 PROCEDURE — 80048 BASIC METABOLIC PNL TOTAL CA: CPT

## 2020-05-14 PROCEDURE — 92507 TX SP LANG VOICE COMM INDIV: CPT

## 2020-05-14 PROCEDURE — 1180000000 HC REHAB R&B

## 2020-05-14 PROCEDURE — 92526 ORAL FUNCTION THERAPY: CPT

## 2020-05-14 PROCEDURE — 97110 THERAPEUTIC EXERCISES: CPT

## 2020-05-14 PROCEDURE — 97535 SELF CARE MNGMENT TRAINING: CPT

## 2020-05-14 PROCEDURE — 2580000003 HC RX 258: Performed by: PHYSICIAN ASSISTANT

## 2020-05-14 PROCEDURE — 97130 THER IVNTJ EA ADDL 15 MIN: CPT

## 2020-05-14 RX ORDER — SODIUM CHLORIDE 9 MG/ML
INJECTION, SOLUTION INTRAVENOUS CONTINUOUS
Status: DISCONTINUED | OUTPATIENT
Start: 2020-05-14 | End: 2020-05-14

## 2020-05-14 RX ORDER — 0.9 % SODIUM CHLORIDE 0.9 %
500 INTRAVENOUS SOLUTION INTRAVENOUS ONCE
Status: COMPLETED | OUTPATIENT
Start: 2020-05-14 | End: 2020-05-14

## 2020-05-14 RX ADMIN — SODIUM CHLORIDE 500 ML: 9 INJECTION, SOLUTION INTRAVENOUS at 09:27

## 2020-05-14 RX ADMIN — DABIGATRAN ETEXILATE MESYLATE 75 MG: 75 CAPSULE ORAL at 09:11

## 2020-05-14 RX ADMIN — ATORVASTATIN CALCIUM 80 MG: 80 TABLET, FILM COATED ORAL at 21:24

## 2020-05-14 RX ADMIN — BUSPIRONE HYDROCHLORIDE 15 MG: 15 TABLET ORAL at 09:11

## 2020-05-14 RX ADMIN — CITALOPRAM 40 MG: 40 TABLET ORAL at 09:11

## 2020-05-14 RX ADMIN — DABIGATRAN ETEXILATE MESYLATE 75 MG: 75 CAPSULE ORAL at 21:24

## 2020-05-14 RX ADMIN — LINAGLIPTIN 5 MG: 5 TABLET, FILM COATED ORAL at 09:10

## 2020-05-14 RX ADMIN — ASPIRIN 81 MG 81 MG: 81 TABLET ORAL at 09:11

## 2020-05-14 RX ADMIN — BUSPIRONE HYDROCHLORIDE 15 MG: 15 TABLET ORAL at 21:24

## 2020-05-14 RX ADMIN — AMLODIPINE BESYLATE 5 MG: 10 TABLET ORAL at 09:11

## 2020-05-14 RX ADMIN — PHENYLEPHRINE HYDROCHLORIDE 1 SPRAY: 0.5 SPRAY NASAL at 17:58

## 2020-05-14 RX ADMIN — LORAZEPAM 0.5 MG: 0.5 TABLET ORAL at 23:22

## 2020-05-14 NOTE — PROGRESS NOTES
Physical Therapy  Sebastien [facility]  [id number]     05/14/20 0419   Subjective   Subjective Agrees to work with therapy. Bed Mobility   Supine to Sit Moderate assistance   Sit to Supine Moderate assistance   Transfers   Sit to Stand Moderate Assistance   Stand to sit Moderate Assistance   Bed to Chair Moderate assistance   Exercises   Hip Flexion 10 x2   Hip Abduction 10 x2   Knee Long Arc Quad 10 x2   Ankle Pumps 10 x2   Comments B LE ex's in sitting, R LE A-AAROM secondary to fatigue, L LE PROM   Other Activities   Comment Patient did well with therapy. Patient requires PRN rest breaks with ex's. Worked with patient on bed mobility, sitting balance EOB and transfers. Patient unable to initiate transfer to L side, patient did all transfers to the R side. Patient with c/o fatigue but willing to participate. Patient requested to return to bed after treatment. Patient positioned for comfort with all needs in reach. Short term goals   Time Frame for Short term goals 2 WEEKS   Short term goal 1 BED MOBILITY CGA   Short term goal 2 TRANSFERS CGA   Short term goal 3 AMBULATE 25 FT WITH A.D. CGA   Short term goal 4 UP/DOWN 4 STEPS 1 RAIL MIN A   Short term goal 5 PROPEL  FT CGA   Long term goals   Time Frame for Long term goals  4 WEEKS   Long term goal 1 INDEP BED MOB   Long term goal 2 TF SURFACE TO SURFACE INDEP   Long term goal 3 AMBULATE 150 FT WITH A.D. INDEP   Long term goal 4 UP/DOWN 4 STEPS WITH ONE RAIL INDEP   Long term goal 5 PROPEL  FT INDEP   Activity Tolerance   Activity Tolerance Patient limited by fatigue   Safety Devices   Type of devices Bed alarm in place;Call light within reach; Left in bed   Electronically signed by Maico Rivera PTA on 5/14/2020 at 2:07 PM

## 2020-05-14 NOTE — PROGRESS NOTES
outlined by the ARNP/PA. Please refer to my separate note for complete documentation.      Electronically signed by Sarthak Figueroa MD on 5/14/2020 at 8:44 AM

## 2020-05-14 NOTE — PROGRESS NOTES
Occupational Therapy     05/14/20 1015   Restrictions/Precautions   Restrictions/Precautions Fall Risk   Subjective   Subjective Pt sidelying on bed pan. Had not touched breakfast tray. Assisted off bed pan and assisted with ADLs. After transferring out of bed to wheelchair pt immediately requested BTB. Reviewed benefits of out of bed. ADL   LE Bathing Moderate assistance   UE Dressing   (not completed d/t IV)   LE Dressing Moderate assistance   Toileting Maximum assistance   Transfers   Stand Pivot Transfers Minimal assistance   Toilet Transfers   Toilet - Technique Stand pivot   Equipment Used Grab bars   Toilet Transfer Minimal assistance   Assessment   Performance deficits / Impairments Decreased functional mobility ; Decreased ADL status; Decreased ROM; Decreased strength;Decreased cognition;Decreased endurance;Decreased balance;Decreased vision/visual deficit; Decreased high-level IADLs   Assessment SW present during tx and discussed need for further therapy. Pt allowed don of splint without complaints. Tone mildly decreased this date and splint easier to apply. Treatment Diagnosis Multiple right hemispheric Strokes   Timed Code Treatment Minutes 75 Minutes   Activity Tolerance   Activity Tolerance Patient limited by fatigue   Activity Tolerance Pt c/o being very sleepy.     Safety Devices   Safety Devices in place Yes   Type of devices   (with SLP for rehab dining)

## 2020-05-14 NOTE — PROGRESS NOTES
[facility]  [facility]PATIENT SPEECH THERAPY  [facility] 8 REHAB UNIT  TIME   Time In: 1100  Time Out: 1200  Minutes: 60       [x]Daily Note  []Progress Note    Date: 2020  Patient Name: Yaquelin Diaz        MRN: [id number]    Account #: [de-identified]  : 1945  (75 y.o.)  Gender: female   Primary Provider: Edra Snellen, MD  Precautions: fall/aspiration   Swallowing Status/Diet: Minced and moist with nectar thick liquids      Subjective: Patient alert and cooperative with all therapy tasks. Objective:   Patient recalling what she had spoke with  about prior to SLP entering the room. Did have some difficulty recalling what she had done in the morning with occupational therapist.   Patient continues to be oriented to time, place and situation. Increased verbalizations noted today, however continues to have decreased initiation overall. Slow/delayed processing with all tasks. Patient continues to require cues to attend to the left. Patient had difficulty locating items on her lunch tray. Patient also requires cues at times to look to midline and make eye contact during conversational speech. Patient completed toileting during session. Patient required moderate assistance. Continues to require cues for safety awareness. Also requires cues to follow commands during functional ADL's. Patients swallow function monitored with lunch time meal. Patient was upgraded to minced and moist yesterday. Mild decreased oral prep and mastication for minced and moist consistency. Min residue noted post swallow however cleared with liquid wash. No overt s/s of aspiration noted with minced and moist or nectar thick liquids during the session. Sips of thin liquids also administered via cup. Patient had several sips during session and no overt s/s of aspiration observed. At this time recommend continue current diet consistency.        Recommended Diet:  Solid consistency: Minced and moist   Liquid consistency: Mildly Thick (Nectar)  Liquid administration via: Cup  Medication administration: Meds in puree     Safe Swallow Protocol:  Compensatory Swallowing Strategies: Alternate solids and liquids  Check for pocketing of food on the Left  Lingual sweep  Small bites/sips  No straws  Remain upright for 30-45 minutes after meals  Upright as possible for all oral intake  Eat/Feed slowly    SHORT TERM GOAL #1:  Goal 1: The patient will complete simple/complex naming tasks with min verbal cues at 80% accuracy in order to improve verbal expression and thought organization for functional communication in all settings. SHORT TERM GOAL #2:  Goal 2: The patient will follow 2/3 step commands related to functional living environment with 100% accuracy and min verbal cues in order to increase functional integration into environment. SHORT TERM GOAL #3:  Goal 3: The patient will complete executive function tasks with 100% accuracy and min verbal cues during daily living activities to improve safety and awareness in functional living environment. SHORT TERM GOAL #4:  Goal 4: Locate items left/right of midline at page level for target cancellation tasks/trail-making/visual closure/address checking/editing/reading sentences (aloud)/paragraphs/signs/maps with minimal cues. SHORT TERM GOAL #5:  Goal 5: The patient will demonstrate functional problem solving and safety awareness with 100% accuracy and min verbal cues for daily living tasks in order to increase safe interaction with environment and decreased assitance from caregivers. Swallowing Short Term Goals  Short-term Goals  Goal 1: The patient will tolerate pureed diet with nectar thick liquids with no overt s/s of aspiration   Goal 2: Patient staff will follow swallow safety recommendations to decrease risk of penetration/aspiration during PO intake.    Goal 3: The patient will complete oral motor exercises to improve labial/lingual strengthening/ROM with min verbal cues at 100% accuracy. Goal 4: The patient will complete laryngeal strengthening exercises (jorge luis, effortful swallow, mendolsohn) with mod verbal cues at 100% accuracy to improve airway protection.      ASSESSMENT:  Assessment: [x]Progressing towards goals          []Not Progressing towards goals    Patient Tolerance of Treatment:   [x]Tolerated well []Tolerated fair []Required rest breaks []Fatigued    Education:  Learner:  [x]Patient          []Significant Other          []Other  Education provided regarding:  [x]Goals and POC   [x]Diet and swallowing precautions    []Home Exercise Program  []Progress and/or discharge information  Method of Education:  [x]Discussion          [x]Demonstration          []Handout          []Other  Evaluation of Education:   []Verbalized understanding   []Demonstrates without assistance  []Demonstrates with assistance  [x]Needs further instruction     []No evidence of learning                  [x]No family present      Plan: [x]Continue with current plan of care    []Modify current plan of care as follows:    []Discharge patient    Discharge Location:    Services/Supervision Recommended:      [x]Patient continues to require treatment by a licensed therapist to address functional deficits

## 2020-05-15 LAB
ANION GAP SERPL CALCULATED.3IONS-SCNC: 18 MMOL/L (ref 7–19)
BUN BLDV-MCNC: 37 MG/DL (ref 8–23)
CALCIUM SERPL-MCNC: 9.4 MG/DL (ref 8.8–10.2)
CHLORIDE BLD-SCNC: 101 MMOL/L (ref 98–111)
CO2: 22 MMOL/L (ref 22–29)
CREAT SERPL-MCNC: 1.2 MG/DL (ref 0.5–0.9)
GFR NON-AFRICAN AMERICAN: 44
GLUCOSE BLD-MCNC: 107 MG/DL (ref 70–99)
GLUCOSE BLD-MCNC: 118 MG/DL (ref 74–109)
GLUCOSE BLD-MCNC: 140 MG/DL (ref 70–99)
GLUCOSE BLD-MCNC: 151 MG/DL (ref 70–99)
PERFORMED ON: ABNORMAL
POTASSIUM SERPL-SCNC: 3.8 MMOL/L (ref 3.5–5)
SODIUM BLD-SCNC: 141 MMOL/L (ref 136–145)

## 2020-05-15 PROCEDURE — 92526 ORAL FUNCTION THERAPY: CPT

## 2020-05-15 PROCEDURE — 1180000000 HC REHAB R&B

## 2020-05-15 PROCEDURE — 92507 TX SP LANG VOICE COMM INDIV: CPT

## 2020-05-15 PROCEDURE — 36415 COLL VENOUS BLD VENIPUNCTURE: CPT

## 2020-05-15 PROCEDURE — 97116 GAIT TRAINING THERAPY: CPT

## 2020-05-15 PROCEDURE — 99232 SBSQ HOSP IP/OBS MODERATE 35: CPT | Performed by: PSYCHIATRY & NEUROLOGY

## 2020-05-15 PROCEDURE — 97530 THERAPEUTIC ACTIVITIES: CPT

## 2020-05-15 PROCEDURE — 80048 BASIC METABOLIC PNL TOTAL CA: CPT

## 2020-05-15 PROCEDURE — 6370000000 HC RX 637 (ALT 250 FOR IP): Performed by: PSYCHIATRY & NEUROLOGY

## 2020-05-15 PROCEDURE — 82947 ASSAY GLUCOSE BLOOD QUANT: CPT

## 2020-05-15 PROCEDURE — 97110 THERAPEUTIC EXERCISES: CPT

## 2020-05-15 PROCEDURE — 97535 SELF CARE MNGMENT TRAINING: CPT

## 2020-05-15 RX ADMIN — ATORVASTATIN CALCIUM 80 MG: 80 TABLET, FILM COATED ORAL at 21:47

## 2020-05-15 RX ADMIN — BUSPIRONE HYDROCHLORIDE 15 MG: 15 TABLET ORAL at 09:10

## 2020-05-15 RX ADMIN — DABIGATRAN ETEXILATE MESYLATE 75 MG: 75 CAPSULE ORAL at 21:47

## 2020-05-15 RX ADMIN — ASPIRIN 81 MG 81 MG: 81 TABLET ORAL at 09:10

## 2020-05-15 RX ADMIN — POLYETHYLENE GLYCOL 3350 17 G: 17 POWDER, FOR SOLUTION ORAL at 09:10

## 2020-05-15 RX ADMIN — CITALOPRAM 40 MG: 40 TABLET ORAL at 09:10

## 2020-05-15 RX ADMIN — AMLODIPINE BESYLATE 5 MG: 10 TABLET ORAL at 09:10

## 2020-05-15 RX ADMIN — LINAGLIPTIN 5 MG: 5 TABLET, FILM COATED ORAL at 09:10

## 2020-05-15 RX ADMIN — DABIGATRAN ETEXILATE MESYLATE 75 MG: 75 CAPSULE ORAL at 09:10

## 2020-05-15 RX ADMIN — BUSPIRONE HYDROCHLORIDE 15 MG: 15 TABLET ORAL at 21:47

## 2020-05-15 NOTE — PROGRESS NOTES
Nutrition Assessment    Type and Reason for Visit: Reassess    Nutrition Recommendations: continue current nutritional interventions    Nutrition Assessment: Pt remains nutritionally compromised, but po intake is slowly improving even thou variable. .  Will continue ONS    Malnutrition Assessment:  · Malnutrition Status: No malnutrition  · Context: Acute illness or injury  · Findings of the 6 clinical characteristics of malnutrition (Minimum of 2 out of 6 clinical characteristics is required to make the diagnosis of moderate or severe Protein Calorie Malnutrition based on AND/ASPEN Guidelines):  1. Energy Intake-Less than or equal to 75% of estimated energy requirement, Greater than or equal to 5 days    2. Weight Loss-No significant weight loss,    3. Fat Loss-No significant subcutaneous fat loss,    4. Muscle Loss-No significant muscle mass loss,    5. Fluid Accumulation-No significant fluid accumulation,    6.  Strength-Not measured    Nutrition Risk Level: Moderate    Nutrient Needs:  · Estimated Daily Total Kcal: 3802-4998(20-25 kcal/kg)  · Estimated Daily Protein (g): 59-82(1.3-1.8 g/kg)  · Estimated Daily Total Fluid (ml/day): 5614-4311    Nutrition Diagnosis:   · Problem: Inadequate oral intake  · Etiology: related to Difficulty swallowing     Signs and symptoms:  as evidenced by Swallow study results, Intake 25-50%, Intake 0-25%    Objective Information:  · Nutrition-Focused Physical Findings: well nourished  · Wound Type: None  · Current Nutrition Therapies:  · Oral Diet Orders: Carb Control 4 Carbs/Meal, Dysphagia Minced and Moist (Dysphagia 2), Mildly Thick   · Oral Diet intake: %, 26-50%, 1-25%  · Oral Nutrition Supplement (ONS) Orders:  Other Oral Supplement (See Comment)(boost pudding)  · ONS intake: 26-50%, 51-75%     · Anthropometric Measures:  · Ht: 5' (152.4 cm)   · Current Body Wt: 152 lb 4 oz (69.1 kg)  · Admission Body Wt: 152 lb 3 oz (69 kg)  · Usual Body Wt: 153 lb (69.4

## 2020-05-15 NOTE — PROGRESS NOTES
ARNP/PA. Please refer to my separate note for complete documentation.      Electronically signed by Helena Cope MD on 5/15/2020 at 10:24 AM

## 2020-05-16 PROBLEM — I16.0 HYPERTENSIVE URGENCY: Status: ACTIVE | Noted: 2020-05-02

## 2020-05-16 PROBLEM — E78.00 HYPERCHOLESTEROLEMIA: Status: ACTIVE | Noted: 2020-05-02

## 2020-05-16 PROBLEM — I10 ESSENTIAL HYPERTENSION: Status: ACTIVE | Noted: 2020-05-02

## 2020-05-16 LAB
ANION GAP SERPL CALCULATED.3IONS-SCNC: 14 MMOL/L (ref 7–19)
BUN BLDV-MCNC: 34 MG/DL (ref 8–23)
CALCIUM SERPL-MCNC: 9.5 MG/DL (ref 8.8–10.2)
CHLORIDE BLD-SCNC: 103 MMOL/L (ref 98–111)
CO2: 25 MMOL/L (ref 22–29)
CREAT SERPL-MCNC: 1.2 MG/DL (ref 0.5–0.9)
GFR NON-AFRICAN AMERICAN: 44
GLUCOSE BLD-MCNC: 117 MG/DL (ref 74–109)
GLUCOSE BLD-MCNC: 119 MG/DL (ref 70–99)
GLUCOSE BLD-MCNC: 123 MG/DL (ref 70–99)
GLUCOSE BLD-MCNC: 130 MG/DL (ref 70–99)
PERFORMED ON: ABNORMAL
POTASSIUM SERPL-SCNC: 3.8 MMOL/L (ref 3.5–5)
SODIUM BLD-SCNC: 142 MMOL/L (ref 136–145)

## 2020-05-16 PROCEDURE — 6370000000 HC RX 637 (ALT 250 FOR IP): Performed by: PSYCHIATRY & NEUROLOGY

## 2020-05-16 PROCEDURE — 1180000000 HC REHAB R&B

## 2020-05-16 PROCEDURE — 36415 COLL VENOUS BLD VENIPUNCTURE: CPT

## 2020-05-16 PROCEDURE — 51701 INSERT BLADDER CATHETER: CPT

## 2020-05-16 PROCEDURE — 82947 ASSAY GLUCOSE BLOOD QUANT: CPT

## 2020-05-16 PROCEDURE — 80048 BASIC METABOLIC PNL TOTAL CA: CPT

## 2020-05-16 RX ADMIN — AMLODIPINE BESYLATE 5 MG: 10 TABLET ORAL at 08:35

## 2020-05-16 RX ADMIN — BUSPIRONE HYDROCHLORIDE 15 MG: 15 TABLET ORAL at 08:35

## 2020-05-16 RX ADMIN — DABIGATRAN ETEXILATE MESYLATE 75 MG: 75 CAPSULE ORAL at 20:06

## 2020-05-16 RX ADMIN — LINAGLIPTIN 5 MG: 5 TABLET, FILM COATED ORAL at 08:35

## 2020-05-16 RX ADMIN — ATORVASTATIN CALCIUM 80 MG: 80 TABLET, FILM COATED ORAL at 20:06

## 2020-05-16 RX ADMIN — DABIGATRAN ETEXILATE MESYLATE 75 MG: 75 CAPSULE ORAL at 08:35

## 2020-05-16 RX ADMIN — LORAZEPAM 0.5 MG: 0.5 TABLET ORAL at 20:06

## 2020-05-16 RX ADMIN — ASPIRIN 81 MG 81 MG: 81 TABLET ORAL at 08:35

## 2020-05-16 RX ADMIN — CITALOPRAM 40 MG: 40 TABLET ORAL at 08:35

## 2020-05-16 RX ADMIN — BUSPIRONE HYDROCHLORIDE 15 MG: 15 TABLET ORAL at 20:06

## 2020-05-16 ASSESSMENT — PAIN DESCRIPTION - PROGRESSION: CLINICAL_PROGRESSION: GRADUALLY WORSENING

## 2020-05-16 ASSESSMENT — PAIN SCALES - GENERAL: PAINLEVEL_OUTOF10: 0

## 2020-05-16 NOTE — PROGRESS NOTES
extremities  Musculoskeletal:  negative  Neurologic: Dense left hemiparesis    CBC with Differential:    Lab Results   Component Value Date    WBC 11.0 05/14/2020    RBC 4.37 05/14/2020    HGB 13.0 05/14/2020    HCT 39.6 05/14/2020     05/14/2020    MCV 90.6 05/14/2020    MCH 29.7 05/14/2020    MCHC 32.8 05/14/2020    RDW 13.0 05/14/2020    LYMPHOPCT 38.7 05/14/2020    MONOPCT 10.0 05/14/2020    BASOPCT 0.5 05/14/2020    MONOSABS 1.10 05/14/2020    LYMPHSABS 4.2 05/14/2020    EOSABS 0.20 05/14/2020    BASOSABS 0.10 05/14/2020     CMP:    Lab Results   Component Value Date     05/16/2020    K 3.8 05/16/2020    K 3.8 05/14/2020     05/16/2020    CO2 25 05/16/2020    BUN 34 05/16/2020    CREATININE 1.2 05/16/2020    LABGLOM 44 05/16/2020    GLUCOSE 117 05/16/2020    CALCIUM 9.5 05/16/2020     Last 3 Troponin:  No results found for: TROPONINI  Urine Culture:  No components found for: CURINE  Blood Culture:  No components found for: CBLOOD, CFUNGUSBL  Stool Culture:  No components found for: CSTOOL    Assessment:    Principal Problem:    Acute ischemic stroke [facility])  Active Problems:    Essential hypertension    Hypercholesterolemia  Resolved Problems:    * No resolved hospital problems. *          Plan:  Continue to maintain good blood pressure control. Last 24 hours has been good. PT/OT/speech to continue. Disposition planning in process.       Electronically signed by Tianna Navarro MD on 5/16/2020 at 1:17 PM

## 2020-05-17 LAB
ANION GAP SERPL CALCULATED.3IONS-SCNC: 16 MMOL/L (ref 7–19)
BUN BLDV-MCNC: 33 MG/DL (ref 8–23)
CALCIUM SERPL-MCNC: 9.3 MG/DL (ref 8.8–10.2)
CHLORIDE BLD-SCNC: 102 MMOL/L (ref 98–111)
CO2: 25 MMOL/L (ref 22–29)
CREAT SERPL-MCNC: 1.3 MG/DL (ref 0.5–0.9)
GFR NON-AFRICAN AMERICAN: 40
GLUCOSE BLD-MCNC: 131 MG/DL (ref 74–109)
GLUCOSE BLD-MCNC: 134 MG/DL (ref 70–99)
GLUCOSE BLD-MCNC: 144 MG/DL (ref 70–99)
GLUCOSE BLD-MCNC: 195 MG/DL (ref 70–99)
PERFORMED ON: ABNORMAL
POTASSIUM SERPL-SCNC: 3.6 MMOL/L (ref 3.5–5)
SODIUM BLD-SCNC: 143 MMOL/L (ref 136–145)

## 2020-05-17 PROCEDURE — 1180000000 HC REHAB R&B

## 2020-05-17 PROCEDURE — 36415 COLL VENOUS BLD VENIPUNCTURE: CPT

## 2020-05-17 PROCEDURE — 82947 ASSAY GLUCOSE BLOOD QUANT: CPT

## 2020-05-17 PROCEDURE — 80048 BASIC METABOLIC PNL TOTAL CA: CPT

## 2020-05-17 PROCEDURE — 6370000000 HC RX 637 (ALT 250 FOR IP): Performed by: PSYCHIATRY & NEUROLOGY

## 2020-05-17 RX ADMIN — DABIGATRAN ETEXILATE MESYLATE 75 MG: 75 CAPSULE ORAL at 08:33

## 2020-05-17 RX ADMIN — CITALOPRAM 40 MG: 40 TABLET ORAL at 08:33

## 2020-05-17 RX ADMIN — BUSPIRONE HYDROCHLORIDE 15 MG: 15 TABLET ORAL at 08:33

## 2020-05-17 RX ADMIN — ATORVASTATIN CALCIUM 80 MG: 80 TABLET, FILM COATED ORAL at 21:03

## 2020-05-17 RX ADMIN — DABIGATRAN ETEXILATE MESYLATE 75 MG: 75 CAPSULE ORAL at 21:03

## 2020-05-17 RX ADMIN — BUSPIRONE HYDROCHLORIDE 15 MG: 15 TABLET ORAL at 21:03

## 2020-05-17 RX ADMIN — ASPIRIN 81 MG 81 MG: 81 TABLET ORAL at 08:33

## 2020-05-17 RX ADMIN — AMLODIPINE BESYLATE 5 MG: 10 TABLET ORAL at 08:34

## 2020-05-17 RX ADMIN — LINAGLIPTIN 5 MG: 5 TABLET, FILM COATED ORAL at 08:47

## 2020-05-17 ASSESSMENT — PAIN DESCRIPTION - PROGRESSION: CLINICAL_PROGRESSION: GRADUALLY WORSENING

## 2020-05-17 NOTE — PLAN OF CARE
Problem: Falls - Risk of:  Goal: Will remain free from falls  Description: Will remain free from falls  Outcome: Ongoing   Up with 2 assist & homero tolentino lift

## 2020-05-18 LAB
ANION GAP SERPL CALCULATED.3IONS-SCNC: 12 MMOL/L (ref 7–19)
BASOPHILS ABSOLUTE: 0.1 K/UL (ref 0–0.2)
BASOPHILS RELATIVE PERCENT: 0.6 % (ref 0–1)
BUN BLDV-MCNC: 27 MG/DL (ref 8–23)
CALCIUM SERPL-MCNC: 9.8 MG/DL (ref 8.8–10.2)
CHLORIDE BLD-SCNC: 102 MMOL/L (ref 98–111)
CO2: 28 MMOL/L (ref 22–29)
CREAT SERPL-MCNC: 1.3 MG/DL (ref 0.5–0.9)
EOSINOPHILS ABSOLUTE: 0.2 K/UL (ref 0–0.6)
EOSINOPHILS RELATIVE PERCENT: 1.9 % (ref 0–5)
GFR NON-AFRICAN AMERICAN: 40
GLUCOSE BLD-MCNC: 139 MG/DL (ref 74–109)
GLUCOSE BLD-MCNC: 150 MG/DL (ref 70–99)
GLUCOSE BLD-MCNC: 167 MG/DL (ref 70–99)
GLUCOSE BLD-MCNC: 176 MG/DL (ref 70–99)
GLUCOSE BLD-MCNC: 185 MG/DL (ref 70–99)
HCT VFR BLD CALC: 39.9 % (ref 37–47)
HEMOGLOBIN: 12.9 G/DL (ref 12–16)
IMMATURE GRANULOCYTES #: 0 K/UL
LYMPHOCYTES ABSOLUTE: 3.4 K/UL (ref 1.1–4.5)
LYMPHOCYTES RELATIVE PERCENT: 29.8 % (ref 20–40)
MCH RBC QN AUTO: 29.8 PG (ref 27–31)
MCHC RBC AUTO-ENTMCNC: 32.3 G/DL (ref 33–37)
MCV RBC AUTO: 92.1 FL (ref 81–99)
MONOCYTES ABSOLUTE: 1.1 K/UL (ref 0–0.9)
MONOCYTES RELATIVE PERCENT: 9.2 % (ref 0–10)
NEUTROPHILS ABSOLUTE: 6.7 K/UL (ref 1.5–7.5)
NEUTROPHILS RELATIVE PERCENT: 58.2 % (ref 50–65)
PDW BLD-RTO: 13 % (ref 11.5–14.5)
PERFORMED ON: ABNORMAL
PLATELET # BLD: 307 K/UL (ref 130–400)
PMV BLD AUTO: 11.2 FL (ref 9.4–12.3)
POTASSIUM REFLEX MAGNESIUM: 4.8 MMOL/L (ref 3.5–5)
POTASSIUM SERPL-SCNC: 4.8 MMOL/L (ref 3.5–5)
RBC # BLD: 4.33 M/UL (ref 4.2–5.4)
SODIUM BLD-SCNC: 142 MMOL/L (ref 136–145)
WBC # BLD: 11.5 K/UL (ref 4.8–10.8)

## 2020-05-18 PROCEDURE — 97530 THERAPEUTIC ACTIVITIES: CPT

## 2020-05-18 PROCEDURE — 99232 SBSQ HOSP IP/OBS MODERATE 35: CPT | Performed by: PSYCHIATRY & NEUROLOGY

## 2020-05-18 PROCEDURE — 85025 COMPLETE CBC W/AUTO DIFF WBC: CPT

## 2020-05-18 PROCEDURE — 97110 THERAPEUTIC EXERCISES: CPT

## 2020-05-18 PROCEDURE — 92526 ORAL FUNCTION THERAPY: CPT

## 2020-05-18 PROCEDURE — 97130 THER IVNTJ EA ADDL 15 MIN: CPT

## 2020-05-18 PROCEDURE — 97535 SELF CARE MNGMENT TRAINING: CPT

## 2020-05-18 PROCEDURE — 6370000000 HC RX 637 (ALT 250 FOR IP): Performed by: PSYCHIATRY & NEUROLOGY

## 2020-05-18 PROCEDURE — 36415 COLL VENOUS BLD VENIPUNCTURE: CPT

## 2020-05-18 PROCEDURE — 80048 BASIC METABOLIC PNL TOTAL CA: CPT

## 2020-05-18 PROCEDURE — 1180000000 HC REHAB R&B

## 2020-05-18 PROCEDURE — 97129 THER IVNTJ 1ST 15 MIN: CPT

## 2020-05-18 PROCEDURE — 97116 GAIT TRAINING THERAPY: CPT

## 2020-05-18 PROCEDURE — 82947 ASSAY GLUCOSE BLOOD QUANT: CPT

## 2020-05-18 RX ORDER — DABIGATRAN ETEXILATE 150 MG/1
150 CAPSULE, COATED PELLETS ORAL 2 TIMES DAILY
Status: DISCONTINUED | OUTPATIENT
Start: 2020-05-18 | End: 2020-05-23 | Stop reason: HOSPADM

## 2020-05-18 RX ADMIN — DABIGATRAN ETEXILATE MESYLATE 150 MG: 150 CAPSULE ORAL at 21:09

## 2020-05-18 RX ADMIN — BUSPIRONE HYDROCHLORIDE 15 MG: 15 TABLET ORAL at 21:09

## 2020-05-18 RX ADMIN — PHENYLEPHRINE HYDROCHLORIDE 1 SPRAY: 0.5 SPRAY NASAL at 21:45

## 2020-05-18 RX ADMIN — BUSPIRONE HYDROCHLORIDE 15 MG: 15 TABLET ORAL at 09:07

## 2020-05-18 RX ADMIN — ASPIRIN 81 MG 81 MG: 81 TABLET ORAL at 09:07

## 2020-05-18 RX ADMIN — ATORVASTATIN CALCIUM 80 MG: 80 TABLET, FILM COATED ORAL at 21:09

## 2020-05-18 RX ADMIN — AMLODIPINE BESYLATE 5 MG: 10 TABLET ORAL at 09:07

## 2020-05-18 RX ADMIN — DABIGATRAN ETEXILATE MESYLATE 75 MG: 75 CAPSULE ORAL at 09:07

## 2020-05-18 RX ADMIN — POLYETHYLENE GLYCOL 3350 17 G: 17 POWDER, FOR SOLUTION ORAL at 09:07

## 2020-05-18 RX ADMIN — LORAZEPAM 0.5 MG: 0.5 TABLET ORAL at 21:46

## 2020-05-18 RX ADMIN — LINAGLIPTIN 5 MG: 5 TABLET, FILM COATED ORAL at 09:07

## 2020-05-18 RX ADMIN — CITALOPRAM 40 MG: 40 TABLET ORAL at 09:07

## 2020-05-18 ASSESSMENT — PAIN DESCRIPTION - PROGRESSION: CLINICAL_PROGRESSION: GRADUALLY WORSENING

## 2020-05-18 ASSESSMENT — PAIN SCALES - GENERAL: PAINLEVEL_OUTOF10: 0

## 2020-05-18 NOTE — PROGRESS NOTES
Recent Labs     05/17/20  0409 05/18/20  0307   BUN 33* 27*       Recent Labs     05/17/20  0409 05/18/20  0307   CREATININE 1.3* 1.3*       Estimated Creatinine Clearance: 32 mL/min (A) (based on SCr of 1.3 mg/dL (H)). Plan: Has had stable CrCL greater than 30 ml/min for 3 days now. Will adjust back to Pradaxa 150mg PO BID as originally prescribed.     Electronically signed by Reinaldo Stanton, [address] [place] [address] on 5/18/2020 at 1:28 PM

## 2020-05-18 NOTE — PROGRESS NOTES
05/18/20 1300   Bed Mobility   Rolling Contact guard assistance;Stand by assistance; Moderate assistance  (cga/sba to left, mod a to right)   Transfers   Sit to Stand Moderate Assistance;Minimal Assistance;Contact guard assistance   Bed to Chair Moderate assistance;Minimal assistance;Contact guard assistance   Comment ASSIST LEVEL VARIED BY ADHERENCE TO TECHNIQUE. CGA/SBA WHEN PROPER STAND PIVOT WITH RIGHTWARD LEAN/WEIGHTSHIFT. Other exercises   Other exercises 1 WC TO/FROM COMMODE WITH GRAB BAR, X1. STANDING BRIEF DONNING   Other exercises 2 PARTIAL STAND PIVOT TF WITH CUES TO COME TO STANDING, MOVE HAND TO EITHER FAR ARM REST OR MAT TABLE DEPENDING ON DESTINATION AND THEN PERFORMING STEP PIVOT   Conditions Requiring Skilled Therapeutic Intervention   Body structures, Functions, Activity limitations Decreased functional mobility ; Decreased ADL status; Decreased ROM; Decreased strength;Decreased endurance;Decreased sensation;Decreased balance;Decreased posture;Decreased vision/visual deficit   Assessment EMPHASIZED PARTIAL STAND STEP PIVOT TF. PATIENT APPREHENSIVE.

## 2020-05-18 NOTE — PROGRESS NOTES
consistency: Minced and moist   Liquid consistency: Mildly Thick (Nectar)  Liquid administration via: Cup  Medication administration: Meds in puree     Safe Swallow Protocol:  Compensatory Swallowing Strategies: Alternate solids and liquids  Check for pocketing of food on the Left  Lingual sweep  Small bites/sips  No straws  Remain upright for 30-45 minutes after meals  Upright as possible for all oral intake  Eat/Feed slowly       SHORT TERM GOAL #1:  Goal 1: The patient will complete simple/complex naming tasks with min verbal cues at 80% accuracy in order to improve verbal expression and thought organization for functional communication in all settings. SHORT TERM GOAL #2:  Goal 2: The patient will follow 2/3 step commands related to functional living environment with 100% accuracy and min verbal cues in order to increase functional integration into environment. SHORT TERM GOAL #3:  Goal 3: The patient will complete executive function tasks with 100% accuracy and min verbal cues during daily living activities to improve safety and awareness in functional living environment. SHORT TERM GOAL #4:  Goal 4: Locate items left/right of midline at page level for target cancellation tasks/trail-making/visual closure/address checking/editing/reading sentences (aloud)/paragraphs/signs/maps with minimal cues. SHORT TERM GOAL #5:  Goal 5: The patient will demonstrate functional problem solving and safety awareness with 100% accuracy and min verbal cues for daily living tasks in order to increase safe interaction with environment and decreased assitance from caregivers. Swallowing Short Term Goals  Short-term Goals  Goal 1: The patient will tolerate pureed diet with nectar thick liquids with no overt s/s of aspiration   Goal 2: Patient staff will follow swallow safety recommendations to decrease risk of penetration/aspiration during PO intake.    Goal 3: The patient will complete oral motor exercises to

## 2020-05-18 NOTE — PROGRESS NOTES
difficulty forming words and more left sided weakness. MRI brain was done showing new areas of embolic strokes. The decision made to d/c ASA and Plavix and start Pradaxa 150 mg BID. This was explained to patient as well as risk/benefits of anticoagulation with continued embolic strokes versus risk of bleed. Patient is agreeable. IVELISSE did not reveal thrombus and telemetry has shown sinus rhythm. No complaints this morning. Speech more intelligible  REVIEW OF SYSTEMS:  Constitutional: No fevers No chills  Neck:No stiffness  Respiratory: No shortness of breath  Cardiovascular: No chest pain No palpitations  Gastrointestinal: No abdominal pain    Genitourinary: No Dysuria  Neurological: No headache, no confusion      PHYSICAL EXAM:  /85   Pulse 82   Temp 97 °F (36.1 °C) (Temporal)   Resp 16   Ht 5' (1.524 m)   Wt 152 lb (68.9 kg)   SpO2 90%   BMI 29.69 kg/m²     Constitutional: she appears well-developed and well-nourished. Eyes - conjunctiva normal.  Pupils react to light  Ear, nose, throat -hearing intact to voice. No scars, masses, or lesions over external nose or ears, no atrophy of tongue  Neck-symmetric, no masses noted, no jugular vein distension  Respiration- chest wall appears symmetric, good expansion,   normal effort without use of accessory muscles  Cardiovascular- RRR  Musculoskeletal - no significant wasting of muscles noted, no bony deformities, gait no gross ataxia  Extremities-no clubbing, cyanosis or edema  Skin - warm, dry, and intact. No rash, erythema, or pallor. Psychiatric - mood, affect, and behavior appear normal.      Neurology  NEUROLOGICAL EXAM:      Mental status   Drowsy but awakens to voice. Follows simple commands       Cranial Nerves   CN II- Visual fields grossly unremarkable  CN III, IV,VI-EOMI, No nystagmus, conjugate eye movements, no ptosis  CN VII-left facial droop  CN VIII-Hearing intact      Motor function   normal on the right.   No movement in left upper extremity. Increased tone there. 3+/5 left leg     Sensory function Intact to light touch     Cerebellar F-N intact     Tremor None present     Gait                  Not tested           Nursing/pcp notes, imaging,labs and vitals reviewed. PT,OT and/or speech notes reviewed    Lab Results   Component Value Date    WBC 11.5 (H) 05/18/2020    HGB 12.9 05/18/2020    HCT 39.9 05/18/2020    MCV 92.1 05/18/2020     05/18/2020     Lab Results   Component Value Date     05/18/2020    K 4.8 05/18/2020    K 4.8 05/18/2020     05/18/2020    CO2 28 05/18/2020    BUN 27 (H) 05/18/2020    CREATININE 1.3 (H) 05/18/2020    GLUCOSE 139 (H) 05/18/2020    CALCIUM 9.8 05/18/2020    LABGLOM 40 (A) 05/18/2020   No results found for: INRNo results found for: PHENYTOIN, ESR, CRP    05/18/20 0851   General   Additional Pertinent Hx HTN, TIA, ANXIETY, DEPRESSION   Subjective   Subjective Agrees to work with therapy. Transfers   Sit to Stand Contact guard assistance;Stand by assistance  (pull on bedrail)   Stand to sit Contact guard assistance   Exercises   Comments Bilateral LE ex's in sitting, some AAROM with right LE due to inattention/ easily distracted, PROM with left LE. Bilateral ankle stretches. Other Activities   Comment Assisted patient with finishing breakfast   Short term goals   Time Frame for Short term goals 2 WEEKS   Short term goal 1 BED MOBILITY CGA   Short term goal 2 TRANSFERS CGA   Short term goal 3 AMBULATE 25 FT WITH A.D. CGA   Short term goal 4 UP/DOWN 4 STEPS 1 RAIL MIN A   Short term goal 5 PROPEL  FT CGA   Long term goals   Time Frame for Long term goals  4 WEEKS   Long term goal 1 INDEP BED MOB   Long term goal 2 TF SURFACE TO SURFACE INDEP   Long term goal 3 AMBULATE 150 FT WITH A.D.  INDEP   Long term goal 4 UP/DOWN 4 STEPS WITH ONE RAIL INDEP   Long term goal 5 PROPEL  FT INDEP   Conditions Requiring Skilled Therapeutic Intervention   Body structures, Functions, Activity

## 2020-05-18 NOTE — PATIENT CARE CONFERENCE
responsiveness to basic wh questions today, however when responding it was correct response.      Decreased sustained/selective attention during structured/unstructured tasks patient required frequent redirection.      When patient did respond today some phonemic errors were noted in speech.      Verbal expression targeted with structured task. Patient completing divergent naming task where she was to name 10 items within a category. Patient required extended time (5/10 minutes) including several instances of redirection. Patient naming only 5 items within this time frame. Task was discontinued.      Completed reading task to address visual scanning. White board in room was utilized. Patient was able to read her first and last name. Patient unable to read any of the other content on the board secondary to poor attention, and decreased visual scanning.      Encouraged patient to drink during the session. Patient states \"It makes me pee. \" SLP continues to require education for the importance of eating/drinking.         Swallow function monitored with minced and moist diet consistency and thin liquid trials. Patient continues to have slow oral prep and mastication for more solid consistencies. Min residue noted post swallow but was cleared with additional dry swallows and liquid wash. Patient presenting with mild sluggish laryngeal elevation, however no overt s/s of aspiration with minced and moist and thin liquid consistencies.      At this time recommend trial upgrade of thin liquids with minced and moist consistency. Monitor lung sounds.      Recommended Diet:  Solid consistency: Minced and moist   Liquid consistency: thin liquids  Liquid administration via: Cup  Medication administration: Meds in puree     Safe Swallow Protocol:  Compensatory Swallowing Strategies:    Alternate solids and liquids  Check for pocketing of food on the Left  Lingual sweep  Small bites/sips  No straws  Remain upright for 30-45 minutes after meals  Upright as possible for all oral intake  Eat/Feed slowly     SHORT TERM GOAL #1:  Goal 1: The patient will complete simple/complex naming tasks with min verbal cues at 80% accuracy in order to improve verbal expression and thought organization for functional communication in all settings.      SHORT TERM GOAL #2:  Goal 2: The patient will follow 2/3 step commands related to functional living environment with 100% accuracy and min verbal cues in order to increase functional integration into environment.      SHORT TERM GOAL #3:  Goal 3: The patient will complete executive function tasks with 100% accuracy and min verbal cues during daily living activities to improve safety and awareness in functional living environment.      SHORT TERM GOAL #4:  Goal 4: Locate items left/right of midline at page level for target cancellation tasks/trail-making/visual closure/address checking/editing/reading sentences (aloud)/paragraphs/signs/maps with minimal cues.     SHORT TERM GOAL #5:  Goal 5: The patient will demonstrate functional problem solving and safety awareness with 100% accuracy and min verbal cues for daily living tasks in order to increase safe interaction with environment and decreased assitance from caregivers.      Swallowing Short Term Goals  Short-term Goals  Goal 1: The patient will tolerate pureed diet with nectar thick liquids with no overt s/s of aspiration MET Patient will tolerate minced and moist diet with thin liquids with min/no overt s/s of aspiration. Goal 2: Patient staff will follow swallow safety recommendations to decrease risk of penetration/aspiration during PO intake. Goal 3: The patient will complete oral motor exercises to improve labial/lingual strengthening/ROM with min verbal cues at 100% accuracy. Goal 4: The patient will complete laryngeal strengthening exercises (jorge luis, effortful swallow, mendolsohn) with mod verbal cues at 100% accuracy to improve airway protection.    Long-term Goals  Goal 1: The patient will develop functional, cognitive linguistic based skills and utilize compensatory strategies to communicate wants and needs effectively to different conversational partners, maintain safety, and participate socially in functional living environment. Goal 2: The patient will comprehend communication related to basic medical and social needs and utilize compensatory strategies to maintain safety and participate socially in functional living environment. GOAL 3: The patient will maintain adequate hydration/nutrition with optimum safety and efficiency of swallowing function on po intake without overt s/s of aspiration for the highest appropriate diet level        GOALS MET: 1 STG MET  0 LTG MET      OCCUPATIONAL THERAPY    CURRENT IRF-TAHMINA SCORES  Eating: CARE Score: 4  Oral Hygiene: CARE Score: 3  Toileting: CARE Score:  2  Shower/Bathe: CARE Score: 3  Upper Body Dressing: CARE Score: 2  Lower Body Dressing: CARE Score: 2  Footwear: CARE Score: 1  Toilet Transfers: CARE Score: 3  Picking Up Object:  CARE Score: 1      UE Functioning:   No functional movement LUE and significant tone, tolerates splint wearing but d/t to continuous repositioning in bed and laying to left side splint will not stay in place  R WFLs    Pain Assessment:     Pain Location: Buttocks    STGs:  Short term goals  Time Frame for Short term goals: 1-2 weeks  Short term goal 1: MET  Short term goal 2: Don shirt with min A  Short term goal 3: Attend to left visual field and left side body with min prompts  Short term goal 4: Perform 4 SROM exercises with good form after set up with min prompts  Short term goal 5: Develop any beginning level movement in LUE not associated with the flexor synergy pattern  Short term goal 6: Stand with CGA with use of RUE propping for 2 mins consistently  Short term goal 7: Demo ability to propel wheelchair (in appropriately sized wheelchair) with supervision in open spaces    LTGs:  Long term goals  Time Frame for Long term goals : 3 weeks  Long term goal 1: Perform transfers with CGA  Long term goal 2: Consistently attend to Left side body and environment  Long term goal 3: Perform UB dressing with supervision after set up  Long term goal 4: Perform LB dressing with min A  Long term goal 5: Perform toileting with min A  Long term goal 7: Independent with comprehensive HEP, and DME recommendations    Assessment:  Decreased functional mobility ; Decreased ADL status; Decreased ROM; Decreased strength; Decreased cognition; Decreased endurance; Decreased balance; Decreased vision/visual deficit; Decreased high-level IADLs  Occupational performance impacted by low activity tolerance and decreased initiation. NUTRITION  Current Wt: Weight: 152 lb (68.9 kg) / Body mass index is 29.69 kg/m². Admission Wt: Admission Body Weight: 152 lb 3 oz (69 kg)  Oral Diet Orders: Carb Control 4 Carbs/Meal, Dysphagia Minced and Moist (Dysphagia 2), Regular liquids  Oral Nutrition Supplement (ONS) Orders: Other Oral Supplement (See Comment)(boost pudding)   PO intake has decreased with intake now averaging 0-50%. Please see nutrition note for details. NURSING    FIMS:  Bladder  Level of Assistance: Bladder Level of Assistance: 2- Requires 50-74% assistance for bladder management tasks (ie. helper places, holds and removes/empties device)  Frequency of Accidents: Bladder Frequency of Accidents: 5 - One accident (soiling linens or clothing) in past 7 days, includes bed pan or urinal spills by patient    Bowel  Level of Assistance: Bowel Level of Assistance: 2- Requires 50-74% assistance for bowel management tasks (ie. staff places, holds and removes/empties device)  Frequency of Accidents:  Bowel Frequency of Accidents: 6 - No accidents, uses device like bedpan, diaper, bedside commode colostomy, or requires medication to manage bowels    Wounds/Incisions/Ulcers: No skin issues identified Emmanuel Scale Score: 14    Pain: No pain concerns to address    Consultations/Labs/X-rays: PCP-Dr. Tolu Alvares, BMP&CBC twice/week, chest xray    Family Education: Grandson scheduled for family instruction to assist in critical discharge planning    Fall Risk:  Wan Score: 54    Fall in the last week? No falls this hospital stay. Other Nursing Issues: EVAN- IV fluid bolus, wants to go back to bed frequently  Anxiety- Buspar, Ativan nightly prn; Celexa. Patient is Alert and oriented with delayed response. Incontinent of bowel and bladder at times with last BM 20. Assist x 2 with homero steady. Zio patch. IID to right AC. Accu checks tid before meals. Meds whole in apple sauce with Minced and moist diet with Nectar thick liquids. SOCIAL WORK/CASE MANAGEMENT  Assessment: Prior level of function-independently, adult grandson lived in the home with her and another adult grandson involved. Miguel Segal, wants to avoid nursing facility placement- states his mother had CVA some years ago and they went through SNFs with unhappy experiences before she . Discharge Plan   Estimated Length of Stay: CM[facility] date 20, transfer  or  if preauthoriztion obtained and can transfer by family auto    Destination: SNF, [address]: No    Services at Discharge: continued rehabilitative services    Equipment at Discharge:  [facility] has initiated preauthorization    Progress made in the prior week:  1. Patient has slightly improved w/ bed mobility. 2. CGA for toilet t/fs and min A for standing level clothing management  3. Tolerating minced and moist consistency   4.  5.      Goals for following week:  1. Patient to improve w/ transfers and increasing amb distance. 2. Demonstrate increased OOB tolerance  3. Demonstrate increased initiation for self care  4. Tolerate upgrade of thin liquids  5.      Factors facilitating achievement of predicted outcomes: Cooperative and

## 2020-05-18 NOTE — PROGRESS NOTES
Physical therapy  Name: Shauna Lopes  MRN:  [id number]  Date of service:  5/18/2020 05/18/20 0851   General   Additional Pertinent Hx HTN, TIA, ANXIETY, DEPRESSION   Subjective   Subjective Agrees to work with therapy. Transfers   Sit to Stand Contact guard assistance;Stand by assistance  (pull on bedrail)   Stand to sit Contact guard assistance   Exercises   Comments Bilateral LE ex's in sitting, some AAROM with right LE due to inattention/ easily distracted, PROM with left LE. Bilateral ankle stretches. Other Activities   Comment Assisted patient with finishing breakfast   Short term goals   Time Frame for Short term goals 2 WEEKS   Short term goal 1 BED MOBILITY CGA   Short term goal 2 TRANSFERS CGA   Short term goal 3 AMBULATE 25 FT WITH A.D. CGA   Short term goal 4 UP/DOWN 4 STEPS 1 RAIL MIN A   Short term goal 5 PROPEL  FT CGA   Long term goals   Time Frame for Long term goals  4 WEEKS   Long term goal 1 INDEP BED MOB   Long term goal 2 TF SURFACE TO SURFACE INDEP   Long term goal 3 AMBULATE 150 FT WITH A.D. INDEP   Long term goal 4 UP/DOWN 4 STEPS WITH ONE RAIL INDEP   Long term goal 5 PROPEL  FT INDEP   Conditions Requiring Skilled Therapeutic Intervention   Body structures, Functions, Activity limitations Decreased functional mobility ; Decreased ADL status; Decreased ROM; Decreased strength;Decreased endurance;Decreased sensation;Decreased balance;Decreased posture;Decreased vision/visual deficit   Assessment Assisted patient back to room and left with all needs in reach up in w/c. Informed PCA that patient would like to lie down in the bed, but bed not made yet. Treatment Diagnosis INTERFERENCE WITH ACTIVITY   Safety Devices   Type of devices All fall risk precautions in place;Call light within reach; Chair alarm in place;Gait belt;Left in chair       Electronically signed by Dona Kawasaki, PTA on 5/18/2020 at 9:32 AM

## 2020-05-18 NOTE — PROGRESS NOTES
Occupational Therapy     05/18/20 1000   Pain Assessment   Patient Currently in Pain No   Pain Assessment 0-10   Pain Level 0   Balance   Sitting Balance Stand by assistance  (EOM, intermittent reaching with RUE.)   Standing Balance Contact guard assistance   Standing Balance   Time 2 minutes x2 trials, 1 minute x2 trials. Activity Static stand. Comment At mat table. Transfers   Stand Pivot Transfers Contact guard assistance  (Towards unaffected side.)   Sit to stand Contact guard assistance   Stand to sit Contact guard assistance   Type of ROM/Therapeutic Exercise   Type of ROM/Therapeutic Exercise PROM   Comment LUE. Assessment   Performance deficits / Impairments Decreased functional mobility ; Decreased ADL status; Decreased ROM; Decreased strength;Decreased cognition;Decreased endurance;Decreased balance;Decreased vision/visual deficit; Decreased high-level IADLs   Treatment Diagnosis Multiple right hemispheric Strokes   Timed Code Treatment Minutes 60 Minutes   Activity Tolerance   Activity Tolerance Patient Tolerated treatment well   Safety Devices   Safety Devices in place Yes   Type of devices Chair alarm in place;Gait belt  (Given to SLP.)   Plan   Current Treatment Recommendations Strengthening;ROM;Balance Training;Functional Mobility Training; Wheelchair Mobility Training; Safety Education & Training;Patient/Caregiver Education & Training;Equipment Evaluation, Education, & procurement;Self-Care / ADL;Positioning      05/18/20 1000   Oral Hygiene   Assistance Needed Partial/moderate assistance   Comment Assistance for thoroughness.    CARE Score 3

## 2020-05-19 LAB
ANION GAP SERPL CALCULATED.3IONS-SCNC: 15 MMOL/L (ref 7–19)
BUN BLDV-MCNC: 29 MG/DL (ref 8–23)
CALCIUM SERPL-MCNC: 9.4 MG/DL (ref 8.8–10.2)
CHLORIDE BLD-SCNC: 103 MMOL/L (ref 98–111)
CO2: 24 MMOL/L (ref 22–29)
CREAT SERPL-MCNC: 1.3 MG/DL (ref 0.5–0.9)
GFR NON-AFRICAN AMERICAN: 40
GLUCOSE BLD-MCNC: 142 MG/DL (ref 74–109)
GLUCOSE BLD-MCNC: 152 MG/DL (ref 70–99)
GLUCOSE BLD-MCNC: 165 MG/DL (ref 70–99)
GLUCOSE BLD-MCNC: 167 MG/DL (ref 70–99)
PERFORMED ON: ABNORMAL
POTASSIUM SERPL-SCNC: 4.3 MMOL/L (ref 3.5–5)
SODIUM BLD-SCNC: 142 MMOL/L (ref 136–145)

## 2020-05-19 PROCEDURE — 92526 ORAL FUNCTION THERAPY: CPT

## 2020-05-19 PROCEDURE — 82947 ASSAY GLUCOSE BLOOD QUANT: CPT

## 2020-05-19 PROCEDURE — 36415 COLL VENOUS BLD VENIPUNCTURE: CPT

## 2020-05-19 PROCEDURE — 6370000000 HC RX 637 (ALT 250 FOR IP): Performed by: PSYCHIATRY & NEUROLOGY

## 2020-05-19 PROCEDURE — 97130 THER IVNTJ EA ADDL 15 MIN: CPT

## 2020-05-19 PROCEDURE — 97129 THER IVNTJ 1ST 15 MIN: CPT

## 2020-05-19 PROCEDURE — 97530 THERAPEUTIC ACTIVITIES: CPT

## 2020-05-19 PROCEDURE — 2580000003 HC RX 258

## 2020-05-19 PROCEDURE — 99232 SBSQ HOSP IP/OBS MODERATE 35: CPT | Performed by: PSYCHIATRY & NEUROLOGY

## 2020-05-19 PROCEDURE — 1180000000 HC REHAB R&B

## 2020-05-19 PROCEDURE — 80048 BASIC METABOLIC PNL TOTAL CA: CPT

## 2020-05-19 PROCEDURE — 97110 THERAPEUTIC EXERCISES: CPT

## 2020-05-19 PROCEDURE — 97535 SELF CARE MNGMENT TRAINING: CPT

## 2020-05-19 RX ORDER — SODIUM CHLORIDE 0.9 % (FLUSH) 0.9 %
10 SYRINGE (ML) INJECTION 2 TIMES DAILY
Status: DISCONTINUED | OUTPATIENT
Start: 2020-05-19 | End: 2020-05-21 | Stop reason: ALTCHOICE

## 2020-05-19 RX ORDER — IPRATROPIUM BROMIDE AND ALBUTEROL SULFATE 2.5; .5 MG/3ML; MG/3ML
1 SOLUTION RESPIRATORY (INHALATION) EVERY 4 HOURS PRN
Status: DISCONTINUED | OUTPATIENT
Start: 2020-05-19 | End: 2020-05-23 | Stop reason: HOSPADM

## 2020-05-19 RX ADMIN — BUSPIRONE HYDROCHLORIDE 15 MG: 15 TABLET ORAL at 08:40

## 2020-05-19 RX ADMIN — ATORVASTATIN CALCIUM 80 MG: 80 TABLET, FILM COATED ORAL at 21:46

## 2020-05-19 RX ADMIN — LINAGLIPTIN 5 MG: 5 TABLET, FILM COATED ORAL at 08:40

## 2020-05-19 RX ADMIN — BUSPIRONE HYDROCHLORIDE 15 MG: 15 TABLET ORAL at 21:46

## 2020-05-19 RX ADMIN — SODIUM CHLORIDE, PRESERVATIVE FREE 10 ML: 5 INJECTION INTRAVENOUS at 08:44

## 2020-05-19 RX ADMIN — AMLODIPINE BESYLATE 5 MG: 10 TABLET ORAL at 08:40

## 2020-05-19 RX ADMIN — LORAZEPAM 0.5 MG: 0.5 TABLET ORAL at 21:46

## 2020-05-19 RX ADMIN — SODIUM CHLORIDE, PRESERVATIVE FREE 10 ML: 5 INJECTION INTRAVENOUS at 21:47

## 2020-05-19 RX ADMIN — CITALOPRAM 40 MG: 40 TABLET ORAL at 08:40

## 2020-05-19 RX ADMIN — DABIGATRAN ETEXILATE MESYLATE 150 MG: 150 CAPSULE ORAL at 21:46

## 2020-05-19 RX ADMIN — DABIGATRAN ETEXILATE MESYLATE 150 MG: 150 CAPSULE ORAL at 08:40

## 2020-05-19 RX ADMIN — ASPIRIN 81 MG 81 MG: 81 TABLET ORAL at 08:40

## 2020-05-19 ASSESSMENT — PAIN SCALES - GENERAL: PAINLEVEL_OUTOF10: 0

## 2020-05-19 NOTE — PROGRESS NOTES
Nutrition Assessment    Type and Reason for Visit: Reassess    Nutrition Recommendations: modify current diet to include Carb Control    Nutrition Assessment: Pt continues to be nutritionally comromised d/t decreased po intake. SLP did advance liquids to thin froom Mildly thick. Alsomodified diet with addition of Carb Control    Malnutrition Assessment:  · Malnutrition Status: No malnutrition  · Context: Acute illness or injury  · Findings of the 6 clinical characteristics of malnutrition (Minimum of 2 out of 6 clinical characteristics is required to make the diagnosis of moderate or severe Protein Calorie Malnutrition based on AND/ASPEN Guidelines):  1. Energy Intake-Less than or equal to 50% of estimated energy requirement, (2 days)    2. Weight Loss-No significant weight loss,    3. Fat Loss-No significant subcutaneous fat loss,    4. Muscle Loss-No significant muscle mass loss,    5. Fluid Accumulation-No significant fluid accumulation,    6.  Strength-Not measured    Nutrition Risk Level: Moderate    Nutrient Needs:  · Estimated Daily Total Kcal: 1057-0168(20-25 kcal/kg)  · Estimated Daily Protein (g): 59-82(1.3-1.8 g/kg)  · Estimated Daily Total Fluid (ml/day): 7459-7494    Nutrition Diagnosis:   · Problem: Inadequate oral intake  · Etiology: related to Difficulty swallowing     Signs and symptoms:  as evidenced by Swallow study results, Intake 25-50%, Intake 0-25%    Objective Information:  · Nutrition-Focused Physical Findings: well nourished  · Wound Type: None  · Current Nutrition Therapies:  · Oral Diet Orders: Dysphagia Minced and Moist (Dysphagia 2)   · Oral Diet intake: 1-25%, 26-50%  · Oral Nutrition Supplement (ONS) Orders:  Other Oral Supplement (See Comment)(boost pudding)  · ONS intake: 1-25%, 26-50%     · Anthropometric Measures:  · Ht: 5' (152.4 cm)   · Current Body Wt: 152 lb (68.9 kg)  · Admission Body Wt: 152 lb 3 oz (69 kg)  · Usual Body Wt: 153 lb (69.4 kg)  · Ideal Body Wt: 100 lb (45.4 kg), % Ideal Body 152.3%  · BMI Classification: BMI 25.0 - 29.9 Overweight    Nutrition Interventions:   Modify current diet, Continue current ONS  Continued Inpatient Monitoring    Nutrition Evaluation:   · Evaluation: Progressing toward goals   · Goals: po intake 50% or greater.   Weight stable    · Monitoring: Meal Intake, Supplement Intake, Diet Tolerance, Skin Integrity, Weight, Pertinent Labs, Chewing/Swallowing      Electronically signed by Amrik Torres MS, RD, LD on 5/19/20 at 2:56 PM CDT    Contact Number: [phone]

## 2020-05-19 NOTE — PROGRESS NOTES
status; Decreased ROM; Decreased strength;Decreased endurance;Decreased sensation;Decreased balance;Decreased posture;Decreased vision/visual deficit   Assessment Assisted patient back to room and left with all needs in reach up in w/c. Informed PCA that patient would like to lie down in the bed, but bed not made yet. Treatment Diagnosis INTERFERENCE WITH ACTIVITY   Safety Devices   Type of devices All fall risk precautions in place;Call light within reach; Chair alarm in place;Gait belt;Left in chair           05/15/20 1000   Restrictions/Precautions   Restrictions/Precautions Fall Risk   Subjective   Subjective Pt encouraged to stay out of bed. Does much better out of room in a social environment. ADL   Grooming Moderate assistance   UE Bathing Moderate assistance   LE Bathing Maximum assistance   UE Dressing Moderate assistance   LE Dressing Maximum assistance   Additional Comments encouragement to attempt self care tasks, often states \"I can't\" without first trying   Balance   Standing Balance Contact guard assistance   Standing Balance   Activity clothing management   Comment able to complete down/up R side of clothing with steadying assist for balance   Bed mobility   Supine to Sit Moderate assistance   Transfers   Sit to stand Contact guard assistance   Stand to sit Contact guard assistance   Wheelchair Bed Transfers   Wheelchair/Bed - Technique Stand pivot   Level of Asssistance Contact guard assistance   Type of ROM/Therapeutic Exercise   Type of ROM/Therapeutic Exercise PROM   Comment L UE   Assessment   Performance deficits / Impairments Decreased functional mobility ; Decreased ADL status; Decreased ROM; Decreased strength;Decreased cognition;Decreased endurance;Decreased balance;Decreased vision/visual deficit; Decreased high-level IADLs   Assessment Occupational performance impacted by low activity tolerance and decreased initiation.  Would benefit from therapeutic activities to increase overall sense of

## 2020-05-19 NOTE — PROGRESS NOTES
additional dry swallows and liquid wash. Patient presenting with mild sluggish laryngeal elevation, however no overt s/s of aspiration with minced and moist and thin liquid consistencies. At this time recommend trial upgrade of thin liquids with minced and moist consistency. Monitor lung sounds. Recommended Diet:  Solid consistency: Minced and moist   Liquid consistency: thin liquids  Liquid administration via: Cup  Medication administration: Meds in puree     Safe Swallow Protocol:  Compensatory Swallowing Strategies: Alternate solids and liquids  Check for pocketing of food on the Left  Lingual sweep  Small bites/sips  No straws  Remain upright for 30-45 minutes after meals  Upright as possible for all oral intake  Eat/Feed slowly    SHORT TERM GOAL #1:  Goal 1: The patient will complete simple/complex naming tasks with min verbal cues at 80% accuracy in order to improve verbal expression and thought organization for functional communication in all settings. SHORT TERM GOAL #2:  Goal 2: The patient will follow 2/3 step commands related to functional living environment with 100% accuracy and min verbal cues in order to increase functional integration into environment. SHORT TERM GOAL #3:  Goal 3: The patient will complete executive function tasks with 100% accuracy and min verbal cues during daily living activities to improve safety and awareness in functional living environment. SHORT TERM GOAL #4:  Goal 4: Locate items left/right of midline at page level for target cancellation tasks/trail-making/visual closure/address checking/editing/reading sentences (aloud)/paragraphs/signs/maps with minimal cues. SHORT TERM GOAL #5:  Goal 5: The patient will demonstrate functional problem solving and safety awareness with 100% accuracy and min verbal cues for daily living tasks in order to increase safe interaction with environment and decreased assitance from caregivers.      Swallowing Short Term Goals  Short-term Goals  Goal 1: The patient will tolerate pureed diet with nectar thick liquids with no overt s/s of aspiration   Goal 2: Patient staff will follow swallow safety recommendations to decrease risk of penetration/aspiration during PO intake. Goal 3: The patient will complete oral motor exercises to improve labial/lingual strengthening/ROM with min verbal cues at 100% accuracy. Goal 4: The patient will complete laryngeal strengthening exercises (jorge luis, effortful swallow, mendolsohn) with mod verbal cues at 100% accuracy to improve airway protection. ASSESSMENT:  Assessment: [x]Progressing towards goals          []Not Progressing towards goals    Patient Tolerance of Treatment:   [x]Tolerated well []Tolerated fair []Required rest breaks []Fatigued    Education:  Learner:  [x]Patient          []Significant Other          []Other  Education provided regarding:  [x]Goals and POC   [x]Diet and swallowing precautions    []Home Exercise Program  []Progress and/or discharge information  Method of Education:  [x]Discussion          [x]Demonstration          []Handout          []Other  Evaluation of Education:   []Verbalized understanding   []Demonstrates without assistance  []Demonstrates with assistance  [x]Needs further instruction     []No evidence of learning                  [x]No family present      Plan: [x]Continue with current plan of care    []Modify current plan of care as follows:    []Discharge patient    Discharge Location:    Services/Supervision Recommended:      [x]Patient continues to require treatment by a licensed therapist to address functional deficits as outlined in the established plan of care.

## 2020-05-19 NOTE — PLAN OF CARE
Problem: Falls - Risk of:  Goal: Will remain free from falls  Description: Will remain free from falls  Outcome: Ongoing  Goal: Absence of physical injury  Description: Absence of physical injury  Outcome: Ongoing     Problem: HEMODYNAMIC STATUS  Goal: Patient has stable vital signs and fluid balance  Outcome: Ongoing     Problem: ACTIVITY INTOLERANCE/IMPAIRED MOBILITY  Goal: Mobility/activity is maintained at optimum level for patient  Outcome: Ongoing     Problem: COMMUNICATION IMPAIRMENT  Goal: Ability to express needs and understand communication  Outcome: Ongoing     Problem: Safety:  Goal: Free from accidental physical injury  Description: Free from accidental physical injury  Outcome: Ongoing  Goal: Free from intentional harm  Description: Free from intentional harm  Outcome: Ongoing     Problem: Daily Care:  Goal: Daily care needs are met  Description: Daily care needs are met  Outcome: Ongoing     Problem: Pain:  Goal: Patient's pain/discomfort is manageable  Description: Patient's pain/discomfort is manageable  Outcome: Ongoing  Goal: Pain level will decrease  Description: Pain level will decrease  Outcome: Ongoing  Goal: Control of acute pain  Description: Control of acute pain  Outcome: Ongoing  Goal: Control of chronic pain  Description: Control of chronic pain  Outcome: Ongoing     Problem: Skin Integrity:  Goal: Skin integrity will stabilize  Description: Skin integrity will stabilize  Outcome: Ongoing     Problem: Discharge Planning:  Goal: Patients continuum of care needs are met  Description: Patients continuum of care needs are met  Outcome: Ongoing     Problem: Nutrition  Goal: Optimal nutrition therapy  Description: Nutrition Problem: Inadequate oral intake  Intervention: Food and/or Nutrient Delivery: Continue current diet, Continue current ONS  Nutritional Goals: po intake 50% or greater.   Weight stable     Outcome: Ongoing

## 2020-05-20 ENCOUNTER — APPOINTMENT (OUTPATIENT)
Dept: GENERAL RADIOLOGY | Age: 75
DRG: 065 | End: 2020-05-20
Attending: PSYCHIATRY & NEUROLOGY
Payer: MEDICARE

## 2020-05-20 LAB
GLUCOSE BLD-MCNC: 150 MG/DL (ref 70–99)
GLUCOSE BLD-MCNC: 152 MG/DL (ref 70–99)
GLUCOSE BLD-MCNC: 174 MG/DL (ref 70–99)
PERFORMED ON: ABNORMAL

## 2020-05-20 PROCEDURE — 6370000000 HC RX 637 (ALT 250 FOR IP): Performed by: PSYCHIATRY & NEUROLOGY

## 2020-05-20 PROCEDURE — 99233 SBSQ HOSP IP/OBS HIGH 50: CPT | Performed by: PSYCHIATRY & NEUROLOGY

## 2020-05-20 PROCEDURE — 6370000000 HC RX 637 (ALT 250 FOR IP): Performed by: FAMILY MEDICINE

## 2020-05-20 PROCEDURE — 82947 ASSAY GLUCOSE BLOOD QUANT: CPT

## 2020-05-20 PROCEDURE — 92526 ORAL FUNCTION THERAPY: CPT

## 2020-05-20 PROCEDURE — 97530 THERAPEUTIC ACTIVITIES: CPT

## 2020-05-20 PROCEDURE — 97535 SELF CARE MNGMENT TRAINING: CPT

## 2020-05-20 PROCEDURE — 97110 THERAPEUTIC EXERCISES: CPT

## 2020-05-20 PROCEDURE — 71045 X-RAY EXAM CHEST 1 VIEW: CPT

## 2020-05-20 PROCEDURE — 2580000003 HC RX 258

## 2020-05-20 PROCEDURE — 1180000000 HC REHAB R&B

## 2020-05-20 PROCEDURE — 92507 TX SP LANG VOICE COMM INDIV: CPT

## 2020-05-20 RX ORDER — ESCITALOPRAM OXALATE 10 MG/1
5 TABLET ORAL DAILY
Status: DISCONTINUED | OUTPATIENT
Start: 2020-05-20 | End: 2020-05-23 | Stop reason: HOSPADM

## 2020-05-20 RX ORDER — FLUTICASONE PROPIONATE 50 MCG
1 SPRAY, SUSPENSION (ML) NASAL DAILY
Status: DISCONTINUED | OUTPATIENT
Start: 2020-05-20 | End: 2020-05-20

## 2020-05-20 RX ORDER — FLUTICASONE PROPIONATE 50 MCG
2 SPRAY, SUSPENSION (ML) NASAL DAILY
Status: DISCONTINUED | OUTPATIENT
Start: 2020-05-21 | End: 2020-05-23 | Stop reason: HOSPADM

## 2020-05-20 RX ORDER — ATORVASTATIN CALCIUM 20 MG/1
20 TABLET, FILM COATED ORAL NIGHTLY
Status: DISCONTINUED | OUTPATIENT
Start: 2020-05-20 | End: 2020-05-23 | Stop reason: HOSPADM

## 2020-05-20 RX ADMIN — ATORVASTATIN CALCIUM 20 MG: 20 TABLET, FILM COATED ORAL at 21:33

## 2020-05-20 RX ADMIN — LORAZEPAM 0.5 MG: 0.5 TABLET ORAL at 21:33

## 2020-05-20 RX ADMIN — SODIUM CHLORIDE, PRESERVATIVE FREE 10 ML: 5 INJECTION INTRAVENOUS at 21:33

## 2020-05-20 RX ADMIN — LINAGLIPTIN 5 MG: 5 TABLET, FILM COATED ORAL at 10:00

## 2020-05-20 RX ADMIN — SODIUM CHLORIDE, PRESERVATIVE FREE 10 ML: 5 INJECTION INTRAVENOUS at 10:03

## 2020-05-20 RX ADMIN — ASPIRIN 81 MG 81 MG: 81 TABLET ORAL at 10:00

## 2020-05-20 RX ADMIN — CITALOPRAM 40 MG: 40 TABLET ORAL at 10:00

## 2020-05-20 RX ADMIN — DABIGATRAN ETEXILATE MESYLATE 150 MG: 150 CAPSULE ORAL at 10:00

## 2020-05-20 RX ADMIN — AMLODIPINE BESYLATE 5 MG: 10 TABLET ORAL at 10:01

## 2020-05-20 RX ADMIN — BUSPIRONE HYDROCHLORIDE 15 MG: 15 TABLET ORAL at 21:33

## 2020-05-20 RX ADMIN — ESCITALOPRAM OXALATE 5 MG: 10 TABLET ORAL at 14:39

## 2020-05-20 RX ADMIN — DABIGATRAN ETEXILATE MESYLATE 150 MG: 150 CAPSULE ORAL at 21:33

## 2020-05-20 ASSESSMENT — PAIN SCALES - GENERAL: PAINLEVEL_OUTOF10: 0

## 2020-05-20 NOTE — PROGRESS NOTES
[facility]  INPATIENT SPEECH THERAPY  [facility] 8 REHAB UNIT        TIME   Time In: 0700  Time Out: 0800  Minutes: 60       [x]Daily Note  []Progress Note    Date: 2020  Patient Name: Oscar Kumar        MRN: [id number]    Account #: [de-identified]  : 1945  (75 y.o.)  Gender: female   Primary Provider: Donna Guadalupe MD  Swallowing Status/Diet: Minced and moist, thin liquids    PATIENT DIAGNOSIS(ES):    Diagnosis: RIGHT MCA STROKE; LEFT WEAKNESS  Additional Pertinent Hx: HTN, TIA, ANXIETY, DEPRESSION     RESTRICTIONS/PRECAUTIONS:    Restrictions/Precautions  Restrictions/Precautions: Weight Bearing, Fall Risk         Subjective: The patient was upright in her bed. She was very drowsy. She stated this was due to her medications. Objective:  She still exhibits moderate to severe dysarthria and aphasia. Patient still exhibits delayed responses to most questions. She did utilize one word answers and short phrases. Discussed sitting upright for breakfast and she initially said \"I'm not budging. \" The patient has been upgraded to thin liquids. Patient was provided with verbal cues throughout the meal to clear the left side of the oral cavity. Patient has demonstrated buccal cavity pocketing in previous sessions and needs reminders. Today she demonstrated lingual residue (scattered particles covering the entire surface of the tongue) and left buccal cavity pocketing. Cues for liquid wash and lingual sweep were provided. No overt s/s of aspiration observed this date with thin liquids or soft solids. She is afebrile this date. No recent spikes in temperature noted. She continues to complain of difficulty breathing and swallowing. This was discussed with the nurse manager and her neurologist. The patient has reported to her grandson and to staff that this improves when she uses her nasal spray (Flonase) prior to oral intake.  The patient also has exhibited  anxiety during oral intake since admission and has

## 2020-05-20 NOTE — PROGRESS NOTES
Patient:   Heidy Valentino  MR#:    [id number]   Room:    9073/415-24   YOB: 1945  Date of Progress Note: 5/20/2020  Time of Note                           8:28 AM  Consulting Physician:   Eliel Wadsworth M.D. Attending Physician:  Eliel Wadsworth MD     CHIEF COMPLAINT: Left-sided weakness with dysarthria and dysphagia  Subjective  This 75 y.o. female  with history of hyperlipidemia,hypercholesteremia,HTN,TIA,anxiety,and depression. She presented to [facility] on 5/1/20 with c/o left arm weakness x 3 days,some choking on liquids at times and with her legs going weak. She was hypertensive on presentation with B/P 173/101. CT done showed an age-indeterminate infarct in the right basal ganglia,right centrum semiovale and the posterior right temporal lobe. CTA head/neck didn't reveal ay high-grade stenosis or occlusion. She was admitted to the her PCP Dr. Priscila Valencia with consult for neurology. She was seen by Dr. Adame Done. MRI done showed an acute and subacute right MCA stroke. She was not given TPA d/t being outside of the timeframe. She was placed on ASA and Plavix for dual antiplatelet therapy. Dr. Deep Laboy recommended Zio Patch at discharge. She was also found to have a hemoglobin A1C of 8.9 and adjustments have been made to her medications. She was evaluated by SPT for swallow and passed but was noted to have anomia and dysarthria. She is also participating with both PT/OT. Patient became more slow and dysarthric,left sided weakness worsening,left eye was deviating medially and patient seemed to have to force her left eye to move laterally. Repeat CT on 5/5/20 showed larger stroke right frontal lobe 2.5 cm compared prior scan on 5/3/20. Per Dr. Adame Done could also be having CN VI Palsy related to her diabetes. She is felt to need a stay on Rehab for continued PT/OT/SPT and for medical management for maximium BP control and blood glucose management.  On 5/5/20 afternoon, she was having more consistencies.      At this time recommend trial upgrade of thin liquids with minced and moist consistency. Monitor lung sounds.      Recommended Diet:  Solid consistency: Minced and moist   Liquid consistency: thin liquids  Liquid administration via: Cup  Medication administration: Meds in puree     Safe Swallow Protocol:  Compensatory Swallowing Strategies: Alternate solids and liquids  Check for pocketing of food on the Left  Lingual sweep  Small bites/sips  No straws  Remain upright for 30-45 minutes after meals  Upright as possible for all oral intake  Eat/Feed slowly     SHORT TERM GOAL #1:  Goal 1: The patient will complete simple/complex naming tasks with min verbal cues at 80% accuracy in order to improve verbal expression and thought organization for functional communication in all settings.      SHORT TERM GOAL #2:  Goal 2: The patient will follow 2/3 step commands related to functional living environment with 100% accuracy and min verbal cues in order to increase functional integration into environment.      SHORT TERM GOAL #3:  Goal 3: The patient will complete executive function tasks with 100% accuracy and min verbal cues during daily living activities to improve safety and awareness in functional living environment.      SHORT TERM GOAL #4:  Goal 4: Locate items left/right of midline at page level for target cancellation tasks/trail-making/visual closure/address checking/editing/reading sentences (aloud)/paragraphs/signs/maps with minimal cues.     SHORT TERM GOAL #5:  Goal 5:  The patient will demonstrate functional problem solving and safety awareness with 100% accuracy and min verbal cues for daily living tasks in order to increase safe interaction with environment and decreased assitance from caregivers.      Swallowing Short Term Goals  Short-term Goals  Goal 1: The patient will tolerate pureed diet with nectar thick liquids with no overt s/s of aspiration MET Patient will tolerate minced and moist diet with thin liquids with min/no overt s/s of aspiration. Goal 2: Patient staff will follow swallow safety recommendations to decrease risk of penetration/aspiration during PO intake. Goal 3: The patient will complete oral motor exercises to improve labial/lingual strengthening/ROM with min verbal cues at 100% accuracy. Goal 4: The patient will complete laryngeal strengthening exercises (jorge luis, effortful swallow, mendolsohn) with mod verbal cues at 100% accuracy to improve airway protection.      Long-term Goals  Goal 1: The patient will develop functional, cognitive linguistic based skills and utilize compensatory strategies to communicate wants and needs effectively to different conversational partners, maintain safety, and participate socially in functional living environment. Goal 2: The patient will comprehend communication related to basic medical and social needs and utilize compensatory strategies to maintain safety and participate socially in functional living environment. GOAL 3: The patient will maintain adequate hydration/nutrition with optimum safety and efficiency of swallowing function on po intake without overt s/s of aspiration for the highest appropriate diet level           GOALS MET: 1 STG MET  0 LTG MET        OCCUPATIONAL THERAPY     CURRENT IRF-TAHMINA SCORES  Eating: CARE Score: 4  Oral Hygiene: CARE Score: 3  Toileting: CARE Score:  2  Shower/Bathe: CARE Score: 3  Upper Body Dressing: CARE Score: 2  Lower Body Dressing: CARE Score: 2  Footwear: CARE Score: 1  Toilet Transfers: CARE Score: 3  Picking Up Object:  CARE Score: 1        UE Functioning:   No functional movement LUE and significant tone, tolerates splint wearing but d/t to continuous repositioning in bed and laying to left side splint will not stay in place  R WFLs     Pain Assessment:  Pain Location: Buttocks     STGs:  Short term goals  Time Frame for Short term goals: 1-2 weeks  Short term goal 1: MET  Short term goal 2: Don shirt with min A  Short term goal 3: Attend to left visual field and left side body with min prompts  Short term goal 4: Perform 4 SROM exercises with good form after set up with min prompts  Short term goal 5: Develop any beginning level movement in LUE not associated with the flexor synergy pattern  Short term goal 6: Stand with CGA with use of RUE propping for 2 mins consistently  Short term goal 7: Demo ability to propel wheelchair (in appropriately sized wheelchair) with supervision in open spaces     LTGs:  Long term goals  Time Frame for Long term goals : 3 weeks  Long term goal 1: Perform transfers with CGA  Long term goal 2: Consistently attend to Left side body and environment  Long term goal 3: Perform UB dressing with supervision after set up  Long term goal 4: Perform LB dressing with min A  Long term goal 5: Perform toileting with min A  Long term goal 7: Independent with comprehensive HEP, and DME recommendations     Assessment:  Decreased functional mobility ; Decreased ADL status; Decreased ROM; Decreased strength; Decreased cognition; Decreased endurance; Decreased balance; Decreased vision/visual deficit; Decreased high-level IADLs  Occupational performance impacted by low activity tolerance and decreased initiation.      NUTRITION  Current Wt: Weight: 152 lb (68.9 kg) / Body mass index is 29.69 kg/m². Admission Wt: Admission Body Weight: 152 lb 3 oz (69 kg)  Oral Diet Orders: Carb Control 4 Carbs/Meal, Dysphagia Minced and Moist (Dysphagia 2), Regular liquids  Oral Nutrition Supplement (ONS) Orders: Other Oral Supplement (See Comment)(boost pudding)   PO intake has decreased with intake now averaging 0-50%. Please see nutrition note for details.          RECORD REVIEW: Previous medical records, medications were reviewed at today's visit    IMPRESSION:   1.  Multiple right hemispheric strokes possibly embolic-continue Pradaxa. PT/OT/SLP  2.   Diabetes-continue oral hypoglycemics and monitoring  3. DVT prophylaxis-Pradaxa  4. Essential hypertension- HCTZ discontinued due to elevated creatinine. 5.  Hyperlipidemia-continue statin  6. Anxiety/depression-continue mood enhancers. 7.  EVAN-IV fluid bolus per PCP. Improving  8. Chronic subjective SOA. ? D/t anxiety.   Defer to pcp. cxr ordered  Staffing this date , mutlidisciplinary with entire team with complex decision making and planning for discharge  ELOS:SNF, possibly Friday/saturday

## 2020-05-21 LAB
ANION GAP SERPL CALCULATED.3IONS-SCNC: 16 MMOL/L (ref 7–19)
BASOPHILS ABSOLUTE: 0.1 K/UL (ref 0–0.2)
BASOPHILS RELATIVE PERCENT: 0.5 % (ref 0–1)
BUN BLDV-MCNC: 30 MG/DL (ref 8–23)
CALCIUM SERPL-MCNC: 9.3 MG/DL (ref 8.8–10.2)
CHLORIDE BLD-SCNC: 101 MMOL/L (ref 98–111)
CO2: 26 MMOL/L (ref 22–29)
CREAT SERPL-MCNC: 1.4 MG/DL (ref 0.5–0.9)
EOSINOPHILS ABSOLUTE: 0.2 K/UL (ref 0–0.6)
EOSINOPHILS RELATIVE PERCENT: 1.9 % (ref 0–5)
GFR NON-AFRICAN AMERICAN: 37
GLUCOSE BLD-MCNC: 126 MG/DL (ref 74–109)
GLUCOSE BLD-MCNC: 136 MG/DL (ref 70–99)
GLUCOSE BLD-MCNC: 149 MG/DL (ref 70–99)
GLUCOSE BLD-MCNC: 156 MG/DL (ref 70–99)
HCT VFR BLD CALC: 37.7 % (ref 37–47)
HEMOGLOBIN: 12.1 G/DL (ref 12–16)
IMMATURE GRANULOCYTES #: 0 K/UL
LYMPHOCYTES ABSOLUTE: 3.8 K/UL (ref 1.1–4.5)
LYMPHOCYTES RELATIVE PERCENT: 37.3 % (ref 20–40)
MAGNESIUM: 2.3 MG/DL (ref 1.6–2.4)
MCH RBC QN AUTO: 30 PG (ref 27–31)
MCHC RBC AUTO-ENTMCNC: 32.1 G/DL (ref 33–37)
MCV RBC AUTO: 93.5 FL (ref 81–99)
MONOCYTES ABSOLUTE: 1 K/UL (ref 0–0.9)
MONOCYTES RELATIVE PERCENT: 9.9 % (ref 0–10)
NEUTROPHILS ABSOLUTE: 5 K/UL (ref 1.5–7.5)
NEUTROPHILS RELATIVE PERCENT: 50.1 % (ref 50–65)
PDW BLD-RTO: 13.3 % (ref 11.5–14.5)
PERFORMED ON: ABNORMAL
PLATELET # BLD: 308 K/UL (ref 130–400)
PMV BLD AUTO: 11.6 FL (ref 9.4–12.3)
POTASSIUM REFLEX MAGNESIUM: 3.5 MMOL/L (ref 3.5–5)
RBC # BLD: 4.03 M/UL (ref 4.2–5.4)
SODIUM BLD-SCNC: 143 MMOL/L (ref 136–145)
WBC # BLD: 10.1 K/UL (ref 4.8–10.8)

## 2020-05-21 PROCEDURE — 80048 BASIC METABOLIC PNL TOTAL CA: CPT

## 2020-05-21 PROCEDURE — 97535 SELF CARE MNGMENT TRAINING: CPT

## 2020-05-21 PROCEDURE — 97110 THERAPEUTIC EXERCISES: CPT

## 2020-05-21 PROCEDURE — 36415 COLL VENOUS BLD VENIPUNCTURE: CPT

## 2020-05-21 PROCEDURE — 99232 SBSQ HOSP IP/OBS MODERATE 35: CPT | Performed by: PSYCHIATRY & NEUROLOGY

## 2020-05-21 PROCEDURE — 97530 THERAPEUTIC ACTIVITIES: CPT

## 2020-05-21 PROCEDURE — 92526 ORAL FUNCTION THERAPY: CPT

## 2020-05-21 PROCEDURE — 85025 COMPLETE CBC W/AUTO DIFF WBC: CPT

## 2020-05-21 PROCEDURE — 82947 ASSAY GLUCOSE BLOOD QUANT: CPT

## 2020-05-21 PROCEDURE — 83735 ASSAY OF MAGNESIUM: CPT

## 2020-05-21 PROCEDURE — 2580000003 HC RX 258

## 2020-05-21 PROCEDURE — 1180000000 HC REHAB R&B

## 2020-05-21 PROCEDURE — 92507 TX SP LANG VOICE COMM INDIV: CPT

## 2020-05-21 PROCEDURE — 97116 GAIT TRAINING THERAPY: CPT

## 2020-05-21 PROCEDURE — 6370000000 HC RX 637 (ALT 250 FOR IP): Performed by: PSYCHIATRY & NEUROLOGY

## 2020-05-21 PROCEDURE — 6370000000 HC RX 637 (ALT 250 FOR IP): Performed by: FAMILY MEDICINE

## 2020-05-21 RX ADMIN — ATORVASTATIN CALCIUM 20 MG: 20 TABLET, FILM COATED ORAL at 20:55

## 2020-05-21 RX ADMIN — LINAGLIPTIN 5 MG: 5 TABLET, FILM COATED ORAL at 08:31

## 2020-05-21 RX ADMIN — DABIGATRAN ETEXILATE MESYLATE 150 MG: 150 CAPSULE ORAL at 20:55

## 2020-05-21 RX ADMIN — ASPIRIN 81 MG 81 MG: 81 TABLET ORAL at 08:31

## 2020-05-21 RX ADMIN — ESCITALOPRAM OXALATE 5 MG: 10 TABLET ORAL at 08:31

## 2020-05-21 RX ADMIN — DABIGATRAN ETEXILATE MESYLATE 150 MG: 150 CAPSULE ORAL at 08:31

## 2020-05-21 RX ADMIN — BUSPIRONE HYDROCHLORIDE 15 MG: 15 TABLET ORAL at 20:55

## 2020-05-21 RX ADMIN — SODIUM CHLORIDE, PRESERVATIVE FREE 10 ML: 5 INJECTION INTRAVENOUS at 08:35

## 2020-05-21 RX ADMIN — BUSPIRONE HYDROCHLORIDE 15 MG: 15 TABLET ORAL at 08:31

## 2020-05-21 RX ADMIN — CITALOPRAM 40 MG: 40 TABLET ORAL at 08:31

## 2020-05-21 RX ADMIN — FLUTICASONE PROPIONATE 2 SPRAY: 50 SPRAY, METERED NASAL at 08:30

## 2020-05-21 RX ADMIN — AMLODIPINE BESYLATE 5 MG: 10 TABLET ORAL at 08:31

## 2020-05-21 ASSESSMENT — PAIN SCALES - GENERAL: PAINLEVEL_OUTOF10: 0

## 2020-05-21 NOTE — PROGRESS NOTES
THRUST NOTED.  WORKED ON PARTIAL STAND STEP PIVOT TF TO/FROM             SPEECH THERAPY:      Precautions: Fall/aspiration   Swallowing Status/Diet: Minced and moist with upgrade to thin liquids        Subjective: Patient lethargic, however alert and cooperative for all therapy tasks.      Objective:   Patient presenting with minimal verbal responses today during session. Continue to have delayed/slow processing during all tasks. Patient requiring extended time to [address] all tasks. Decreased responsiveness to basic wh questions today, however when responding it was correct response.      Decreased sustained/selective attention during structured/unstructured tasks patient required frequent redirection.      When patient did respond today some phonemic errors were noted in speech.      Verbal expression targeted with structured task. Patient completing divergent naming task where she was to name 10 items within a category. Patient required extended time (5/10 minutes) including several instances of redirection. Patient naming only 5 items within this time frame. Task was discontinued.      Completed reading task to address visual scanning. White board in room was utilized. Patient was able to read her first and last name. Patient unable to read any of the other content on the board secondary to poor attention, and decreased visual scanning.      Encouraged patient to drink during the session. Patient states \"It makes me pee. \" SLP continues to require education for the importance of eating/drinking.         Swallow function monitored with minced and moist diet consistency and thin liquid trials. Patient continues to have slow oral prep and mastication for more solid consistencies. Min residue noted post swallow but was cleared with additional dry swallows and liquid wash.  Patient presenting with mild sluggish laryngeal elevation, however no overt s/s of aspiration with minced and moist and thin liquid goal 2: Don shirt with min A  Short term goal 3: Attend to left visual field and left side body with min prompts  Short term goal 4: Perform 4 SROM exercises with good form after set up with min prompts  Short term goal 5: Develop any beginning level movement in LUE not associated with the flexor synergy pattern  Short term goal 6: Stand with CGA with use of RUE propping for 2 mins consistently  Short term goal 7: Demo ability to propel wheelchair (in appropriately sized wheelchair) with supervision in open spaces     LTGs:  Long term goals  Time Frame for Long term goals : 3 weeks  Long term goal 1: Perform transfers with CGA  Long term goal 2: Consistently attend to Left side body and environment  Long term goal 3: Perform UB dressing with supervision after set up  Long term goal 4: Perform LB dressing with min A  Long term goal 5: Perform toileting with min A  Long term goal 7: Independent with comprehensive HEP, and DME recommendations     Assessment:  Decreased functional mobility ; Decreased ADL status; Decreased ROM; Decreased strength; Decreased cognition; Decreased endurance; Decreased balance; Decreased vision/visual deficit; Decreased high-level IADLs  Occupational performance impacted by low activity tolerance and decreased initiation.      NUTRITION  Current Wt: Weight: 152 lb (68.9 kg) / Body mass index is 29.69 kg/m². Admission Wt: Admission Body Weight: 152 lb 3 oz (69 kg)  Oral Diet Orders: Carb Control 4 Carbs/Meal, Dysphagia Minced and Moist (Dysphagia 2), Regular liquids  Oral Nutrition Supplement (ONS) Orders: Other Oral Supplement (See Comment)(boost pudding)   PO intake has decreased with intake now averaging 0-50%. Please see nutrition note for details.          RECORD REVIEW: Previous medical records, medications were reviewed at today's visit    IMPRESSION:   1.  Multiple right hemispheric strokes possibly embolic-continue Pradaxa. PT/OT/SLP  2.   Diabetes-continue oral hypoglycemics and monitoring  3. DVT prophylaxis-Pradaxa  4. Essential hypertension- HCTZ discontinued due to elevated creatinine. 5.  Hyperlipidemia-continue statin  6. Anxiety/depression-continue mood enhancers. 7.  EVAN-IV fluid bolus per PCP. Improving  8. Chronic subjective SOA. ? D/t anxiety.   Defer to pcp. cxr unremarkable    ELOS:SNF, /saturday

## 2020-05-21 NOTE — PROGRESS NOTES
Physical Therapy  Name: Enrique Weston  MRN:  [id number]  Date of service:  5/21/2020 05/21/20 1134   General   Additional Pertinent Hx HTN, TIA, ANXIETY, DEPRESSION   Subjective   Subjective Patient ready for therapy. Often states, \"I can't breathe\", but patient's SpO2 WNL. Transfers   Sit to Stand Moderate Assistance   Stand to sit Moderate Assistance   Bed to Chair Minimal assistance   Stand Pivot Transfers Minimal Assistance   Comment transferred recliner to w/c via stand pivot   Exercises   Comments Seated bilateral LE ex's x 20 reps, AROM right LE, PROM left LE, yellow t-band and ball squeezes. (cues throughout ex's, patient get easily distracted)   Other Activities   Comment Assisted patient with brief change and lower body dressing   Short term goals   Time Frame for Short term goals 2 WEEKS   Short term goal 1 BED MOBILITY CGA   Short term goal 2 TRANSFERS CGA   Short term goal 3 AMBULATE 25 FT WITH A.D. CGA   Short term goal 4 UP/DOWN 4 STEPS 1 RAIL MIN A   Short term goal 5 PROPEL  FT CGA   Long term goals   Time Frame for Long term goals  4 WEEKS   Long term goal 1 INDEP BED MOB   Long term goal 2 TF SURFACE TO SURFACE INDEP   Long term goal 3 AMBULATE 150 FT WITH A.D. INDEP   Long term goal 4 UP/DOWN 4 STEPS WITH ONE RAIL INDEP   Long term goal 5 PROPEL  FT INDEP   Conditions Requiring Skilled Therapeutic Intervention   Body structures, Functions, Activity limitations Decreased functional mobility ; Decreased ADL status; Decreased ROM; Decreased strength;Decreased endurance;Decreased sensation;Decreased balance;Decreased posture;Decreased vision/visual deficit   Assessment Assisted patient to room and left with all needs in reach, setup for lunch. Treatment Diagnosis INTERFERENCE WITH ACTIVITY   Prognosis Good   Decision Making Low Complexity   Safety Devices   Type of devices All fall risk precautions in place;Call light within reach; Chair alarm in place;Gait belt;Left in chair

## 2020-05-21 NOTE — PLAN OF CARE
Problem: Falls - Risk of:  Goal: Will remain free from falls  Description: Will remain free from falls  5/20/2020 2330 by Cory Lopez RN  Outcome: Ongoing  5/20/2020 1536 by Mary Ricci RN  Outcome: Ongoing  Goal: Absence of physical injury  Description: Absence of physical injury  5/20/2020 2330 by Cory Lopez RN  Outcome: Ongoing  5/20/2020 1536 by Mary Ricci RN  Outcome: Ongoing     Problem: HEMODYNAMIC STATUS  Goal: Patient has stable vital signs and fluid balance  5/20/2020 2330 by Cory Lopez RN  Outcome: Ongoing  5/20/2020 1536 by Mary Ricci RN  Outcome: Ongoing     Problem: ACTIVITY INTOLERANCE/IMPAIRED MOBILITY  Goal: Mobility/activity is maintained at optimum level for patient  5/20/2020 2330 by Cory Lopez RN  Outcome: Ongoing  5/20/2020 1536 by Mary Ricci RN  Outcome: Ongoing     Problem: COMMUNICATION IMPAIRMENT  Goal: Ability to express needs and understand communication  5/20/2020 2330 by Cory Lopez RN  Outcome: Ongoing  5/20/2020 1536 by Mary Ricci RN  Outcome: Ongoing     Problem: Safety:  Goal: Free from accidental physical injury  Description: Free from accidental physical injury  5/20/2020 2330 by Cory Lopez RN  Outcome: Ongoing  5/20/2020 1536 by Mary Ricci RN  Outcome: Ongoing  Goal: Free from intentional harm  Description: Free from intentional harm  5/20/2020 2330 by Cory Lopez RN  Outcome: Ongoing  5/20/2020 1536 by Mary Ricci RN  Outcome: Ongoing     Problem: Daily Care:  Goal: Daily care needs are met  Description: Daily care needs are met  5/20/2020 2330 by Cory Lopez RN  Outcome: Ongoing  5/20/2020 1536 by Mary Ricci RN  Outcome: Ongoing     Problem: Pain:  Goal: Patient's pain/discomfort is manageable  Description: Patient's pain/discomfort is manageable  5/20/2020 2330 by Cory Lopez RN  Outcome: Ongoing  5/20/2020 1536 by Mary Ricci RN  Outcome: Ongoing  Goal: Pain level will decrease  Description: Pain level will decrease  5/20/2020 2330 by Aamir Romero RN  Outcome: Ongoing  5/20/2020 1536 by Kallie Fernandez RN  Outcome: Ongoing  Goal: Control of acute pain  Description: Control of acute pain  5/20/2020 2330 by Aamir Romero RN  Outcome: Ongoing  5/20/2020 1536 by Kallie Fernandez RN  Outcome: Ongoing  Goal: Control of chronic pain  Description: Control of chronic pain  5/20/2020 2330 by Aamir Romero RN  Outcome: Ongoing  5/20/2020 1536 by Kallie Fernandez RN  Outcome: Ongoing     Problem: Skin Integrity:  Goal: Skin integrity will stabilize  Description: Skin integrity will stabilize  5/20/2020 2330 by Aamir Romero RN  Outcome: Ongoing  5/20/2020 1536 by Kallie Fernandez RN  Outcome: Ongoing     Problem: Discharge Planning:  Goal: Patients continuum of care needs are met  Description: Patients continuum of care needs are met  5/20/2020 2330 by Aamir Romero RN  Outcome: Ongoing  5/20/2020 1536 by Kallie Fernandez RN  Outcome: Ongoing     Problem: Nutrition  Goal: Optimal nutrition therapy  Description: Nutrition Problem: Inadequate oral intake  Intervention: Food and/or Nutrient Delivery: Continue current diet, Continue current ONS  Nutritional Goals: po intake 50% or greater.   Weight stable     5/20/2020 2330 by Aamir Romero RN  Outcome: Ongoing  5/20/2020 1536 by Kallie Fernandez RN  Outcome: Ongoing

## 2020-05-22 LAB
GLUCOSE BLD-MCNC: 124 MG/DL (ref 70–99)
GLUCOSE BLD-MCNC: 132 MG/DL (ref 70–99)
GLUCOSE BLD-MCNC: 144 MG/DL (ref 70–99)
PERFORMED ON: ABNORMAL

## 2020-05-22 PROCEDURE — 97110 THERAPEUTIC EXERCISES: CPT

## 2020-05-22 PROCEDURE — 99233 SBSQ HOSP IP/OBS HIGH 50: CPT | Performed by: PSYCHIATRY & NEUROLOGY

## 2020-05-22 PROCEDURE — 6370000000 HC RX 637 (ALT 250 FOR IP): Performed by: PSYCHIATRY & NEUROLOGY

## 2020-05-22 PROCEDURE — 6370000000 HC RX 637 (ALT 250 FOR IP): Performed by: FAMILY MEDICINE

## 2020-05-22 PROCEDURE — 92526 ORAL FUNCTION THERAPY: CPT

## 2020-05-22 PROCEDURE — 1180000000 HC REHAB R&B

## 2020-05-22 PROCEDURE — 92507 TX SP LANG VOICE COMM INDIV: CPT

## 2020-05-22 PROCEDURE — 97530 THERAPEUTIC ACTIVITIES: CPT

## 2020-05-22 PROCEDURE — 97535 SELF CARE MNGMENT TRAINING: CPT

## 2020-05-22 PROCEDURE — 82947 ASSAY GLUCOSE BLOOD QUANT: CPT

## 2020-05-22 RX ORDER — CLONIDINE HYDROCHLORIDE 0.1 MG/1
0.1 TABLET ORAL EVERY 4 HOURS PRN
Qty: 60 TABLET | Refills: 3
Start: 2020-05-22 | End: 2020-08-27

## 2020-05-22 RX ORDER — LORAZEPAM 0.5 MG/1
0.5 TABLET ORAL NIGHTLY PRN
Qty: 30 TABLET | Refills: 0 | Status: SHIPPED | OUTPATIENT
Start: 2020-05-22 | End: 2020-06-21

## 2020-05-22 RX ORDER — ATORVASTATIN CALCIUM 20 MG/1
20 TABLET, FILM COATED ORAL NIGHTLY
Qty: 30 TABLET | Refills: 3
Start: 2020-05-22

## 2020-05-22 RX ORDER — FLUTICASONE PROPIONATE 50 MCG
2 SPRAY, SUSPENSION (ML) NASAL DAILY
Qty: 1 BOTTLE | Refills: 3
Start: 2020-05-22 | End: 2020-08-27

## 2020-05-22 RX ADMIN — AMLODIPINE BESYLATE 5 MG: 10 TABLET ORAL at 09:51

## 2020-05-22 RX ADMIN — LINAGLIPTIN 5 MG: 5 TABLET, FILM COATED ORAL at 09:53

## 2020-05-22 RX ADMIN — DABIGATRAN ETEXILATE MESYLATE 150 MG: 150 CAPSULE ORAL at 09:51

## 2020-05-22 RX ADMIN — BUSPIRONE HYDROCHLORIDE 15 MG: 15 TABLET ORAL at 21:47

## 2020-05-22 RX ADMIN — CITALOPRAM 40 MG: 40 TABLET ORAL at 09:53

## 2020-05-22 RX ADMIN — BUSPIRONE HYDROCHLORIDE 15 MG: 15 TABLET ORAL at 09:51

## 2020-05-22 RX ADMIN — ATORVASTATIN CALCIUM 20 MG: 20 TABLET, FILM COATED ORAL at 21:47

## 2020-05-22 RX ADMIN — DABIGATRAN ETEXILATE MESYLATE 150 MG: 150 CAPSULE ORAL at 21:47

## 2020-05-22 RX ADMIN — POLYETHYLENE GLYCOL 3350 17 G: 17 POWDER, FOR SOLUTION ORAL at 09:53

## 2020-05-22 RX ADMIN — FLUTICASONE PROPIONATE 2 SPRAY: 50 SPRAY, METERED NASAL at 09:50

## 2020-05-22 RX ADMIN — ASPIRIN 81 MG 81 MG: 81 TABLET ORAL at 09:51

## 2020-05-22 RX ADMIN — ESCITALOPRAM OXALATE 5 MG: 10 TABLET ORAL at 09:51

## 2020-05-22 ASSESSMENT — PAIN SCALES - GENERAL: PAINLEVEL_OUTOF10: 0

## 2020-05-22 NOTE — PROGRESS NOTES
are normal. There is no appreciable  distension. There is no point tenderness. no rebounding or guarding. Musculoskeletal: Normal range of motion on other than surgically repaired joint . no edema or tenderness other than surgical area. Post-op changes noted  Neurological:  alert and oriented to person, place, and time. Left facial droop, left hemiparesis upper and lower extremity  Skin: Skin is warm and dry. No new rashes appreciated. Assessment and Plan:     Primary Problem:  Multiple right hemisphere strokes    Hospital Problem list/treatment recommendations: Active Problems:    Multiple right hemispheric strokes possibly embolic------continue Pradaxa. PT/OT/SLP  Diabetes mellitus type 2------ stable, continue to monitor on current medication regimen  Essential hypertension----- stable, continue Norvasc. Zestril, HCTZ DC'd  Slow transit constipation-- stool softener, laxative, bowel regimen  Generalized anxiety disorder--BuSpar  Major depressive disorder--Celexa, counseling provided  Mixed dyslipidemia--on statin therapy  EVAN with underlying chronic kidney disease. --  Labs improved, recheck in am  Generalized anxiety/depression--Lexapro  Shortness of breath without respiratory distress--- suspect anxiety related, chest x-ray normal, O2 sats normal, continue Flonase    Continues to make very slow improvement  AVSS, Accu-Cheks and labs stable,Cr at baseline 1.4  Continue to maximize efforts post stroke rehab program PT/OT/ST  Continue current medication regimen. Plans for discharge to skilled nursing facility Saturday  20 minutes spent in face to face interaction and coordination of care. Electronically signed by Summer Persaud PA-C on 5/22/2020 at 7:02 AM         Plan discharge skilled nursing facility over the weekend. Medically stable for discharge. Follow-up office 1-2 weeks after discharge from skilled nursing facility.     I have discussed the care of [address], including pertinent

## 2020-05-22 NOTE — PROGRESS NOTES
Patient:   Humera Grover  MR#:    [id number]   Room:    9315/995-57   YOB: 1945  Date of Progress Note: 5/22/2020  Time of Note                           7:19 AM  Consulting Physician:   Bernard Berkowitz M.D. Attending Physician:  Bernard Berkowitz MD     CHIEF COMPLAINT: Left-sided weakness with dysarthria and dysphagia  Subjective  This 75 y.o. female  with history of hyperlipidemia,hypercholesteremia,HTN,TIA,anxiety,and depression. She presented to [facility] on 5/1/20 with c/o left arm weakness x 3 days,some choking on liquids at times and with her legs going weak. She was hypertensive on presentation with B/P 173/101. CT done showed an age-indeterminate infarct in the right basal ganglia,right centrum semiovale and the posterior right temporal lobe. CTA head/neck didn't reveal ay high-grade stenosis or occlusion. She was admitted to the her PCP Dr. Carol Sexton with consult for neurology. She was seen by Dr. Sergey Martin. MRI done showed an acute and subacute right MCA stroke. She was not given TPA d/t being outside of the timeframe. She was placed on ASA and Plavix for dual antiplatelet therapy. Dr. Sergey Martin recommended Zio Patch at discharge. She was also found to have a hemoglobin A1C of 8.9 and adjustments have been made to her medications. She was evaluated by SPT for swallow and passed but was noted to have anomia and dysarthria. She is also participating with both PT/OT. Patient became more slow and dysarthric,left sided weakness worsening,left eye was deviating medially and patient seemed to have to force her left eye to move laterally. Repeat CT on 5/5/20 showed larger stroke right frontal lobe 2.5 cm compared prior scan on 5/3/20. Per Dr. Sergey Martin could also be having CN VI Palsy related to her diabetes. She is felt to need a stay on Rehab for continued PT/OT/SPT and for medical management for maximium BP control and blood glucose management.  On 5/5/20 afternoon, she was having more

## 2020-05-22 NOTE — PROGRESS NOTES
[facility]  INPATIENT SPEECH THERAPY  [facility] 8 REHAB UNIT        TIME   Time In: 0830  Time Out: 0900  Minutes: 30       [x]Daily Note  []Progress Note    Date: 2020  Patient Name: Mel Albert        MRN: [id number]    Account #: [de-identified]  : 1945  (75 y.o.)  Gender: female   Primary Provider: Maureen Parra MD  Swallowing Status/Diet:      PATIENT DIAGNOSIS(ES):    Diagnosis: RIGHT MCA STROKE; LEFT WEAKNESS  Additional Pertinent Hx: HTN, TIA, ANXIETY, DEPRESSION     RESTRICTIONS/PRECAUTIONS:    Restrictions/Precautions  Restrictions/Precautions: Weight Bearing, Fall Risk      Subjective: The patient was upright in her wheelchair. She asked to stand and nursing aide and therapist helped her stand with the homero steady. Objective: The patient had completed breakfast.  She still demonstrates poor intake and poor appetite. Oral residue was noted throughout the oral cavity. Left buccal cavity pocketing was also noted. Patient was presented with liquids for liquid wash. After several sips of thin liquids, the material did clear from the oral cavity. She demonstrated poor safety awareness and problem solving this morning. She was trying to sit on the edge of the bed without assistance. She was instructed to stay in bed. Today she exhibits moderate dysarthria and moderate to severe expressive aphasia. Oral motor exercises were completed to improve lingual/labial strength, range of motion and coordination. She continues to report difficulty breathing. Her 02 sats were within normal limits. While standing with the homero steady she stated she could breath more easily. Recommended Diet:  Solid consistency: Minced and moist   Liquid consistency: thin liquids  Liquid administration via: Cup  Medication administration: Meds in puree     Safe Swallow Protocol:  Compensatory Swallowing Strategies:    Alternate solids and liquids  Check for pocketing of food on the Left  Lingual sweep  Small

## 2020-05-22 NOTE — PROGRESS NOTES
Occupational Therapy     05/22/20 1300   Pain Assessment   Patient Currently in Pain No   Pain Assessment 0-10   Pain Level 0   Balance   Sitting Balance Supervision   Standing Balance Contact guard assistance   Standing Balance   Time 2 minutes x1 trial, 1 minute x3 trials. Activity Static stand. Comment At mat table, bedrail, and VGB. Transfers   Stand Pivot Transfers Contact guard assistance  (Towards unaffected side.)   Sit to stand Contact guard assistance   Stand to sit Contact guard assistance   Type of ROM/Therapeutic Exercise   Type of ROM/Therapeutic Exercise AAROM;PROM   Comment AAROM: LUE shldr and elbow. PROM: wrist and hand. Assessment   Performance deficits / Impairments Decreased functional mobility ; Decreased ADL status; Decreased ROM; Decreased strength;Decreased cognition;Decreased endurance;Decreased balance;Decreased vision/visual deficit; Decreased high-level IADLs   Treatment Diagnosis Multiple right hemispheric Strokes   Timed Code Treatment Minutes 60 Minutes   Activity Tolerance   Activity Tolerance Patient Tolerated treatment well   Safety Devices   Safety Devices in place Yes   Type of devices Call light within reach; Chair alarm in place   Plan   Current Treatment Recommendations Strengthening;ROM;Balance Training;Functional Mobility Training; Wheelchair Mobility Training; Safety Education & Training;Patient/Caregiver Education & Training;Equipment Evaluation, Education, & procurement;Self-Care / ADL;Positioning      05/22/20 1300   Oral Hygiene   Assistance Needed Partial/moderate assistance   Comment Mod A. CARE Score 3   Lower Body Dressing   Assistance Needed Substantial/maximal assistance   Comment Max encouragement to complete, pt. refuses to use AE to assist with task. CARE Score 2   Putting On/Taking Off Footwear   Assistance Needed Substantial/maximal assistance   Comment Able to don/doff sock and shoe on R foot, by crossing up leg, requires assistance with L foot.    CARE

## 2020-05-23 VITALS
OXYGEN SATURATION: 98 % | RESPIRATION RATE: 17 BRPM | HEIGHT: 60 IN | SYSTOLIC BLOOD PRESSURE: 118 MMHG | WEIGHT: 148.01 LBS | BODY MASS INDEX: 29.06 KG/M2 | TEMPERATURE: 96.1 F | HEART RATE: 76 BPM | DIASTOLIC BLOOD PRESSURE: 73 MMHG

## 2020-05-23 LAB
GLUCOSE BLD-MCNC: 125 MG/DL (ref 70–99)
GLUCOSE BLD-MCNC: 154 MG/DL (ref 70–99)
PERFORMED ON: ABNORMAL
PERFORMED ON: ABNORMAL

## 2020-05-23 PROCEDURE — 6370000000 HC RX 637 (ALT 250 FOR IP): Performed by: PSYCHIATRY & NEUROLOGY

## 2020-05-23 PROCEDURE — 6370000000 HC RX 637 (ALT 250 FOR IP): Performed by: FAMILY MEDICINE

## 2020-05-23 PROCEDURE — 82947 ASSAY GLUCOSE BLOOD QUANT: CPT

## 2020-05-23 RX ADMIN — FLUTICASONE PROPIONATE 2 SPRAY: 50 SPRAY, METERED NASAL at 08:30

## 2020-05-23 RX ADMIN — ASPIRIN 81 MG 81 MG: 81 TABLET ORAL at 08:30

## 2020-05-23 RX ADMIN — LORAZEPAM 0.5 MG: 0.5 TABLET ORAL at 04:04

## 2020-05-23 RX ADMIN — AMLODIPINE BESYLATE 5 MG: 10 TABLET ORAL at 08:30

## 2020-05-23 RX ADMIN — BUSPIRONE HYDROCHLORIDE 15 MG: 15 TABLET ORAL at 08:30

## 2020-05-23 RX ADMIN — ESCITALOPRAM OXALATE 5 MG: 10 TABLET ORAL at 08:30

## 2020-05-23 RX ADMIN — LINAGLIPTIN 5 MG: 5 TABLET, FILM COATED ORAL at 08:30

## 2020-05-23 RX ADMIN — CITALOPRAM 40 MG: 40 TABLET ORAL at 08:30

## 2020-05-23 RX ADMIN — DABIGATRAN ETEXILATE MESYLATE 150 MG: 150 CAPSULE ORAL at 08:30

## 2020-05-25 NOTE — DISCHARGE SUMMARY
Physical Therapy DISCHARGE Note  DATE:  2020  NAME:  Humera Grover  :  1945  (74 y.o.,female)  MRN:  [id number]    HEIGHT:  Height: 5' (152.4 cm)  WEIGHT:  Weight: 148 lb 0.1 oz (67.1 kg)    PATIENT DIAGNOSIS(ES):    Diagnosis: RIGHT MCA STROKE; LEFT WEAKNESS  Additional Pertinent Hx: HTN, TIA, ANXIETY, DEPRESSION  RESTRICTIONS/PRECAUTIONS:    Restrictions/Precautions  Restrictions/Precautions: Fall Risk     OVERALL  ORIENTATION STATUS:  Overall Orientation Status: Within Normal Limits  PAIN:  Pain Level: 0  Pain Type: Acute pain    Pain Location: Buttocks            NEUROLOGICAL                    Motor Control  Gross Motor?: Exceptions  Comments: IMPAIRED COORDINATION LEFT LE  Sensation  Overall Sensation Status: Impaired(LEFT NEGLECT)  STRENGTH  Strength RLE  Strength RLE: Exception  Comment: GROSSLY 4/5  Strength LLE  Strength LLE: Exception  Comment: GROSSLY 3/5  ROM  AROM RLE (degrees)  RLE AROM: WFL  AROM LLE (degrees)  LLE AROM : WFL  POSTURE/BALANCE  Balance  Posture: Fair(SLIGHT FLEXED FORWARD)  Sitting - Static: Good  Sitting - Dynamic: Good  Standing - Static: Fair  Standing - Dynamic: Fair(POSTERIOR LOB)       ACTIVITY TOLERANCE  Activity Tolerance  Activity Tolerance: Patient limited by fatigue      BED MOBILITY  Bed Mobility    Rolling: Contact guard assistance(to right wiht rail)  Supine to Sit: Moderate assistance  Sit to Supine:  Moderate assistance  Scooting: Minimal assistance  Comment: Patient sat EOB x 10 minutes SBA/CGA  TRANSFERS  Sit to Stand: Minimal Assistance(from w/c)      Bed to Chair: Minimal assistance  Car Transfer: Maximum Assistance, Moderate Assistance     WHEELCHAIR PROPULSION 1  Propulsion 1  Propulsion: Manual  Method: LUCA PAGAN  Distance: 5 FT  WHEELCHAIR PROPULSION 2     AMBULATION 1  Ambulation 1  Surface: level tile  Device: Parallel Bars  Other Apparatus: Wheelchair follow  Assistance: Maximum assistance, Moderate assistance  Quality of Gait: INCREASED LEFT LE TONE (Curb)  Reason if not Attempted: Not attempted due to medical condition or safety concerns  CARE Score: 88  Discharge Goal: Independent    4 Steps  Reason if not Attempted: Not attempted due to medical condition or safety concerns  CARE Score: 88  Discharge Goal: Independent    12 Steps  Reason if not Attempted: Not attempted due to medical condition or safety concerns  CARE Score: 88  Discharge Goal: Supervision or touching assistance    Wheel 50 Feet with Two Turns  Reason if not Attempted: Not attempted due to medical condition or safety concerns  CARE Score: 88  Discharge Goal: Independent    Wheel 150 Feet  Reason if not Attempted: Not attempted due to medical condition or safety concerns  CARE Score: 88  Discharge Goal: Independent

## 2020-06-02 ENCOUNTER — LAB REQUISITION (OUTPATIENT)
Dept: LAB | Facility: HOSPITAL | Age: 75
End: 2020-06-02

## 2020-06-02 DIAGNOSIS — Z00.00 ENCOUNTER FOR GENERAL ADULT MEDICAL EXAMINATION WITHOUT ABNORMAL FINDINGS: ICD-10-CM

## 2020-06-02 PROCEDURE — 87147 CULTURE TYPE IMMUNOLOGIC: CPT | Performed by: SPECIALIST

## 2020-06-02 PROCEDURE — 87186 SC STD MICRODIL/AGAR DIL: CPT | Performed by: SPECIALIST

## 2020-06-02 PROCEDURE — 87205 SMEAR GRAM STAIN: CPT | Performed by: SPECIALIST

## 2020-06-02 PROCEDURE — 87070 CULTURE OTHR SPECIMN AEROBIC: CPT | Performed by: SPECIALIST

## 2020-06-04 ENCOUNTER — LAB REQUISITION (OUTPATIENT)
Dept: LAB | Facility: HOSPITAL | Age: 75
End: 2020-06-04

## 2020-06-04 DIAGNOSIS — Z00.00 ENCOUNTER FOR GENERAL ADULT MEDICAL EXAMINATION WITHOUT ABNORMAL FINDINGS: ICD-10-CM

## 2020-06-04 LAB
BACTERIA SPEC AEROBE CULT: ABNORMAL
BACTERIA SPEC AEROBE CULT: ABNORMAL
BILIRUB UR QL STRIP: NEGATIVE
CLARITY UR: CLEAR
COLOR UR: ABNORMAL
GLUCOSE UR STRIP-MCNC: NEGATIVE MG/DL
GRAM STN SPEC: ABNORMAL
GRAM STN SPEC: ABNORMAL
HGB UR QL STRIP.AUTO: NEGATIVE
KETONES UR QL STRIP: NEGATIVE
LEUKOCYTE ESTERASE UR QL STRIP.AUTO: NEGATIVE
NITRITE UR QL STRIP: NEGATIVE
PH UR STRIP.AUTO: <=5 [PH] (ref 5–8)
PROT UR QL STRIP: NEGATIVE
SP GR UR STRIP: 1.02 (ref 1–1.03)
UROBILINOGEN UR QL STRIP: ABNORMAL

## 2020-06-04 PROCEDURE — 87086 URINE CULTURE/COLONY COUNT: CPT | Performed by: SPECIALIST

## 2020-06-04 PROCEDURE — 81003 URINALYSIS AUTO W/O SCOPE: CPT | Performed by: SPECIALIST

## 2020-06-05 LAB — BACTERIA SPEC AEROBE CULT: NO GROWTH

## 2020-06-09 ENCOUNTER — LAB REQUISITION (OUTPATIENT)
Dept: LAB | Facility: HOSPITAL | Age: 75
End: 2020-06-09

## 2020-06-09 DIAGNOSIS — Z00.00 ENCOUNTER FOR GENERAL ADULT MEDICAL EXAMINATION WITHOUT ABNORMAL FINDINGS: ICD-10-CM

## 2020-06-09 LAB
ALBUMIN SERPL-MCNC: 4 G/DL (ref 3.5–5.2)
ALBUMIN/GLOB SERPL: 1.7 G/DL
ALP SERPL-CCNC: 78 U/L (ref 39–117)
ALT SERPL W P-5'-P-CCNC: 16 U/L (ref 1–33)
ANION GAP SERPL CALCULATED.3IONS-SCNC: 15 MMOL/L (ref 5–15)
AST SERPL-CCNC: 25 U/L (ref 1–32)
BILIRUB SERPL-MCNC: 0.3 MG/DL (ref 0.2–1.2)
BUN BLD-MCNC: 37 MG/DL (ref 8–23)
BUN/CREAT SERPL: 19.4 (ref 7–25)
BURR CELLS BLD QL SMEAR: ABNORMAL
CALCIUM SPEC-SCNC: 9.5 MG/DL (ref 8.6–10.5)
CHLORIDE SERPL-SCNC: 104 MMOL/L (ref 98–107)
CO2 SERPL-SCNC: 21 MMOL/L (ref 22–29)
CREAT BLD-MCNC: 1.91 MG/DL (ref 0.57–1)
D DIMER PPP FEU-MCNC: 0.76 MG/L (FEU) (ref 0–0.5)
DEPRECATED RDW RBC AUTO: 48.9 FL (ref 37–54)
EOSINOPHIL # BLD MANUAL: 0.18 10*3/MM3 (ref 0–0.4)
EOSINOPHIL NFR BLD MANUAL: 2 % (ref 0.3–6.2)
ERYTHROCYTE [DISTWIDTH] IN BLOOD BY AUTOMATED COUNT: 14.9 % (ref 12.3–15.4)
GFR SERPL CREATININE-BSD FRML MDRD: 26 ML/MIN/1.73
GLOBULIN UR ELPH-MCNC: 2.4 GM/DL
GLUCOSE BLD-MCNC: 137 MG/DL (ref 65–99)
HCT VFR BLD AUTO: 28.8 % (ref 34–46.6)
HGB BLD-MCNC: 9.7 G/DL (ref 12–15.9)
LYMPHOCYTES # BLD MANUAL: 1.36 10*3/MM3 (ref 0.7–3.1)
LYMPHOCYTES NFR BLD MANUAL: 10.1 % (ref 5–12)
LYMPHOCYTES NFR BLD MANUAL: 15.2 % (ref 19.6–45.3)
MCH RBC QN AUTO: 30.2 PG (ref 26.6–33)
MCHC RBC AUTO-ENTMCNC: 33.7 G/DL (ref 31.5–35.7)
MCV RBC AUTO: 89.7 FL (ref 79–97)
MONOCYTES # BLD AUTO: 0.91 10*3/MM3 (ref 0.1–0.9)
NEUTROPHILS # BLD AUTO: 6.34 10*3/MM3 (ref 1.7–7)
NEUTROPHILS NFR BLD MANUAL: 70.7 % (ref 42.7–76)
NEUTS HYPERSEG # BLD: PRESENT 10*3/UL
NT-PROBNP SERPL-MCNC: 215.9 PG/ML (ref 5–1800)
PLAT MORPH BLD: NORMAL
PLATELET # BLD AUTO: 330 10*3/MM3 (ref 140–450)
PMV BLD AUTO: 10.7 FL (ref 6–12)
POIKILOCYTOSIS BLD QL SMEAR: ABNORMAL
POTASSIUM BLD-SCNC: 4 MMOL/L (ref 3.5–5.2)
PROT SERPL-MCNC: 6.4 G/DL (ref 6–8.5)
RBC # BLD AUTO: 3.21 10*6/MM3 (ref 3.77–5.28)
SODIUM BLD-SCNC: 140 MMOL/L (ref 136–145)
VARIANT LYMPHS NFR BLD MANUAL: 2 % (ref 0–5)
WBC NRBC COR # BLD: 8.97 10*3/MM3 (ref 3.4–10.8)

## 2020-06-09 PROCEDURE — 83880 ASSAY OF NATRIURETIC PEPTIDE: CPT | Performed by: SPECIALIST

## 2020-06-09 PROCEDURE — 80053 COMPREHEN METABOLIC PANEL: CPT | Performed by: SPECIALIST

## 2020-06-09 PROCEDURE — 85379 FIBRIN DEGRADATION QUANT: CPT | Performed by: SPECIALIST

## 2020-06-09 PROCEDURE — 85025 COMPLETE CBC W/AUTO DIFF WBC: CPT | Performed by: SPECIALIST

## 2020-06-10 PROCEDURE — 0298T PR EXT ECG > 48HR TO 21 DAY REVIEW AND INTERPRETATN: CPT | Performed by: INTERNAL MEDICINE

## 2020-06-11 ENCOUNTER — LAB REQUISITION (OUTPATIENT)
Dept: LAB | Facility: HOSPITAL | Age: 75
End: 2020-06-11

## 2020-06-11 DIAGNOSIS — Z00.00 ENCOUNTER FOR GENERAL ADULT MEDICAL EXAMINATION WITHOUT ABNORMAL FINDINGS: ICD-10-CM

## 2020-06-11 LAB
ANION GAP SERPL CALCULATED.3IONS-SCNC: 13 MMOL/L (ref 5–15)
BUN BLD-MCNC: 27 MG/DL (ref 8–23)
BUN/CREAT SERPL: 17.5 (ref 7–25)
CALCIUM SPEC-SCNC: 9.3 MG/DL (ref 8.6–10.5)
CHLORIDE SERPL-SCNC: 107 MMOL/L (ref 98–107)
CO2 SERPL-SCNC: 23 MMOL/L (ref 22–29)
CREAT BLD-MCNC: 1.54 MG/DL (ref 0.57–1)
GFR SERPL CREATININE-BSD FRML MDRD: 33 ML/MIN/1.73
GLUCOSE BLD-MCNC: 104 MG/DL (ref 65–99)
POTASSIUM BLD-SCNC: 4.5 MMOL/L (ref 3.5–5.2)
SODIUM BLD-SCNC: 143 MMOL/L (ref 136–145)

## 2020-06-11 PROCEDURE — 36415 COLL VENOUS BLD VENIPUNCTURE: CPT | Performed by: SPECIALIST

## 2020-06-11 PROCEDURE — 80048 BASIC METABOLIC PNL TOTAL CA: CPT | Performed by: SPECIALIST

## 2020-06-20 LAB
REPORT: NORMAL
SARS-COV-2: NOT DETECTED
THIS TEST SENT TO: NORMAL

## 2020-06-24 ENCOUNTER — LAB REQUISITION (OUTPATIENT)
Dept: LAB | Facility: HOSPITAL | Age: 75
End: 2020-06-24

## 2020-06-24 DIAGNOSIS — Z00.00 ENCOUNTER FOR GENERAL ADULT MEDICAL EXAMINATION WITHOUT ABNORMAL FINDINGS: ICD-10-CM

## 2020-06-24 LAB
ALBUMIN SERPL-MCNC: 4 G/DL (ref 3.5–5.2)
ALP SERPL-CCNC: 73 U/L (ref 39–117)
ALT SERPL W P-5'-P-CCNC: 34 U/L (ref 1–33)
ANION GAP SERPL CALCULATED.3IONS-SCNC: 15 MMOL/L (ref 5–15)
AST SERPL-CCNC: 46 U/L (ref 1–32)
BASOPHILS # BLD AUTO: 0.03 10*3/MM3 (ref 0–0.2)
BASOPHILS NFR BLD AUTO: 0.3 % (ref 0–1.5)
BILIRUB CONJ SERPL-MCNC: <0.2 MG/DL (ref 0.2–0.3)
BILIRUB INDIRECT SERPL-MCNC: ABNORMAL MG/DL
BILIRUB SERPL-MCNC: 0.5 MG/DL (ref 0.2–1.2)
BUN BLD-MCNC: 55 MG/DL (ref 8–23)
BUN/CREAT SERPL: 29.9 (ref 7–25)
CALCIUM SPEC-SCNC: 9.6 MG/DL (ref 8.6–10.5)
CHLORIDE SERPL-SCNC: 110 MMOL/L (ref 98–107)
CHOLEST SERPL-MCNC: 117 MG/DL (ref 0–200)
CO2 SERPL-SCNC: 23 MMOL/L (ref 22–29)
CREAT BLD-MCNC: 1.84 MG/DL (ref 0.57–1)
DEPRECATED RDW RBC AUTO: 53.4 FL (ref 37–54)
EOSINOPHIL # BLD AUTO: 0.18 10*3/MM3 (ref 0–0.4)
EOSINOPHIL NFR BLD AUTO: 1.7 % (ref 0.3–6.2)
ERYTHROCYTE [DISTWIDTH] IN BLOOD BY AUTOMATED COUNT: 15.3 % (ref 12.3–15.4)
GFR SERPL CREATININE-BSD FRML MDRD: 27 ML/MIN/1.73
GLUCOSE BLD-MCNC: 149 MG/DL (ref 65–99)
HBA1C MFR BLD: 6.1 % (ref 4.8–5.6)
HCT VFR BLD AUTO: 30.5 % (ref 34–46.6)
HDLC SERPL-MCNC: 36 MG/DL (ref 40–60)
HGB BLD-MCNC: 9.7 G/DL (ref 12–15.9)
IMM GRANULOCYTES # BLD AUTO: 0.03 10*3/MM3 (ref 0–0.05)
IMM GRANULOCYTES NFR BLD AUTO: 0.3 % (ref 0–0.5)
LDLC SERPL CALC-MCNC: 51 MG/DL (ref 0–100)
LDLC/HDLC SERPL: 1.42 {RATIO}
LYMPHOCYTES # BLD AUTO: 2.57 10*3/MM3 (ref 0.7–3.1)
LYMPHOCYTES NFR BLD AUTO: 24.1 % (ref 19.6–45.3)
MCH RBC QN AUTO: 30.4 PG (ref 26.6–33)
MCHC RBC AUTO-ENTMCNC: 31.8 G/DL (ref 31.5–35.7)
MCV RBC AUTO: 95.6 FL (ref 79–97)
MONOCYTES # BLD AUTO: 0.94 10*3/MM3 (ref 0.1–0.9)
MONOCYTES NFR BLD AUTO: 8.8 % (ref 5–12)
NEUTROPHILS # BLD AUTO: 6.91 10*3/MM3 (ref 1.7–7)
NEUTROPHILS NFR BLD AUTO: 64.8 % (ref 42.7–76)
NRBC BLD AUTO-RTO: 0 /100 WBC (ref 0–0.2)
PLATELET # BLD AUTO: 355 10*3/MM3 (ref 140–450)
PMV BLD AUTO: 11.2 FL (ref 6–12)
POTASSIUM BLD-SCNC: 4.2 MMOL/L (ref 3.5–5.2)
PREALB SERPL-MCNC: 24.4 MG/DL (ref 20–40)
PROT SERPL-MCNC: 6.3 G/DL (ref 6–8.5)
RBC # BLD AUTO: 3.19 10*6/MM3 (ref 3.77–5.28)
SODIUM BLD-SCNC: 148 MMOL/L (ref 136–145)
TRIGL SERPL-MCNC: 150 MG/DL (ref 0–150)
TSH SERPL DL<=0.05 MIU/L-ACNC: 2.33 UIU/ML (ref 0.27–4.2)
VIT B12 BLD-MCNC: 419 PG/ML (ref 211–946)
VLDLC SERPL-MCNC: 30 MG/DL
WBC NRBC COR # BLD: 10.66 10*3/MM3 (ref 3.4–10.8)

## 2020-06-24 PROCEDURE — 84443 ASSAY THYROID STIM HORMONE: CPT | Performed by: FAMILY MEDICINE

## 2020-06-24 PROCEDURE — 84134 ASSAY OF PREALBUMIN: CPT | Performed by: FAMILY MEDICINE

## 2020-06-24 PROCEDURE — 83036 HEMOGLOBIN GLYCOSYLATED A1C: CPT | Performed by: FAMILY MEDICINE

## 2020-06-24 PROCEDURE — 80061 LIPID PANEL: CPT | Performed by: FAMILY MEDICINE

## 2020-06-24 PROCEDURE — 80076 HEPATIC FUNCTION PANEL: CPT | Performed by: FAMILY MEDICINE

## 2020-06-24 PROCEDURE — 85025 COMPLETE CBC W/AUTO DIFF WBC: CPT | Performed by: FAMILY MEDICINE

## 2020-06-24 PROCEDURE — 82607 VITAMIN B-12: CPT | Performed by: FAMILY MEDICINE

## 2020-06-24 PROCEDURE — 36415 COLL VENOUS BLD VENIPUNCTURE: CPT | Performed by: FAMILY MEDICINE

## 2020-06-24 PROCEDURE — 80048 BASIC METABOLIC PNL TOTAL CA: CPT | Performed by: FAMILY MEDICINE

## 2020-06-30 ENCOUNTER — LAB REQUISITION (OUTPATIENT)
Dept: LAB | Facility: HOSPITAL | Age: 75
End: 2020-06-30

## 2020-06-30 DIAGNOSIS — Z00.00 ENCOUNTER FOR GENERAL ADULT MEDICAL EXAMINATION WITHOUT ABNORMAL FINDINGS: ICD-10-CM

## 2020-06-30 LAB
BACTERIA UR QL AUTO: ABNORMAL /HPF
BILIRUB UR QL STRIP: NEGATIVE
CLARITY UR: CLEAR
COLOR UR: YELLOW
GLUCOSE UR STRIP-MCNC: NEGATIVE MG/DL
HGB UR QL STRIP.AUTO: NEGATIVE
HYALINE CASTS UR QL AUTO: ABNORMAL /LPF
KETONES UR QL STRIP: NEGATIVE
LEUKOCYTE ESTERASE UR QL STRIP.AUTO: ABNORMAL
MUCOUS THREADS URNS QL MICRO: ABNORMAL /HPF
NITRITE UR QL STRIP: NEGATIVE
PH UR STRIP.AUTO: <=5 [PH] (ref 5–8)
PROT UR QL STRIP: ABNORMAL
RBC # UR: ABNORMAL /HPF
REF LAB TEST METHOD: ABNORMAL
SP GR UR STRIP: 1.02 (ref 1–1.03)
SQUAMOUS #/AREA URNS HPF: ABNORMAL /HPF
UROBILINOGEN UR QL STRIP: ABNORMAL
WBC UR QL AUTO: ABNORMAL /HPF
YEAST URNS QL MICRO: ABNORMAL /HPF

## 2020-06-30 PROCEDURE — 87086 URINE CULTURE/COLONY COUNT: CPT | Performed by: NURSE PRACTITIONER

## 2020-06-30 PROCEDURE — 81001 URINALYSIS AUTO W/SCOPE: CPT | Performed by: NURSE PRACTITIONER

## 2020-07-01 ENCOUNTER — LAB REQUISITION (OUTPATIENT)
Dept: LAB | Facility: HOSPITAL | Age: 75
End: 2020-07-01

## 2020-07-01 DIAGNOSIS — Z00.00 ENCOUNTER FOR GENERAL ADULT MEDICAL EXAMINATION WITHOUT ABNORMAL FINDINGS: ICD-10-CM

## 2020-07-01 LAB
ALBUMIN SERPL-MCNC: 3.3 G/DL (ref 3.5–5.2)
ALBUMIN/GLOB SERPL: 1.8 G/DL
ALP SERPL-CCNC: 93 U/L (ref 39–117)
ALT SERPL W P-5'-P-CCNC: 26 U/L (ref 1–33)
ANION GAP SERPL CALCULATED.3IONS-SCNC: 10 MMOL/L (ref 5–15)
AST SERPL-CCNC: 17 U/L (ref 1–32)
BACTERIA SPEC AEROBE CULT: NO GROWTH
BILIRUB SERPL-MCNC: 0.4 MG/DL (ref 0.2–1.2)
BUN SERPL-MCNC: 24 MG/DL (ref 8–23)
BUN/CREAT SERPL: 30.4 (ref 7–25)
CALCIUM SPEC-SCNC: 8.7 MG/DL (ref 8.6–10.5)
CHLORIDE SERPL-SCNC: 104 MMOL/L (ref 98–107)
CO2 SERPL-SCNC: 28 MMOL/L (ref 22–29)
CREAT SERPL-MCNC: 0.79 MG/DL (ref 0.57–1)
GFR SERPL CREATININE-BSD FRML MDRD: 71 ML/MIN/1.73
GLOBULIN UR ELPH-MCNC: 1.8 GM/DL
GLUCOSE SERPL-MCNC: 163 MG/DL (ref 65–99)
POTASSIUM SERPL-SCNC: 4.4 MMOL/L (ref 3.5–5.2)
PROT SERPL-MCNC: 5.1 G/DL (ref 6–8.5)
SODIUM SERPL-SCNC: 142 MMOL/L (ref 136–145)

## 2020-07-01 PROCEDURE — 36415 COLL VENOUS BLD VENIPUNCTURE: CPT | Performed by: NURSE PRACTITIONER

## 2020-07-01 PROCEDURE — 80053 COMPREHEN METABOLIC PANEL: CPT | Performed by: NURSE PRACTITIONER

## 2020-07-02 RX ORDER — LORAZEPAM 1 MG/1
1 TABLET ORAL 2 TIMES DAILY
Qty: 60 TABLET | Refills: 0 | Status: SHIPPED | OUTPATIENT
Start: 2020-07-02 | End: 2020-08-01

## 2020-07-22 ENCOUNTER — LAB REQUISITION (OUTPATIENT)
Dept: LAB | Facility: HOSPITAL | Age: 75
End: 2020-07-22

## 2020-07-22 DIAGNOSIS — Z00.00 ENCOUNTER FOR GENERAL ADULT MEDICAL EXAMINATION WITHOUT ABNORMAL FINDINGS: ICD-10-CM

## 2020-07-22 LAB
ALBUMIN SERPL-MCNC: 3.9 G/DL (ref 3.5–5.2)
ALBUMIN/GLOB SERPL: 1.9 G/DL
ALP SERPL-CCNC: 74 U/L (ref 39–117)
ALT SERPL W P-5'-P-CCNC: 11 U/L (ref 1–33)
ANION GAP SERPL CALCULATED.3IONS-SCNC: 14 MMOL/L (ref 5–15)
AST SERPL-CCNC: 18 U/L (ref 1–32)
BASOPHILS # BLD AUTO: 0.03 10*3/MM3 (ref 0–0.2)
BASOPHILS NFR BLD AUTO: 0.3 % (ref 0–1.5)
BILIRUB SERPL-MCNC: 0.4 MG/DL (ref 0–1.2)
BUN SERPL-MCNC: 19 MG/DL (ref 8–23)
BUN/CREAT SERPL: 20 (ref 7–25)
CALCIUM SPEC-SCNC: 9.6 MG/DL (ref 8.6–10.5)
CHLORIDE SERPL-SCNC: 105 MMOL/L (ref 98–107)
CO2 SERPL-SCNC: 25 MMOL/L (ref 22–29)
CREAT SERPL-MCNC: 0.95 MG/DL (ref 0.57–1)
DEPRECATED RDW RBC AUTO: 47.1 FL (ref 37–54)
EOSINOPHIL # BLD AUTO: 0.27 10*3/MM3 (ref 0–0.4)
EOSINOPHIL NFR BLD AUTO: 2.8 % (ref 0.3–6.2)
ERYTHROCYTE [DISTWIDTH] IN BLOOD BY AUTOMATED COUNT: 14.2 % (ref 12.3–15.4)
GFR SERPL CREATININE-BSD FRML MDRD: 57 ML/MIN/1.73
GLOBULIN UR ELPH-MCNC: 2.1 GM/DL
GLUCOSE SERPL-MCNC: 133 MG/DL (ref 65–99)
HCT VFR BLD AUTO: 34.5 % (ref 34–46.6)
HGB BLD-MCNC: 10.8 G/DL (ref 12–15.9)
IMM GRANULOCYTES # BLD AUTO: 0.02 10*3/MM3 (ref 0–0.05)
IMM GRANULOCYTES NFR BLD AUTO: 0.2 % (ref 0–0.5)
LYMPHOCYTES # BLD AUTO: 3.8 10*3/MM3 (ref 0.7–3.1)
LYMPHOCYTES NFR BLD AUTO: 39.7 % (ref 19.6–45.3)
MCH RBC QN AUTO: 28.4 PG (ref 26.6–33)
MCHC RBC AUTO-ENTMCNC: 31.3 G/DL (ref 31.5–35.7)
MCV RBC AUTO: 90.8 FL (ref 79–97)
MONOCYTES # BLD AUTO: 0.87 10*3/MM3 (ref 0.1–0.9)
MONOCYTES NFR BLD AUTO: 9.1 % (ref 5–12)
NEUTROPHILS NFR BLD AUTO: 4.58 10*3/MM3 (ref 1.7–7)
NEUTROPHILS NFR BLD AUTO: 47.9 % (ref 42.7–76)
NRBC BLD AUTO-RTO: 0 /100 WBC (ref 0–0.2)
PLATELET # BLD AUTO: 346 10*3/MM3 (ref 140–450)
PMV BLD AUTO: 11.8 FL (ref 6–12)
POTASSIUM SERPL-SCNC: 4.2 MMOL/L (ref 3.5–5.2)
PROT SERPL-MCNC: 6 G/DL (ref 6–8.5)
RBC # BLD AUTO: 3.8 10*6/MM3 (ref 3.77–5.28)
SODIUM SERPL-SCNC: 144 MMOL/L (ref 136–145)
WBC # BLD AUTO: 9.57 10*3/MM3 (ref 3.4–10.8)

## 2020-07-22 PROCEDURE — 36415 COLL VENOUS BLD VENIPUNCTURE: CPT | Performed by: NURSE PRACTITIONER

## 2020-07-22 PROCEDURE — 85025 COMPLETE CBC W/AUTO DIFF WBC: CPT | Performed by: NURSE PRACTITIONER

## 2020-07-22 PROCEDURE — 80053 COMPREHEN METABOLIC PANEL: CPT | Performed by: NURSE PRACTITIONER

## 2020-08-07 ENCOUNTER — LAB REQUISITION (OUTPATIENT)
Dept: LAB | Facility: HOSPITAL | Age: 75
End: 2020-08-07

## 2020-08-07 DIAGNOSIS — Z00.00 ENCOUNTER FOR GENERAL ADULT MEDICAL EXAMINATION WITHOUT ABNORMAL FINDINGS: ICD-10-CM

## 2020-08-07 LAB
BASOPHILS # BLD AUTO: 0.02 10*3/MM3 (ref 0–0.2)
BASOPHILS NFR BLD AUTO: 0.2 % (ref 0–1.5)
DEPRECATED RDW RBC AUTO: 42.3 FL (ref 37–54)
EOSINOPHIL # BLD AUTO: 0.15 10*3/MM3 (ref 0–0.4)
EOSINOPHIL NFR BLD AUTO: 1.3 % (ref 0.3–6.2)
ERYTHROCYTE [DISTWIDTH] IN BLOOD BY AUTOMATED COUNT: 13.7 % (ref 12.3–15.4)
HCT VFR BLD AUTO: 37.7 % (ref 34–46.6)
HEMOCCULT STL QL: POSITIVE
HGB BLD-MCNC: 12.5 G/DL (ref 12–15.9)
IMM GRANULOCYTES # BLD AUTO: 0.03 10*3/MM3 (ref 0–0.05)
IMM GRANULOCYTES NFR BLD AUTO: 0.3 % (ref 0–0.5)
LYMPHOCYTES # BLD AUTO: 3.64 10*3/MM3 (ref 0.7–3.1)
LYMPHOCYTES NFR BLD AUTO: 31.9 % (ref 19.6–45.3)
MCH RBC QN AUTO: 28 PG (ref 26.6–33)
MCHC RBC AUTO-ENTMCNC: 33.2 G/DL (ref 31.5–35.7)
MCV RBC AUTO: 84.3 FL (ref 79–97)
MONOCYTES # BLD AUTO: 0.88 10*3/MM3 (ref 0.1–0.9)
MONOCYTES NFR BLD AUTO: 7.7 % (ref 5–12)
NEUTROPHILS NFR BLD AUTO: 58.6 % (ref 42.7–76)
NEUTROPHILS NFR BLD AUTO: 6.68 10*3/MM3 (ref 1.7–7)
NRBC BLD AUTO-RTO: 0 /100 WBC (ref 0–0.2)
PLATELET # BLD AUTO: 393 10*3/MM3 (ref 140–450)
PMV BLD AUTO: 11.3 FL (ref 6–12)
RBC # BLD AUTO: 4.47 10*6/MM3 (ref 3.77–5.28)
WBC # BLD AUTO: 11.4 10*3/MM3 (ref 3.4–10.8)

## 2020-08-07 PROCEDURE — 82272 OCCULT BLD FECES 1-3 TESTS: CPT | Performed by: NURSE PRACTITIONER

## 2020-08-07 PROCEDURE — 85025 COMPLETE CBC W/AUTO DIFF WBC: CPT | Performed by: NURSE PRACTITIONER

## 2020-08-08 ENCOUNTER — LAB REQUISITION (OUTPATIENT)
Dept: LAB | Facility: HOSPITAL | Age: 75
End: 2020-08-08

## 2020-08-08 DIAGNOSIS — Z00.00 ENCOUNTER FOR GENERAL ADULT MEDICAL EXAMINATION WITHOUT ABNORMAL FINDINGS: ICD-10-CM

## 2020-08-08 LAB — HEMOCCULT STL QL: NEGATIVE

## 2020-08-08 PROCEDURE — 82272 OCCULT BLD FECES 1-3 TESTS: CPT | Performed by: NURSE PRACTITIONER

## 2020-08-10 ENCOUNTER — LAB REQUISITION (OUTPATIENT)
Dept: LAB | Facility: HOSPITAL | Age: 75
End: 2020-08-10

## 2020-08-10 DIAGNOSIS — Z00.00 ENCOUNTER FOR GENERAL ADULT MEDICAL EXAMINATION WITHOUT ABNORMAL FINDINGS: ICD-10-CM

## 2020-08-10 LAB
BASOPHILS # BLD AUTO: 0.05 10*3/MM3 (ref 0–0.2)
BASOPHILS NFR BLD AUTO: 0.4 % (ref 0–1.5)
DEPRECATED RDW RBC AUTO: 43.8 FL (ref 37–54)
EOSINOPHIL # BLD AUTO: 0.22 10*3/MM3 (ref 0–0.4)
EOSINOPHIL NFR BLD AUTO: 1.7 % (ref 0.3–6.2)
ERYTHROCYTE [DISTWIDTH] IN BLOOD BY AUTOMATED COUNT: 13.8 % (ref 12.3–15.4)
HCT VFR BLD AUTO: 34.4 % (ref 34–46.6)
HGB BLD-MCNC: 10.9 G/DL (ref 12–15.9)
IMM GRANULOCYTES # BLD AUTO: 0.04 10*3/MM3 (ref 0–0.05)
IMM GRANULOCYTES NFR BLD AUTO: 0.3 % (ref 0–0.5)
LYMPHOCYTES # BLD AUTO: 3.72 10*3/MM3 (ref 0.7–3.1)
LYMPHOCYTES NFR BLD AUTO: 28.9 % (ref 19.6–45.3)
MCH RBC QN AUTO: 27.5 PG (ref 26.6–33)
MCHC RBC AUTO-ENTMCNC: 31.7 G/DL (ref 31.5–35.7)
MCV RBC AUTO: 86.9 FL (ref 79–97)
MONOCYTES # BLD AUTO: 0.91 10*3/MM3 (ref 0.1–0.9)
MONOCYTES NFR BLD AUTO: 7.1 % (ref 5–12)
NEUTROPHILS NFR BLD AUTO: 61.6 % (ref 42.7–76)
NEUTROPHILS NFR BLD AUTO: 7.91 10*3/MM3 (ref 1.7–7)
NRBC BLD AUTO-RTO: 0 /100 WBC (ref 0–0.2)
PLATELET # BLD AUTO: 394 10*3/MM3 (ref 140–450)
PMV BLD AUTO: 11.3 FL (ref 6–12)
RBC # BLD AUTO: 3.96 10*6/MM3 (ref 3.77–5.28)
WBC # BLD AUTO: 12.85 10*3/MM3 (ref 3.4–10.8)

## 2020-08-10 PROCEDURE — 36415 COLL VENOUS BLD VENIPUNCTURE: CPT | Performed by: NURSE PRACTITIONER

## 2020-08-10 PROCEDURE — 85025 COMPLETE CBC W/AUTO DIFF WBC: CPT | Performed by: NURSE PRACTITIONER

## 2020-08-14 ENCOUNTER — LAB REQUISITION (OUTPATIENT)
Dept: LAB | Facility: HOSPITAL | Age: 75
End: 2020-08-14

## 2020-08-14 DIAGNOSIS — Z00.00 ENCOUNTER FOR GENERAL ADULT MEDICAL EXAMINATION WITHOUT ABNORMAL FINDINGS: ICD-10-CM

## 2020-08-14 LAB — HEMOCCULT STL QL: POSITIVE

## 2020-08-14 PROCEDURE — 82272 OCCULT BLD FECES 1-3 TESTS: CPT | Performed by: NURSE PRACTITIONER

## 2020-08-17 ENCOUNTER — LAB REQUISITION (OUTPATIENT)
Dept: LAB | Facility: HOSPITAL | Age: 75
End: 2020-08-17

## 2020-08-17 DIAGNOSIS — Z00.00 ENCOUNTER FOR GENERAL ADULT MEDICAL EXAMINATION WITHOUT ABNORMAL FINDINGS: ICD-10-CM

## 2020-08-17 LAB
BASOPHILS # BLD AUTO: 0.04 10*3/MM3 (ref 0–0.2)
BASOPHILS NFR BLD AUTO: 0.3 % (ref 0–1.5)
DEPRECATED RDW RBC AUTO: 42.3 FL (ref 37–54)
EOSINOPHIL # BLD AUTO: 0.21 10*3/MM3 (ref 0–0.4)
EOSINOPHIL NFR BLD AUTO: 1.7 % (ref 0.3–6.2)
ERYTHROCYTE [DISTWIDTH] IN BLOOD BY AUTOMATED COUNT: 13.6 % (ref 12.3–15.4)
HCT VFR BLD AUTO: 33 % (ref 34–46.6)
HGB BLD-MCNC: 10.6 G/DL (ref 12–15.9)
IMM GRANULOCYTES # BLD AUTO: 0.02 10*3/MM3 (ref 0–0.05)
IMM GRANULOCYTES NFR BLD AUTO: 0.2 % (ref 0–0.5)
LYMPHOCYTES # BLD AUTO: 5.09 10*3/MM3 (ref 0.7–3.1)
LYMPHOCYTES NFR BLD AUTO: 40.4 % (ref 19.6–45.3)
MCH RBC QN AUTO: 27.3 PG (ref 26.6–33)
MCHC RBC AUTO-ENTMCNC: 32.1 G/DL (ref 31.5–35.7)
MCV RBC AUTO: 85.1 FL (ref 79–97)
MONOCYTES # BLD AUTO: 1.13 10*3/MM3 (ref 0.1–0.9)
MONOCYTES NFR BLD AUTO: 9 % (ref 5–12)
NEUTROPHILS NFR BLD AUTO: 48.4 % (ref 42.7–76)
NEUTROPHILS NFR BLD AUTO: 6.1 10*3/MM3 (ref 1.7–7)
NRBC BLD AUTO-RTO: 0 /100 WBC (ref 0–0.2)
PLATELET # BLD AUTO: 361 10*3/MM3 (ref 140–450)
PMV BLD AUTO: 11.4 FL (ref 6–12)
RBC # BLD AUTO: 3.88 10*6/MM3 (ref 3.77–5.28)
WBC # BLD AUTO: 12.59 10*3/MM3 (ref 3.4–10.8)

## 2020-08-17 PROCEDURE — 36415 COLL VENOUS BLD VENIPUNCTURE: CPT | Performed by: NURSE PRACTITIONER

## 2020-08-17 PROCEDURE — 85025 COMPLETE CBC W/AUTO DIFF WBC: CPT | Performed by: NURSE PRACTITIONER

## 2020-08-18 ENCOUNTER — LAB REQUISITION (OUTPATIENT)
Dept: LAB | Facility: HOSPITAL | Age: 75
End: 2020-08-18

## 2020-08-18 DIAGNOSIS — Z00.00 ENCOUNTER FOR GENERAL ADULT MEDICAL EXAMINATION WITHOUT ABNORMAL FINDINGS: ICD-10-CM

## 2020-08-18 LAB — HEMOCCULT STL QL: POSITIVE

## 2020-08-18 PROCEDURE — 82272 OCCULT BLD FECES 1-3 TESTS: CPT | Performed by: NURSE PRACTITIONER

## 2020-08-20 ENCOUNTER — LAB REQUISITION (OUTPATIENT)
Dept: LAB | Facility: HOSPITAL | Age: 75
End: 2020-08-20

## 2020-08-20 DIAGNOSIS — Z00.00 ENCOUNTER FOR GENERAL ADULT MEDICAL EXAMINATION WITHOUT ABNORMAL FINDINGS: ICD-10-CM

## 2020-08-20 LAB
BASOPHILS # BLD AUTO: 0.05 10*3/MM3 (ref 0–0.2)
BASOPHILS NFR BLD AUTO: 0.3 % (ref 0–1.5)
DEPRECATED RDW RBC AUTO: 43.6 FL (ref 37–54)
EOSINOPHIL # BLD AUTO: 0.19 10*3/MM3 (ref 0–0.4)
EOSINOPHIL NFR BLD AUTO: 1.2 % (ref 0.3–6.2)
ERYTHROCYTE [DISTWIDTH] IN BLOOD BY AUTOMATED COUNT: 13.8 % (ref 12.3–15.4)
HCT VFR BLD AUTO: 34.7 % (ref 34–46.6)
HEMOCCULT STL QL: POSITIVE
HGB BLD-MCNC: 11 G/DL (ref 12–15.9)
IMM GRANULOCYTES # BLD AUTO: 0.03 10*3/MM3 (ref 0–0.05)
IMM GRANULOCYTES NFR BLD AUTO: 0.2 % (ref 0–0.5)
LYMPHOCYTES # BLD AUTO: 4.94 10*3/MM3 (ref 0.7–3.1)
LYMPHOCYTES NFR BLD AUTO: 32.2 % (ref 19.6–45.3)
MCH RBC QN AUTO: 27.2 PG (ref 26.6–33)
MCHC RBC AUTO-ENTMCNC: 31.7 G/DL (ref 31.5–35.7)
MCV RBC AUTO: 85.9 FL (ref 79–97)
MONOCYTES # BLD AUTO: 1.19 10*3/MM3 (ref 0.1–0.9)
MONOCYTES NFR BLD AUTO: 7.8 % (ref 5–12)
NEUTROPHILS NFR BLD AUTO: 58.3 % (ref 42.7–76)
NEUTROPHILS NFR BLD AUTO: 8.94 10*3/MM3 (ref 1.7–7)
NRBC BLD AUTO-RTO: 0 /100 WBC (ref 0–0.2)
PLATELET # BLD AUTO: 385 10*3/MM3 (ref 140–450)
PMV BLD AUTO: 11.4 FL (ref 6–12)
RBC # BLD AUTO: 4.04 10*6/MM3 (ref 3.77–5.28)
WBC # BLD AUTO: 15.34 10*3/MM3 (ref 3.4–10.8)

## 2020-08-20 PROCEDURE — 82272 OCCULT BLD FECES 1-3 TESTS: CPT | Performed by: NURSE PRACTITIONER

## 2020-08-20 PROCEDURE — 36415 COLL VENOUS BLD VENIPUNCTURE: CPT | Performed by: NURSE PRACTITIONER

## 2020-08-20 PROCEDURE — 85025 COMPLETE CBC W/AUTO DIFF WBC: CPT | Performed by: NURSE PRACTITIONER

## 2020-08-21 ENCOUNTER — LAB REQUISITION (OUTPATIENT)
Dept: LAB | Facility: HOSPITAL | Age: 75
End: 2020-08-21

## 2020-08-21 DIAGNOSIS — Z00.00 ENCOUNTER FOR GENERAL ADULT MEDICAL EXAMINATION WITHOUT ABNORMAL FINDINGS: ICD-10-CM

## 2020-08-21 LAB
HEMOCCULT STL QL: POSITIVE
SARS-COV-2 N GENE RESP QL NAA+PROBE: NOT DETECTED

## 2020-08-21 PROCEDURE — 82272 OCCULT BLD FECES 1-3 TESTS: CPT | Performed by: NURSE PRACTITIONER

## 2020-08-21 PROCEDURE — 87635 SARS-COV-2 COVID-19 AMP PRB: CPT | Performed by: NURSE PRACTITIONER

## 2020-08-22 ENCOUNTER — LAB REQUISITION (OUTPATIENT)
Dept: LAB | Facility: HOSPITAL | Age: 75
End: 2020-08-22

## 2020-08-22 DIAGNOSIS — Z00.00 ENCOUNTER FOR GENERAL ADULT MEDICAL EXAMINATION WITHOUT ABNORMAL FINDINGS: ICD-10-CM

## 2020-08-22 LAB
BACTERIA UR QL AUTO: ABNORMAL /HPF
BILIRUB UR QL STRIP: NEGATIVE
CLARITY UR: ABNORMAL
COLOR UR: YELLOW
GLUCOSE UR STRIP-MCNC: NEGATIVE MG/DL
HGB UR QL STRIP.AUTO: ABNORMAL
HYALINE CASTS UR QL AUTO: ABNORMAL /LPF
KETONES UR QL STRIP: NEGATIVE
LEUKOCYTE ESTERASE UR QL STRIP.AUTO: ABNORMAL
NITRITE UR QL STRIP: NEGATIVE
PH UR STRIP.AUTO: <=5 [PH] (ref 5–8)
PROT UR QL STRIP: ABNORMAL
RBC # UR: ABNORMAL /HPF
REF LAB TEST METHOD: ABNORMAL
SP GR UR STRIP: 1.02 (ref 1–1.03)
SQUAMOUS #/AREA URNS HPF: ABNORMAL /HPF
UROBILINOGEN UR QL STRIP: ABNORMAL
WBC UR QL AUTO: ABNORMAL /HPF
YEAST URNS QL MICRO: ABNORMAL /HPF

## 2020-08-22 PROCEDURE — 81001 URINALYSIS AUTO W/SCOPE: CPT | Performed by: NURSE PRACTITIONER

## 2020-08-22 PROCEDURE — 87086 URINE CULTURE/COLONY COUNT: CPT | Performed by: NURSE PRACTITIONER

## 2020-08-24 LAB — BACTERIA SPEC AEROBE CULT: NORMAL

## 2020-08-25 ENCOUNTER — PATIENT OUTREACH (OUTPATIENT)
Dept: CASE MANAGEMENT | Facility: OTHER | Age: 75
End: 2020-08-25

## 2020-08-25 ENCOUNTER — EPISODE CHANGES (OUTPATIENT)
Dept: CASE MANAGEMENT | Facility: OTHER | Age: 75
End: 2020-08-25

## 2020-08-25 NOTE — OUTREACH NOTE
Care Coordination Note    Received proactive outreach request on [facility] patient. Per chart review, it appears she is currently at [facility]. ACM spoke with AUSTIN Hastings, and she has not discharge plan at this time.     Vicki Nolasco RN  Ambulatory     8/25/2020, 10:38

## 2020-08-27 RX ORDER — FLUCONAZOLE 200 MG/1
200 TABLET ORAL DAILY
COMMUNITY
Start: 2020-08-23 | End: 2020-09-05

## 2020-08-27 RX ORDER — PROPRANOLOL HYDROCHLORIDE 10 MG/1
10 TABLET ORAL 2 TIMES DAILY
COMMUNITY

## 2020-08-27 RX ORDER — LORAZEPAM 1 MG/1
1 TABLET ORAL 2 TIMES DAILY PRN
COMMUNITY
End: 2020-09-24

## 2020-08-27 RX ORDER — PANTOPRAZOLE SODIUM 40 MG/1
40 TABLET, DELAYED RELEASE ORAL DAILY
COMMUNITY

## 2020-08-28 ENCOUNTER — LAB REQUISITION (OUTPATIENT)
Dept: LAB | Facility: HOSPITAL | Age: 75
End: 2020-08-28

## 2020-08-28 ENCOUNTER — VIRTUAL VISIT (OUTPATIENT)
Dept: GASTROENTEROLOGY | Age: 75
End: 2020-08-28
Payer: MEDICARE

## 2020-08-28 DIAGNOSIS — Z00.00 ENCOUNTER FOR GENERAL ADULT MEDICAL EXAMINATION WITHOUT ABNORMAL FINDINGS: ICD-10-CM

## 2020-08-28 PROBLEM — D64.9 ANEMIA: Status: ACTIVE | Noted: 2020-08-28

## 2020-08-28 PROBLEM — K62.5 BRBPR (BRIGHT RED BLOOD PER RECTUM): Status: ACTIVE | Noted: 2020-08-28

## 2020-08-28 LAB
BACTERIA UR QL AUTO: ABNORMAL /HPF
BASOPHILS # BLD AUTO: 0.05 10*3/MM3 (ref 0–0.2)
BASOPHILS NFR BLD AUTO: 0.4 % (ref 0–1.5)
BILIRUB UR QL STRIP: NEGATIVE
CLARITY UR: CLEAR
COLOR UR: YELLOW
DEPRECATED RDW RBC AUTO: 42.2 FL (ref 37–54)
EOSINOPHIL # BLD AUTO: 0.18 10*3/MM3 (ref 0–0.4)
EOSINOPHIL NFR BLD AUTO: 1.4 % (ref 0.3–6.2)
ERYTHROCYTE [DISTWIDTH] IN BLOOD BY AUTOMATED COUNT: 13.7 % (ref 12.3–15.4)
GLUCOSE UR STRIP-MCNC: NEGATIVE MG/DL
HCT VFR BLD AUTO: 35.7 % (ref 34–46.6)
HGB BLD-MCNC: 11.3 G/DL (ref 12–15.9)
HGB UR QL STRIP.AUTO: NEGATIVE
HYALINE CASTS UR QL AUTO: ABNORMAL /LPF
IMM GRANULOCYTES # BLD AUTO: 0.03 10*3/MM3 (ref 0–0.05)
IMM GRANULOCYTES NFR BLD AUTO: 0.2 % (ref 0–0.5)
KETONES UR QL STRIP: NEGATIVE
LEUKOCYTE ESTERASE UR QL STRIP.AUTO: ABNORMAL
LYMPHOCYTES # BLD AUTO: 3.44 10*3/MM3 (ref 0.7–3.1)
LYMPHOCYTES NFR BLD AUTO: 26.6 % (ref 19.6–45.3)
MCH RBC QN AUTO: 26.7 PG (ref 26.6–33)
MCHC RBC AUTO-ENTMCNC: 31.7 G/DL (ref 31.5–35.7)
MCV RBC AUTO: 84.2 FL (ref 79–97)
MONOCYTES # BLD AUTO: 0.98 10*3/MM3 (ref 0.1–0.9)
MONOCYTES NFR BLD AUTO: 7.6 % (ref 5–12)
NEUTROPHILS NFR BLD AUTO: 63.8 % (ref 42.7–76)
NEUTROPHILS NFR BLD AUTO: 8.25 10*3/MM3 (ref 1.7–7)
NITRITE UR QL STRIP: NEGATIVE
NRBC BLD AUTO-RTO: 0 /100 WBC (ref 0–0.2)
PH UR STRIP.AUTO: 6.5 [PH] (ref 5–8)
PLATELET # BLD AUTO: 381 10*3/MM3 (ref 140–450)
PMV BLD AUTO: 11.1 FL (ref 6–12)
PROT UR QL STRIP: NEGATIVE
RBC # BLD AUTO: 4.24 10*6/MM3 (ref 3.77–5.28)
RBC # UR: ABNORMAL /HPF
REF LAB TEST METHOD: ABNORMAL
SP GR UR STRIP: 1.01 (ref 1–1.03)
SQUAMOUS #/AREA URNS HPF: ABNORMAL /HPF
UROBILINOGEN UR QL STRIP: ABNORMAL
WBC # BLD AUTO: 12.93 10*3/MM3 (ref 3.4–10.8)
WBC UR QL AUTO: ABNORMAL /HPF

## 2020-08-28 PROCEDURE — 87086 URINE CULTURE/COLONY COUNT: CPT | Performed by: NURSE PRACTITIONER

## 2020-08-28 PROCEDURE — 85025 COMPLETE CBC W/AUTO DIFF WBC: CPT | Performed by: NURSE PRACTITIONER

## 2020-08-28 PROCEDURE — 99203 OFFICE O/P NEW LOW 30 MIN: CPT | Performed by: NURSE PRACTITIONER

## 2020-08-28 PROCEDURE — 81001 URINALYSIS AUTO W/SCOPE: CPT | Performed by: NURSE PRACTITIONER

## 2020-08-28 PROCEDURE — 36415 COLL VENOUS BLD VENIPUNCTURE: CPT | Performed by: NURSE PRACTITIONER

## 2020-08-28 NOTE — PROGRESS NOTES
2020     Pt location: nursing home    TELEHEALTH EVALUATION -- Audio/Visual (During [id number] public health emergency)    HPI:    Chace Lopez (:  1945) has requested an audio/video evaluation for the following concern(s):    Pt resides in [address] [place] [address]. She is laying in the bed, asleep, and is aphasic. Interview conducted with RN, Angel Burk, whom is at patient's bedside. The referral is for anemia. RN reports bloody stools. That started om 2020. Her pradaxa was held due to this. And is still currently on hold. Has soft formed BMs daily. RN reviewed labs. Her H/H currently is 11.0 / 34.7  And she reviewed labs dating back to when she arrived at the nursing home. And her hgb was 9.5 when she arrived  So it is better now than it is historically. RN reports pt doesn't grimace as if she has any abd pain. Not sure when her last colonoscopy is, its not in their records. RN reports the family wanted to proceed with this referral here for evaluation of anemia and brbpr. Review of Systems   Unable to perform ROS: Patient nonverbal       Prior to Visit Medications    Medication Sig Taking? Authorizing Provider   pantoprazole (PROTONIX) 40 MG tablet Take 40 mg by mouth daily [address] [place] [address]  Historical Provider, MD   propranolol (INDERAL) 10 MG tablet Take 10 mg by mouth 2 times daily FOR TACACARDA  Historical Provider, MD   LORazepam (ATIVAN) 1 MG tablet Take 1 mg by mouth 2 times daily as needed for Anxiety.   Historical Provider, MD   fluconazole (DIFLUCAN) 200 MG tablet Take 200 mg by mouth daily FOR YEAST INFECTION  Historical Provider, MD   atorvastatin (LIPITOR) 20 MG tablet Take 1 tablet by mouth nightly  Mayra Barr MD   busPIRone (BUSPAR) 15 MG tablet Take 15 mg by mouth 2 times daily  Historical Provider, MD   linaGLIPtin (TRADJENTA PO) Take 5 mg by mouth daily  Historical Provider, MD   amLODIPine (NORVASC) 5 MG tablet Take 5 mg by mouth daily  Historical Provider, MD dabigatran (PRADAXA) 150 MG capsule Take 150 mg by mouth 2 times daily  Historical Provider, MD       Social History     Tobacco Use    Smoking status: Never Smoker    Smokeless tobacco: Never Used   Substance Use Topics    Alcohol use: Not Currently    Drug use: Never        PHYSICAL EXAMINATION:  [ INSTRUCTIONS:  \"[x]\" Indicates a positive item  \"[]\" Indicates a negative item  -- DELETE ALL ITEMS NOT EXAMINED]  Vital Signs: (As obtained by patient/caregiver or practitioner observation)    Blood pressure-  Heart rate-    Respiratory rate-    Temperature-  Pulse oximetry-     Constitutional: [] Appears well-developed and well-nourished [x] No apparent distress      [] Abnormal-   Mental status  [] Alert and awake  [] Oriented to person/place/time []Able to follow commands      Eyes:  EOM    []  Normal  [] Abnormal-  Sclera  []  Normal  [] Abnormal -         Discharge []  None visible  [] Abnormal -    HENT:   [x] Normocephalic, atraumatic. [] Abnormal   [] Mouth/Throat: Mucous membranes are moist.     External Ears [x] Normal  [] Abnormal-     Neck: [x] No visualized mass     Pulmonary/Chest: [x] Respiratory effort normal.  [x] No visualized signs of difficulty breathing or respiratory distress        [] Abnormal-      Musculoskeletal:   [] Normal gait with no signs of ataxia         [] Normal range of motion of neck        [] Abnormal-       Neurological:        [] No Facial Asymmetry (Cranial nerve 7 motor function) (limited exam to video visit)          [] No gaze palsy        [] Abnormal-         Skin:        [] No significant exanthematous lesions or discoloration noted on facial skin         [] Abnormal-            Psychiatric:       [] Normal Affect [] No Hallucinations        [] Abnormal-     Other pertinent observable physical exam findings-   Pt is asleep in the bed. Exam via video not able to be fully performed. ASSESSMENT/PLAN:  1.  Anemia, unspecified type  - COLONOSCOPY W/ OR W/O BIOPSY;

## 2020-08-28 NOTE — PATIENT INSTRUCTIONS

## 2020-08-29 LAB — BACTERIA SPEC AEROBE CULT: ABNORMAL

## 2020-09-02 ENCOUNTER — HOSPITAL ENCOUNTER (OUTPATIENT)
Dept: CT IMAGING | Age: 75
Discharge: HOME | End: 2020-09-02
Payer: MEDICARE

## 2020-09-02 ENCOUNTER — EPISODE CHANGES (OUTPATIENT)
Dept: CASE MANAGEMENT | Facility: OTHER | Age: 75
End: 2020-09-02

## 2020-09-02 PROCEDURE — 71250 CT THORAX DX C-: CPT

## 2020-09-14 ENCOUNTER — LAB REQUISITION (OUTPATIENT)
Dept: LAB | Facility: HOSPITAL | Age: 75
End: 2020-09-14

## 2020-09-14 DIAGNOSIS — Z00.00 ENCOUNTER FOR GENERAL ADULT MEDICAL EXAMINATION WITHOUT ABNORMAL FINDINGS: ICD-10-CM

## 2020-09-14 LAB
BASOPHILS # BLD AUTO: 0.06 10*3/MM3 (ref 0–0.2)
BASOPHILS NFR BLD AUTO: 0.5 % (ref 0–1.5)
DEPRECATED RDW RBC AUTO: 41.7 FL (ref 37–54)
EOSINOPHIL # BLD AUTO: 0.21 10*3/MM3 (ref 0–0.4)
EOSINOPHIL NFR BLD AUTO: 1.8 % (ref 0.3–6.2)
ERYTHROCYTE [DISTWIDTH] IN BLOOD BY AUTOMATED COUNT: 14.2 % (ref 12.3–15.4)
HCT VFR BLD AUTO: 36.8 % (ref 34–46.6)
HGB BLD-MCNC: 11.6 G/DL (ref 12–15.9)
IMM GRANULOCYTES # BLD AUTO: 0.02 10*3/MM3 (ref 0–0.05)
IMM GRANULOCYTES NFR BLD AUTO: 0.2 % (ref 0–0.5)
LYMPHOCYTES # BLD AUTO: 3.32 10*3/MM3 (ref 0.7–3.1)
LYMPHOCYTES NFR BLD AUTO: 28.8 % (ref 19.6–45.3)
MCH RBC QN AUTO: 25.7 PG (ref 26.6–33)
MCHC RBC AUTO-ENTMCNC: 31.5 G/DL (ref 31.5–35.7)
MCV RBC AUTO: 81.6 FL (ref 79–97)
MONOCYTES # BLD AUTO: 0.92 10*3/MM3 (ref 0.1–0.9)
MONOCYTES NFR BLD AUTO: 8 % (ref 5–12)
NEUTROPHILS NFR BLD AUTO: 6.99 10*3/MM3 (ref 1.7–7)
NEUTROPHILS NFR BLD AUTO: 60.7 % (ref 42.7–76)
NRBC BLD AUTO-RTO: 0 /100 WBC (ref 0–0.2)
PLATELET # BLD AUTO: 387 10*3/MM3 (ref 140–450)
PMV BLD AUTO: 11.5 FL (ref 6–12)
RBC # BLD AUTO: 4.51 10*6/MM3 (ref 3.77–5.28)
WBC # BLD AUTO: 11.52 10*3/MM3 (ref 3.4–10.8)

## 2020-09-14 PROCEDURE — 85025 COMPLETE CBC W/AUTO DIFF WBC: CPT | Performed by: NURSE PRACTITIONER

## 2020-09-14 PROCEDURE — 36415 COLL VENOUS BLD VENIPUNCTURE: CPT | Performed by: NURSE PRACTITIONER

## 2020-09-21 ENCOUNTER — LAB REQUISITION (OUTPATIENT)
Dept: LAB | Facility: HOSPITAL | Age: 75
End: 2020-09-21

## 2020-09-21 DIAGNOSIS — Z00.00 ENCOUNTER FOR GENERAL ADULT MEDICAL EXAMINATION WITHOUT ABNORMAL FINDINGS: ICD-10-CM

## 2020-09-21 LAB
BASOPHILS # BLD AUTO: 0.03 10*3/MM3 (ref 0–0.2)
BASOPHILS NFR BLD AUTO: 0.3 % (ref 0–1.5)
DEPRECATED RDW RBC AUTO: 42.7 FL (ref 37–54)
EOSINOPHIL # BLD AUTO: 0.14 10*3/MM3 (ref 0–0.4)
EOSINOPHIL NFR BLD AUTO: 1.5 % (ref 0.3–6.2)
ERYTHROCYTE [DISTWIDTH] IN BLOOD BY AUTOMATED COUNT: 14.8 % (ref 12.3–15.4)
HCT VFR BLD AUTO: 36.9 % (ref 34–46.6)
HGB BLD-MCNC: 11.8 G/DL (ref 12–15.9)
IMM GRANULOCYTES # BLD AUTO: 0.03 10*3/MM3 (ref 0–0.05)
IMM GRANULOCYTES NFR BLD AUTO: 0.3 % (ref 0–0.5)
LYMPHOCYTES # BLD AUTO: 2.82 10*3/MM3 (ref 0.7–3.1)
LYMPHOCYTES NFR BLD AUTO: 30.1 % (ref 19.6–45.3)
MCH RBC QN AUTO: 25.6 PG (ref 26.6–33)
MCHC RBC AUTO-ENTMCNC: 32 G/DL (ref 31.5–35.7)
MCV RBC AUTO: 80 FL (ref 79–97)
MONOCYTES # BLD AUTO: 0.79 10*3/MM3 (ref 0.1–0.9)
MONOCYTES NFR BLD AUTO: 8.4 % (ref 5–12)
NEUTROPHILS NFR BLD AUTO: 5.57 10*3/MM3 (ref 1.7–7)
NEUTROPHILS NFR BLD AUTO: 59.4 % (ref 42.7–76)
NRBC BLD AUTO-RTO: 0 /100 WBC (ref 0–0.2)
PLATELET # BLD AUTO: 372 10*3/MM3 (ref 140–450)
PMV BLD AUTO: 11.3 FL (ref 6–12)
RBC # BLD AUTO: 4.61 10*6/MM3 (ref 3.77–5.28)
WBC # BLD AUTO: 9.38 10*3/MM3 (ref 3.4–10.8)

## 2020-09-21 PROCEDURE — 36415 COLL VENOUS BLD VENIPUNCTURE: CPT | Performed by: NURSE PRACTITIONER

## 2020-09-21 PROCEDURE — 85025 COMPLETE CBC W/AUTO DIFF WBC: CPT | Performed by: NURSE PRACTITIONER

## 2020-09-24 RX ORDER — LORAZEPAM 1 MG/1
TABLET ORAL
Qty: 60 TABLET | Refills: 0 | Status: SHIPPED | OUTPATIENT
Start: 2020-09-24 | End: 2020-10-24

## 2020-10-01 ENCOUNTER — TELEPHONE (OUTPATIENT)
Dept: GASTROENTEROLOGY | Age: 75
End: 2020-10-01

## 2020-10-02 ENCOUNTER — TELEPHONE (OUTPATIENT)
Dept: GASTROENTEROLOGY | Age: 75
End: 2020-10-02

## 2020-10-02 NOTE — TELEPHONE ENCOUNTER
I spoke with the  at [facility] about scheduling EGD/CLN for the pt. She said that pt family had decided not to put the pt through the procedures at this time.

## 2020-11-02 ENCOUNTER — LAB REQUISITION (OUTPATIENT)
Dept: LAB | Facility: HOSPITAL | Age: 75
End: 2020-11-02

## 2020-11-02 DIAGNOSIS — Z00.00 ENCOUNTER FOR GENERAL ADULT MEDICAL EXAMINATION WITHOUT ABNORMAL FINDINGS: ICD-10-CM

## 2020-11-02 LAB — SARS-COV-2 RNA PNL SPEC NAA+PROBE: DETECTED

## 2020-11-02 PROCEDURE — 87635 SARS-COV-2 COVID-19 AMP PRB: CPT | Performed by: NURSE PRACTITIONER

## 2020-11-03 ENCOUNTER — LAB REQUISITION (OUTPATIENT)
Dept: LAB | Facility: HOSPITAL | Age: 75
End: 2020-11-03

## 2020-11-03 DIAGNOSIS — Z00.00 ENCOUNTER FOR GENERAL ADULT MEDICAL EXAMINATION WITHOUT ABNORMAL FINDINGS: ICD-10-CM

## 2020-11-03 LAB
ALBUMIN SERPL-MCNC: 4 G/DL (ref 3.5–5.2)
ALBUMIN/GLOB SERPL: 1.5 G/DL
ALP SERPL-CCNC: 76 U/L (ref 39–117)
ALT SERPL W P-5'-P-CCNC: 16 U/L (ref 1–33)
ANION GAP SERPL CALCULATED.3IONS-SCNC: 11 MMOL/L (ref 5–15)
AST SERPL-CCNC: 18 U/L (ref 1–32)
BASOPHILS # BLD AUTO: 0.02 10*3/MM3 (ref 0–0.2)
BASOPHILS NFR BLD AUTO: 0.3 % (ref 0–1.5)
BILIRUB SERPL-MCNC: 0.2 MG/DL (ref 0–1.2)
BUN SERPL-MCNC: 25 MG/DL (ref 8–23)
BUN/CREAT SERPL: 21.4 (ref 7–25)
CALCIUM SPEC-SCNC: 9.8 MG/DL (ref 8.6–10.5)
CHLORIDE SERPL-SCNC: 106 MMOL/L (ref 98–107)
CO2 SERPL-SCNC: 28 MMOL/L (ref 22–29)
CREAT SERPL-MCNC: 1.17 MG/DL (ref 0.57–1)
DEPRECATED RDW RBC AUTO: 50.5 FL (ref 37–54)
EOSINOPHIL # BLD AUTO: 0.02 10*3/MM3 (ref 0–0.4)
EOSINOPHIL NFR BLD AUTO: 0.3 % (ref 0.3–6.2)
ERYTHROCYTE [DISTWIDTH] IN BLOOD BY AUTOMATED COUNT: 17.2 % (ref 12.3–15.4)
GFR SERPL CREATININE-BSD FRML MDRD: 45 ML/MIN/1.73
GLOBULIN UR ELPH-MCNC: 2.7 GM/DL
GLUCOSE SERPL-MCNC: 129 MG/DL (ref 65–99)
HCT VFR BLD AUTO: 38.7 % (ref 34–46.6)
HGB BLD-MCNC: 12.2 G/DL (ref 12–15.9)
IMM GRANULOCYTES # BLD AUTO: 0.02 10*3/MM3 (ref 0–0.05)
IMM GRANULOCYTES NFR BLD AUTO: 0.3 % (ref 0–0.5)
LYMPHOCYTES # BLD AUTO: 3.32 10*3/MM3 (ref 0.7–3.1)
LYMPHOCYTES NFR BLD AUTO: 43.8 % (ref 19.6–45.3)
MCH RBC QN AUTO: 25.7 PG (ref 26.6–33)
MCHC RBC AUTO-ENTMCNC: 31.5 G/DL (ref 31.5–35.7)
MCV RBC AUTO: 81.5 FL (ref 79–97)
MONOCYTES # BLD AUTO: 1.24 10*3/MM3 (ref 0.1–0.9)
MONOCYTES NFR BLD AUTO: 16.4 % (ref 5–12)
NEUTROPHILS NFR BLD AUTO: 2.96 10*3/MM3 (ref 1.7–7)
NEUTROPHILS NFR BLD AUTO: 38.9 % (ref 42.7–76)
NRBC BLD AUTO-RTO: 0 /100 WBC (ref 0–0.2)
PLATELET # BLD AUTO: 243 10*3/MM3 (ref 140–450)
PMV BLD AUTO: 11.5 FL (ref 6–12)
POTASSIUM SERPL-SCNC: 4.9 MMOL/L (ref 3.5–5.2)
PROT SERPL-MCNC: 6.7 G/DL (ref 6–8.5)
RBC # BLD AUTO: 4.75 10*6/MM3 (ref 3.77–5.28)
SODIUM SERPL-SCNC: 145 MMOL/L (ref 136–145)
WBC # BLD AUTO: 7.58 10*3/MM3 (ref 3.4–10.8)

## 2020-11-03 PROCEDURE — 82306 VITAMIN D 25 HYDROXY: CPT | Performed by: NURSE PRACTITIONER

## 2020-11-03 PROCEDURE — 80053 COMPREHEN METABOLIC PANEL: CPT | Performed by: NURSE PRACTITIONER

## 2020-11-03 PROCEDURE — 36415 COLL VENOUS BLD VENIPUNCTURE: CPT | Performed by: NURSE PRACTITIONER

## 2020-11-03 PROCEDURE — 85025 COMPLETE CBC W/AUTO DIFF WBC: CPT | Performed by: NURSE PRACTITIONER

## 2020-11-04 LAB — 25(OH)D3 SERPL-MCNC: 19.5 NG/ML (ref 30–100)

## 2020-11-04 RX ORDER — LORAZEPAM 1 MG/1
1 TABLET ORAL 2 TIMES DAILY PRN
Qty: 60 TABLET | Refills: 0 | Status: SHIPPED | OUTPATIENT
Start: 2020-11-04 | End: 2020-12-04

## 2021-01-20 ENCOUNTER — LAB REQUISITION (OUTPATIENT)
Dept: LAB | Facility: HOSPITAL | Age: 76
End: 2021-01-20

## 2021-01-20 DIAGNOSIS — Z00.00 ENCOUNTER FOR GENERAL ADULT MEDICAL EXAMINATION WITHOUT ABNORMAL FINDINGS: ICD-10-CM

## 2021-01-20 LAB
ANION GAP SERPL CALCULATED.3IONS-SCNC: 12 MMOL/L (ref 5–15)
BACTERIA UR QL AUTO: ABNORMAL /HPF
BASOPHILS # BLD AUTO: 0.04 10*3/MM3 (ref 0–0.2)
BASOPHILS NFR BLD AUTO: 0.3 % (ref 0–1.5)
BILIRUB UR QL STRIP: NEGATIVE
BUN SERPL-MCNC: 24 MG/DL (ref 8–23)
BUN/CREAT SERPL: 32 (ref 7–25)
CALCIUM SPEC-SCNC: 9.9 MG/DL (ref 8.6–10.5)
CHLORIDE SERPL-SCNC: 109 MMOL/L (ref 98–107)
CLARITY UR: ABNORMAL
CO2 SERPL-SCNC: 21 MMOL/L (ref 22–29)
COLOR UR: YELLOW
CREAT SERPL-MCNC: 0.75 MG/DL (ref 0.57–1)
DEPRECATED RDW RBC AUTO: 46.7 FL (ref 37–54)
EOSINOPHIL # BLD AUTO: 0.23 10*3/MM3 (ref 0–0.4)
EOSINOPHIL NFR BLD AUTO: 2 % (ref 0.3–6.2)
ERYTHROCYTE [DISTWIDTH] IN BLOOD BY AUTOMATED COUNT: 15.4 % (ref 12.3–15.4)
GFR SERPL CREATININE-BSD FRML MDRD: 75 ML/MIN/1.73
GLUCOSE SERPL-MCNC: 192 MG/DL (ref 65–99)
GLUCOSE UR STRIP-MCNC: ABNORMAL MG/DL
HCT VFR BLD AUTO: 35.4 % (ref 34–46.6)
HGB BLD-MCNC: 12.1 G/DL (ref 12–15.9)
HGB UR QL STRIP.AUTO: ABNORMAL
HYALINE CASTS UR QL AUTO: ABNORMAL /LPF
IMM GRANULOCYTES # BLD AUTO: 0.03 10*3/MM3 (ref 0–0.05)
IMM GRANULOCYTES NFR BLD AUTO: 0.3 % (ref 0–0.5)
KETONES UR QL STRIP: NEGATIVE
LEUKOCYTE ESTERASE UR QL STRIP.AUTO: ABNORMAL
LYMPHOCYTES # BLD AUTO: 3.57 10*3/MM3 (ref 0.7–3.1)
LYMPHOCYTES NFR BLD AUTO: 30.5 % (ref 19.6–45.3)
MCH RBC QN AUTO: 28.9 PG (ref 26.6–33)
MCHC RBC AUTO-ENTMCNC: 34.2 G/DL (ref 31.5–35.7)
MCV RBC AUTO: 84.5 FL (ref 79–97)
MONOCYTES # BLD AUTO: 0.89 10*3/MM3 (ref 0.1–0.9)
MONOCYTES NFR BLD AUTO: 7.6 % (ref 5–12)
NEUTROPHILS NFR BLD AUTO: 59.3 % (ref 42.7–76)
NEUTROPHILS NFR BLD AUTO: 6.93 10*3/MM3 (ref 1.7–7)
NITRITE UR QL STRIP: POSITIVE
NRBC BLD AUTO-RTO: 0 /100 WBC (ref 0–0.2)
PH UR STRIP.AUTO: 6 [PH] (ref 5–8)
PLATELET # BLD AUTO: 326 10*3/MM3 (ref 140–450)
PMV BLD AUTO: 11.3 FL (ref 6–12)
POTASSIUM SERPL-SCNC: 3.6 MMOL/L (ref 3.5–5.2)
PROT UR QL STRIP: NEGATIVE
RBC # BLD AUTO: 4.19 10*6/MM3 (ref 3.77–5.28)
RBC # UR: ABNORMAL /HPF
REF LAB TEST METHOD: ABNORMAL
SODIUM SERPL-SCNC: 142 MMOL/L (ref 136–145)
SP GR UR STRIP: 1.02 (ref 1–1.03)
SQUAMOUS #/AREA URNS HPF: ABNORMAL /HPF
UROBILINOGEN UR QL STRIP: ABNORMAL
WBC # BLD AUTO: 11.69 10*3/MM3 (ref 3.4–10.8)
WBC UR QL AUTO: ABNORMAL /HPF

## 2021-01-20 PROCEDURE — 87086 URINE CULTURE/COLONY COUNT: CPT | Performed by: NURSE PRACTITIONER

## 2021-01-20 PROCEDURE — 81001 URINALYSIS AUTO W/SCOPE: CPT | Performed by: NURSE PRACTITIONER

## 2021-01-20 PROCEDURE — 80048 BASIC METABOLIC PNL TOTAL CA: CPT | Performed by: FAMILY MEDICINE

## 2021-01-20 PROCEDURE — 85025 COMPLETE CBC W/AUTO DIFF WBC: CPT | Performed by: FAMILY MEDICINE

## 2021-01-21 LAB — BACTERIA SPEC AEROBE CULT: NORMAL

## 2021-01-22 ENCOUNTER — LAB REQUISITION (OUTPATIENT)
Dept: LAB | Facility: HOSPITAL | Age: 76
End: 2021-01-22

## 2021-01-22 DIAGNOSIS — Z00.00 ENCOUNTER FOR GENERAL ADULT MEDICAL EXAMINATION WITHOUT ABNORMAL FINDINGS: ICD-10-CM

## 2021-01-22 LAB
BASOPHILS # BLD AUTO: 0.04 10*3/MM3 (ref 0–0.2)
BASOPHILS NFR BLD AUTO: 0.4 % (ref 0–1.5)
DEPRECATED RDW RBC AUTO: 46.5 FL (ref 37–54)
EOSINOPHIL # BLD AUTO: 0.14 10*3/MM3 (ref 0–0.4)
EOSINOPHIL NFR BLD AUTO: 1.4 % (ref 0.3–6.2)
ERYTHROCYTE [DISTWIDTH] IN BLOOD BY AUTOMATED COUNT: 15.3 % (ref 12.3–15.4)
HCT VFR BLD AUTO: 37.2 % (ref 34–46.6)
HGB BLD-MCNC: 12.9 G/DL (ref 12–15.9)
IMM GRANULOCYTES # BLD AUTO: 0.02 10*3/MM3 (ref 0–0.05)
IMM GRANULOCYTES NFR BLD AUTO: 0.2 % (ref 0–0.5)
LYMPHOCYTES # BLD AUTO: 3.69 10*3/MM3 (ref 0.7–3.1)
LYMPHOCYTES NFR BLD AUTO: 36.2 % (ref 19.6–45.3)
MCH RBC QN AUTO: 28.9 PG (ref 26.6–33)
MCHC RBC AUTO-ENTMCNC: 34.7 G/DL (ref 31.5–35.7)
MCV RBC AUTO: 83.2 FL (ref 79–97)
MONOCYTES # BLD AUTO: 0.91 10*3/MM3 (ref 0.1–0.9)
MONOCYTES NFR BLD AUTO: 8.9 % (ref 5–12)
NEUTROPHILS NFR BLD AUTO: 5.38 10*3/MM3 (ref 1.7–7)
NEUTROPHILS NFR BLD AUTO: 52.9 % (ref 42.7–76)
NRBC BLD AUTO-RTO: 0 /100 WBC (ref 0–0.2)
PLATELET # BLD AUTO: 358 10*3/MM3 (ref 140–450)
PMV BLD AUTO: 11.1 FL (ref 6–12)
RBC # BLD AUTO: 4.47 10*6/MM3 (ref 3.77–5.28)
WBC # BLD AUTO: 10.18 10*3/MM3 (ref 3.4–10.8)

## 2021-01-22 PROCEDURE — 36415 COLL VENOUS BLD VENIPUNCTURE: CPT | Performed by: NURSE PRACTITIONER

## 2021-01-22 PROCEDURE — 85025 COMPLETE CBC W/AUTO DIFF WBC: CPT | Performed by: NURSE PRACTITIONER

## 2021-03-05 DIAGNOSIS — F41.9 ANXIETY: Primary | ICD-10-CM

## 2021-03-05 RX ORDER — LORAZEPAM 1 MG/1
TABLET ORAL
Qty: 60 TABLET | Refills: 0 | Status: SHIPPED | OUTPATIENT
Start: 2021-03-05 | End: 2021-04-04

## 2021-08-26 ENCOUNTER — LAB REQUISITION (OUTPATIENT)
Dept: LAB | Facility: HOSPITAL | Age: 76
End: 2021-08-26

## 2021-08-26 DIAGNOSIS — Z00.00 ENCOUNTER FOR GENERAL ADULT MEDICAL EXAMINATION WITHOUT ABNORMAL FINDINGS: ICD-10-CM

## 2021-08-26 LAB — HBA1C MFR BLD: 5.8 % (ref 4.8–5.6)

## 2021-08-26 PROCEDURE — 83036 HEMOGLOBIN GLYCOSYLATED A1C: CPT | Performed by: FAMILY MEDICINE

## 2021-08-26 PROCEDURE — 36415 COLL VENOUS BLD VENIPUNCTURE: CPT | Performed by: FAMILY MEDICINE

## 2021-10-12 ENCOUNTER — LAB REQUISITION (OUTPATIENT)
Dept: LAB | Facility: HOSPITAL | Age: 76
End: 2021-10-12

## 2021-10-12 DIAGNOSIS — Z00.00 ENCOUNTER FOR GENERAL ADULT MEDICAL EXAMINATION WITHOUT ABNORMAL FINDINGS: ICD-10-CM

## 2021-10-12 LAB
BACTERIA UR QL AUTO: ABNORMAL /HPF
BILIRUB UR QL STRIP: NEGATIVE
CLARITY UR: CLEAR
COLOR UR: YELLOW
GLUCOSE UR STRIP-MCNC: NEGATIVE MG/DL
HGB UR QL STRIP.AUTO: ABNORMAL
HYALINE CASTS UR QL AUTO: ABNORMAL /LPF
KETONES UR QL STRIP: NEGATIVE
LEUKOCYTE ESTERASE UR QL STRIP.AUTO: ABNORMAL
NITRITE UR QL STRIP: POSITIVE
PH UR STRIP.AUTO: 5.5 [PH] (ref 5–8)
PROT UR QL STRIP: NEGATIVE
RBC # UR: ABNORMAL /HPF
REF LAB TEST METHOD: ABNORMAL
SP GR UR STRIP: 1.01 (ref 1–1.03)
SQUAMOUS #/AREA URNS HPF: ABNORMAL /HPF
UROBILINOGEN UR QL STRIP: ABNORMAL
WBC CLUMPS # UR AUTO: ABNORMAL /HPF
WBC UR QL AUTO: ABNORMAL /HPF

## 2021-10-12 PROCEDURE — 81001 URINALYSIS AUTO W/SCOPE: CPT | Performed by: NURSE PRACTITIONER

## 2021-12-23 DIAGNOSIS — M54.50 MIDLINE LOW BACK PAIN WITHOUT SCIATICA, UNSPECIFIED CHRONICITY: Primary | ICD-10-CM

## 2021-12-23 RX ORDER — OXYCODONE HYDROCHLORIDE AND ACETAMINOPHEN 5; 325 MG/1; MG/1
TABLET ORAL
Qty: 120 TABLET | Refills: 0 | Status: SHIPPED | OUTPATIENT
Start: 2021-12-23 | End: 2022-01-22

## 2021-12-30 ENCOUNTER — LAB REQUISITION (OUTPATIENT)
Dept: LAB | Facility: HOSPITAL | Age: 76
End: 2021-12-30

## 2021-12-30 DIAGNOSIS — Z00.00 ENCOUNTER FOR GENERAL ADULT MEDICAL EXAMINATION WITHOUT ABNORMAL FINDINGS: ICD-10-CM

## 2021-12-30 LAB — HBA1C MFR BLD: 5.1 % (ref 4.8–5.6)

## 2021-12-30 PROCEDURE — 36415 COLL VENOUS BLD VENIPUNCTURE: CPT | Performed by: FAMILY MEDICINE

## 2021-12-30 PROCEDURE — 83036 HEMOGLOBIN GLYCOSYLATED A1C: CPT | Performed by: FAMILY MEDICINE

## 2022-01-03 DIAGNOSIS — F41.9 ANXIETY: Primary | ICD-10-CM

## 2022-01-07 RX ORDER — LORAZEPAM 0.5 MG/1
TABLET ORAL
Qty: 60 TABLET | Refills: 0 | Status: SHIPPED | OUTPATIENT
Start: 2022-01-07 | End: 2022-02-06

## 2022-01-10 ENCOUNTER — LAB REQUISITION (OUTPATIENT)
Dept: LAB | Facility: HOSPITAL | Age: 77
End: 2022-01-10

## 2022-01-10 DIAGNOSIS — Z00.00 ENCOUNTER FOR GENERAL ADULT MEDICAL EXAMINATION WITHOUT ABNORMAL FINDINGS: ICD-10-CM

## 2022-01-10 LAB — HBA1C MFR BLD: 5.3 % (ref 4.8–5.6)

## 2022-01-10 PROCEDURE — 36415 COLL VENOUS BLD VENIPUNCTURE: CPT | Performed by: FAMILY MEDICINE

## 2022-01-10 PROCEDURE — 83036 HEMOGLOBIN GLYCOSYLATED A1C: CPT | Performed by: FAMILY MEDICINE

## 2022-04-26 ENCOUNTER — LAB REQUISITION (OUTPATIENT)
Dept: LAB | Facility: HOSPITAL | Age: 77
End: 2022-04-26

## 2022-04-26 DIAGNOSIS — Z00.00 ENCOUNTER FOR GENERAL ADULT MEDICAL EXAMINATION WITHOUT ABNORMAL FINDINGS: ICD-10-CM

## 2022-04-26 LAB — HBA1C MFR BLD: 5.2 % (ref 4.8–5.6)

## 2022-04-26 PROCEDURE — 36415 COLL VENOUS BLD VENIPUNCTURE: CPT | Performed by: INTERNAL MEDICINE

## 2022-04-26 PROCEDURE — 83036 HEMOGLOBIN GLYCOSYLATED A1C: CPT | Performed by: INTERNAL MEDICINE

## 2022-08-25 ENCOUNTER — LAB REQUISITION (OUTPATIENT)
Dept: LAB | Facility: HOSPITAL | Age: 77
End: 2022-08-25

## 2022-08-25 DIAGNOSIS — Z00.00 ENCOUNTER FOR GENERAL ADULT MEDICAL EXAMINATION WITHOUT ABNORMAL FINDINGS: ICD-10-CM

## 2022-08-25 LAB — HBA1C MFR BLD: 5.2 % (ref 4.8–5.6)

## 2022-08-25 PROCEDURE — 36415 COLL VENOUS BLD VENIPUNCTURE: CPT | Performed by: INTERNAL MEDICINE

## 2022-08-25 PROCEDURE — 83036 HEMOGLOBIN GLYCOSYLATED A1C: CPT | Performed by: INTERNAL MEDICINE

## 2022-12-26 ENCOUNTER — LAB REQUISITION (OUTPATIENT)
Dept: LAB | Facility: HOSPITAL | Age: 77
End: 2022-12-26

## 2022-12-26 DIAGNOSIS — Z00.00 ENCOUNTER FOR GENERAL ADULT MEDICAL EXAMINATION WITHOUT ABNORMAL FINDINGS: ICD-10-CM

## 2022-12-26 LAB — HBA1C MFR BLD: 5.3 % (ref 4.8–5.6)

## 2022-12-26 PROCEDURE — 36415 COLL VENOUS BLD VENIPUNCTURE: CPT | Performed by: INTERNAL MEDICINE

## 2022-12-26 PROCEDURE — 83036 HEMOGLOBIN GLYCOSYLATED A1C: CPT | Performed by: INTERNAL MEDICINE

## 2023-04-07 NOTE — ED PROVIDER NOTES
Subjective   74-year-old lady with a past medical history of hypertension, hyperlipidemia, TIA who presents the emergency department with left arm ataxia.  She states this was sudden onset somewhere between 3 and 5 days ago.  It is intermittent.  No exacerbating or relieving factors.  Associated with a feeling of heaviness in her bilateral lower extremities.  She denies any headache, vision changes, neck pain, shoulder pain, arm pain, chest pain, shortness of breath, abdominal pain, nausea, vomiting, fevers, chills, cough, dysuria, or hematuria.  She states she called her primary care provider who was concerned she had a stroke so she was sent here for further evaluation.  She denies any numbness or weakness.    Past medical history: Hypertension, hyperlipidemia, TIA  Medications: Plavix      History provided by:  Patient      Review of Systems   All other systems reviewed and are negative.      Past Medical History:   Diagnosis Date   • Depression    • Hypercholesteremia    • Hypertension    • TIA (transient ischemic attack)        No Known Allergies    Past Surgical History:   Procedure Laterality Date   • HYSTERECTOMY     • SKIN CANCER EXCISION         History reviewed. No pertinent family history.    Social History     Socioeconomic History   • Marital status:      Spouse name: Not on file   • Number of children: Not on file   • Years of education: Not on file   • Highest education level: Not on file   Tobacco Use   • Smoking status: Never Smoker   Substance and Sexual Activity   • Alcohol use: No   • Drug use: No   • Sexual activity: Defer           Objective   Physical Exam   Constitutional: She is oriented to person, place, and time. She appears well-developed and well-nourished. No distress.   HENT:   Head: Normocephalic and atraumatic.   Eyes: Conjunctivae and EOM are normal. Right eye exhibits no discharge. Left eye exhibits no discharge.   Neck: Normal range of motion. Neck supple. No JVD present.  No tracheal deviation present.   Cardiovascular: Normal rate, regular rhythm, normal heart sounds and intact distal pulses. Exam reveals no gallop and no friction rub.   No murmur heard.  Pulmonary/Chest: Effort normal and breath sounds normal. No stridor. No respiratory distress. She has no wheezes. She has no rales. She exhibits no tenderness.   Abdominal: Soft. Bowel sounds are normal. She exhibits no distension and no mass. There is no tenderness. There is no rebound and no guarding. No hernia.   Musculoskeletal: Normal range of motion. She exhibits no edema or deformity.   Neurological: She is alert and oriented to person, place, and time. No cranial nerve deficit or sensory deficit. She exhibits normal muscle tone. Coordination normal.   NIH stroke scale 0.  Alert and oriented.  5 out of 5 strength in bilateral upper and bilateral lower extremities.  Normal sensation to light touch in bilateral lower extremities.  Normal finger-to-nose in the right arm.  Patient does not want to try it with her left arm.   Skin: Skin is warm and dry. Capillary refill takes less than 2 seconds. No rash noted. She is not diaphoretic. No pallor.   Psychiatric: She has a normal mood and affect. Her behavior is normal.   Nursing note and vitals reviewed.      Procedures           ED Course                                           MDM  Number of Diagnoses or Management Options  Left arm weakness: new and requires workup  Diagnosis management comments: Patient presents with left arm ataxia.  Upon arrival she is in no acute distress and vital signs are reassuring.  She is far outside the stroke alert window so stroke alert is not called.  Overall, physical exam is remarkable for no objective finding.  She has no neck pain or shoulder pain to suggest some sort of injury to her arm.  She has no back pain to suggest cauda equina syndrome.  She has no urinary retention, urinary incontinence, stool incontinence, saddle anesthesia.  She is  evaluated with chest x-ray, lab work, and CT angiogram of the head and neck. Overall lab work is reassuring as is her CT scan. I considered spinal cord pathology however she has no spinal pain or tenderness. I considered brachial plexus pathology however she has no trauma and no arm pain. Labwork overall is unrevealing. Due to her complex presentation I have discussed the case with neurology who has recommended admission and MRI. Due to this I have discussed the case with the patient's PCP who has graciously agreed to admit her to his service. I have ordered a MRI brain for the morning as well as a diet, vitals, and telemtery. The patient is in agreement with this plan. She has been admitted in stable condition for further evaluation and management.        Amount and/or Complexity of Data Reviewed  Clinical lab tests: ordered and reviewed  Tests in the radiology section of CPT®: ordered and reviewed  Discuss the patient with other providers: yes (Dr. Gaspar and Dr. Luna)    Risk of Complications, Morbidity, and/or Mortality  Presenting problems: moderate  Diagnostic procedures: low  Management options: moderate    Patient Progress  Patient progress: stable      Final diagnoses:   Left arm weakness            Thomas Rosales MD  05/02/20 0032     You can access the FollowMyHealth Patient Portal offered by [facility] by registering at the following website: [email or web address]. By joining [employer]’s FollowMyHealth portal, you will also be able to view your health information using other applications (apps) compatible with our system.

## 2023-08-16 ENCOUNTER — LAB REQUISITION (OUTPATIENT)
Dept: LAB | Facility: HOSPITAL | Age: 78
End: 2023-08-16
Payer: MEDICARE

## 2023-08-16 LAB — HBA1C MFR BLD: 5.6 % (ref 4.8–5.6)

## 2023-08-16 PROCEDURE — 83036 HEMOGLOBIN GLYCOSYLATED A1C: CPT | Performed by: INTERNAL MEDICINE

## 2023-08-16 PROCEDURE — 36415 COLL VENOUS BLD VENIPUNCTURE: CPT | Performed by: INTERNAL MEDICINE

## 2023-12-07 ENCOUNTER — LAB REQUISITION (OUTPATIENT)
Dept: LAB | Facility: HOSPITAL | Age: 78
End: 2023-12-07
Payer: MEDICARE

## 2023-12-07 DIAGNOSIS — E11.9 TYPE 2 DIABETES MELLITUS WITHOUT COMPLICATIONS: ICD-10-CM

## 2023-12-07 LAB — HBA1C MFR BLD: 5.9 % (ref 4.8–5.6)

## 2023-12-07 PROCEDURE — 83036 HEMOGLOBIN GLYCOSYLATED A1C: CPT | Performed by: PHYSICIAN ASSISTANT

## 2023-12-07 PROCEDURE — 36415 COLL VENOUS BLD VENIPUNCTURE: CPT | Performed by: PHYSICIAN ASSISTANT

## 2024-02-11 ENCOUNTER — APPOINTMENT (OUTPATIENT)
Dept: GENERAL RADIOLOGY | Facility: HOSPITAL | Age: 79
End: 2024-02-11
Payer: MEDICARE

## 2024-02-11 ENCOUNTER — HOSPITAL ENCOUNTER (EMERGENCY)
Facility: HOSPITAL | Age: 79
Discharge: NURSING, REHAB OR LONG-TERM CARE | End: 2024-02-11
Admitting: INTERNAL MEDICINE
Payer: MEDICARE

## 2024-02-11 ENCOUNTER — APPOINTMENT (OUTPATIENT)
Dept: CT IMAGING | Facility: HOSPITAL | Age: 79
End: 2024-02-11
Payer: MEDICARE

## 2024-02-11 VITALS
TEMPERATURE: 98.7 F | HEIGHT: 60 IN | WEIGHT: 92 LBS | RESPIRATION RATE: 18 BRPM | HEART RATE: 65 BPM | OXYGEN SATURATION: 100 % | BODY MASS INDEX: 18.06 KG/M2 | DIASTOLIC BLOOD PRESSURE: 81 MMHG | SYSTOLIC BLOOD PRESSURE: 149 MMHG
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
## 2024-02-11 DIAGNOSIS — K59.00 CONSTIPATION, UNSPECIFIED CONSTIPATION TYPE: ICD-10-CM

## 2024-02-11 PROCEDURE — 72125 CT NECK SPINE W/O DYE: CPT

## 2024-02-11 PROCEDURE — 73523 X-RAY EXAM HIPS BI 5/> VIEWS: CPT

## 2024-02-11 PROCEDURE — 72128 CT CHEST SPINE W/O DYE: CPT

## 2024-02-11 PROCEDURE — 70450 CT HEAD/BRAIN W/O DYE: CPT

## 2024-02-11 PROCEDURE — 99284 EMERGENCY DEPT VISIT MOD MDM: CPT

## 2024-02-11 RX ORDER — BISACODYL 10 MG
10 SUPPOSITORY, RECTAL RECTAL ONCE
Status: COMPLETED | OUTPATIENT
Start: 2024-02-11 | End: 2024-02-11

## 2024-02-11 RX ADMIN — BISACODYL 10 MG: 10 SUPPOSITORY RECTAL at 14:02
